# Patient Record
Sex: MALE | Race: WHITE | Employment: UNEMPLOYED | URBAN - METROPOLITAN AREA
[De-identification: names, ages, dates, MRNs, and addresses within clinical notes are randomized per-mention and may not be internally consistent; named-entity substitution may affect disease eponyms.]

---

## 2017-03-17 ENCOUNTER — APPOINTMENT (OUTPATIENT)
Dept: GENERAL RADIOLOGY | Age: 52
End: 2017-03-17
Payer: COMMERCIAL

## 2017-03-17 ENCOUNTER — HOSPITAL ENCOUNTER (EMERGENCY)
Age: 52
Discharge: HOME | End: 2017-03-17
Attending: EMERGENCY MEDICINE
Payer: COMMERCIAL

## 2017-03-17 VITALS
HEART RATE: 95 BPM | BODY MASS INDEX: 21.98 KG/M2 | SYSTOLIC BLOOD PRESSURE: 102 MMHG | HEIGHT: 68 IN | DIASTOLIC BLOOD PRESSURE: 84 MMHG | RESPIRATION RATE: 18 BRPM | OXYGEN SATURATION: 96 % | TEMPERATURE: 98.2 F | WEIGHT: 145 LBS

## 2017-03-17 DIAGNOSIS — S43.402A SPRAIN SHOULDER/ARM, LEFT, INITIAL ENCOUNTER: Primary | ICD-10-CM

## 2017-03-17 PROCEDURE — 73030 X-RAY EXAM OF SHOULDER: CPT

## 2017-03-17 PROCEDURE — 99283 EMERGENCY DEPT VISIT LOW MDM: CPT

## 2017-03-17 PROCEDURE — 6360000002 HC RX W HCPCS: Performed by: EMERGENCY MEDICINE

## 2017-03-17 PROCEDURE — 96372 THER/PROPH/DIAG INJ SC/IM: CPT

## 2017-03-17 RX ORDER — COVID-19 ANTIGEN TEST
1 KIT MISCELLANEOUS 2 TIMES DAILY
COMMUNITY
End: 2017-03-25

## 2017-03-17 RX ORDER — NAPROXEN 500 MG/1
500 TABLET ORAL 2 TIMES DAILY
Qty: 10 TABLET | Refills: 0 | Status: SHIPPED | OUTPATIENT
Start: 2017-03-17 | End: 2017-03-25

## 2017-03-17 RX ORDER — KETOROLAC TROMETHAMINE 30 MG/ML
60 INJECTION, SOLUTION INTRAMUSCULAR; INTRAVENOUS ONCE
Status: COMPLETED | OUTPATIENT
Start: 2017-03-17 | End: 2017-03-17

## 2017-03-17 RX ORDER — IBUPROFEN 600 MG/1
600 TABLET ORAL EVERY 6 HOURS PRN
COMMUNITY
End: 2017-03-25

## 2017-03-17 RX ADMIN — KETOROLAC TROMETHAMINE 60 MG: 30 INJECTION, SOLUTION INTRAMUSCULAR at 23:37

## 2017-03-17 ASSESSMENT — ENCOUNTER SYMPTOMS
EYE REDNESS: 0
SORE THROAT: 0
ABDOMINAL PAIN: 0
PHOTOPHOBIA: 0
RHINORRHEA: 0
COUGH: 0
SHORTNESS OF BREATH: 0
BACK PAIN: 0
COLOR CHANGE: 0
NAUSEA: 0

## 2017-03-17 ASSESSMENT — PAIN DESCRIPTION - LOCATION: LOCATION: SHOULDER

## 2017-03-17 ASSESSMENT — PAIN DESCRIPTION - PAIN TYPE: TYPE: ACUTE PAIN

## 2017-03-17 ASSESSMENT — PAIN DESCRIPTION - ORIENTATION: ORIENTATION: LEFT

## 2017-03-17 ASSESSMENT — PAIN DESCRIPTION - DESCRIPTORS: DESCRIPTORS: BURNING

## 2017-03-17 ASSESSMENT — PAIN SCALES - GENERAL
PAINLEVEL_OUTOF10: 8
PAINLEVEL_OUTOF10: 8

## 2017-03-23 ENCOUNTER — HOSPITAL ENCOUNTER (EMERGENCY)
Age: 52
Discharge: HOME | End: 2017-03-23
Attending: EMERGENCY MEDICINE
Payer: COMMERCIAL

## 2017-03-23 VITALS
HEART RATE: 73 BPM | BODY MASS INDEX: 21.98 KG/M2 | RESPIRATION RATE: 16 BRPM | TEMPERATURE: 97.9 F | WEIGHT: 145 LBS | SYSTOLIC BLOOD PRESSURE: 123 MMHG | DIASTOLIC BLOOD PRESSURE: 76 MMHG | HEIGHT: 68 IN | OXYGEN SATURATION: 99 %

## 2017-03-23 DIAGNOSIS — T25.222A SECOND DEGREE BURN OF FOOT, LEFT, INITIAL ENCOUNTER: Primary | ICD-10-CM

## 2017-03-23 PROCEDURE — 99283 EMERGENCY DEPT VISIT LOW MDM: CPT

## 2017-03-23 PROCEDURE — 2500000003 HC RX 250 WO HCPCS: Performed by: PHYSICIAN ASSISTANT

## 2017-03-23 PROCEDURE — 16020 DRESS/DEBRID P-THICK BURN S: CPT

## 2017-03-23 PROCEDURE — 6370000000 HC RX 637 (ALT 250 FOR IP): Performed by: PHYSICIAN ASSISTANT

## 2017-03-23 RX ORDER — CEPHALEXIN 500 MG/1
500 CAPSULE ORAL 4 TIMES DAILY
Qty: 40 CAPSULE | Refills: 0 | Status: SHIPPED | OUTPATIENT
Start: 2017-03-23 | End: 2017-04-02

## 2017-03-23 RX ORDER — TRAMADOL HYDROCHLORIDE 50 MG/1
50 TABLET ORAL ONCE
Status: COMPLETED | OUTPATIENT
Start: 2017-03-23 | End: 2017-03-23

## 2017-03-23 RX ORDER — CEPHALEXIN 250 MG/1
500 CAPSULE ORAL ONCE
Status: COMPLETED | OUTPATIENT
Start: 2017-03-23 | End: 2017-03-23

## 2017-03-23 RX ADMIN — CEPHALEXIN 500 MG: 250 CAPSULE ORAL at 19:37

## 2017-03-23 RX ADMIN — SILVER SULFADIAZINE: 10 CREAM TOPICAL at 18:58

## 2017-03-23 RX ADMIN — TRAMADOL HYDROCHLORIDE 50 MG: 50 TABLET, FILM COATED ORAL at 18:57

## 2017-03-23 ASSESSMENT — PAIN DESCRIPTION - ORIENTATION: ORIENTATION: LEFT

## 2017-03-23 ASSESSMENT — PAIN DESCRIPTION - PAIN TYPE: TYPE: ACUTE PAIN

## 2017-03-23 ASSESSMENT — PAIN SCALES - GENERAL
PAINLEVEL_OUTOF10: 7
PAINLEVEL_OUTOF10: 7

## 2017-03-23 ASSESSMENT — PAIN DESCRIPTION - LOCATION: LOCATION: FOOT

## 2017-03-25 ENCOUNTER — HOSPITAL ENCOUNTER (EMERGENCY)
Age: 52
Discharge: HOME | End: 2017-03-25
Attending: EMERGENCY MEDICINE
Payer: COMMERCIAL

## 2017-03-25 VITALS
BODY MASS INDEX: 21.98 KG/M2 | HEART RATE: 84 BPM | RESPIRATION RATE: 17 BRPM | WEIGHT: 145 LBS | OXYGEN SATURATION: 100 % | TEMPERATURE: 97.5 F | DIASTOLIC BLOOD PRESSURE: 77 MMHG | SYSTOLIC BLOOD PRESSURE: 126 MMHG | HEIGHT: 68 IN

## 2017-03-25 DIAGNOSIS — T30.0 BURN: Primary | ICD-10-CM

## 2017-03-25 PROCEDURE — 16020 DRESS/DEBRID P-THICK BURN S: CPT

## 2017-03-25 PROCEDURE — 2500000003 HC RX 250 WO HCPCS: Performed by: NURSE PRACTITIONER

## 2017-03-25 PROCEDURE — 99283 EMERGENCY DEPT VISIT LOW MDM: CPT

## 2017-03-25 RX ADMIN — SILVER SULFADIAZINE: 10 CREAM TOPICAL at 16:53

## 2017-03-25 ASSESSMENT — ENCOUNTER SYMPTOMS
SHORTNESS OF BREATH: 0
TROUBLE SWALLOWING: 0
NAUSEA: 0
VOMITING: 0
COUGH: 0

## 2017-03-25 ASSESSMENT — PAIN DESCRIPTION - DESCRIPTORS: DESCRIPTORS: BURNING

## 2017-03-25 ASSESSMENT — PAIN SCALES - GENERAL: PAINLEVEL_OUTOF10: 7

## 2017-03-25 ASSESSMENT — PAIN DESCRIPTION - ORIENTATION: ORIENTATION: LEFT

## 2017-03-25 ASSESSMENT — PAIN DESCRIPTION - PAIN TYPE: TYPE: ACUTE PAIN

## 2017-03-25 ASSESSMENT — PAIN DESCRIPTION - LOCATION: LOCATION: FOOT

## 2017-03-27 ENCOUNTER — TELEPHONE (OUTPATIENT)
Dept: BURN CARE | Age: 52
End: 2017-03-27

## 2017-03-28 ENCOUNTER — OFFICE VISIT (OUTPATIENT)
Dept: BURN CARE | Age: 52
End: 2017-03-28
Payer: COMMERCIAL

## 2017-03-28 VITALS
HEIGHT: 68 IN | TEMPERATURE: 97.6 F | SYSTOLIC BLOOD PRESSURE: 138 MMHG | HEART RATE: 78 BPM | DIASTOLIC BLOOD PRESSURE: 90 MMHG | BODY MASS INDEX: 22.43 KG/M2 | WEIGHT: 148 LBS

## 2017-03-28 DIAGNOSIS — T25.222A LEFT FOOT BURN, SECOND DEGREE, INITIAL ENCOUNTER: Primary | ICD-10-CM

## 2017-03-28 PROBLEM — T25.022A LEFT FOOT BURN: Status: ACTIVE | Noted: 2017-03-28

## 2017-03-28 PROCEDURE — 99202 OFFICE O/P NEW SF 15 MIN: CPT | Performed by: PLASTIC SURGERY

## 2017-03-28 RX ORDER — GINSENG 100 MG
CAPSULE ORAL
Qty: 453.9 G | Refills: 0 | Status: SHIPPED | OUTPATIENT
Start: 2017-03-28 | End: 2017-04-07

## 2017-04-05 ENCOUNTER — TELEPHONE (OUTPATIENT)
Dept: BURN CARE | Age: 52
End: 2017-04-05

## 2017-04-11 ENCOUNTER — OFFICE VISIT (OUTPATIENT)
Dept: BURN CARE | Age: 52
End: 2017-04-11
Payer: COMMERCIAL

## 2017-04-11 VITALS
DIASTOLIC BLOOD PRESSURE: 86 MMHG | TEMPERATURE: 97.4 F | HEART RATE: 66 BPM | HEIGHT: 68 IN | WEIGHT: 152 LBS | SYSTOLIC BLOOD PRESSURE: 125 MMHG | BODY MASS INDEX: 23.04 KG/M2

## 2017-04-11 DIAGNOSIS — T25.222A LEFT FOOT BURN, SECOND DEGREE, INITIAL ENCOUNTER: Primary | ICD-10-CM

## 2017-04-11 PROCEDURE — 99213 OFFICE O/P EST LOW 20 MIN: CPT | Performed by: PLASTIC SURGERY

## 2017-04-11 RX ORDER — GINSENG 100 MG
CAPSULE ORAL 2 TIMES DAILY
Status: ON HOLD | COMMUNITY
End: 2018-02-10 | Stop reason: ALTCHOICE

## 2017-04-23 ENCOUNTER — HOSPITAL ENCOUNTER (EMERGENCY)
Age: 52
Discharge: HOME | End: 2017-04-23
Attending: EMERGENCY MEDICINE
Payer: COMMERCIAL

## 2017-04-23 VITALS
WEIGHT: 154 LBS | DIASTOLIC BLOOD PRESSURE: 95 MMHG | TEMPERATURE: 97.5 F | BODY MASS INDEX: 23.34 KG/M2 | HEIGHT: 68 IN | HEART RATE: 94 BPM | OXYGEN SATURATION: 99 % | RESPIRATION RATE: 16 BRPM | SYSTOLIC BLOOD PRESSURE: 114 MMHG

## 2017-04-23 DIAGNOSIS — R11.0 NAUSEA: ICD-10-CM

## 2017-04-23 DIAGNOSIS — J02.9 PHARYNGITIS, UNSPECIFIED ETIOLOGY: Primary | ICD-10-CM

## 2017-04-23 LAB
DIRECT EXAM: NORMAL
Lab: NORMAL
SPECIMEN DESCRIPTION: NORMAL
SPECIMEN DESCRIPTION: NORMAL
STATUS: NORMAL

## 2017-04-23 PROCEDURE — 99283 EMERGENCY DEPT VISIT LOW MDM: CPT

## 2017-04-23 PROCEDURE — 6370000000 HC RX 637 (ALT 250 FOR IP): Performed by: PHYSICIAN ASSISTANT

## 2017-04-23 PROCEDURE — 87880 STREP A ASSAY W/OPTIC: CPT

## 2017-04-23 PROCEDURE — 6360000002 HC RX W HCPCS: Performed by: PHYSICIAN ASSISTANT

## 2017-04-23 RX ORDER — ONDANSETRON 4 MG/1
4 TABLET, ORALLY DISINTEGRATING ORAL EVERY 8 HOURS PRN
Qty: 8 TABLET | Refills: 0 | Status: ON HOLD | OUTPATIENT
Start: 2017-04-23 | End: 2018-02-10 | Stop reason: ALTCHOICE

## 2017-04-23 RX ORDER — IBUPROFEN 600 MG/1
600 TABLET ORAL ONCE
Status: COMPLETED | OUTPATIENT
Start: 2017-04-23 | End: 2017-04-23

## 2017-04-23 RX ORDER — ONDANSETRON 4 MG/1
4 TABLET, ORALLY DISINTEGRATING ORAL ONCE
Status: COMPLETED | OUTPATIENT
Start: 2017-04-23 | End: 2017-04-23

## 2017-04-23 RX ADMIN — ONDANSETRON 4 MG: 4 TABLET, ORALLY DISINTEGRATING ORAL at 09:13

## 2017-04-23 RX ADMIN — IBUPROFEN 600 MG: 600 TABLET, FILM COATED ORAL at 09:13

## 2017-04-23 ASSESSMENT — PAIN DESCRIPTION - LOCATION: LOCATION: THROAT

## 2017-04-23 ASSESSMENT — PAIN SCALES - GENERAL: PAINLEVEL_OUTOF10: 8

## 2017-04-25 ENCOUNTER — OFFICE VISIT (OUTPATIENT)
Dept: BURN CARE | Age: 52
End: 2017-04-25
Payer: COMMERCIAL

## 2017-04-25 VITALS
BODY MASS INDEX: 22.94 KG/M2 | TEMPERATURE: 97.5 F | DIASTOLIC BLOOD PRESSURE: 74 MMHG | WEIGHT: 151.4 LBS | SYSTOLIC BLOOD PRESSURE: 114 MMHG | HEART RATE: 87 BPM | HEIGHT: 68 IN

## 2017-04-25 DIAGNOSIS — T25.222A LEFT FOOT BURN, SECOND DEGREE, INITIAL ENCOUNTER: Primary | ICD-10-CM

## 2017-04-25 PROCEDURE — 99212 OFFICE O/P EST SF 10 MIN: CPT | Performed by: PLASTIC SURGERY

## 2017-04-26 ENCOUNTER — TELEPHONE (OUTPATIENT)
Dept: BURN CARE | Age: 52
End: 2017-04-26

## 2017-05-16 ENCOUNTER — OFFICE VISIT (OUTPATIENT)
Dept: BURN CARE | Age: 52
End: 2017-05-16
Payer: COMMERCIAL

## 2017-05-16 VITALS
TEMPERATURE: 97.6 F | WEIGHT: 150 LBS | HEART RATE: 64 BPM | SYSTOLIC BLOOD PRESSURE: 106 MMHG | HEIGHT: 68 IN | BODY MASS INDEX: 22.73 KG/M2 | DIASTOLIC BLOOD PRESSURE: 51 MMHG

## 2017-05-16 DIAGNOSIS — T25.222A LEFT FOOT BURN, SECOND DEGREE, INITIAL ENCOUNTER: Primary | ICD-10-CM

## 2017-05-16 PROCEDURE — 99213 OFFICE O/P EST LOW 20 MIN: CPT | Performed by: PLASTIC SURGERY

## 2017-09-17 ENCOUNTER — HOSPITAL ENCOUNTER (EMERGENCY)
Age: 52
Discharge: HOME | End: 2017-09-17
Attending: EMERGENCY MEDICINE
Payer: COMMERCIAL

## 2017-09-17 VITALS
WEIGHT: 160 LBS | RESPIRATION RATE: 16 BRPM | DIASTOLIC BLOOD PRESSURE: 88 MMHG | OXYGEN SATURATION: 100 % | HEIGHT: 68 IN | BODY MASS INDEX: 24.25 KG/M2 | TEMPERATURE: 97.8 F | HEART RATE: 86 BPM | SYSTOLIC BLOOD PRESSURE: 116 MMHG

## 2017-09-17 DIAGNOSIS — L02.91 ABSCESS: Primary | ICD-10-CM

## 2017-09-17 PROCEDURE — 10060 I&D ABSCESS SIMPLE/SINGLE: CPT

## 2017-09-17 PROCEDURE — 99282 EMERGENCY DEPT VISIT SF MDM: CPT

## 2017-09-17 RX ORDER — LIDOCAINE HYDROCHLORIDE 10 MG/ML
20 INJECTION, SOLUTION INFILTRATION; PERINEURAL ONCE
Status: DISCONTINUED | OUTPATIENT
Start: 2017-09-17 | End: 2017-09-17 | Stop reason: HOSPADM

## 2017-09-17 RX ORDER — CEPHALEXIN 500 MG/1
500 CAPSULE ORAL 4 TIMES DAILY
Qty: 40 CAPSULE | Refills: 0 | Status: SHIPPED | OUTPATIENT
Start: 2017-09-17 | End: 2017-09-27

## 2017-09-17 RX ORDER — SULFAMETHOXAZOLE AND TRIMETHOPRIM 800; 160 MG/1; MG/1
1 TABLET ORAL 2 TIMES DAILY
Qty: 20 TABLET | Refills: 0 | Status: SHIPPED | OUTPATIENT
Start: 2017-09-17 | End: 2017-09-27

## 2017-09-26 ENCOUNTER — HOSPITAL ENCOUNTER (EMERGENCY)
Age: 52
Discharge: HOME | End: 2017-09-26
Attending: EMERGENCY MEDICINE
Payer: COMMERCIAL

## 2017-09-26 VITALS
SYSTOLIC BLOOD PRESSURE: 100 MMHG | DIASTOLIC BLOOD PRESSURE: 72 MMHG | RESPIRATION RATE: 15 BRPM | WEIGHT: 160 LBS | HEIGHT: 68 IN | HEART RATE: 78 BPM | OXYGEN SATURATION: 99 % | BODY MASS INDEX: 24.25 KG/M2 | TEMPERATURE: 97.5 F

## 2017-09-26 DIAGNOSIS — L02.91 ABSCESS: Primary | ICD-10-CM

## 2017-09-26 PROCEDURE — 10060 I&D ABSCESS SIMPLE/SINGLE: CPT

## 2017-09-26 PROCEDURE — 99282 EMERGENCY DEPT VISIT SF MDM: CPT

## 2017-09-26 PROCEDURE — 2500000003 HC RX 250 WO HCPCS: Performed by: PHYSICIAN ASSISTANT

## 2017-09-26 RX ORDER — DOXYCYCLINE HYCLATE 100 MG
100 TABLET ORAL 2 TIMES DAILY
Qty: 20 TABLET | Refills: 0 | Status: SHIPPED | OUTPATIENT
Start: 2017-09-26 | End: 2017-10-06

## 2017-09-26 RX ORDER — LIDOCAINE HYDROCHLORIDE 10 MG/ML
20 INJECTION, SOLUTION INFILTRATION; PERINEURAL ONCE
Status: COMPLETED | OUTPATIENT
Start: 2017-09-26 | End: 2017-09-26

## 2017-09-26 RX ADMIN — LIDOCAINE HYDROCHLORIDE 20 ML: 10 INJECTION, SOLUTION INFILTRATION; PERINEURAL at 09:18

## 2017-09-26 ASSESSMENT — PAIN SCALES - GENERAL
PAINLEVEL_OUTOF10: 7
PAINLEVEL_OUTOF10: 7

## 2017-09-26 ASSESSMENT — PAIN DESCRIPTION - PAIN TYPE: TYPE: ACUTE PAIN

## 2017-09-26 ASSESSMENT — PAIN DESCRIPTION - LOCATION: LOCATION: BUTTOCKS

## 2017-09-26 ASSESSMENT — PAIN DESCRIPTION - DESCRIPTORS: DESCRIPTORS: BURNING

## 2018-02-10 ENCOUNTER — HOSPITAL ENCOUNTER (INPATIENT)
Age: 53
LOS: 1 days | Discharge: HOME HEALTH | DRG: 603 | End: 2018-02-13
Attending: EMERGENCY MEDICINE | Admitting: INTERNAL MEDICINE
Payer: COMMERCIAL

## 2018-02-10 DIAGNOSIS — L02.31 GLUTEAL ABSCESS: ICD-10-CM

## 2018-02-10 DIAGNOSIS — L03.317 CELLULITIS OF BUTTOCK: Primary | ICD-10-CM

## 2018-02-10 PROBLEM — L03.90 CELLULITIS: Status: ACTIVE | Noted: 2018-02-10

## 2018-02-10 LAB
ABSOLUTE EOS #: 0.1 K/UL (ref 0–0.4)
ABSOLUTE IMMATURE GRANULOCYTE: ABNORMAL K/UL (ref 0–0.3)
ABSOLUTE LYMPH #: 2.4 K/UL (ref 1–4.8)
ABSOLUTE MONO #: 1.2 K/UL (ref 0.1–1.3)
ANION GAP SERPL CALCULATED.3IONS-SCNC: 13 MMOL/L (ref 9–17)
BASOPHILS # BLD: 0 % (ref 0–2)
BASOPHILS ABSOLUTE: 0 K/UL (ref 0–0.2)
BUN BLDV-MCNC: 26 MG/DL (ref 6–20)
BUN/CREAT BLD: ABNORMAL (ref 9–20)
CALCIUM SERPL-MCNC: 8.6 MG/DL (ref 8.6–10.4)
CHLORIDE BLD-SCNC: 104 MMOL/L (ref 98–107)
CO2: 22 MMOL/L (ref 20–31)
CREAT SERPL-MCNC: 0.8 MG/DL (ref 0.7–1.2)
DIFFERENTIAL TYPE: ABNORMAL
EOSINOPHILS RELATIVE PERCENT: 1 % (ref 0–4)
GFR AFRICAN AMERICAN: >60 ML/MIN
GFR NON-AFRICAN AMERICAN: >60 ML/MIN
GFR SERPL CREATININE-BSD FRML MDRD: ABNORMAL ML/MIN/{1.73_M2}
GFR SERPL CREATININE-BSD FRML MDRD: ABNORMAL ML/MIN/{1.73_M2}
GLUCOSE BLD-MCNC: 112 MG/DL (ref 70–99)
HCT VFR BLD CALC: 43.2 % (ref 41–53)
HEMOGLOBIN: 14.7 G/DL (ref 13.5–17.5)
IMMATURE GRANULOCYTES: ABNORMAL %
LACTIC ACID, WHOLE BLOOD: NORMAL MMOL/L (ref 0.7–2.1)
LACTIC ACID: 0.8 MMOL/L (ref 0.5–2.2)
LYMPHOCYTES # BLD: 20 % (ref 24–44)
MCH RBC QN AUTO: 30.9 PG (ref 26–34)
MCHC RBC AUTO-ENTMCNC: 33.9 G/DL (ref 31–37)
MCV RBC AUTO: 91.1 FL (ref 80–100)
MONOCYTES # BLD: 10 % (ref 1–7)
NRBC AUTOMATED: ABNORMAL PER 100 WBC
PDW BLD-RTO: 14.6 % (ref 11.5–14.9)
PLATELET # BLD: 195 K/UL (ref 150–450)
PLATELET ESTIMATE: ABNORMAL
PMV BLD AUTO: 9.5 FL (ref 6–12)
POTASSIUM SERPL-SCNC: 3.9 MMOL/L (ref 3.7–5.3)
RBC # BLD: 4.74 M/UL (ref 4.5–5.9)
RBC # BLD: ABNORMAL 10*6/UL
SEG NEUTROPHILS: 69 % (ref 36–66)
SEGMENTED NEUTROPHILS ABSOLUTE COUNT: 8.5 K/UL (ref 1.3–9.1)
SODIUM BLD-SCNC: 139 MMOL/L (ref 135–144)
WBC # BLD: 12.2 K/UL (ref 3.5–11)
WBC # BLD: ABNORMAL 10*3/UL

## 2018-02-10 PROCEDURE — 96365 THER/PROPH/DIAG IV INF INIT: CPT

## 2018-02-10 PROCEDURE — 85025 COMPLETE CBC W/AUTO DIFF WBC: CPT

## 2018-02-10 PROCEDURE — 80048 BASIC METABOLIC PNL TOTAL CA: CPT

## 2018-02-10 PROCEDURE — 83605 ASSAY OF LACTIC ACID: CPT

## 2018-02-10 PROCEDURE — 6360000002 HC RX W HCPCS: Performed by: PHYSICIAN ASSISTANT

## 2018-02-10 PROCEDURE — 2580000003 HC RX 258: Performed by: PHYSICIAN ASSISTANT

## 2018-02-10 PROCEDURE — 36415 COLL VENOUS BLD VENIPUNCTURE: CPT

## 2018-02-10 PROCEDURE — 96375 TX/PRO/DX INJ NEW DRUG ADDON: CPT

## 2018-02-10 PROCEDURE — 87040 BLOOD CULTURE FOR BACTERIA: CPT

## 2018-02-10 PROCEDURE — G0378 HOSPITAL OBSERVATION PER HR: HCPCS

## 2018-02-10 PROCEDURE — 99284 EMERGENCY DEPT VISIT MOD MDM: CPT

## 2018-02-10 RX ORDER — SODIUM CHLORIDE 0.9 % (FLUSH) 0.9 %
10 SYRINGE (ML) INJECTION EVERY 12 HOURS SCHEDULED
Status: DISCONTINUED | OUTPATIENT
Start: 2018-02-11 | End: 2018-02-12

## 2018-02-10 RX ORDER — SODIUM CHLORIDE 0.9 % (FLUSH) 0.9 %
10 SYRINGE (ML) INJECTION PRN
Status: DISCONTINUED | OUTPATIENT
Start: 2018-02-10 | End: 2018-02-12

## 2018-02-10 RX ORDER — ACETAMINOPHEN 325 MG/1
650 TABLET ORAL EVERY 4 HOURS PRN
Status: DISCONTINUED | OUTPATIENT
Start: 2018-02-10 | End: 2018-02-12

## 2018-02-10 RX ORDER — MORPHINE SULFATE 2 MG/ML
2 INJECTION, SOLUTION INTRAMUSCULAR; INTRAVENOUS
Status: DISCONTINUED | OUTPATIENT
Start: 2018-02-10 | End: 2018-02-12

## 2018-02-10 RX ORDER — MORPHINE SULFATE 4 MG/ML
4 INJECTION, SOLUTION INTRAMUSCULAR; INTRAVENOUS ONCE
Status: COMPLETED | OUTPATIENT
Start: 2018-02-10 | End: 2018-02-10

## 2018-02-10 RX ADMIN — MORPHINE SULFATE 4 MG: 4 INJECTION, SOLUTION INTRAMUSCULAR; INTRAVENOUS at 21:45

## 2018-02-10 RX ADMIN — VANCOMYCIN HYDROCHLORIDE 1000 MG: 1 INJECTION, POWDER, LYOPHILIZED, FOR SOLUTION INTRAVENOUS at 21:49

## 2018-02-10 ASSESSMENT — PAIN SCALES - GENERAL
PAINLEVEL_OUTOF10: 5
PAINLEVEL_OUTOF10: 8
PAINLEVEL_OUTOF10: 5

## 2018-02-10 ASSESSMENT — PAIN DESCRIPTION - LOCATION: LOCATION: BUTTOCKS

## 2018-02-10 ASSESSMENT — PAIN DESCRIPTION - PAIN TYPE: TYPE: ACUTE PAIN

## 2018-02-11 PROCEDURE — 99254 IP/OBS CNSLTJ NEW/EST MOD 60: CPT | Performed by: INTERNAL MEDICINE

## 2018-02-11 PROCEDURE — 2580000003 HC RX 258: Performed by: INTERNAL MEDICINE

## 2018-02-11 PROCEDURE — 96366 THER/PROPH/DIAG IV INF ADDON: CPT

## 2018-02-11 PROCEDURE — 6360000002 HC RX W HCPCS: Performed by: INTERNAL MEDICINE

## 2018-02-11 PROCEDURE — 6370000000 HC RX 637 (ALT 250 FOR IP): Performed by: INTERNAL MEDICINE

## 2018-02-11 PROCEDURE — 99223 1ST HOSP IP/OBS HIGH 75: CPT | Performed by: INTERNAL MEDICINE

## 2018-02-11 PROCEDURE — 87641 MR-STAPH DNA AMP PROBE: CPT

## 2018-02-11 PROCEDURE — 96376 TX/PRO/DX INJ SAME DRUG ADON: CPT

## 2018-02-11 PROCEDURE — 96372 THER/PROPH/DIAG INJ SC/IM: CPT

## 2018-02-11 PROCEDURE — G0378 HOSPITAL OBSERVATION PER HR: HCPCS

## 2018-02-11 RX ORDER — ASPIRIN 81 MG/1
81 TABLET, CHEWABLE ORAL DAILY
Status: DISCONTINUED | OUTPATIENT
Start: 2018-02-11 | End: 2018-02-13 | Stop reason: HOSPADM

## 2018-02-11 RX ADMIN — VANCOMYCIN HYDROCHLORIDE 1000 MG: 1 INJECTION, POWDER, LYOPHILIZED, FOR SOLUTION INTRAVENOUS at 11:13

## 2018-02-11 RX ADMIN — MORPHINE SULFATE 2 MG: 2 INJECTION, SOLUTION INTRAMUSCULAR; INTRAVENOUS at 21:18

## 2018-02-11 RX ADMIN — MORPHINE SULFATE 2 MG: 2 INJECTION, SOLUTION INTRAMUSCULAR; INTRAVENOUS at 05:55

## 2018-02-11 RX ADMIN — VANCOMYCIN HYDROCHLORIDE 1000 MG: 1 INJECTION, POWDER, LYOPHILIZED, FOR SOLUTION INTRAVENOUS at 20:57

## 2018-02-11 RX ADMIN — MORPHINE SULFATE 2 MG: 2 INJECTION, SOLUTION INTRAMUSCULAR; INTRAVENOUS at 11:22

## 2018-02-11 RX ADMIN — Medication 10 ML: at 11:23

## 2018-02-11 RX ADMIN — Medication 10 ML: at 20:56

## 2018-02-11 RX ADMIN — ENOXAPARIN SODIUM 40 MG: 40 INJECTION SUBCUTANEOUS at 11:13

## 2018-02-11 RX ADMIN — ASPIRIN 81 MG 81 MG: 81 TABLET ORAL at 11:13

## 2018-02-11 ASSESSMENT — PAIN SCALES - GENERAL
PAINLEVEL_OUTOF10: 5
PAINLEVEL_OUTOF10: 3
PAINLEVEL_OUTOF10: 7
PAINLEVEL_OUTOF10: 8
PAINLEVEL_OUTOF10: 6
PAINLEVEL_OUTOF10: 8

## 2018-02-11 ASSESSMENT — ENCOUNTER SYMPTOMS
NAUSEA: 0
VOMITING: 0
SHORTNESS OF BREATH: 0
WHEEZING: 0
COUGH: 0
DIARRHEA: 0

## 2018-02-11 NOTE — PROGRESS NOTES
[address] [address]    HISTORY AND PHYSICAL EXAMINATION            Date:   2/11/2018  Patient name:  Latanya Rizvi  Date of admission:  2/10/2018  7:36 PM  MRN:   [id number]  Account:  [de-identified]  YOB: 1965  PCP:    No primary care provider on file. Room:   [address] [place] [address]  Code Status:    Full Code    Chief Complaint:     Chief Complaint   Patient presents with    Abscess       History Obtained From:     patient    History of Present Illness: The patient is a 46 y.o. Non-/non  male who presents with Abscess   and he is admitted to the hospital for the management of cellulitis of left buttock. He had multiple I&Ds in the past with last cellulitis being two months ago when was given bactrim and keflex. Denies fever or chills. He has history of MRSA. Past Medical History:     Past Medical History:   Diagnosis Date    Chronic back pain     Murmur, cardiac         Past Surgical History:     Past Surgical History:   Procedure Laterality Date    CYST REMOVAL Left     INGUINAL HERNIA REPAIR Right         Medications Prior to Admission:     Prior to Admission medications    Medication Sig Start Date End Date Taking? Authorizing Provider   aspirin EC 81 MG EC tablet Take 1 tablet by mouth daily. 3/27/15  Yes Maty Eaton MD   Gauze Pads & Dressings 4\"X4\" PADS Patient to do dressing changes bid 3/28/17   Kajal Arriaga MD        Allergies:     Review of patient's allergies indicates no known allergies. Social History:     Tobacco:    reports that he has been smoking Cigarettes. He has a 16.50 pack-year smoking history. His smokeless tobacco use includes Chew. Alcohol:      reports that he does not drink alcohol. Drug Use:  reports that he does not use drugs.     Family History:     Family History   Problem Relation Age of Onset    Depression Mother        Review of Systems:     Positive and Negative as described in HPI. Review of Systems   Constitutional: Negative for chills and fever. Respiratory: Negative for cough, shortness of breath and wheezing. Cardiovascular: Negative for chest pain. Gastrointestinal: Negative for diarrhea, nausea and vomiting. Genitourinary: Negative for dysuria. Musculoskeletal: Negative for neck pain. Skin: Positive for rash. Neurological: Negative for weakness. Physical Exam:   /75   Pulse 98   Temp 97.9 °F (36.6 °C) (Oral)   Resp 18   Ht 5' 7\" (1.702 m)   Wt 142 lb 10.2 oz (64.7 kg)   SpO2 100%   BMI 22.34 kg/m²   Temp (24hrs), Av.9 °F (36.6 °C), Min:97.7 °F (36.5 °C), Max:98.3 °F (36.8 °C)    No results for input(s): POCGLU in the last 72 hours. Intake/Output Summary (Last 24 hours) at 18 1303  Last data filed at 18 0552   Gross per 24 hour   Intake              840 ml   Output                0 ml   Net              840 ml       Physical Exam   Constitutional: He is well-developed, well-nourished, and in no distress. Cardiovascular:   Positive for irregular rhythm with regular rate   Pulmonary/Chest: Effort normal and breath sounds normal.   Abdominal: Soft. Bowel sounds are normal.   Skin: He is not diaphoretic. Positive for non fluctuant erythematous lesion over left buttock. Positive for warmth with no discharge   Nursing note and vitals reviewed.       Investigations:      Laboratory Testing:  Recent Results (from the past 24 hour(s))   CBC Auto Differential    Collection Time: 02/10/18  8:45 PM   Result Value Ref Range    WBC 12.2 (H) 3.5 - 11.0 k/uL    RBC 4.74 4.5 - 5.9 m/uL    Hemoglobin 14.7 13.5 - 17.5 g/dL    Hematocrit 43.2 41 - 53 %    MCV 91.1 80 - 100 fL    MCH 30.9 26 - 34 pg    MCHC 33.9 31 - 37 g/dL    RDW 14.6 11.5 - 14.9 %    Platelets 387 518 - 421 k/uL    MPV 9.5 6.0 - 12.0 fL    NRBC Automated NOT REPORTED per 100 WBC    Differential Type NOT REPORTED     Seg Neutrophils 69 (H) 36 - 66 %    Lymphocytes 20 (L) 24 - 44 %    Monocytes 10 (H) 1 - 7 %    Eosinophils % 1 0 - 4 %    Basophils 0 0 - 2 %    Immature Granulocytes NOT REPORTED 0 %    Segs Absolute 8.50 1.3 - 9.1 k/uL    Absolute Lymph # 2.40 1.0 - 4.8 k/uL    Absolute Mono # 1.20 0.1 - 1.3 k/uL    Absolute Eos # 0.10 0.0 - 0.4 k/uL    Basophils # 0.00 0.0 - 0.2 k/uL    Absolute Immature Granulocyte NOT REPORTED 0.00 - 0.30 k/uL    WBC Morphology NOT REPORTED     RBC Morphology NOT REPORTED     Platelet Estimate NOT REPORTED    Basic Metabolic Panel    Collection Time: 02/10/18  8:45 PM   Result Value Ref Range    Glucose 112 (H) 70 - 99 mg/dL    BUN 26 (H) 6 - 20 mg/dL    CREATININE 0.80 0.70 - 1.20 mg/dL    Bun/Cre Ratio NOT REPORTED 9 - 20    Calcium 8.6 8.6 - 10.4 mg/dL    Sodium 139 135 - 144 mmol/L    Potassium 3.9 3.7 - 5.3 mmol/L    Chloride 104 98 - 107 mmol/L    CO2 22 20 - 31 mmol/L    Anion Gap 13 9 - 17 mmol/L    GFR Non-African American >60 >60 mL/min    GFR African American >60 >60 mL/min    GFR Comment          GFR Staging NOT REPORTED    Lactic Acid, Plasma    Collection Time: 02/10/18  8:45 PM   Result Value Ref Range    Lactic Acid 0.8 0.5 - 2.2 mmol/L    Lactic Acid, Whole Blood NOT REPORTED 0.7 - 2.1 mmol/L   Culture Blood #1    Collection Time: 02/10/18  8:45 PM   Result Value Ref Range    Specimen Description       . BLOOD 10mlPURP 10mlRED LA Performed at [facility]: [address]    Specimen Description  [address] [phone]     Special Requests NOT REPORTED     Culture NO GROWTH 1 HOUR     Culture       Performed at [address] [place] [address], [address] [place] [address] [phone]    Status Pending        Imaging/Diagnostics:  No results found.     [id number] Score for Atrial Fibrillation Stroke Risk   Risk   Factors  Component Value   C CHF No 0   H HTN No 0   A2 Age >= 75 No,  (48 y.o.) 0   D DM No 0   S2 Prior Stroke/TIA No 0   V Vascular Disease No 0   A Age 74-69 No,  (48 y.o.) 0   Sc Sex male 0

## 2018-02-11 NOTE — PROGRESS NOTES
Pharmacy Note  Vancomycin Consult    Lexie Irby is a 46 y.o. male started on Vancomycin for boil; consult received from Dr. Yanci Brown to manage therapy. Also receiving the following antibiotics: None. Patient Active Problem List   Diagnosis    Back pain    Tobacco abuse    Atrial fibrillation ([address]    Thrush, oral    Fatigue    Left foot burn       Allergies:  Review of patient's allergies indicates no known allergies. Temp max: 97.7    No results for input(s): BUN in the last 72 hours. No results for input(s): CREATININE in the last 72 hours. No results for input(s): WBC in the last 72 hours. No intake or output data in the 24 hours ending 02/10/18 2034    Culture Date      Source                       Results      Ht Readings from Last 1 Encounters:   02/10/18 5' 7\" (1.702 m)        Wt Readings from Last 1 Encounters:   02/10/18 165 lb (74.8 kg)         Body mass index is 25.84 kg/m². CrCl cannot be calculated (Patient's most recent lab result is older than the maximum 10 days allowed. ). Assessment/Plan:  Will initiate vancomycin 1000 mg IV Once. Re-consult pharmacy if maintenance dosing needed should patient be admitted. Timing of trough level will be determined based on culture results, renal function, and clinical response. Thank you for the consult. Will continue to follow.     Abhinav Cordoba PharmD, BCPS 2/10/2018 8:37 PM

## 2018-02-11 NOTE — ED NOTES
Report called to Kristina Lopez RN on the Med-Surg-Unit. Pt being admitted to room # 2068.      Ariana Guajardo RN  02/10/18 7022

## 2018-02-12 ENCOUNTER — APPOINTMENT (OUTPATIENT)
Dept: ULTRASOUND IMAGING | Age: 53
DRG: 603 | End: 2018-02-12
Payer: COMMERCIAL

## 2018-02-12 ENCOUNTER — ANESTHESIA (OUTPATIENT)
Dept: OPERATING ROOM | Age: 53
DRG: 603 | End: 2018-02-12
Payer: COMMERCIAL

## 2018-02-12 ENCOUNTER — ANESTHESIA EVENT (OUTPATIENT)
Dept: OPERATING ROOM | Age: 53
DRG: 603 | End: 2018-02-12
Payer: COMMERCIAL

## 2018-02-12 VITALS — SYSTOLIC BLOOD PRESSURE: 91 MMHG | DIASTOLIC BLOOD PRESSURE: 66 MMHG | TEMPERATURE: 98.3 F | OXYGEN SATURATION: 98 %

## 2018-02-12 LAB
CREAT SERPL-MCNC: 0.74 MG/DL (ref 0.7–1.2)
EKG ATRIAL RATE: 178 BPM
EKG Q-T INTERVAL: 352 MS
EKG QRS DURATION: 88 MS
EKG QTC CALCULATION (BAZETT): 435 MS
EKG R AXIS: 79 DEGREES
EKG T AXIS: 85 DEGREES
EKG VENTRICULAR RATE: 92 BPM
GFR AFRICAN AMERICAN: >60 ML/MIN
GFR NON-AFRICAN AMERICAN: >60 ML/MIN
GFR SERPL CREATININE-BSD FRML MDRD: NORMAL ML/MIN/{1.73_M2}
GFR SERPL CREATININE-BSD FRML MDRD: NORMAL ML/MIN/{1.73_M2}
MRSA, DNA, NASAL: ABNORMAL
SPECIMEN DESCRIPTION: ABNORMAL
VANCOMYCIN TROUGH DATE LAST DOSE: ABNORMAL
VANCOMYCIN TROUGH DOSE AMOUNT: ABNORMAL
VANCOMYCIN TROUGH TIME LAST DOSE: 2057
VANCOMYCIN TROUGH: 9.1 UG/ML (ref 10–20)

## 2018-02-12 PROCEDURE — 86403 PARTICLE AGGLUT ANTBDY SCRN: CPT

## 2018-02-12 PROCEDURE — 3600000012 HC SURGERY LEVEL 2 ADDTL 15MIN: Performed by: SURGERY

## 2018-02-12 PROCEDURE — 3700000001 HC ADD 15 MINUTES (ANESTHESIA): Performed by: SURGERY

## 2018-02-12 PROCEDURE — 1200000000 HC SEMI PRIVATE

## 2018-02-12 PROCEDURE — 87205 SMEAR GRAM STAIN: CPT

## 2018-02-12 PROCEDURE — 87070 CULTURE OTHR SPECIMN AEROBIC: CPT

## 2018-02-12 PROCEDURE — 99233 SBSQ HOSP IP/OBS HIGH 50: CPT | Performed by: INTERNAL MEDICINE

## 2018-02-12 PROCEDURE — 6370000000 HC RX 637 (ALT 250 FOR IP): Performed by: INTERNAL MEDICINE

## 2018-02-12 PROCEDURE — 82565 ASSAY OF CREATININE: CPT

## 2018-02-12 PROCEDURE — 6360000002 HC RX W HCPCS: Performed by: NURSE ANESTHETIST, CERTIFIED REGISTERED

## 2018-02-12 PROCEDURE — 3700000000 HC ANESTHESIA ATTENDED CARE: Performed by: SURGERY

## 2018-02-12 PROCEDURE — 2580000003 HC RX 258: Performed by: SURGERY

## 2018-02-12 PROCEDURE — 0H98XZX DRAINAGE OF BUTTOCK SKIN, EXTERNAL APPROACH, DIAGNOSTIC: ICD-10-PCS | Performed by: SURGERY

## 2018-02-12 PROCEDURE — 2580000003 HC RX 258: Performed by: ANESTHESIOLOGY

## 2018-02-12 PROCEDURE — 96376 TX/PRO/DX INJ SAME DRUG ADON: CPT

## 2018-02-12 PROCEDURE — 2500000003 HC RX 250 WO HCPCS: Performed by: SURGERY

## 2018-02-12 PROCEDURE — 80202 ASSAY OF VANCOMYCIN: CPT

## 2018-02-12 PROCEDURE — 7100000001 HC PACU RECOVERY - ADDTL 15 MIN: Performed by: SURGERY

## 2018-02-12 PROCEDURE — 6360000002 HC RX W HCPCS: Performed by: INTERNAL MEDICINE

## 2018-02-12 PROCEDURE — 87075 CULTR BACTERIA EXCEPT BLOOD: CPT

## 2018-02-12 PROCEDURE — A6402 STERILE GAUZE <= 16 SQ IN: HCPCS | Performed by: SURGERY

## 2018-02-12 PROCEDURE — 3600000002 HC SURGERY LEVEL 2 BASE: Performed by: SURGERY

## 2018-02-12 PROCEDURE — 6360000002 HC RX W HCPCS: Performed by: SURGERY

## 2018-02-12 PROCEDURE — 99232 SBSQ HOSP IP/OBS MODERATE 35: CPT | Performed by: INTERNAL MEDICINE

## 2018-02-12 PROCEDURE — 87186 SC STD MICRODIL/AGAR DIL: CPT

## 2018-02-12 PROCEDURE — 93005 ELECTROCARDIOGRAM TRACING: CPT

## 2018-02-12 PROCEDURE — 2500000003 HC RX 250 WO HCPCS: Performed by: NURSE ANESTHETIST, CERTIFIED REGISTERED

## 2018-02-12 PROCEDURE — 7100000000 HC PACU RECOVERY - FIRST 15 MIN: Performed by: SURGERY

## 2018-02-12 PROCEDURE — 36415 COLL VENOUS BLD VENIPUNCTURE: CPT

## 2018-02-12 PROCEDURE — G0378 HOSPITAL OBSERVATION PER HR: HCPCS

## 2018-02-12 PROCEDURE — 2580000003 HC RX 258: Performed by: INTERNAL MEDICINE

## 2018-02-12 RX ORDER — OXYCODONE HYDROCHLORIDE AND ACETAMINOPHEN 5; 325 MG/1; MG/1
2 TABLET ORAL EVERY 4 HOURS PRN
Status: DISCONTINUED | OUTPATIENT
Start: 2018-02-12 | End: 2018-02-13 | Stop reason: HOSPADM

## 2018-02-12 RX ORDER — OXYCODONE HYDROCHLORIDE AND ACETAMINOPHEN 5; 325 MG/1; MG/1
1 TABLET ORAL PRN
Status: DISCONTINUED | OUTPATIENT
Start: 2018-02-12 | End: 2018-02-12 | Stop reason: HOSPADM

## 2018-02-12 RX ORDER — HYDROMORPHONE HCL 110MG/55ML
0.5 PATIENT CONTROLLED ANALGESIA SYRINGE INTRAVENOUS EVERY 5 MIN PRN
Status: DISCONTINUED | OUTPATIENT
Start: 2018-02-12 | End: 2018-02-12 | Stop reason: HOSPADM

## 2018-02-12 RX ORDER — BUPIVACAINE HYDROCHLORIDE 2.5 MG/ML
INJECTION, SOLUTION EPIDURAL; INFILTRATION; INTRACAUDAL PRN
Status: DISCONTINUED | OUTPATIENT
Start: 2018-02-12 | End: 2018-02-12 | Stop reason: HOSPADM

## 2018-02-12 RX ORDER — MORPHINE SULFATE 4 MG/ML
4 INJECTION, SOLUTION INTRAMUSCULAR; INTRAVENOUS
Status: DISCONTINUED | OUTPATIENT
Start: 2018-02-12 | End: 2018-02-13 | Stop reason: HOSPADM

## 2018-02-12 RX ORDER — LIDOCAINE HYDROCHLORIDE 10 MG/ML
INJECTION, SOLUTION EPIDURAL; INFILTRATION; INTRACAUDAL; PERINEURAL PRN
Status: DISCONTINUED | OUTPATIENT
Start: 2018-02-12 | End: 2018-02-12 | Stop reason: SDUPTHER

## 2018-02-12 RX ORDER — MORPHINE SULFATE 2 MG/ML
1 INJECTION, SOLUTION INTRAMUSCULAR; INTRAVENOUS EVERY 5 MIN PRN
Status: DISCONTINUED | OUTPATIENT
Start: 2018-02-12 | End: 2018-02-12 | Stop reason: HOSPADM

## 2018-02-12 RX ORDER — SODIUM CHLORIDE 0.9 % (FLUSH) 0.9 %
10 SYRINGE (ML) INJECTION EVERY 12 HOURS SCHEDULED
Status: DISCONTINUED | OUTPATIENT
Start: 2018-02-12 | End: 2018-02-13 | Stop reason: HOSPADM

## 2018-02-12 RX ORDER — MORPHINE SULFATE 2 MG/ML
2 INJECTION, SOLUTION INTRAMUSCULAR; INTRAVENOUS
Status: DISCONTINUED | OUTPATIENT
Start: 2018-02-12 | End: 2018-02-13 | Stop reason: HOSPADM

## 2018-02-12 RX ORDER — OXYCODONE HYDROCHLORIDE AND ACETAMINOPHEN 5; 325 MG/1; MG/1
1 TABLET ORAL EVERY 4 HOURS PRN
Status: DISCONTINUED | OUTPATIENT
Start: 2018-02-12 | End: 2018-02-13 | Stop reason: HOSPADM

## 2018-02-12 RX ORDER — OXYCODONE HYDROCHLORIDE AND ACETAMINOPHEN 5; 325 MG/1; MG/1
2 TABLET ORAL PRN
Status: DISCONTINUED | OUTPATIENT
Start: 2018-02-12 | End: 2018-02-12 | Stop reason: HOSPADM

## 2018-02-12 RX ORDER — SODIUM CHLORIDE 0.9 % (FLUSH) 0.9 %
10 SYRINGE (ML) INJECTION PRN
Status: DISCONTINUED | OUTPATIENT
Start: 2018-02-12 | End: 2018-02-13 | Stop reason: HOSPADM

## 2018-02-12 RX ORDER — PROMETHAZINE HYDROCHLORIDE 25 MG/ML
6.25 INJECTION, SOLUTION INTRAMUSCULAR; INTRAVENOUS
Status: DISCONTINUED | OUTPATIENT
Start: 2018-02-12 | End: 2018-02-12 | Stop reason: HOSPADM

## 2018-02-12 RX ORDER — MIDAZOLAM HYDROCHLORIDE 1 MG/ML
INJECTION INTRAMUSCULAR; INTRAVENOUS PRN
Status: DISCONTINUED | OUTPATIENT
Start: 2018-02-12 | End: 2018-02-12 | Stop reason: SDUPTHER

## 2018-02-12 RX ORDER — SODIUM CHLORIDE 9 MG/ML
INJECTION, SOLUTION INTRAVENOUS CONTINUOUS PRN
Status: DISCONTINUED | OUTPATIENT
Start: 2018-02-12 | End: 2018-02-12 | Stop reason: SDUPTHER

## 2018-02-12 RX ORDER — ACETAMINOPHEN 325 MG/1
650 TABLET ORAL EVERY 4 HOURS PRN
Status: DISCONTINUED | OUTPATIENT
Start: 2018-02-12 | End: 2018-02-13 | Stop reason: HOSPADM

## 2018-02-12 RX ORDER — MEPERIDINE HYDROCHLORIDE 50 MG/ML
12.5 INJECTION INTRAMUSCULAR; INTRAVENOUS; SUBCUTANEOUS EVERY 5 MIN PRN
Status: DISCONTINUED | OUTPATIENT
Start: 2018-02-12 | End: 2018-02-12 | Stop reason: HOSPADM

## 2018-02-12 RX ORDER — DIPHENHYDRAMINE HYDROCHLORIDE 50 MG/ML
12.5 INJECTION INTRAMUSCULAR; INTRAVENOUS
Status: DISCONTINUED | OUTPATIENT
Start: 2018-02-12 | End: 2018-02-12 | Stop reason: HOSPADM

## 2018-02-12 RX ORDER — PROPOFOL 10 MG/ML
INJECTION, EMULSION INTRAVENOUS PRN
Status: DISCONTINUED | OUTPATIENT
Start: 2018-02-12 | End: 2018-02-12 | Stop reason: SDUPTHER

## 2018-02-12 RX ORDER — ONDANSETRON 2 MG/ML
4 INJECTION INTRAMUSCULAR; INTRAVENOUS EVERY 6 HOURS PRN
Status: DISCONTINUED | OUTPATIENT
Start: 2018-02-12 | End: 2018-02-13 | Stop reason: HOSPADM

## 2018-02-12 RX ORDER — FENTANYL CITRATE 50 UG/ML
INJECTION, SOLUTION INTRAMUSCULAR; INTRAVENOUS PRN
Status: DISCONTINUED | OUTPATIENT
Start: 2018-02-12 | End: 2018-02-12 | Stop reason: SDUPTHER

## 2018-02-12 RX ORDER — FENTANYL CITRATE 50 UG/ML
25 INJECTION, SOLUTION INTRAMUSCULAR; INTRAVENOUS EVERY 5 MIN PRN
Status: DISCONTINUED | OUTPATIENT
Start: 2018-02-12 | End: 2018-02-12 | Stop reason: HOSPADM

## 2018-02-12 RX ADMIN — Medication 10 ML: at 20:09

## 2018-02-12 RX ADMIN — FENTANYL CITRATE 50 MCG: 50 INJECTION, SOLUTION INTRAMUSCULAR; INTRAVENOUS at 15:20

## 2018-02-12 RX ADMIN — FENTANYL CITRATE 50 MCG: 50 INJECTION, SOLUTION INTRAMUSCULAR; INTRAVENOUS at 15:15

## 2018-02-12 RX ADMIN — SODIUM CHLORIDE: 9 INJECTION, SOLUTION INTRAVENOUS at 15:09

## 2018-02-12 RX ADMIN — ASPIRIN 81 MG 81 MG: 81 TABLET ORAL at 09:26

## 2018-02-12 RX ADMIN — MORPHINE SULFATE 2 MG: 2 INJECTION, SOLUTION INTRAMUSCULAR; INTRAVENOUS at 09:26

## 2018-02-12 RX ADMIN — PROPOFOL 40 MG: 10 INJECTION, EMULSION INTRAVENOUS at 15:20

## 2018-02-12 RX ADMIN — LIDOCAINE HYDROCHLORIDE 50 MG: 10 INJECTION, SOLUTION EPIDURAL; INFILTRATION; INTRACAUDAL; PERINEURAL at 15:20

## 2018-02-12 RX ADMIN — PROPOFOL 40 MG: 10 INJECTION, EMULSION INTRAVENOUS at 15:15

## 2018-02-12 RX ADMIN — MORPHINE SULFATE 2 MG: 2 INJECTION, SOLUTION INTRAMUSCULAR; INTRAVENOUS at 14:02

## 2018-02-12 RX ADMIN — MIDAZOLAM 2 MG: 1 INJECTION INTRAMUSCULAR; INTRAVENOUS at 15:09

## 2018-02-12 RX ADMIN — ENOXAPARIN SODIUM 40 MG: 40 INJECTION SUBCUTANEOUS at 20:08

## 2018-02-12 RX ADMIN — Medication 10 ML: at 09:35

## 2018-02-12 RX ADMIN — VANCOMYCIN HYDROCHLORIDE 1250 MG: 1 INJECTION, POWDER, LYOPHILIZED, FOR SOLUTION INTRAVENOUS at 14:04

## 2018-02-12 ASSESSMENT — ENCOUNTER SYMPTOMS
VOMITING: 0
NAUSEA: 0
WHEEZING: 0
SPUTUM PRODUCTION: 0
COUGH: 0
SHORTNESS OF BREATH: 0
BLOOD IN STOOL: 0
EYE DISCHARGE: 0
CONSTIPATION: 0
EYE PAIN: 0
DIARRHEA: 0

## 2018-02-12 ASSESSMENT — PULMONARY FUNCTION TESTS
PIF_VALUE: 1
PIF_VALUE: 1
PIF_VALUE: 0
PIF_VALUE: 1
PIF_VALUE: 0
PIF_VALUE: 0
PIF_VALUE: 1
PIF_VALUE: 0
PIF_VALUE: 1
PIF_VALUE: 0
PIF_VALUE: 0
PIF_VALUE: 1
PIF_VALUE: 0
PIF_VALUE: 1
PIF_VALUE: 0
PIF_VALUE: 1
PIF_VALUE: 1

## 2018-02-12 ASSESSMENT — PAIN SCALES - GENERAL
PAINLEVEL_OUTOF10: 6
PAINLEVEL_OUTOF10: 0
PAINLEVEL_OUTOF10: 5

## 2018-02-12 ASSESSMENT — LIFESTYLE VARIABLES: SMOKING_STATUS: 1

## 2018-02-12 ASSESSMENT — PAIN - FUNCTIONAL ASSESSMENT: PAIN_FUNCTIONAL_ASSESSMENT: 0-10

## 2018-02-12 NOTE — H&P
Performed at [facility] [address] [facility], [address] [place] [address] [phone]     Status Pending              Imaging/Diagnostics:  No results found.     [id number] Score for Atrial Fibrillation Stroke Risk    Risk   Factors   Component Value   C CHF No 0   H HTN No 0   A2 Age >= 76 No,  (48 y.o.) 0   D DM No 0   S2 Prior Stroke/TIA No 0   V Vascular Disease No 0   A Age 74-69 No,  (48 y.o.) 0   Sc Sex male 0     [id number]  Score   0   Score last updated 0/55/66 0:61 PM     Click here for a link to the UpToDate guideline \"Atrial Fibrillation: Anticoagulation therapy to prevent embolization     Disclaimer: Risk Score calculation is dependent on accuracy of patient problem list and past encounter diagnosis.       Assessment :       Primary Problem  Cellulitis          Active Hospital Problems     Diagnosis Date Noted    Cellulitis [L03.90] 02/10/2018    Atrial fibrillation ([facility] [address] [I48.91] 03/27/2015         Plan:      Patient status Admit as observation in the  Med/Surge     1. Left buttock cellulitis: on IV vanco, monitor for improvement. Following ID recs for recurrent history of cellulitis. H/o MRSA  2. Chronic a fib without RVR, will get EKG. Patient states he is on ASA due to [facility] of 0. Following cardio recs     Consultations:   PHARMACY TO DOSE VANCOMYCIN  IP CONSULT TO INTERNAL MEDICINE  IP CONSULT TO PHARMACY  IP CONSULT TO PHARMACY  IP CONSULT TO CARDIOLOGY  IP CONSULT TO INFECTIOUS DISEASES     Patient is admitted as inpatient status because of co-morbidities listed above, severity of signs and symptoms as outlined, requirement for current medical therapies and most importantly because of direct risk to patient if care not provided in a hospital setting.  Rodriguez Barger MD  2/11/2018  1:03 PM     Copy sent to Dr. Sofi Thompson primary care provider on file.   Attending Physician Statement  I have reviewed and edited the discharge summary of  Alexandro Soriano AS NEEDED  ,   including pertinent

## 2018-02-12 NOTE — PROGRESS NOTES
Nutrition Assessment    Type and Reason for Visit: Positive Nutrition Screen (Pressure ulcer or non-healing wounds)    Malnutrition Assessment:  · Malnutrition Status: No malnutrition    Nutrition Diagnosis:   · Problem: Increased nutrient needs  · Etiology: Increased demand for energy/nutrients due to (infection/ abscess)    Signs and symptoms: Presence of wounds (left buttock)    Nutrition Assessment:  · Subjective Assessment: Patient reports a good appetite and is eating well at meals. Patient going to OR to have I&D of left buttock abscess. Allow double protein at meals    Nutrition Risk Level   Risk Level: Low    Nutrition Intervention  Food and/or Delivery: Continue current diet  Nutrition Education/Counseling/Coordination of Care:  Continued Inpatient Monitoring    Patient assessed for nutrition risk. Deemed to be at low risk at this time. Will continue to follow patient.       Lydia CAZARES, R.D, L.D,  Clinical Dietitian  Pager # [phone]

## 2018-02-12 NOTE — OP NOTE
OPERATIVE NOTE    DATE OF PROCEDURE: 2/12/2018     SURGEON:Estrella Sinclair DO    PREOPERATIVE DIAGNOSIS: Abscess right buttocks    POSTOPERATIVE DIAGNOSIS: Same    OPERATION: Incision and drainage     ANESTHESIA: MAC    ESTIMATED BLOOD LOSS:  less than 10     COMPLICATIONS: None. SPECIMENS:  Was Obtained: culture right gluteal abscess    HISTORY: The patient is a 46y.o. year old male with history of above preop diagnosis. I explained the risk, benefits, expected outcome, and alternatives to the Patient  And they consent. The area is prepped and draped in typical sterile fashion. The skin and subcutaneous tissue is infiltrated with 1/4% plain marcaine. Incision is made approximately   3 cm and the pus is extruded from the wound. Cultures are taken. The wound is irrigated and suctioned dry. The wound is packed with iodoform gauze and a dressing is applied. The patient tolerates this procedure well.     Electronically signed by Alfa Eden DO on 2/12/2018 at 3:33 PM

## 2018-02-12 NOTE — PLAN OF CARE
Problem: Pain:  Goal: Pain level will decrease  Pain level will decrease   Outcome: Ongoing  No complaints of pain this shift

## 2018-02-13 ENCOUNTER — TELEPHONE (OUTPATIENT)
Dept: INTERNAL MEDICINE CLINIC | Age: 53
End: 2018-02-13

## 2018-02-13 VITALS
SYSTOLIC BLOOD PRESSURE: 100 MMHG | DIASTOLIC BLOOD PRESSURE: 73 MMHG | HEIGHT: 67 IN | TEMPERATURE: 97.5 F | RESPIRATION RATE: 16 BRPM | OXYGEN SATURATION: 99 % | BODY MASS INDEX: 22.91 KG/M2 | WEIGHT: 145.94 LBS | HEART RATE: 80 BPM

## 2018-02-13 PROBLEM — L02.31 GLUTEAL ABSCESS: Status: ACTIVE | Noted: 2018-02-13

## 2018-02-13 LAB
ABSOLUTE EOS #: 0 K/UL (ref 0–0.4)
ABSOLUTE IMMATURE GRANULOCYTE: ABNORMAL K/UL (ref 0–0.3)
ABSOLUTE LYMPH #: 1.1 K/UL (ref 1–4.8)
ABSOLUTE MONO #: 1.2 K/UL (ref 0.1–1.3)
ANION GAP SERPL CALCULATED.3IONS-SCNC: 10 MMOL/L (ref 9–17)
BASOPHILS # BLD: 0 % (ref 0–2)
BASOPHILS ABSOLUTE: 0 K/UL (ref 0–0.2)
BUN BLDV-MCNC: 18 MG/DL (ref 6–20)
BUN/CREAT BLD: ABNORMAL (ref 9–20)
CALCIUM SERPL-MCNC: 8.9 MG/DL (ref 8.6–10.4)
CHLORIDE BLD-SCNC: 103 MMOL/L (ref 98–107)
CO2: 23 MMOL/L (ref 20–31)
CREAT SERPL-MCNC: 0.71 MG/DL (ref 0.7–1.2)
DIFFERENTIAL TYPE: ABNORMAL
EOSINOPHILS RELATIVE PERCENT: 0 % (ref 0–4)
GFR AFRICAN AMERICAN: >60 ML/MIN
GFR NON-AFRICAN AMERICAN: >60 ML/MIN
GFR SERPL CREATININE-BSD FRML MDRD: ABNORMAL ML/MIN/{1.73_M2}
GFR SERPL CREATININE-BSD FRML MDRD: ABNORMAL ML/MIN/{1.73_M2}
GLUCOSE BLD-MCNC: 124 MG/DL (ref 70–99)
HCT VFR BLD CALC: 42 % (ref 41–53)
HEMOGLOBIN: 14.2 G/DL (ref 13.5–17.5)
IMMATURE GRANULOCYTES: ABNORMAL %
LV EF: 55 %
LVEF MODALITY: NORMAL
LYMPHOCYTES # BLD: 8 % (ref 24–44)
MCH RBC QN AUTO: 30.4 PG (ref 26–34)
MCHC RBC AUTO-ENTMCNC: 33.9 G/DL (ref 31–37)
MCV RBC AUTO: 89.6 FL (ref 80–100)
MONOCYTES # BLD: 9 % (ref 1–7)
NRBC AUTOMATED: ABNORMAL PER 100 WBC
PDW BLD-RTO: 14.5 % (ref 11.5–14.9)
PLATELET # BLD: 194 K/UL (ref 150–450)
PLATELET ESTIMATE: ABNORMAL
PMV BLD AUTO: 9.8 FL (ref 6–12)
POTASSIUM SERPL-SCNC: 4.6 MMOL/L (ref 3.7–5.3)
RBC # BLD: 4.69 M/UL (ref 4.5–5.9)
RBC # BLD: ABNORMAL 10*6/UL
SEG NEUTROPHILS: 83 % (ref 36–66)
SEGMENTED NEUTROPHILS ABSOLUTE COUNT: 10.6 K/UL (ref 1.3–9.1)
SODIUM BLD-SCNC: 136 MMOL/L (ref 135–144)
WBC # BLD: 12.9 K/UL (ref 3.5–11)
WBC # BLD: ABNORMAL 10*3/UL

## 2018-02-13 PROCEDURE — 93306 TTE W/DOPPLER COMPLETE: CPT

## 2018-02-13 PROCEDURE — 2580000003 HC RX 258: Performed by: INTERNAL MEDICINE

## 2018-02-13 PROCEDURE — 6360000002 HC RX W HCPCS: Performed by: SURGERY

## 2018-02-13 PROCEDURE — 80048 BASIC METABOLIC PNL TOTAL CA: CPT

## 2018-02-13 PROCEDURE — 6360000002 HC RX W HCPCS: Performed by: INTERNAL MEDICINE

## 2018-02-13 PROCEDURE — 6370000000 HC RX 637 (ALT 250 FOR IP): Performed by: SURGERY

## 2018-02-13 PROCEDURE — 99239 HOSP IP/OBS DSCHRG MGMT >30: CPT | Performed by: INTERNAL MEDICINE

## 2018-02-13 PROCEDURE — 36415 COLL VENOUS BLD VENIPUNCTURE: CPT

## 2018-02-13 PROCEDURE — 85025 COMPLETE CBC W/AUTO DIFF WBC: CPT

## 2018-02-13 PROCEDURE — 2580000003 HC RX 258: Performed by: SURGERY

## 2018-02-13 PROCEDURE — G0378 HOSPITAL OBSERVATION PER HR: HCPCS

## 2018-02-13 PROCEDURE — 99233 SBSQ HOSP IP/OBS HIGH 50: CPT | Performed by: INTERNAL MEDICINE

## 2018-02-13 RX ORDER — OXYCODONE HYDROCHLORIDE AND ACETAMINOPHEN 5; 325 MG/1; MG/1
1 TABLET ORAL EVERY 8 HOURS PRN
Qty: 15 TABLET | Refills: 0 | Status: CANCELLED | OUTPATIENT
Start: 2018-02-13 | End: 2018-02-18

## 2018-02-13 RX ORDER — CEPHALEXIN 500 MG/1
500 CAPSULE ORAL 2 TIMES DAILY
Qty: 20 CAPSULE | Refills: 0 | Status: SHIPPED | OUTPATIENT
Start: 2018-02-13 | End: 2018-02-23

## 2018-02-13 RX ORDER — HYDROCODONE BITARTRATE AND ACETAMINOPHEN 5; 325 MG/1; MG/1
TABLET ORAL
Qty: 30 TABLET | Refills: 0 | Status: SHIPPED | OUTPATIENT
Start: 2018-02-13 | End: 2018-02-20

## 2018-02-13 RX ORDER — DOXYCYCLINE HYCLATE 100 MG
100 TABLET ORAL 2 TIMES DAILY
Qty: 20 TABLET | Refills: 0 | Status: SHIPPED | OUTPATIENT
Start: 2018-02-13 | End: 2018-02-23

## 2018-02-13 RX ORDER — CEPHALEXIN 500 MG/1
500 CAPSULE ORAL 2 TIMES DAILY
Qty: 20 CAPSULE | Refills: 0 | Status: SHIPPED | OUTPATIENT
Start: 2018-02-13 | End: 2018-02-13

## 2018-02-13 RX ADMIN — ENOXAPARIN SODIUM 40 MG: 40 INJECTION SUBCUTANEOUS at 07:50

## 2018-02-13 RX ADMIN — Medication 10 ML: at 07:57

## 2018-02-13 RX ADMIN — VANCOMYCIN HYDROCHLORIDE 1250 MG: 1 INJECTION, POWDER, LYOPHILIZED, FOR SOLUTION INTRAVENOUS at 01:11

## 2018-02-13 RX ADMIN — OXYCODONE HYDROCHLORIDE AND ACETAMINOPHEN 2 TABLET: 5; 325 TABLET ORAL at 07:50

## 2018-02-13 RX ADMIN — OXYCODONE HYDROCHLORIDE AND ACETAMINOPHEN 1 TABLET: 5; 325 TABLET ORAL at 03:18

## 2018-02-13 ASSESSMENT — PAIN SCALES - GENERAL
PAINLEVEL_OUTOF10: 8
PAINLEVEL_OUTOF10: 0
PAINLEVEL_OUTOF10: 2
PAINLEVEL_OUTOF10: 5

## 2018-02-13 ASSESSMENT — PAIN DESCRIPTION - DESCRIPTORS: DESCRIPTORS: ACHING;DISCOMFORT

## 2018-02-13 ASSESSMENT — PAIN DESCRIPTION - LOCATION: LOCATION: BUTTOCKS

## 2018-02-13 ASSESSMENT — PAIN DESCRIPTION - PAIN TYPE: TYPE: ACUTE PAIN

## 2018-02-13 NOTE — PROGRESS NOTES
(1.702 m)   Wt 145 lb 15.1 oz (66.2 kg)   SpO2 99%   BMI 22.86 kg/m²           General Appearance: alert and oriented to person, place and time, well-developed and well-nourished, in no acute distress  Skin: warm and dry,  left buttock incision with surrounding induration and erythema  . Head: normocephalic and atraumatic  Eyes: pupils equal, round  ENT: hearing grossly normal bilaterally  Neck: neck supple and non tender   Pulmonary/Chest: clear to auscultation bilaterally- no wheezes, rales or rhonchi, normal air movement, no respiratory distress  Cardiovascular: normal rate, normal S1 and S2, no murmurs  Abdomen: soft, non-tender, non-distended,  Extremities: no cyanosis, clubbing or edema      Data Review:    Recent Labs      02/10/18   2045  02/13/18   0621   WBC  12.2*  12.9*   HGB  14.7  14.2   HCT  43.2  42.0   MCV  91.1  89.6   PLT  195  194     Recent Labs      02/10/18   2045  02/12/18   0501  02/13/18   0621   NA  139   --   136   K  3.9   --   4.6   CL  104   --   103   CO2  22   --   23   BUN  26*   --   18   CREATININE  0.80  0.74  0.71       Imaging Studies:                           All appropriate imaging studies and reports reviewed: Yes                 Assessment:         Cellulitis to the left buttock with  abscess s/p I&D. Active Problems:    Atrial fibrillation (HCC)          Recommendations:     Po Doxycycline and Keflex for 1 week on discharge   Follow final cultures . F/u in 1 week .           Montana Tolbert MD

## 2018-02-13 NOTE — CARE COORDINATION
CASE MANAGEMENT NOTE:    Admission Date:  2/10/2018 Yeimi Vizcaino is a 46 y.o.  male    Admitted for : Cellulitis [L03.90]    Met with:  Patient    PCP:  None, wants assistance finding one                                Insurance:  Judith Brian, Wife to bring in Card, is currently in [place], [facility] Dept notified,      Current Residence/ Living Arrangements:  independently at home             Current Services PTA:  No    Is patient agreeable to VNS: Yes    Freedom of choice provided: Yes         VNS chosen:  No, Wants to speak w/ Wife, she may be able to do dressing changes, if needed    DME:  none    Home Oxygen: No    Nebulizer: No    Supplier: N/A    Potential Assistance Needed: Yes, Needs PCP, follow for possible VNS, for wound care    SNF needed: No    Pharmacy:  [facility]       Does Patient want to use MEDS to BEDS? No    Family Members/Caregivers that pt would like involved in their care:    Yes    If yes, list name here:  Wife, Leandra Mcculloughdebbi    Transportation Provider:  Patient                      Discharge Plan:  2/11/18, Pt. [facility], Wife is currently in [place], has card, home tonight, Lives W/ wife in an apt w/ no steps. WBC 12.2, on Vanco, HX of MRSA/Cellulitis. Lt buttock abcess, wants to speak w/ wife, about VNS, if dressing changes are needed.  Yovany started, Pt has no PCP, would like assistance getting one. will follow//KB               Readmission Risk              Readmission Risk:        4.5       Age 72 or Greater:  0    Admitted from SNF or Requires Paid or Family Care:  0    Currently has CHF,COPD,ARF,CRI,or is on dialysis:  0    Takes more than 5 Prescription Medications:  0    Takes Digoxin,Insulin,Anticoagulants,Narcotics or ASA/Plavix:  [address] in Past 12 Months:  0    On Disability:  0    Patient Considers own Health:  2.5          Electronically signed by: Phoebe Murrell RN on 2/11/2018 at 9:21 AM
8:26 PM   Calculated Wound Size (cm^2) (l*w) 2.89 cm^2 2/10/2018  8:26 PM   Wound Assessment Yellow;Red 2/10/2018  8:26 PM   Drainage Amount None 2/10/2018  8:26 PM   Odor None 2/10/2018  8:26 PM   Kathie-wound Assessment Clean;Dry; Intact 2/10/2018  8:26 PM   Non-staged Wound Description Not applicable 4/72/0405  0:85 PM   Number of days: 0       Wound 02/10/18 Other (Comment) Hip Lateral circular (Active)   Dressing/Treatment Open to air 2/10/2018  8:26 PM   Wound Length (cm) 1 cm 2/10/2018  8:26 PM   Wound Width (cm) 1 cm 2/10/2018  8:26 PM   Calculated Wound Size (cm^2) (l*w) 1 cm^2 2/10/2018  8:26 PM   Wound Assessment Yellow;Red 2/10/2018  8:26 PM   Drainage Amount None 2/10/2018  8:26 PM   Odor None 2/10/2018  8:26 PM   Kathie-wound Assessment Clean;Dry 2/10/2018  8:26 PM   Non-staged Wound Description Not applicable 6/99/7912  6:27 PM   Number of days: 0       Wound 02/10/18 Other (Comment) Buttocks Other (Comment); Medial (Active)   Dressing/Treatment Open to air 2/10/2018  8:26 PM   Wound Length (cm) 9 cm 2/10/2018  8:26 PM   Wound Width (cm) 9 cm 2/10/2018  8:26 PM   Calculated Wound Size (cm^2) (l*w) 81 cm^2 2/10/2018  8:26 PM   Wound Assessment Red 2/10/2018  8:26 PM   Drainage Amount None 2/10/2018  8:26 PM   Odor None 2/10/2018  8:26 PM   Kathie-wound Assessment Clean;Dry; Intact 2/10/2018  8:26 PM   Non-staged Wound Description Not applicable 3/29/4933  4:89 PM   Number of days: 0        Elimination:  Continence:   · Bowel: Yes  · Bladder: Yes  Urinary Catheter: None   Colostomy/Ileostomy/Ileal Conduit: No       Date of Last BM: 02/12/2018    Intake/Output Summary (Last 24 hours) at 02/11/18 0925  Last data filed at 02/11/18 0552   Gross per 24 hour   Intake              840 ml   Output                0 ml   Net              840 ml     I/O last 3 completed shifts:   In: 840 [P.O.:840]  Out: -     Safety Concerns:     None    Impairments/Disabilities:      None    Nutrition Therapy:  Current Nutrition Therapy:

## 2018-02-13 NOTE — PROGRESS NOTES
Thrush, oral    Fatigue    Left foot burn    Cellulitis    Cellulitis of buttock    Gluteal abscess     POD#1 s/p incision and drainage of gluteal abscess    Plan  Ok to discharge from surgical standpoint with outpatient follow up 1-2 weeks  Will need home care for daily packing changes with 1/2\" iodoform gauze  Pt to shower daily with dressing changes    Electronically signed by Kodak Lawson DO  on 2/13/2018 at 10:10 AM

## 2018-02-13 NOTE — PROGRESS NOTES
[facility])        Progress Note    2018 3:40 PM      Subjective:  Mr. Jacinto Wyatt  has no chest pain, shortness of breath, or palpitations. Very anxious to be discharged      Review of system: Constitutional:    No fever                                   GI:                      No abdominal pain                                   Extremities:        No leg swelling          LABS:     CBC: Recent Labs      02/10/18   2045  02/13/18   0621   WBC  12.2*  12.9*   HGB  14.7  14.2   HCT  43.2  42.0   MCV  91.1  89.6   PLT  195  194     BMP: Recent Labs      02/10/18   2045  02/12/18   0501  18   0621   NA  139   --   136   K  3.9   --   4.6   CL  104   --   103   CO2  22   --   23   BUN  26*   --   18   CREATININE  0.80  0.74  0.71     PT/INR: No results for input(s): PROTIME, INR in the last 72 hours. APTT: No results for input(s): APTT in the last 72 hours. MAG: No results for input(s): MG in the last 72 hours. D Dimer: No results for input(s): DDIMER in the last 72 hours. Troponin: No results for input(s): Lauri Baas in the last 72 hours. PROBNP No results for input(s): PROBNP in the last 72 hours. Pulse Ox: SpO2  Av %  Min: 96 %  Max: 100 %  Supplemental O2:       Current Meds:    influenza virus vaccine  0.5 mL Intramuscular Once    vancomycin  1,250 mg Intravenous Q12H    sodium chloride flush  10 mL Intravenous 2 times per day    enoxaparin  40 mg Subcutaneous Daily    [MAR Hold] aspirin  81 mg Oral Daily    vancomycin (VANCOCIN) intermittent dosing (placeholder)   Other RX Placeholder     Continuous Infusions:         VITAL SIGNS:    /73   Pulse 80   Temp 97.5 °F (36.4 °C) (Oral)   Resp 16   Ht 5' 7\" (1.702 m)   Wt 145 lb 15.1 oz (66.2 kg)   SpO2 99%   BMI 22.86 kg/m²         Admit Weight:  165 lb (74.8 kg)    Last 3 weights:   Wt Readings from Last 3 Encounters:   18 145 lb 15.1 oz (66.2 kg)   17 160 lb (72.6 kg)   17 160 lb (72.6 kg)       BMI: Body mass index is 22.86 kg/m². INPUT/OUTPUT:        Intake/Output Summary (Last 24 hours) at 02/13/18 1540  Last data filed at 02/13/18 1207   Gross per 24 hour   Intake              650 ml   Output              875 ml   Net             -225 ml        TELEMETRY Atrial fibrillation with controlled ventricular response    EXAM:     General appearance: awake and alert moves all ext   Lungs: no rhonchi, no wheezes, no rales  Heart: S1 and S2 Case 3/6 systolic ejection murmur heard to the right of the sternum  Abdomen: positive bowel sounds, no bruits, no masses  Extremities: warm and dry, no cyanosis, no clubbing      Principal Problem:    Cellulitis  Active Problems:    Atrial fibrillation (HCC)    Cellulitis of buttock    Gluteal abscess  Resolved Problems:    * No resolved hospital problems. *      Assessment:  89 years old white male who presents with right gluteal abscess  2. Severe aortic stenosis valve area of 0.7 cm². Denies any chest pain denies any shortness of breath denies any lightheadedness or dizziness or syncopal episodes. 3' chronic persistent atrial fibrillation good rate control without any AV odessa blocking agent  Recommendations:  I discussed with the patient's results of the echocardiogram.  Discussed the importance of follow-up, he was supposed to follow in 2017 but never made an appointment. Recommended to start anticoagulation with Elquis 5 mg twice a day if okay with surgery. To follow with his primary cardiologist Dr. Ace Overton in 2 weeks. And further evaluation and need to proceed with further cardiac studies. The patient remains asymptomatic from cardiac point of view.   We also discussed about the importance of smoke cessation    I made an appointment for the patient to follow-up with Dr. Ace Overton in our office on February 27 at 7:45 AM    Electronically signed by Samantha Diaz MD on 2/13/2018 at 3:40 PM   This note was created with the assistance

## 2018-02-17 LAB
CULTURE: ABNORMAL
CULTURE: NORMAL
CULTURE: NORMAL
DIRECT EXAM: ABNORMAL
DIRECT EXAM: ABNORMAL
Lab: ABNORMAL
Lab: NORMAL
ORGANISM: ABNORMAL
SPECIMEN DESCRIPTION: ABNORMAL
SPECIMEN DESCRIPTION: ABNORMAL
SPECIMEN DESCRIPTION: NORMAL
SPECIMEN DESCRIPTION: NORMAL
STATUS: ABNORMAL
STATUS: NORMAL

## 2018-02-20 ENCOUNTER — TELEPHONE (OUTPATIENT)
Dept: INTERNAL MEDICINE CLINIC | Age: 53
End: 2018-02-20

## 2018-02-20 NOTE — TELEPHONE ENCOUNTER
Patient came in to be seen today for a new patient hospital follow up. He did not have photo id. I explained that we would see him today but he would need to have it updated before he comes in next time. He said \"I will but there won't be a next time. \"    I then asked for his insurance card. He presented me with a BCBS card which I tried to process. RTE said he was ineligible for coverage. He denied having any other coverage. I advised there was a $30 deposit for the visit today and he turned around and rudely stated \"I don't have that either, I'm not coming back. \"  I did not have an opportunity to explain that we could still see him today. Patient and the 3 others that were with him stormed out of the office. I could see him shouting at someone before he got into his vehicle. Please advise if there is anything further that I can do to facilitate this patient.

## 2018-02-22 ENCOUNTER — HOSPITAL ENCOUNTER (EMERGENCY)
Age: 53
Discharge: HOME | End: 2018-02-22
Attending: EMERGENCY MEDICINE
Payer: COMMERCIAL

## 2018-02-22 ENCOUNTER — APPOINTMENT (OUTPATIENT)
Dept: GENERAL RADIOLOGY | Age: 53
End: 2018-02-22
Payer: COMMERCIAL

## 2018-02-22 VITALS
WEIGHT: 147 LBS | TEMPERATURE: 97.5 F | OXYGEN SATURATION: 97 % | BODY MASS INDEX: 23.02 KG/M2 | SYSTOLIC BLOOD PRESSURE: 107 MMHG | HEART RATE: 90 BPM | DIASTOLIC BLOOD PRESSURE: 68 MMHG | RESPIRATION RATE: 18 BRPM

## 2018-02-22 DIAGNOSIS — J06.9 ACUTE UPPER RESPIRATORY INFECTION: Primary | ICD-10-CM

## 2018-02-22 DIAGNOSIS — J44.9 CHRONIC OBSTRUCTIVE PULMONARY DISEASE, UNSPECIFIED COPD TYPE (HCC): ICD-10-CM

## 2018-02-22 LAB
DIRECT EXAM: NORMAL
Lab: NORMAL
Lab: NORMAL
SPECIMEN DESCRIPTION: NORMAL
STATUS: NORMAL
STATUS: NORMAL

## 2018-02-22 PROCEDURE — 87880 STREP A ASSAY W/OPTIC: CPT

## 2018-02-22 PROCEDURE — 71046 X-RAY EXAM CHEST 2 VIEWS: CPT

## 2018-02-22 PROCEDURE — 6360000002 HC RX W HCPCS: Performed by: NURSE PRACTITIONER

## 2018-02-22 PROCEDURE — 87804 INFLUENZA ASSAY W/OPTIC: CPT

## 2018-02-22 PROCEDURE — 99284 EMERGENCY DEPT VISIT MOD MDM: CPT

## 2018-02-22 PROCEDURE — 96372 THER/PROPH/DIAG INJ SC/IM: CPT

## 2018-02-22 RX ORDER — KETOROLAC TROMETHAMINE 30 MG/ML
30 INJECTION, SOLUTION INTRAMUSCULAR; INTRAVENOUS ONCE
Status: COMPLETED | OUTPATIENT
Start: 2018-02-22 | End: 2018-02-22

## 2018-02-22 RX ORDER — ALBUTEROL SULFATE 90 UG/1
2 AEROSOL, METERED RESPIRATORY (INHALATION) EVERY 6 HOURS PRN
Qty: 1 INHALER | Refills: 0 | Status: SHIPPED | OUTPATIENT
Start: 2018-02-22 | End: 2018-11-12 | Stop reason: SDUPTHER

## 2018-02-22 RX ORDER — BENZONATATE 100 MG/1
100 CAPSULE ORAL 3 TIMES DAILY PRN
Qty: 15 CAPSULE | Refills: 0 | Status: SHIPPED | OUTPATIENT
Start: 2018-02-22 | End: 2018-03-14 | Stop reason: ALTCHOICE

## 2018-02-22 RX ADMIN — KETOROLAC TROMETHAMINE 30 MG: 30 INJECTION, SOLUTION INTRAMUSCULAR at 21:54

## 2018-02-22 ASSESSMENT — ENCOUNTER SYMPTOMS
VOMITING: 0
TROUBLE SWALLOWING: 0
SHORTNESS OF BREATH: 0
RHINORRHEA: 1
SORE THROAT: 1
NAUSEA: 0
ABDOMINAL PAIN: 0
COUGH: 1

## 2018-02-22 ASSESSMENT — PAIN SCALES - GENERAL
PAINLEVEL_OUTOF10: 0
PAINLEVEL_OUTOF10: 6

## 2018-02-23 NOTE — ED NOTES
Pt presents in ED c/o cough, generalized body aches, and pharyngitis x2 days. Pt reported he was in hospital x1 week ago for cellulitis. Pt is currently taking doxycyline and keflex. Pt stated \"I just don't feel right. My throat hurts and I have had this cough for two days. I don't feel like doing anything. \" Pt is eupneic, A&OX4, and PWD. Call light in reach.          Leslie Molina RN  02/22/18 9464

## 2018-02-23 NOTE — ED PROVIDER NOTES
[address]  eMERGENCY dEPARTMENT eNCOUnter      Pt Name: Latanya Rizvi  MRN: [id number]  [place] 1965  Date of evaluation: 2/22/2018  Provider: Farrukh Lin 6626       Chief Complaint   Patient presents with    Cough    Pharyngitis    Generalized Body Aches         HISTORY OF PRESENT ILLNESS  (Location/Symptom, Timing/Onset, Context/Setting, Quality, Duration, Modifying Factors, Severity.)   Latanya Rizvi is a 48 y.o. male who presents to the emergency department Complaints of cough, runny nose, congestion, sore throat and generalized body aches. States that symptoms started 2 days ago and are not improving. Patient is currently on antibiotics for cellulitis and abscess of the buttock. States that cellulitis/abscess is getting better. Denies fever or chills. Denies chest pain, shortness of breath, wheezing. Patient smokes tobacco daily. States that cough is intermittent and nonproductive. Nursing Notes were reviewed and I agree. REVIEW OF SYSTEMS    (2-9 systems for level 4, 10 or more for level 5)     Review of Systems   Constitutional: Positive for fever. Negative for chills. HENT: Positive for congestion, rhinorrhea and sore throat. Negative for trouble swallowing. Respiratory: Positive for cough. Negative for shortness of breath. Cardiovascular: Negative for chest pain and palpitations. Gastrointestinal: Negative for abdominal pain, nausea and vomiting. Except as noted above the remainder of the review of systems was reviewed and negative. PAST MEDICAL HISTORY         Diagnosis Date    Chronic back pain     Murmur, cardiac      Reviewed. SURGICAL HISTORY           Procedure Laterality Date    CYST REMOVAL Left     INGUINAL HERNIA REPAIR Right     KS DRAIN SKIN ABSCESS COMPLIC N/A 3/10/4520    GLUTEAL INCISION AND DRAINAGE performed by Gemini Arango DO at [address] [facility].   CURRENT MEDICATIONS       Discharge Medication List

## 2018-02-28 ENCOUNTER — APPOINTMENT (OUTPATIENT)
Dept: GENERAL RADIOLOGY | Age: 53
End: 2018-02-28
Payer: COMMERCIAL

## 2018-02-28 ENCOUNTER — HOSPITAL ENCOUNTER (EMERGENCY)
Age: 53
Discharge: HOME | End: 2018-02-28
Attending: EMERGENCY MEDICINE
Payer: COMMERCIAL

## 2018-02-28 VITALS
HEIGHT: 67 IN | RESPIRATION RATE: 16 BRPM | BODY MASS INDEX: 23.07 KG/M2 | HEART RATE: 84 BPM | WEIGHT: 147 LBS | DIASTOLIC BLOOD PRESSURE: 76 MMHG | TEMPERATURE: 97.5 F | SYSTOLIC BLOOD PRESSURE: 105 MMHG | OXYGEN SATURATION: 100 %

## 2018-02-28 DIAGNOSIS — L02.91 ABSCESS: ICD-10-CM

## 2018-02-28 DIAGNOSIS — J40 BRONCHITIS: Primary | ICD-10-CM

## 2018-02-28 PROCEDURE — 71046 X-RAY EXAM CHEST 2 VIEWS: CPT

## 2018-02-28 PROCEDURE — 99283 EMERGENCY DEPT VISIT LOW MDM: CPT

## 2018-02-28 RX ORDER — GUAIFENESIN AND DEXTROMETHORPHAN HYDROBROMIDE 100; 10 MG/5ML; MG/5ML
5 SOLUTION ORAL EVERY 6 HOURS PRN
Qty: 120 ML | Refills: 0 | Status: SHIPPED | OUTPATIENT
Start: 2018-02-28 | End: 2018-03-14 | Stop reason: ALTCHOICE

## 2018-02-28 RX ORDER — AZITHROMYCIN 250 MG/1
TABLET, FILM COATED ORAL
Qty: 1 PACKET | Refills: 0 | Status: SHIPPED | OUTPATIENT
Start: 2018-02-28 | End: 2018-03-14 | Stop reason: ALTCHOICE

## 2018-02-28 ASSESSMENT — ENCOUNTER SYMPTOMS
ROS SKIN COMMENTS: ABSCESS
NAUSEA: 0
VOMITING: 0
SORE THROAT: 1
TROUBLE SWALLOWING: 0
COUGH: 1
SHORTNESS OF BREATH: 0

## 2018-02-28 ASSESSMENT — PAIN SCALES - GENERAL: PAINLEVEL_OUTOF10: 5

## 2018-03-01 NOTE — ED PROVIDER NOTES
Left     INGUINAL HERNIA REPAIR Right     VT DRAIN SKIN ABSCESS COMPLIC N/A 2/94/4262    GLUTEAL INCISION AND DRAINAGE performed by Jerome Jean Baptiste DO at [address] [address]. CURRENT MEDICATIONS       Discharge Medication List as of 2/28/2018  9:19 PM      CONTINUE these medications which have NOT CHANGED    Details   benzonatate (TESSALON PERLES) 100 MG capsule Take 1 capsule by mouth 3 times daily as needed for Cough, Disp-15 capsule, R-0Print      albuterol sulfate HFA (PROVENTIL HFA) 108 (90 Base) MCG/ACT inhaler Inhale 2 puffs into the lungs every 6 hours as needed for Wheezing, Disp-1 Inhaler, R-0Print      menthol-cetylpyridinium (CEPACOL REGULAR STRENGTH) 3 MG lozenge Take 1 lozenge by mouth as needed for Pain, Disp-15 lozenge, R-0Print      apixaban (ELIQUIS) 5 MG TABS tablet Take 1 tablet by mouth 2 times daily, Disp-60 tablet, R-3NO PRINT      Gauze Pads & Dressings 4\"X4\" PADS Disp-14 each, R-2, PrintPatient to do dressing changes bid      aspirin EC 81 MG EC tablet Take 1 tablet by mouth daily. , Disp-30 tablet, R-3             ALLERGIES     Patient has no known allergies. FAMILY HISTORY           Problem Relation Age of Onset    Depression Mother      Family Status   Relation Status    Mother       Reviewed and not relevant. SOCIAL HISTORY      reports that he has been smoking Cigarettes. He has a 16.50 pack-year smoking history. His smokeless tobacco use includes Chew. He reports that he does not drink alcohol or use drugs. Reviewed. PHYSICAL EXAM    (up to 7 for level 4, 8 or more for level 5)     ED Triage Vitals [02/28/18 1950]   BP Temp Temp Source Pulse Resp SpO2 Height Weight   105/76 97.5 °F (36.4 °C) Oral 84 16 100 % 5' 7\" (1.702 m) 147 lb (66.7 kg)       Physical Exam   Constitutional: He is oriented to person, place, and time. He appears well-developed and well-nourished. HENT:   Head: Normocephalic and atraumatic.    Right Ear: Tympanic membrane, external ear and ear canal tablet (250 mg) once daily on Days 2 through 5., Disp-1 packet, R-0Print             (Please note that portions of this note were completed with a voice recognition program.  Efforts were made to edit the dictations but occasionally words are mis-transcribed.)    Galina Carpenter 41, CNP  02/28/18 5986

## 2018-03-01 NOTE — ED NOTES
Pt given instructions for follow-up and discharge. Pt given education on robitussin, cepacol, and zithromax. Pt verbalizes understanding. Pt is A&O x4, PWD, eupneic, and ambulatory with steady, even gait upon discharge.      Bill Singh RN  02/28/18 5654

## 2018-03-07 ENCOUNTER — OFFICE VISIT (OUTPATIENT)
Dept: INTERNAL MEDICINE CLINIC | Age: 53
End: 2018-03-07
Payer: MEDICAID

## 2018-03-07 ENCOUNTER — TELEPHONE (OUTPATIENT)
Dept: INTERNAL MEDICINE CLINIC | Age: 53
End: 2018-03-07

## 2018-03-07 VITALS
WEIGHT: 161 LBS | HEART RATE: 78 BPM | SYSTOLIC BLOOD PRESSURE: 112 MMHG | OXYGEN SATURATION: 98 % | DIASTOLIC BLOOD PRESSURE: 76 MMHG | BODY MASS INDEX: 25.27 KG/M2 | HEIGHT: 67 IN

## 2018-03-07 DIAGNOSIS — Z72.0 TOBACCO ABUSE: ICD-10-CM

## 2018-03-07 DIAGNOSIS — F17.200 SMOKER: ICD-10-CM

## 2018-03-07 DIAGNOSIS — I48.91 ATRIAL FIBRILLATION, UNSPECIFIED TYPE (HCC): ICD-10-CM

## 2018-03-07 DIAGNOSIS — I35.0 NONRHEUMATIC AORTIC VALVE STENOSIS: Primary | ICD-10-CM

## 2018-03-07 DIAGNOSIS — Q23.1 BICUSPID AORTIC VALVE: ICD-10-CM

## 2018-03-07 DIAGNOSIS — Z00.00 ROUTINE HEALTH MAINTENANCE: ICD-10-CM

## 2018-03-07 DIAGNOSIS — R06.02 SOB (SHORTNESS OF BREATH): ICD-10-CM

## 2018-03-07 PROCEDURE — G8484 FLU IMMUNIZE NO ADMIN: HCPCS | Performed by: INTERNAL MEDICINE

## 2018-03-07 PROCEDURE — 3017F COLORECTAL CA SCREEN DOC REV: CPT | Performed by: INTERNAL MEDICINE

## 2018-03-07 PROCEDURE — 4004F PT TOBACCO SCREEN RCVD TLK: CPT | Performed by: INTERNAL MEDICINE

## 2018-03-07 PROCEDURE — G8427 DOCREV CUR MEDS BY ELIG CLIN: HCPCS | Performed by: INTERNAL MEDICINE

## 2018-03-07 PROCEDURE — 99215 OFFICE O/P EST HI 40 MIN: CPT | Performed by: INTERNAL MEDICINE

## 2018-03-07 PROCEDURE — G8419 CALC BMI OUT NRM PARAM NOF/U: HCPCS | Performed by: INTERNAL MEDICINE

## 2018-03-07 PROCEDURE — 1111F DSCHRG MED/CURRENT MED MERGE: CPT | Performed by: INTERNAL MEDICINE

## 2018-03-07 RX ORDER — BUDESONIDE AND FORMOTEROL FUMARATE DIHYDRATE 160; 4.5 UG/1; UG/1
2 AEROSOL RESPIRATORY (INHALATION) 2 TIMES DAILY
Qty: 1 INHALER | Refills: 3 | Status: SHIPPED | OUTPATIENT
Start: 2018-03-07 | End: 2018-04-10 | Stop reason: ALTCHOICE

## 2018-03-07 RX ORDER — VARENICLINE TARTRATE 25 MG
KIT ORAL
Qty: 1 EACH | Refills: 1 | Status: SHIPPED | OUTPATIENT
Start: 2018-03-07 | End: 2018-05-11

## 2018-03-07 ASSESSMENT — ENCOUNTER SYMPTOMS
CHEST TIGHTNESS: 0
SHORTNESS OF BREATH: 1
BACK PAIN: 0
ABDOMINAL DISTENTION: 0
WHEEZING: 0
DIARRHEA: 0
COUGH: 1
COLOR CHANGE: 0
CONSTIPATION: 0
APNEA: 0
ABDOMINAL PAIN: 0
FACIAL SWELLING: 0

## 2018-03-07 ASSESSMENT — PATIENT HEALTH QUESTIONNAIRE - PHQ9
2. FEELING DOWN, DEPRESSED OR HOPELESS: 0
SUM OF ALL RESPONSES TO PHQ9 QUESTIONS 1 & 2: 0
1. LITTLE INTEREST OR PLEASURE IN DOING THINGS: 0
SUM OF ALL RESPONSES TO PHQ QUESTIONS 1-9: 0

## 2018-03-07 NOTE — TELEPHONE ENCOUNTER
Medication: 1955 24Symbols     Quantity: 1 of each    Directions:     Lot Number: Marla Fremont [id number]  [facility] [id number]    Expiration: SYMBICORT 2/19 SPIRIVA 7/19    Prescriber: DR Nasir Palacios

## 2018-03-13 NOTE — ED PROVIDER NOTES
[address]  eMERGENCY dEPARTMENT eNCOUnter   Independent Attestation     Pt Name: Laura Kapadia  MRN: [id number]  [place] 1965  Date of evaluation: 3/13/18     Laura Kapadia is a 48 y.o. male with CC: Abscess and Influenza      Based on the medical record the care appears appropriate. I was personally available for consultation in the Emergency Department.     Ad Rodriguez MD  Attending Emergency Physician                    Awais Singh MD  03/13/18 7811

## 2018-03-14 ENCOUNTER — OFFICE VISIT (OUTPATIENT)
Dept: INFECTIOUS DISEASES | Age: 53
End: 2018-03-14
Payer: COMMERCIAL

## 2018-03-14 VITALS
WEIGHT: 163.8 LBS | DIASTOLIC BLOOD PRESSURE: 92 MMHG | RESPIRATION RATE: 16 BRPM | SYSTOLIC BLOOD PRESSURE: 131 MMHG | TEMPERATURE: 97.2 F | BODY MASS INDEX: 25.71 KG/M2 | HEART RATE: 81 BPM | HEIGHT: 67 IN

## 2018-03-14 DIAGNOSIS — L02.31 GLUTEAL ABSCESS: Primary | ICD-10-CM

## 2018-03-14 PROCEDURE — G8484 FLU IMMUNIZE NO ADMIN: HCPCS | Performed by: INTERNAL MEDICINE

## 2018-03-14 PROCEDURE — 3017F COLORECTAL CA SCREEN DOC REV: CPT | Performed by: INTERNAL MEDICINE

## 2018-03-14 PROCEDURE — 1111F DSCHRG MED/CURRENT MED MERGE: CPT | Performed by: INTERNAL MEDICINE

## 2018-03-14 PROCEDURE — 99215 OFFICE O/P EST HI 40 MIN: CPT | Performed by: INTERNAL MEDICINE

## 2018-03-14 PROCEDURE — G8419 CALC BMI OUT NRM PARAM NOF/U: HCPCS | Performed by: INTERNAL MEDICINE

## 2018-03-14 PROCEDURE — 4004F PT TOBACCO SCREEN RCVD TLK: CPT | Performed by: INTERNAL MEDICINE

## 2018-03-14 PROCEDURE — G8427 DOCREV CUR MEDS BY ELIG CLIN: HCPCS | Performed by: INTERNAL MEDICINE

## 2018-03-14 NOTE — PROGRESS NOTES
02/12/2018  3:00  [address]   METHICILLIN RESISTANT STAPHYLOCOCCUS AUREUS LIGHT GROWTH     Culture  (Abnormal) 02/12/2018  3:00  [address]   NO ANAEROBIC ORGANISMS ISOLATED AT 5 DAYS     Culture 02/12/2018  3:00  [address]   Performed at [facility] [address] [phone]    Status 02/12/2018  3:00  [address]   FINAL 02/17/2018    Organism 02/12/2018  3:00  [address]   MRSA    Testing Performed By     Lab - Abbreviation Name Director Address Valid Date Range   [address] [address] 08/30/17 1201-Present   [address] [place] [address] Gustavo Parisi MD [address]   [facility] [address] 11/17/17 2021-Present   Culture & Susceptibility     METHICILLIN RESISTANT STAPHYLOCOCCUS AUREUS     Antibiotic Interpretation ELIANA Status   Induced Clind Resist Sensitive NEGATIVE Final   Synercid  NOT REPORTED Final   cefoxitin screen  NOT REPORTED Final   ciprofloxacin  NOT REPORTED Final   clindamycin Sensitive <=0.25 SUSCEPTIBLE Final   erythromycin Resistant >=8 RESISTANT Final   gentamicin Sensitive <=0.5 SUSCEPTIBLE Final   levofloxacin Intermediate 4 INTERMEDIATE Final   linezolid  NOT REPORTED Final   moxifloxacin  NOT REPORTED Final   nitrofurantoin  NOT REPORTED Final   oxacillin Resistant >=4 RESISTANT Final   penicillin Resistant >=0.5 RESISTANT Final   rifampin  NOT REPORTED Final   tetracycline Sensitive <=1 SUSCEPTIBLE Final   tigecycline  NOT REPORTED Final   trimethoprim-sulfamethoxazole Sensitive <=10 SUSCEPTIBLE Final   vancomycin Sensitive <=0.5 SUSCEPTIBLE Final         Lab and Collection     Anaerobic and Aerobic Culture on 2/12/2018   Result History     Anaerobic and Aerobic Culture on 2/17/2018                      Assessment:     Cellulitis to the left buttock

## 2018-03-26 ENCOUNTER — TELEPHONE (OUTPATIENT)
Dept: INTERNAL MEDICINE CLINIC | Age: 53
End: 2018-03-26

## 2018-03-28 ENCOUNTER — HOSPITAL ENCOUNTER (OUTPATIENT)
Age: 53
Setting detail: SPECIMEN
Discharge: HOME | End: 2018-03-28
Payer: MEDICAID

## 2018-03-28 DIAGNOSIS — Z00.00 ROUTINE HEALTH MAINTENANCE: ICD-10-CM

## 2018-03-28 LAB
CHOLESTEROL/HDL RATIO: 2.5
CHOLESTEROL: 119 MG/DL
DLCO %PRED: NORMAL
DLCO PRE: NORMAL
ESTIMATED AVERAGE GLUCOSE: 105 MG/DL
FEF 25-75%-POST: NORMAL
FEF 25-75%-PRE: NORMAL
FEV1-POST: NORMAL
FEV1-PRE: NORMAL
FEV1/FVC-POST: NORMAL
FEV1/FVC-PRE: NORMAL
FVC-POST: NORMAL
FVC-PRE: NORMAL
HBA1C MFR BLD: 5.3 % (ref 4–6)
HDLC SERPL-MCNC: 48 MG/DL
HIV AG/AB: NONREACTIVE
LDL CHOLESTEROL: 60 MG/DL (ref 0–130)
MEP: NORMAL
MIP: NORMAL
MVV %PRED-PRE: NORMAL
MVV-PRE: NORMAL
TLC %PRED: NORMAL
TLC PRE: NORMAL
TRIGL SERPL-MCNC: 55 MG/DL
VLDLC SERPL CALC-MCNC: NORMAL MG/DL (ref 1–30)

## 2018-04-04 ENCOUNTER — TELEPHONE (OUTPATIENT)
Dept: INTERNAL MEDICINE CLINIC | Age: 53
End: 2018-04-04

## 2018-04-06 DIAGNOSIS — R06.02 SOB (SHORTNESS OF BREATH): ICD-10-CM

## 2018-04-10 ENCOUNTER — OFFICE VISIT (OUTPATIENT)
Dept: INTERNAL MEDICINE CLINIC | Age: 53
End: 2018-04-10
Payer: MEDICAID

## 2018-04-10 VITALS
WEIGHT: 162 LBS | HEIGHT: 67 IN | BODY MASS INDEX: 25.43 KG/M2 | HEART RATE: 88 BPM | SYSTOLIC BLOOD PRESSURE: 117 MMHG | DIASTOLIC BLOOD PRESSURE: 83 MMHG

## 2018-04-10 DIAGNOSIS — I35.0 NONRHEUMATIC AORTIC VALVE STENOSIS: ICD-10-CM

## 2018-04-10 DIAGNOSIS — Z72.0 TOBACCO ABUSE: Primary | ICD-10-CM

## 2018-04-10 DIAGNOSIS — I48.91 ATRIAL FIBRILLATION, UNSPECIFIED TYPE (HCC): ICD-10-CM

## 2018-04-10 DIAGNOSIS — L02.32 BOIL OF BUTTOCK: ICD-10-CM

## 2018-04-10 DIAGNOSIS — I35.0 SEVERE AORTIC STENOSIS: ICD-10-CM

## 2018-04-10 PROCEDURE — G8427 DOCREV CUR MEDS BY ELIG CLIN: HCPCS | Performed by: INTERNAL MEDICINE

## 2018-04-10 PROCEDURE — G8419 CALC BMI OUT NRM PARAM NOF/U: HCPCS | Performed by: INTERNAL MEDICINE

## 2018-04-10 PROCEDURE — 3017F COLORECTAL CA SCREEN DOC REV: CPT | Performed by: INTERNAL MEDICINE

## 2018-04-10 PROCEDURE — 99214 OFFICE O/P EST MOD 30 MIN: CPT | Performed by: INTERNAL MEDICINE

## 2018-04-10 PROCEDURE — 4004F PT TOBACCO SCREEN RCVD TLK: CPT | Performed by: INTERNAL MEDICINE

## 2018-04-10 RX ORDER — MUPIROCIN CALCIUM 20 MG/G
CREAM TOPICAL
Qty: 1 TUBE | Refills: 1 | Status: SHIPPED | OUTPATIENT
Start: 2018-04-10 | End: 2018-05-10

## 2018-04-10 ASSESSMENT — ENCOUNTER SYMPTOMS
APNEA: 0
FACIAL SWELLING: 0
DIARRHEA: 0
CONSTIPATION: 0
ABDOMINAL DISTENTION: 0
SHORTNESS OF BREATH: 1
WHEEZING: 0
BACK PAIN: 0
CHEST TIGHTNESS: 0
COUGH: 0
ABDOMINAL PAIN: 0
COLOR CHANGE: 0

## 2018-04-30 ENCOUNTER — TELEPHONE (OUTPATIENT)
Dept: INTERNAL MEDICINE CLINIC | Age: 53
End: 2018-04-30

## 2018-05-06 ENCOUNTER — HOSPITAL ENCOUNTER (EMERGENCY)
Age: 53
Discharge: HOME | End: 2018-05-06
Attending: EMERGENCY MEDICINE
Payer: MEDICAID

## 2018-05-06 VITALS
OXYGEN SATURATION: 98 % | WEIGHT: 160 LBS | DIASTOLIC BLOOD PRESSURE: 81 MMHG | SYSTOLIC BLOOD PRESSURE: 125 MMHG | TEMPERATURE: 97.6 F | BODY MASS INDEX: 25.11 KG/M2 | HEART RATE: 100 BPM | HEIGHT: 67 IN | RESPIRATION RATE: 14 BRPM

## 2018-05-06 DIAGNOSIS — H72.91 RUPTURED OR PERFORATED EARDRUM, RIGHT: Primary | ICD-10-CM

## 2018-05-06 PROCEDURE — 99282 EMERGENCY DEPT VISIT SF MDM: CPT

## 2018-05-06 RX ORDER — MECLIZINE HYDROCHLORIDE 25 MG/1
25 TABLET ORAL 3 TIMES DAILY PRN
Qty: 10 TABLET | Refills: 0 | Status: SHIPPED | OUTPATIENT
Start: 2018-05-06 | End: 2018-05-16

## 2018-05-06 RX ORDER — HYDROCODONE BITARTRATE AND ACETAMINOPHEN 5; 325 MG/1; MG/1
1 TABLET ORAL EVERY 6 HOURS PRN
Qty: 10 TABLET | Refills: 0 | Status: SHIPPED | OUTPATIENT
Start: 2018-05-06 | End: 2018-05-11

## 2018-05-06 ASSESSMENT — PAIN SCALES - GENERAL: PAINLEVEL_OUTOF10: 8

## 2018-05-06 ASSESSMENT — PAIN DESCRIPTION - LOCATION: LOCATION: EAR

## 2018-05-06 ASSESSMENT — PAIN DESCRIPTION - ORIENTATION: ORIENTATION: RIGHT

## 2018-05-06 ASSESSMENT — PAIN DESCRIPTION - PAIN TYPE: TYPE: ACUTE PAIN

## 2018-05-11 ENCOUNTER — OFFICE VISIT (OUTPATIENT)
Dept: INTERNAL MEDICINE CLINIC | Age: 53
End: 2018-05-11
Payer: MEDICAID

## 2018-05-11 VITALS
DIASTOLIC BLOOD PRESSURE: 71 MMHG | BODY MASS INDEX: 24.48 KG/M2 | SYSTOLIC BLOOD PRESSURE: 108 MMHG | WEIGHT: 156 LBS | HEIGHT: 67 IN

## 2018-05-11 DIAGNOSIS — I35.0 NONRHEUMATIC AORTIC VALVE STENOSIS: ICD-10-CM

## 2018-05-11 DIAGNOSIS — I48.91 ATRIAL FIBRILLATION, UNSPECIFIED TYPE (HCC): Primary | ICD-10-CM

## 2018-05-11 DIAGNOSIS — H72.91 PERFORATION OF RIGHT TYMPANIC MEMBRANE: ICD-10-CM

## 2018-05-11 DIAGNOSIS — L02.32 BOIL OF BUTTOCK: ICD-10-CM

## 2018-05-11 PROCEDURE — 4004F PT TOBACCO SCREEN RCVD TLK: CPT | Performed by: INTERNAL MEDICINE

## 2018-05-11 PROCEDURE — G8427 DOCREV CUR MEDS BY ELIG CLIN: HCPCS | Performed by: INTERNAL MEDICINE

## 2018-05-11 PROCEDURE — G8420 CALC BMI NORM PARAMETERS: HCPCS | Performed by: INTERNAL MEDICINE

## 2018-05-11 PROCEDURE — 3017F COLORECTAL CA SCREEN DOC REV: CPT | Performed by: INTERNAL MEDICINE

## 2018-05-11 PROCEDURE — 99214 OFFICE O/P EST MOD 30 MIN: CPT | Performed by: INTERNAL MEDICINE

## 2018-05-11 RX ORDER — DOXYCYCLINE HYCLATE 100 MG
100 TABLET ORAL 2 TIMES DAILY
Qty: 20 TABLET | Refills: 0 | Status: SHIPPED | OUTPATIENT
Start: 2018-05-11 | End: 2018-05-21

## 2018-05-15 DIAGNOSIS — H72.91 PERFORATED TYMPANIC MEMBRANE, RIGHT: Primary | ICD-10-CM

## 2018-05-17 ASSESSMENT — ENCOUNTER SYMPTOMS
COLOR CHANGE: 0
APNEA: 0
FACIAL SWELLING: 0
WHEEZING: 0
DIARRHEA: 0
ABDOMINAL PAIN: 0
CHEST TIGHTNESS: 0
ABDOMINAL DISTENTION: 0
SHORTNESS OF BREATH: 0
BACK PAIN: 0
COUGH: 0
CONSTIPATION: 0

## 2018-06-11 ENCOUNTER — TELEPHONE (OUTPATIENT)
Dept: INTERNAL MEDICINE CLINIC | Age: 53
End: 2018-06-11

## 2018-07-12 ENCOUNTER — OFFICE VISIT (OUTPATIENT)
Dept: INTERNAL MEDICINE CLINIC | Age: 53
End: 2018-07-12
Payer: COMMERCIAL

## 2018-07-12 VITALS
WEIGHT: 156 LBS | SYSTOLIC BLOOD PRESSURE: 90 MMHG | DIASTOLIC BLOOD PRESSURE: 70 MMHG | HEART RATE: 74 BPM | BODY MASS INDEX: 23.64 KG/M2 | HEIGHT: 68 IN

## 2018-07-12 DIAGNOSIS — H73.91 TM (TYMPANIC MEMBRANE DISORDER), RIGHT: ICD-10-CM

## 2018-07-12 DIAGNOSIS — Z12.11 SCREENING FOR COLON CANCER: Primary | ICD-10-CM

## 2018-07-12 DIAGNOSIS — I48.91 ATRIAL FIBRILLATION, UNSPECIFIED TYPE (HCC): ICD-10-CM

## 2018-07-12 DIAGNOSIS — F17.200 SMOKER: ICD-10-CM

## 2018-07-12 DIAGNOSIS — I35.0 SEVERE AORTIC STENOSIS: ICD-10-CM

## 2018-07-12 PROCEDURE — G8427 DOCREV CUR MEDS BY ELIG CLIN: HCPCS | Performed by: INTERNAL MEDICINE

## 2018-07-12 PROCEDURE — 99214 OFFICE O/P EST MOD 30 MIN: CPT | Performed by: INTERNAL MEDICINE

## 2018-07-12 PROCEDURE — 3017F COLORECTAL CA SCREEN DOC REV: CPT | Performed by: INTERNAL MEDICINE

## 2018-07-12 PROCEDURE — 4004F PT TOBACCO SCREEN RCVD TLK: CPT | Performed by: INTERNAL MEDICINE

## 2018-07-12 PROCEDURE — G8420 CALC BMI NORM PARAMETERS: HCPCS | Performed by: INTERNAL MEDICINE

## 2018-07-12 ASSESSMENT — ENCOUNTER SYMPTOMS
APNEA: 0
COUGH: 0
ABDOMINAL PAIN: 0
DIARRHEA: 0
SHORTNESS OF BREATH: 1
CHEST TIGHTNESS: 0
WHEEZING: 0
COLOR CHANGE: 0
CONSTIPATION: 0
BACK PAIN: 0
FACIAL SWELLING: 0
ABDOMINAL DISTENTION: 0

## 2018-07-12 ASSESSMENT — PATIENT HEALTH QUESTIONNAIRE - PHQ9
2. FEELING DOWN, DEPRESSED OR HOPELESS: 0
1. LITTLE INTEREST OR PLEASURE IN DOING THINGS: 0
SUM OF ALL RESPONSES TO PHQ QUESTIONS 1-9: 0
SUM OF ALL RESPONSES TO PHQ9 QUESTIONS 1 & 2: 0

## 2018-07-12 NOTE — PROGRESS NOTES
Subjective:      Patient ID: Heladio Salazar is a 48 y.o. male. Visit Information    Have you changed or started any medications since your last visit including any over-the-counter medicines, vitamins, or herbal medicines? no   Are you having any side effects from any of your medications? -  no  Have you stopped taking any of your medications? Is so, why? -  no    Have you seen any other physician or provider since your last visit? No  Have you had any other diagnostic tests since your last visit? No  Have you been seen in the emergency room and/or had an admission to a hospital since we last saw you? No  Have you had your routine dental cleaning in the past 6 months? no    Have you activated your Hiri account? If not, what are your barriers? No: declined     Patient Care Team:  Xochilt Martinez MD as PCP - General (Internal Medicine)    Medical History Review  Past Medical, Family, and Social History reviewed and does contribute to the patient presenting condition  Chief Complaint   Patient presents with    3 Month Follow-Up     FU  on right ear still feels like an echo in it can hear  missed ENT appointment    Back Pain     doing well       Health Maintenance   Topic Date Due    Hepatitis C screen  1965    Pneumococcal med risk (1 of 1 - PPSV23) 02/13/1984    Shingles Vaccine (1 of 2 - 2 Dose Series) 02/13/2015    Colon cancer screen colonoscopy  02/13/2015    Flu vaccine (1) 09/01/2018    Lipid screen  03/28/2023    DTaP/Tdap/Td vaccine (2 - Td) 09/18/2025    HIV screen  Completed       HPI- Patient is here for Follow up examination. He has AF, Bicuspid Aortic valve severe AS . Following with Cardiologist , Has been Referred to CTS . Patient also has Traumatic Perforation of right TM, sen by ENT , Going to have Audiometry study soon     Review of Systems   Constitutional: Negative for activity change, appetite change, chills and diaphoresis.    HENT: Positive for ear pain (Right Ear , No Drainage ). Negative for congestion, dental problem, ear discharge, facial swelling and hearing loss. Respiratory: Positive for shortness of breath (with excertion ). Negative for apnea, cough, chest tightness and wheezing. Cardiovascular: Negative for chest pain and leg swelling. Gastrointestinal: Negative for abdominal distention, abdominal pain, constipation and diarrhea. Genitourinary: Negative for difficulty urinating, dysuria, enuresis, flank pain and frequency. Musculoskeletal: Negative for arthralgias, back pain, gait problem and joint swelling. Skin: Negative for color change, pallor and rash. Neurological: Negative for dizziness, seizures, facial asymmetry, light-headedness, numbness and headaches. Psychiatric/Behavioral: Negative for agitation, behavioral problems, confusion, decreased concentration and dysphoric mood. Objective:   Physical Exam   Constitutional: He is oriented to person, place, and time. He appears well-developed and well-nourished. HENT:   Head: Normocephalic and atraumatic. Mouth/Throat: No oropharyngeal exudate. Perforated Ear Drum Present    Eyes: Conjunctivae are normal. Pupils are equal, round, and reactive to light. Right eye exhibits no discharge. Left eye exhibits no discharge. No scleral icterus. Neck: Normal range of motion. Neck supple. No JVD present. No tracheal deviation present. No thyromegaly present. Cardiovascular: Normal rate. Exam reveals no gallop. Murmur (Aortic Valave systolic Murmur Present ) heard. Pulmonary/Chest: Effort normal and breath sounds normal. No stridor. No respiratory distress. He has no wheezes. He has no rales. He exhibits no tenderness. Abdominal: Soft. Bowel sounds are normal. He exhibits no distension. There is no tenderness. There is no rebound and no guarding. Musculoskeletal: Normal range of motion. He exhibits no edema. Neurological: He is alert and oriented to person, place, and time.    Skin: He is not diaphoretic. Assessment / Plan:   1. Screening for colon cancer  - POCT FECAL IMMUNOCHEMICAL TEST (FIT); Future    2. Atrial fibrillation, unspecified type (HCC, Rate Controlled , on AC     3. Tm (tympanic membrane disorder), right  Seen by ENT Physician , Missed his hearing Test     4. Severe aortic stenosis- Symptomatic, Bicuspids Aortic Valve   Evaluated by Cardiologist , Referred to CTS and Structural Heart Disease Expert by his Cardiologist     5. Smoker, Advise to quit smoking   Quit Cocaine already       · Return in about 3 months (around 10/12/2018). · Reviewed prior labs and health maintenance. · Discussed use, benefit, and side effects of prescribed medications. Barriers to medication compliance addressed. All patient questions answered. Pt voiced understanding. MD [facility]UNIOR [facility]  7/12/2018, 12:46 PM    Please note that this chart was generated using voice recognition Dragon dictation software. Although every effort was made to ensure the accuracy of this automated transcription, some errors in transcription may have occurred.

## 2018-08-22 ENCOUNTER — TELEPHONE (OUTPATIENT)
Dept: OTHER | Facility: CLINIC | Age: 53
End: 2018-08-22

## 2018-08-25 ENCOUNTER — APPOINTMENT (OUTPATIENT)
Dept: GENERAL RADIOLOGY | Age: 53
DRG: 178 | End: 2018-08-25
Payer: COMMERCIAL

## 2018-08-25 ENCOUNTER — HOSPITAL ENCOUNTER (OUTPATIENT)
Age: 53
Setting detail: OBSERVATION
Discharge: HOME | DRG: 178 | End: 2018-08-26
Attending: EMERGENCY MEDICINE | Admitting: INTERNAL MEDICINE
Payer: COMMERCIAL

## 2018-08-25 DIAGNOSIS — R07.9 CHEST PAIN, UNSPECIFIED TYPE: ICD-10-CM

## 2018-08-25 DIAGNOSIS — I50.9 ACUTE ON CHRONIC CONGESTIVE HEART FAILURE, UNSPECIFIED HEART FAILURE TYPE (HCC): Primary | ICD-10-CM

## 2018-08-25 PROBLEM — I50.43 CHF (CONGESTIVE HEART FAILURE), NYHA CLASS I, ACUTE ON CHRONIC, COMBINED (HCC): Status: ACTIVE | Noted: 2018-08-25

## 2018-08-25 PROBLEM — R06.02 SHORTNESS OF BREATH: Status: ACTIVE | Noted: 2018-08-25

## 2018-08-25 LAB
ABSOLUTE EOS #: 0.1 K/UL (ref 0–0.4)
ABSOLUTE IMMATURE GRANULOCYTE: ABNORMAL K/UL (ref 0–0.3)
ABSOLUTE LYMPH #: 1.6 K/UL (ref 1–4.8)
ABSOLUTE MONO #: 1.2 K/UL (ref 0.1–1.3)
ALBUMIN SERPL-MCNC: 3.4 G/DL (ref 3.5–5.2)
ALBUMIN/GLOBULIN RATIO: ABNORMAL (ref 1–2.5)
ALP BLD-CCNC: 80 U/L (ref 40–129)
ALT SERPL-CCNC: 18 U/L (ref 5–41)
ANION GAP SERPL CALCULATED.3IONS-SCNC: 11 MMOL/L (ref 9–17)
AST SERPL-CCNC: 20 U/L
BASOPHILS # BLD: 1 % (ref 0–2)
BASOPHILS ABSOLUTE: 0.1 K/UL (ref 0–0.2)
BILIRUB SERPL-MCNC: 0.36 MG/DL (ref 0.3–1.2)
BNP INTERPRETATION: ABNORMAL
BUN BLDV-MCNC: 22 MG/DL (ref 6–20)
BUN/CREAT BLD: ABNORMAL (ref 9–20)
CALCIUM SERPL-MCNC: 8.9 MG/DL (ref 8.6–10.4)
CHLORIDE BLD-SCNC: 103 MMOL/L (ref 98–107)
CO2: 22 MMOL/L (ref 20–31)
CREAT SERPL-MCNC: 0.77 MG/DL (ref 0.7–1.2)
DIFFERENTIAL TYPE: ABNORMAL
EKG ATRIAL RATE: 197 BPM
EKG Q-T INTERVAL: 326 MS
EKG QRS DURATION: 84 MS
EKG QTC CALCULATION (BAZETT): 458 MS
EKG R AXIS: 65 DEGREES
EKG T AXIS: 99 DEGREES
EKG VENTRICULAR RATE: 119 BPM
EOSINOPHILS RELATIVE PERCENT: 1 % (ref 0–4)
GFR AFRICAN AMERICAN: >60 ML/MIN
GFR NON-AFRICAN AMERICAN: >60 ML/MIN
GFR SERPL CREATININE-BSD FRML MDRD: ABNORMAL ML/MIN/{1.73_M2}
GFR SERPL CREATININE-BSD FRML MDRD: ABNORMAL ML/MIN/{1.73_M2}
GLUCOSE BLD-MCNC: 118 MG/DL (ref 70–99)
HCT VFR BLD CALC: 38.9 % (ref 41–53)
HEMOGLOBIN: 13.1 G/DL (ref 13.5–17.5)
IMMATURE GRANULOCYTES: ABNORMAL %
LYMPHOCYTES # BLD: 13 % (ref 24–44)
MCH RBC QN AUTO: 30.3 PG (ref 26–34)
MCHC RBC AUTO-ENTMCNC: 33.8 G/DL (ref 31–37)
MCV RBC AUTO: 89.8 FL (ref 80–100)
MONOCYTES # BLD: 10 % (ref 1–7)
NRBC AUTOMATED: ABNORMAL PER 100 WBC
PDW BLD-RTO: 14.1 % (ref 11.5–14.9)
PLATELET # BLD: 224 K/UL (ref 150–450)
PLATELET ESTIMATE: ABNORMAL
PMV BLD AUTO: 9.7 FL (ref 6–12)
POTASSIUM SERPL-SCNC: 4.2 MMOL/L (ref 3.7–5.3)
PRO-BNP: 7448 PG/ML
RBC # BLD: 4.33 M/UL (ref 4.5–5.9)
RBC # BLD: ABNORMAL 10*6/UL
SEG NEUTROPHILS: 75 % (ref 36–66)
SEGMENTED NEUTROPHILS ABSOLUTE COUNT: 9.1 K/UL (ref 1.3–9.1)
SODIUM BLD-SCNC: 136 MMOL/L (ref 135–144)
TOTAL PROTEIN: 6.1 G/DL (ref 6.4–8.3)
TROPONIN INTERP: NORMAL
TROPONIN T: <0.03 NG/ML
WBC # BLD: 12 K/UL (ref 3.5–11)
WBC # BLD: ABNORMAL 10*3/UL

## 2018-08-25 PROCEDURE — G0378 HOSPITAL OBSERVATION PER HR: HCPCS

## 2018-08-25 PROCEDURE — 99285 EMERGENCY DEPT VISIT HI MDM: CPT

## 2018-08-25 PROCEDURE — 85025 COMPLETE CBC W/AUTO DIFF WBC: CPT

## 2018-08-25 PROCEDURE — 96374 THER/PROPH/DIAG INJ IV PUSH: CPT

## 2018-08-25 PROCEDURE — 84484 ASSAY OF TROPONIN QUANT: CPT

## 2018-08-25 PROCEDURE — 83880 ASSAY OF NATRIURETIC PEPTIDE: CPT

## 2018-08-25 PROCEDURE — 2580000003 HC RX 258: Performed by: STUDENT IN AN ORGANIZED HEALTH CARE EDUCATION/TRAINING PROGRAM

## 2018-08-25 PROCEDURE — 94664 DEMO&/EVAL PT USE INHALER: CPT

## 2018-08-25 PROCEDURE — 6370000000 HC RX 637 (ALT 250 FOR IP): Performed by: STUDENT IN AN ORGANIZED HEALTH CARE EDUCATION/TRAINING PROGRAM

## 2018-08-25 PROCEDURE — 36415 COLL VENOUS BLD VENIPUNCTURE: CPT

## 2018-08-25 PROCEDURE — 6360000002 HC RX W HCPCS: Performed by: STUDENT IN AN ORGANIZED HEALTH CARE EDUCATION/TRAINING PROGRAM

## 2018-08-25 PROCEDURE — 96375 TX/PRO/DX INJ NEW DRUG ADDON: CPT

## 2018-08-25 PROCEDURE — 99999 PR OFFICE/OUTPT VISIT,PROCEDURE ONLY: CPT | Performed by: INTERNAL MEDICINE

## 2018-08-25 PROCEDURE — 93005 ELECTROCARDIOGRAM TRACING: CPT

## 2018-08-25 PROCEDURE — 71046 X-RAY EXAM CHEST 2 VIEWS: CPT

## 2018-08-25 PROCEDURE — 80053 COMPREHEN METABOLIC PANEL: CPT

## 2018-08-25 RX ORDER — ALBUTEROL SULFATE 90 UG/1
2 AEROSOL, METERED RESPIRATORY (INHALATION) EVERY 6 HOURS PRN
Status: DISCONTINUED | OUTPATIENT
Start: 2018-08-25 | End: 2018-08-26 | Stop reason: HOSPADM

## 2018-08-25 RX ORDER — LORAZEPAM 2 MG/ML
1 INJECTION INTRAMUSCULAR ONCE
Status: COMPLETED | OUTPATIENT
Start: 2018-08-25 | End: 2018-08-25

## 2018-08-25 RX ORDER — ASPIRIN 81 MG/1
324 TABLET, CHEWABLE ORAL ONCE
Status: COMPLETED | OUTPATIENT
Start: 2018-08-25 | End: 2018-08-25

## 2018-08-25 RX ORDER — POTASSIUM CHLORIDE 20 MEQ/1
40 TABLET, EXTENDED RELEASE ORAL PRN
Status: DISCONTINUED | OUTPATIENT
Start: 2018-08-25 | End: 2018-08-26 | Stop reason: HOSPADM

## 2018-08-25 RX ORDER — FUROSEMIDE 10 MG/ML
20 INJECTION INTRAMUSCULAR; INTRAVENOUS ONCE
Status: COMPLETED | OUTPATIENT
Start: 2018-08-25 | End: 2018-08-25

## 2018-08-25 RX ORDER — POTASSIUM CHLORIDE 20MEQ/15ML
40 LIQUID (ML) ORAL PRN
Status: DISCONTINUED | OUTPATIENT
Start: 2018-08-25 | End: 2018-08-26 | Stop reason: HOSPADM

## 2018-08-25 RX ORDER — POTASSIUM CHLORIDE 7.45 MG/ML
10 INJECTION INTRAVENOUS PRN
Status: DISCONTINUED | OUTPATIENT
Start: 2018-08-25 | End: 2018-08-26 | Stop reason: HOSPADM

## 2018-08-25 RX ORDER — FUROSEMIDE 10 MG/ML
20 INJECTION INTRAMUSCULAR; INTRAVENOUS DAILY
Status: DISCONTINUED | OUTPATIENT
Start: 2018-08-26 | End: 2018-08-26 | Stop reason: HOSPADM

## 2018-08-25 RX ORDER — SODIUM CHLORIDE 0.9 % (FLUSH) 0.9 %
10 SYRINGE (ML) INJECTION EVERY 12 HOURS SCHEDULED
Status: DISCONTINUED | OUTPATIENT
Start: 2018-08-25 | End: 2018-08-26 | Stop reason: HOSPADM

## 2018-08-25 RX ORDER — FAMOTIDINE 20 MG/1
20 TABLET, FILM COATED ORAL 2 TIMES DAILY
Status: DISCONTINUED | OUTPATIENT
Start: 2018-08-25 | End: 2018-08-26 | Stop reason: HOSPADM

## 2018-08-25 RX ORDER — ONDANSETRON 2 MG/ML
4 INJECTION INTRAMUSCULAR; INTRAVENOUS EVERY 6 HOURS PRN
Status: DISCONTINUED | OUTPATIENT
Start: 2018-08-25 | End: 2018-08-26 | Stop reason: HOSPADM

## 2018-08-25 RX ORDER — SODIUM CHLORIDE 0.9 % (FLUSH) 0.9 %
10 SYRINGE (ML) INJECTION PRN
Status: DISCONTINUED | OUTPATIENT
Start: 2018-08-25 | End: 2018-08-26 | Stop reason: HOSPADM

## 2018-08-25 RX ADMIN — APIXABAN 5 MG: 5 TABLET, FILM COATED ORAL at 21:06

## 2018-08-25 RX ADMIN — TIOTROPIUM BROMIDE 18 MCG: 18 CAPSULE ORAL; RESPIRATORY (INHALATION) at 21:12

## 2018-08-25 RX ADMIN — FUROSEMIDE 20 MG: 10 INJECTION, SOLUTION INTRAVENOUS at 18:06

## 2018-08-25 RX ADMIN — Medication 10 ML: at 21:13

## 2018-08-25 RX ADMIN — LORAZEPAM 1 MG: 2 INJECTION INTRAMUSCULAR; INTRAVENOUS at 15:52

## 2018-08-25 RX ADMIN — FAMOTIDINE 20 MG: 20 TABLET ORAL at 21:06

## 2018-08-25 RX ADMIN — ASPIRIN 81 MG 324 MG: 81 TABLET ORAL at 15:52

## 2018-08-25 ASSESSMENT — ENCOUNTER SYMPTOMS
SHORTNESS OF BREATH: 0
PHOTOPHOBIA: 0
SORE THROAT: 0
DOUBLE VISION: 0
COUGH: 1
SPUTUM PRODUCTION: 0
SHORTNESS OF BREATH: 1
WHEEZING: 0
BLURRED VISION: 0
ABDOMINAL PAIN: 0
VOMITING: 0
VOMITING: 1
NAUSEA: 0
CONSTIPATION: 1
BACK PAIN: 0
DIARRHEA: 0

## 2018-08-25 ASSESSMENT — PAIN DESCRIPTION - PAIN TYPE: TYPE: ACUTE PAIN

## 2018-08-25 ASSESSMENT — PAIN SCALES - GENERAL
PAINLEVEL_OUTOF10: 7
PAINLEVEL_OUTOF10: 2

## 2018-08-25 ASSESSMENT — PAIN DESCRIPTION - LOCATION: LOCATION: CHEST

## 2018-08-25 ASSESSMENT — PAIN DESCRIPTION - ORIENTATION: ORIENTATION: LEFT

## 2018-08-25 NOTE — ED PROVIDER NOTES
[address]  Emergency Department Encounter  Emergency Medicine Resident     Pt Name: Hiram Silver  MRN: [id number]  [place] 1965  Date of evaluation: 8/25/18  PCP:  Judy Gant MD    [address] [place] [address]       Chief Complaint   Patient presents with    Chest Pain    Shortness of Breath       HISTORY OF PRESENT ILLNESS  (Location/Symptom, Timing/Onset, Context/Setting, Quality, Duration, Modifying Factors, Severity.)      Hiram Silver is a 48 y.o. male with a history of atrial fibrillation and aortic stenosis presents with chest pain, shortness of breath, lightheadedness, and headaches for the past week. Patient reports intermittent left-sided sharp chest pain with radiation to his left neck, exacerbated by exertion, deep breathing, coughing, and certain positions. His pain is associated with shortness of breath and lightheadedness. He also reports cough productive of white sputum as well as arthralgias. No fevers/chills, vision changes, numbness/tingling, or any focal weakness. No abdominal pain, nausea, vomiting, or any other symptoms. No lower extremity swelling or pain. History of cocaine use, last used 2 days ago. History of A. fib on Eliquis, history of aortic stenosis currently being evaluated for possible valve replacement. Cardiologist is  Dr. August Browning with [address] [place] [address]. No history of CAD. Last stress test 2016. No history of DVT/PE. No recent surgery. No recent immobilization. PAST MEDICAL / SURGICAL / SOCIAL / FAMILY HISTORY      has a past medical history of Atrial fibrillation ([address] Chronic back pain; and Murmur, cardiac.     has a past surgical history that includes Inguinal hernia repair (Right); cyst removal (Left); and pr drain skin abscess complic (N/A, 5/64/5309). Social History     Social History    Marital status:      Spouse name: N/A    Number of children: N/A    Years of education: N/A     Occupational History    Not on file.      Social History Main Topics    Smoking status: Light Tobacco Smoker     Packs/day: 0.25     Years: 33.00     Types: Cigarettes    Smokeless tobacco: Current User     Types: Chew    Alcohol use No    Drug use: Yes     Types: Cocaine      Comment: rarely    Sexual activity: Yes     Partners: Female     Other Topics Concern    Not on file     Social History Narrative    No narrative on file       Family History   Problem Relation Age of Onset    Depression Mother        Allergies:  Patient has no known allergies. Home Medications:  Prior to Admission medications    Medication Sig Start Date End Date Taking? Authorizing Provider   diltiazem (CARDIZEM CD) 240 MG extended release capsule Take 1 capsule by mouth daily 8/27/18  Yes Jacqui Mejia MD   furosemide (LASIX) 20 MG tablet Take 1 tablet by mouth every other day 8/26/18  Yes Jacqui Mejia MD   tiotropium (SPIRIVA RESPIMAT) 1.25 MCG/ACT AERS inhaler Inhale 2 puffs into the lungs daily 3/7/18  Yes Mera Alba MD   albuterol sulfate HFA (PROVENTIL HFA) 108 (90 Base) MCG/ACT inhaler Inhale 2 puffs into the lungs every 6 hours as needed for Wheezing 2/22/18  Yes Ann Mondragon APRN - CNP   apixaban (ELIQUIS) 5 MG TABS tablet Take 1 tablet by mouth 2 times daily 2/13/18  Yes Serafin Fung MD       REVIEW OF SYSTEMS    (2-9 systems for level 4, 10 or more for level 5)      Review of Systems   Constitutional: Positive for fatigue. Negative for chills, diaphoresis and fever. HENT: Positive for congestion. Negative for sore throat. Eyes: Negative for photophobia and visual disturbance. Respiratory: Positive for cough and shortness of breath. Cardiovascular: Positive for chest pain. Negative for palpitations and leg swelling. Gastrointestinal: Negative for abdominal pain, nausea and vomiting. Genitourinary: Negative for dysuria and hematuria. Musculoskeletal: Negative for back pain, neck pain and neck stiffness.    Skin: Negative for rash and wound.   Neurological: Positive for light-headedness and headaches. Negative for syncope, weakness and numbness. PHYSICAL EXAM   (up to 7 for level 4, 8 or more for level 5)      INITIAL VITALS:   BP 95/73   Pulse 105   Temp 98.3 °F (36.8 °C) (Oral)   Resp 16   Ht 5' 9\" (1.753 m)   Wt 147 lb (66.7 kg)   SpO2 97%   BMI 21.71 kg/m²     Physical Exam   Gen. Appearance: patient appears well, nontoxic-appearing, nondistressed. HEENT: head atraumatic, normocephalic. Pupils equal and reactive to light. Oropharynx clear and moist.  Neck: Supple, normal range of motion. No lymphadenopathy. Pulmonary: Lungs clear to auscultation bilaterally. Equal air entry right left. Cardiovascular:  Heart sounds irregularly irregular. Systolic murmur. Peripheral pulses strong, equal, irregularly irregular. Abdomen: Soft, nontender, nondistended. Bowel sounds normal. No palpable masses. Neurology: GCS 15. Oriented to person place and time. Normal tone and power in all 4 extremities. No sensory deficits.     Skin: Warm, dry, well-perfused      DIFFERENTIAL  DIAGNOSIS     PLAN (LABS / IMAGING / EKG):  Orders Placed This Encounter   Procedures    Cult,Blood    CULTURE BLOOD #1    XR CHEST STANDARD (2 VW)    CBC Auto Differential    Comprehensive Metabolic Panel    Brain Natriuretic Peptide    Troponin    Troponin    Basic Metabolic Panel w/ Reflex to MG    CBC auto differential    Troponin    DIET CARDIAC;    Telemetry monitoring    Vital signs per unit routine    Tobacco cessation education    Intake and output    Place intermittent pneumatic compression device    Telemetry monitoring    Full Code    Consult to Cardiology    OT eval and treat    PT evaluation and treat    Initiate Oxygen Therapy Protocol    Respiratory Care Evaluation and Treat    EKG 12 Lead    PATIENT STATUS (FROM ED OR OR/PROCEDURAL) Observation    Discharge patient       MEDICATIONS ORDERED:  Orders Placed This

## 2018-08-25 NOTE — H&P
substance abuse (smokes crack). Physical Exam:   /66   Pulse 125   Temp 98.4 °F (36.9 °C) (Oral)   Resp 18   Ht 5' 9\" (1.753 m)   Wt 147 lb (66.7 kg)   SpO2 96%   BMI 21.71 kg/m²   Temp (24hrs), Av.4 °F (36.9 °C), Min:98.4 °F (36.9 °C), Max:98.4 °F (36.9 °C)    No results for input(s): POCGLU in the last 72 hours. No intake or output data in the 24 hours ending 18 1820    Physical Exam   Constitutional: He is well-developed, well-nourished, and in no distress. HENT:   Head: Normocephalic and atraumatic. Neck: Normal range of motion. Cardiovascular: Normal rate and regular rhythm. Murmur (Systolic ejection murmur, grade 4) heard. Pulmonary/Chest: Effort normal and breath sounds normal. He exhibits no tenderness. Abdominal: Soft. Bowel sounds are normal. He exhibits mass (Bilateral inquinal hernia present, right > left). Musculoskeletal: Normal range of motion. He exhibits no edema. Neurological: He is alert. Skin: Skin is warm and dry. Nursing note and vitals reviewed.       Investigations:      Laboratory Testing:  Recent Results (from the past 24 hour(s))   EKG 12 Lead    Collection Time: 18  3:17 PM   Result Value Ref Range    Ventricular Rate 119 BPM    Atrial Rate 197 BPM    QRS Duration 84 ms    Q-T Interval 326 ms    QTc Calculation (Bazett) 458 ms    R Axis 65 degrees    T Axis 99 degrees   CBC Auto Differential    Collection Time: 18  3:52 PM   Result Value Ref Range    WBC 12.0 (H) 3.5 - 11.0 k/uL    RBC 4.33 (L) 4.5 - 5.9 m/uL    Hemoglobin 13.1 (L) 13.5 - 17.5 g/dL    Hematocrit 38.9 (L) 41 - 53 %    MCV 89.8 80 - 100 fL    MCH 30.3 26 - 34 pg    MCHC 33.8 31 - 37 g/dL    RDW 14.1 11.5 - 14.9 %    Platelets 254 651 - 945 k/uL    MPV 9.7 6.0 - 12.0 fL    NRBC Automated NOT REPORTED per 100 WBC    Differential Type NOT REPORTED     Seg Neutrophils 75 (H) 36 - 66 %    Lymphocytes 13 (L) 24 - 44 %    Monocytes 10 (H) 1 - 7 %    Eosinophils % 1 0 - 4 size. There is pulmonary venous congestion new since prior study with some associated patchy perihilar airspace disease. No pleural effusion or pneumothorax. No free subdiaphragmatic air. The osseous structures are grossly unremarkable. Interval development of pulmonary edema manifest predominantly by pulmonary venous congestion and perihilar opacities. .       Assessment :      Primary Problem  Atrial fibrillation [facility])    Active Hospital Problems    Diagnosis Date Noted    Shortness of breath [R06.02] 08/25/2018    Nonrheumatic aortic valve stenosis [I35.0] 03/07/2018    Atrial fibrillation ([address] [I48.91] 03/27/2015       Plan:     Patient status Admit as inpatient in the  Med/Surge    1. Atrial Fibrillation 2/2 Aortic Stenosis (bivalve)  -Telemetry  -F/u troponin   -Lasix 20mg IV daily   -Eliquis 5mg tablet BID  -f/u cardio consult     2. Asthma?   -Resp Eval and Treat  -Albuterol inhaler 2 puff  -Spriva 1.25mcg/act inhaler 2 puff     3. Substance Abuse  -Crack: smoke/snort  -Last use Wednesday, 08/22     4. Maintenance  -Potassium replacement   -Zofran 4mg q6h PRN  -Pepcid 20mg tablet BID     Consultations:   None    Patient is admitted as inpatient status because of co-morbidities listed above, severity of signs and symptoms as outlined, requirement for current medical therapies and most importantly because of direct risk to patient if care not provided in a hospital setting. Carolina Adame MD  8/25/2018  6:20 PM    Copy sent to Dr. Laly Martin MD      IM Attending    Pt seen and examined today   I have discussed the care of pt , including pertinent history and exam findings,  with the resident. I have reviewed the key elements of all parts of the encounter with the resident. I agree with the assessment, plan and orders as documented by the resident.   A fib   Cocaine use   Social issues  Need AV sx   D/w pt     Electronically signed by Nova Carlton MD

## 2018-08-25 NOTE — ED PROVIDER NOTES
[address]  eMERGENCY dEPARTMENT eNCOUnter   Attending Attestation     Pt Name: Veronica Archuleta  MRN: [id number]  [place] 1965  Date of evaluation: 8/25/18       Veronica Archuleta is a 48 y.o. male who presents with Chest Pain and Shortness of Breath      MDM:     51-year-old male past medical history significant for aortic stenosis and cocaine use presents to ER with chest pain and shortness of breath. Will obtain cardiac workup, provide Ativan for chest pain consider rate control with a calcium channel blocker if needed. Atrial fibrillation with RVR @ 119, QRS 84, QTc 458. Abnormal EKG    Vitals:   Vitals:    08/25/18 2336 08/26/18 0013 08/26/18 0653 08/26/18 1255   BP: 89/75 100/62 115/73 114/72   Pulse: 92  102 105   Resp: 12  17 16   Temp: 98.2 °F (36.8 °C)  98.1 °F (36.7 °C) 98.3 °F (36.8 °C)   TempSrc: Oral  Oral Oral   SpO2: 96%  95% 97%   Weight:       Height:             I personally evaluated and examined the patient in conjunction with the resident and agree with the assessment, treatment plan, and disposition of the patient as recorded by the resident. I performed a history and physical examination of the patient and discussed management with the resident. I reviewed the residents note and agree with the documented findings and plan of care. Any areas of disagreement are noted on the chart. I was personally present for the key portions of any procedures. I have documented in the chart those procedures where I was not present during the key portions. I have personally reviewed all images and agree with the resident's interpretation. I have reviewed the emergency nurses triage note. I agree with the chief complaint, past medical history, past surgical history, allergies, medications, social and family history as documented unless otherwise noted.     Cat Pinto MD  Attending Emergency Physician          Cat Pinto MD  08/25/18 7882       Cat Pinto MD  08/27/18 6649

## 2018-08-26 VITALS
DIASTOLIC BLOOD PRESSURE: 72 MMHG | OXYGEN SATURATION: 97 % | BODY MASS INDEX: 21.77 KG/M2 | WEIGHT: 147 LBS | SYSTOLIC BLOOD PRESSURE: 114 MMHG | HEART RATE: 105 BPM | RESPIRATION RATE: 16 BRPM | TEMPERATURE: 98.3 F | HEIGHT: 69 IN

## 2018-08-26 LAB
ABSOLUTE EOS #: 0.2 K/UL (ref 0–0.4)
ABSOLUTE IMMATURE GRANULOCYTE: ABNORMAL K/UL (ref 0–0.3)
ABSOLUTE LYMPH #: 1.7 K/UL (ref 1–4.8)
ABSOLUTE MONO #: 1.2 K/UL (ref 0.1–1.3)
ANION GAP SERPL CALCULATED.3IONS-SCNC: 10 MMOL/L (ref 9–17)
BASOPHILS # BLD: 1 % (ref 0–2)
BASOPHILS ABSOLUTE: 0.1 K/UL (ref 0–0.2)
BUN BLDV-MCNC: 20 MG/DL (ref 6–20)
BUN/CREAT BLD: ABNORMAL (ref 9–20)
CALCIUM SERPL-MCNC: 8.5 MG/DL (ref 8.6–10.4)
CHLORIDE BLD-SCNC: 107 MMOL/L (ref 98–107)
CO2: 21 MMOL/L (ref 20–31)
CREAT SERPL-MCNC: 0.75 MG/DL (ref 0.7–1.2)
DIFFERENTIAL TYPE: ABNORMAL
EOSINOPHILS RELATIVE PERCENT: 2 % (ref 0–4)
GFR AFRICAN AMERICAN: >60 ML/MIN
GFR NON-AFRICAN AMERICAN: >60 ML/MIN
GFR SERPL CREATININE-BSD FRML MDRD: ABNORMAL ML/MIN/{1.73_M2}
GFR SERPL CREATININE-BSD FRML MDRD: ABNORMAL ML/MIN/{1.73_M2}
GLUCOSE BLD-MCNC: 126 MG/DL (ref 70–99)
HCT VFR BLD CALC: 42.9 % (ref 41–53)
HEMOGLOBIN: 14.5 G/DL (ref 13.5–17.5)
IMMATURE GRANULOCYTES: ABNORMAL %
LYMPHOCYTES # BLD: 16 % (ref 24–44)
MCH RBC QN AUTO: 30.3 PG (ref 26–34)
MCHC RBC AUTO-ENTMCNC: 33.8 G/DL (ref 31–37)
MCV RBC AUTO: 89.8 FL (ref 80–100)
MONOCYTES # BLD: 11 % (ref 1–7)
NRBC AUTOMATED: ABNORMAL PER 100 WBC
PDW BLD-RTO: 14.4 % (ref 11.5–14.9)
PLATELET # BLD: 218 K/UL (ref 150–450)
PLATELET ESTIMATE: ABNORMAL
PMV BLD AUTO: 9.6 FL (ref 6–12)
POTASSIUM SERPL-SCNC: 4.1 MMOL/L (ref 3.7–5.3)
RBC # BLD: 4.78 M/UL (ref 4.5–5.9)
RBC # BLD: ABNORMAL 10*6/UL
SEG NEUTROPHILS: 70 % (ref 36–66)
SEGMENTED NEUTROPHILS ABSOLUTE COUNT: 7.3 K/UL (ref 1.3–9.1)
SODIUM BLD-SCNC: 138 MMOL/L (ref 135–144)
WBC # BLD: 10.4 K/UL (ref 3.5–11)
WBC # BLD: ABNORMAL 10*3/UL

## 2018-08-26 PROCEDURE — G0378 HOSPITAL OBSERVATION PER HR: HCPCS

## 2018-08-26 PROCEDURE — G8978 MOBILITY CURRENT STATUS: HCPCS

## 2018-08-26 PROCEDURE — G8980 MOBILITY D/C STATUS: HCPCS

## 2018-08-26 PROCEDURE — G8979 MOBILITY GOAL STATUS: HCPCS

## 2018-08-26 PROCEDURE — 85025 COMPLETE CBC W/AUTO DIFF WBC: CPT

## 2018-08-26 PROCEDURE — 97161 PT EVAL LOW COMPLEX 20 MIN: CPT

## 2018-08-26 PROCEDURE — 2580000003 HC RX 258: Performed by: STUDENT IN AN ORGANIZED HEALTH CARE EDUCATION/TRAINING PROGRAM

## 2018-08-26 PROCEDURE — 87040 BLOOD CULTURE FOR BACTERIA: CPT

## 2018-08-26 PROCEDURE — 6360000002 HC RX W HCPCS: Performed by: STUDENT IN AN ORGANIZED HEALTH CARE EDUCATION/TRAINING PROGRAM

## 2018-08-26 PROCEDURE — 36415 COLL VENOUS BLD VENIPUNCTURE: CPT

## 2018-08-26 PROCEDURE — 96376 TX/PRO/DX INJ SAME DRUG ADON: CPT

## 2018-08-26 PROCEDURE — 99217 PR OBSERVATION CARE DISCHARGE MANAGEMENT: CPT | Performed by: INTERNAL MEDICINE

## 2018-08-26 PROCEDURE — 6370000000 HC RX 637 (ALT 250 FOR IP): Performed by: STUDENT IN AN ORGANIZED HEALTH CARE EDUCATION/TRAINING PROGRAM

## 2018-08-26 PROCEDURE — 6370000000 HC RX 637 (ALT 250 FOR IP): Performed by: INTERNAL MEDICINE

## 2018-08-26 PROCEDURE — 80048 BASIC METABOLIC PNL TOTAL CA: CPT

## 2018-08-26 RX ORDER — DILTIAZEM HYDROCHLORIDE 240 MG/1
240 CAPSULE, COATED, EXTENDED RELEASE ORAL DAILY
Status: DISCONTINUED | OUTPATIENT
Start: 2018-08-26 | End: 2018-08-26 | Stop reason: HOSPADM

## 2018-08-26 RX ORDER — FUROSEMIDE 20 MG/1
20 TABLET ORAL DAILY
Qty: 60 TABLET | Refills: 3 | Status: SHIPPED | OUTPATIENT
Start: 2018-08-26 | End: 2018-08-26

## 2018-08-26 RX ORDER — FUROSEMIDE 20 MG/1
20 TABLET ORAL EVERY OTHER DAY
Qty: 60 TABLET | Refills: 3 | Status: SHIPPED | OUTPATIENT
Start: 2018-08-26 | End: 2019-01-02

## 2018-08-26 RX ORDER — DILTIAZEM HYDROCHLORIDE 240 MG/1
240 CAPSULE, COATED, EXTENDED RELEASE ORAL DAILY
Qty: 30 CAPSULE | Refills: 3 | Status: ON HOLD | OUTPATIENT
Start: 2018-08-27 | End: 2018-08-31

## 2018-08-26 RX ADMIN — APIXABAN 5 MG: 5 TABLET, FILM COATED ORAL at 08:33

## 2018-08-26 RX ADMIN — Medication 10 ML: at 08:34

## 2018-08-26 RX ADMIN — FUROSEMIDE 20 MG: 10 INJECTION, SOLUTION INTRAVENOUS at 08:33

## 2018-08-26 RX ADMIN — DILTIAZEM HYDROCHLORIDE 240 MG: 240 CAPSULE, COATED, EXTENDED RELEASE ORAL at 11:10

## 2018-08-26 RX ADMIN — FAMOTIDINE 20 MG: 20 TABLET ORAL at 08:33

## 2018-08-26 RX ADMIN — TIOTROPIUM BROMIDE 18 MCG: 18 CAPSULE ORAL; RESPIRATORY (INHALATION) at 11:11

## 2018-08-26 ASSESSMENT — PAIN DESCRIPTION - ONSET: ONSET: ON-GOING

## 2018-08-26 ASSESSMENT — PAIN SCALES - GENERAL
PAINLEVEL_OUTOF10: 0
PAINLEVEL_OUTOF10: 4

## 2018-08-26 ASSESSMENT — PAIN DESCRIPTION - PROGRESSION: CLINICAL_PROGRESSION: GRADUALLY IMPROVING

## 2018-08-26 ASSESSMENT — PAIN DESCRIPTION - FREQUENCY: FREQUENCY: INTERMITTENT

## 2018-08-26 ASSESSMENT — PAIN DESCRIPTION - DESCRIPTORS: DESCRIPTORS: ACHING

## 2018-08-26 ASSESSMENT — PAIN DESCRIPTION - LOCATION: LOCATION: CHEST;RIB CAGE

## 2018-08-26 ASSESSMENT — PAIN DESCRIPTION - PAIN TYPE: TYPE: ACUTE PAIN

## 2018-08-26 ASSESSMENT — PAIN DESCRIPTION - ORIENTATION: ORIENTATION: LEFT

## 2018-08-26 NOTE — PROGRESS NOTES
Repositioned; Ambulation/Increased activity  Response to Pain Intervention: Patient Satisfied  Multiple Pain Sites: No  Vital Signs  Patient Currently in Pain: Yes       Orientation  Orientation  Overall Orientation Status: Within Normal Limits    Social/Functional History  Social/Functional History  Lives With: Significant other  Type of Home: Apartment  Home Layout: One level  Home Access: Stairs to enter without rails  Entrance Stairs - Number of Steps: 4\" threshhold step to enter  Bathroom Shower/Tub: Tub/Shower unit, Curtain  Bathroom Toilet: Standard  Bathroom Equipment: Tub transfer bench  Home Equipment:  (no DME)  ADL Assistance: Independent  Homemaking Assistance: Independent (states he shares duties w/ SO)  Homemaking Responsibilities: Yes (states he shares duties w/ SO)  Ambulation Assistance: Independent (no device)  Transfer Assistance: Independent  Active : Yes  Mode of Transportation: Car  Occupation: Self employed  Objective     Observation/Palpation  Observation: pt walking back from bathroom when therapist entered room    AROM RLE (degrees)  RLE AROM: WFL  AROM LLE (degrees)  LLE AROM : WFL  AROM RUE (degrees)  RUE General AROM: see OT for UE assessment  AROM LUE (degrees)  LUE General AROM: see OT for UE assessment  Strength RLE  Strength RLE: WFL  Comment: 5/5  Strength LLE  Strength LLE: WFL  Comment: 5/5  Strength RUE  Comment: see OT for UE assessment  Strength LUE  Comment: see OT for UE assessment     Sensation  Overall Sensation Status: WFL (denies)  Bed mobility  Rolling to Left: Independent  Supine to Sit: Independent  Sit to Supine: Independent  Scooting: Independent  Comment: dangled at the EOB independently for evaluation  Transfers  Sit to Stand: Independent  Stand to sit:  Independent  Stand Pivot Transfers: Independent (no device)  Ambulation  Ambulation?: Yes  Ambulation 1  Surface: level tile  Device: No Device  Assistance: Independent  Quality of Gait: no balance issues  Distance: 200' x 2  Stairs/Curb  Stairs?: No (refused demonstration- states step is only 4\" and does not anticipate a problem)     Balance  Sitting - Static: Good  Sitting - Dynamic: Good  Standing - Static: Good (no device)  Standing - Dynamic: Good (no device)        Assessment   Assessment: pt is independent in bed mobility, transfers and gait. Strength is 5/5. No need for skilled PT. Treatment Diagnosis: CHF, chest pain  Specific instructions for Next Treatment: 8-26-18 D/C PT- pt is independent in bed mobility, transfers and gait. Prognosis: Excellent  Decision Making: Low Complexity  History: admitted w/ C/O chest pain x 1 day and SOB x 1 week  Exam: ROM, MMT, balance and mobility assessments  Clinical Presentation: D/C PT- pt is independent  Patient Education: POC  Barriers to Learning: none  No Skilled PT: Independent with functional mobility   REQUIRES PT FOLLOW UP: No  Activity Tolerance  Activity Tolerance: Patient Tolerated treatment well         Plan   Plan  Times per week: D/C PT  Times per day:  (D/C PT)  Specific instructions for Next Treatment: 8-26-18 D/C PT- pt is independent in bed mobility, transfers and gait.    Safety Devices  Type of devices: Call light within reach, Left in bed    G-Code  PT G-Codes  Functional Assessment Tool Used: [place] w/o steps  Score: 20/20  Functional Limitation: Mobility: Walking and moving around  Mobility: Walking and Moving Around Current Status (): 0 percent impaired, limited or restricted  Mobility: Walking and Moving Around Goal Status (): 0 percent impaired, limited or restricted  Mobility: Walking and Moving Around Discharge Status (): 0 percent impaired, limited or restricted  OutComes Score                                           AM-PAC Score     AM-PAC Inpatient Mobility without Stair Climbing Raw Score : 20  AM-PAC Inpatient without Stair Climbing T-Scale Score : 60.57  Mobility Inpatient CMS 0-100% Score: 0  Mobility

## 2018-08-26 NOTE — DISCHARGE SUMMARY
Internal Medicine   Discharge Summary         Patient Identification:  Camacho Chanel is a 48 y.o. male. :  1965  MRN: [id number]     Acct: [de-identified]   Admit Date:  2018  Discharge date and time: No discharge date for patient encounter. Attending Provider: Muriel Chun MD                                       Reason for Admission: CHF   Severe AS   Cocaine  use     Discharge Diagnoses:   Patient Active Problem List   Diagnosis    Back pain    Tobacco abuse    Atrial fibrillation ([facility]    Thrush, oral    Fatigue    Left foot burn    Cellulitis    Cellulitis of buttock    Gluteal abscess    Nonrheumatic aortic valve stenosis    Shortness of breath    CHF (congestive heart failure), NYHA class I, acute on chronic, combined ([facility] [address] [facility]          Consults:  Cardiology     Procedures:    Hospital Course:     Vitals:    18 0653   BP: 115/73   Pulse: 102   Resp: 17   Temp: 98.1 °F (36.7 °C)   SpO2: 95%         Disposition:   Home    Discharged Condition:  Stable     Discharge Medications:       Thomas Jamison   [place] Medication Instructions UPU:[id number]    Printed on:18 1130   Medication Information                      albuterol sulfate HFA (PROVENTIL HFA) 108 (90 Base) MCG/ACT inhaler  Inhale 2 puffs into the lungs every 6 hours as needed for Wheezing             apixaban (ELIQUIS) 5 MG TABS tablet  Take 1 tablet by mouth 2 times daily             diltiazem (CARDIZEM CD) 240 MG extended release capsule  Take 1 capsule by mouth daily             furosemide (LASIX) 20 MG tablet  Take 1 tablet by mouth every other day             tiotropium (SPIRIVA RESPIMAT) 1.25 MCG/ACT AERS inhaler  Inhale 2 puffs into the lungs daily                 Discharge Instructions: Follow up with Yokasta Mccarthy MD in 2 weeks.       Hospital acquired Infections: None    Time spent for dc more than 30 min     Lola MIRANDA attending

## 2018-08-26 NOTE — CONSULTS
Inhalation Q6H PRN Micky Petit MD        sodium chloride flush 0.9 % injection 10 mL  10 mL Intravenous 2 times per day Micky Petit MD   10 mL at 08/26/18 0834    sodium chloride flush 0.9 % injection 10 mL  10 mL Intravenous PRN Micky Petit MD        magnesium hydroxide (MILK OF MAGNESIA) 400 MG/5ML suspension 30 mL  30 mL Oral Daily PRN Micky Petit MD        ondansetron (ZOFRAN) injection 4 mg  4 mg Intravenous Q6H PRN Micky Petit MD        potassium chloride (KLOR-CON M) extended release tablet 40 mEq  40 mEq Oral PRN Micky Petit MD        Or    potassium chloride 20 MEQ/15ML (10%) oral solution 40 mEq  40 mEq Oral PRN Micky Petit MD        Or    potassium chloride 10 mEq/100 mL IVPB (Peripheral Line)  10 mEq Intravenous PRN Micky Petit MD        famotidine (PEPCID) tablet 20 mg  20 mg Oral BID Micky Petit MD   20 mg at 08/26/18 2101    furosemide (LASIX) injection 20 mg  20 mg Intravenous Daily Micky Petit MD   20 mg at 08/26/18 1360       Social History:       TOBACCO:   reports that he has been smoking Cigarettes. He has a 8.25 pack-year smoking history. His smokeless tobacco use includes Chew. ETOH:   reports that he does not drink alcohol. DRUGS:  reports that he uses drugs, including Cocaine. OCCUPATION:          Family Histroy:     Family History   Problem Relation Age of Onset    Depression Mother            Review of Systems:   · Constitutional: there has been no unanticipated weight loss. There's been no change in energy level, sleep pattern, or activity level. Does note some night sweats  · Eyes: No visual changes or diplopia. No scleral icterus. · ENT: No Headaches, hearing loss or vertigo. No mouth sores or sore throat. · Cardiovascular: No chest pain, dyspnea on exertion, palpitations or loss of consciousness. No cough, hemoptysis, pleuritic pain, or phlebitis. · Respiratory: No cough or wheezing, no sputum production. No hematemesis. · Gastrointestinal: No abdominal pain, appetite loss, blood in stools. No change in bowel or bladder habits. · Genitourinary: No dysuria, trouble voiding, or hematuria. · Musculoskeletal:  No gait disturbance, weakness or joint complaints. · Integumentary: No rash or pruritis. · Neurological: No headache, diplopia, change in muscle strength, numbness or tingling. No change in gait, balance, coordination, mood, affect, memory, mentation, behavior. · Psychiatric: No anxiety, or depression. · Endocrine: No temperature intolerance. No excessive thirst, fluid intake, or urination. No tremor. · Hematologic/Lymphatic: No abnormal bruising or bleeding, blood clots or swollen lymph nodes. · Allergic/Immunologic: No nasal congestion or hives. Physical Exam    Vital Signs: /73   Pulse 102   Temp 98.1 °F (36.7 °C) (Oral)   Resp 17   Ht 5' 9\" (1.753 m)   Wt 147 lb (66.7 kg)   SpO2 95%   BMI 21.71 kg/m²        Admission Weight: 147 lb (66.7 kg)     General appearance: Awake, Alert Cooperative    Head: Normocephalic, without obvious abnormality, atraumatic    Eyes: Conjunctivae/corneas clear. PERRL, EOM's intact. Fundi benign    Neck: no adenopathy, no carotid bruit, no JVD, supple, symmetrical, trachea midline and thyroid: not enlarged, symmetric, no tenderness/mass/nodules    Lungs: scarce wheeze at apicies. Clear to percussion. Heart: irregular rate and rhythm, S1, S2 absent normal,harsh AS murmur, click, rub or gallop    Abdomen: Soft, non-tender.  Bowel sounds normal. No masses,  no organomegaly    Extremities: extremities normal, atraumatic, no cyanosis or edema    Skin: Skin color, texture, turgor normal. No rashes or lesions    Neurologic: Grossly normal            Labs:      CBC:   Recent Labs      08/25/18   1552  08/26/18   0554   WBC  12.0*  10.4   HGB  13.1*  14.5   HCT  38.9*  42.9   MCV  89.8  89.8   PLT  224  218     BMP:   Recent Labs      08/25/18   1552  08/26/18   0554   NA  136

## 2018-08-26 NOTE — PROGRESS NOTES
[address]   OCCUPATIONAL THERAPY  TREATMENT NOTE   INPATIENT   Date: 18  Patient Name: Delana Sever       Room: 9188/7202-31  MRN: [id number]   Account #: [de-identified]    : 1965  (48 y.o.)  Gender: male      REASON FOR MISSED TREATMENT:    Reports he is performing his own care tasks. Nursing notes increased heart rate with activity, but patient reports no symptoms. .  No Skilled OT Needs identified currently - patient at baseline. -   OT being discontinued at this time.  Patient functioning at Premorbid Level  No further needs        Yamilet Cordova OT

## 2018-08-26 NOTE — PROGRESS NOTES
Pt evaluated per RT ,pt would like to remain on home regime for Albuterol inhaler as well as Spiriva 02 sat on room air 97% ,Breath sounds clear

## 2018-08-26 NOTE — CARE COORDINATION
ADMISSION NOTE       Patient admitted to room  2050. Time of admit:  1925    Admit from:  ER    Reason for admission:  Chest pain, atrial fib. Where patient has been residing for the last 24 hrs:  Private residence    Has the patient been admitted to any facility in the last 4 weeks, which one:  no    Family at bedside:  yes    Patient is currently in pain yes      Patient has been oriented to room, educated on how to use call light, and to call for assistance prior to getting up. Bed in lowest and locked position. 2 siderails up for safety. Call light within reach. [facility]  DVT Prophylaxis and Vaccine Status  Work List  Mandatory for all patients      Patient must be on both Chemical prophylaxis and Mechanical prophylaxis.  If chemical/mechanical prophylaxis is not ordered, the physician must document a reason for not using prophylaxis     Chemical Prophylaxis  Is patient on chemical prophylaxis: Yes, on eliquis  If no chemical prophylaxis Is a order in for No Chemical VTE prophylaxisNo  If no was the physician notified not applicable      Mechanical Prophylaxis  Is patient on mechanical prophylaxis, intermittent pneumatic compression device: Yes  If no was the physician notified not applicable        Pneumonia Vaccine  Vaccine indicated:  Not indicated  If indicated was the vaccine given: not applicable    Influenza Vaccine (applicable from October through March):  Vaccine indicated: Not indicated  If indicated was the vaccine given: not applicable    Patient Education  Education completed on DVT prophylaxis: yes

## 2018-08-26 NOTE — SIGNIFICANT EVENT
irregular rate and rhythm, no S3, +systolic murmur  PERIPHERAL PULSES: bilateral radial pulses full and equal  EXTREMITIES and BACK: no cyanosis present, no clubbing present, no edema present  PSYCHIATRIC: appropriate mood, memory and judgement  NEUROLOGICAL: no gross motor or sensory deficits noted  _______________________  VITAL SIGNS:  /68 (BP Site: Left Arm, BP Postition: Sitting, BP CUFF SIZE: M (9-13 inches))   Pulse 64   Ht 177.8 cm (5' 10\")   Wt 71.2 kg (157 lb)   BMI 22.53 kg/m²   _______________________  No orders of the defined types were placed in this encounter.          Medications Discontinued During This Encounter   Medication Reason    DOXYCYCLINE CALCIUM ORAL Discontinued by another clinician      _______________________  IMPRESSIONS/PLAN       Encounter Diagnoses   Name Primary?  Severe aortic stenosis Yes    Permanent atrial fibrillation (HCC)      SOB (shortness of breath) on exertion      Smoker      Chews tobacco        Given improved infection and not using any drugs, will refer to [facility]. for further evaluation of Critical Aortic stenosis and timing of eventual AVR with a Team approach by Interventional Cardiologist and Cardiothoracic surgeon. .       Will continue on ALL current cardiac medications along with continued aggressive cardiac risk factor modification including cardiac diet, regular exercise, loss of weight.       Advised to stop cig smoking and chewing tobacco.     Congratulated on quitting Cocaine and no more infection.     Follow up in 6 months or sooner as needed.     Thank you.  _______________________  Kasia Coffman      Orders Placed This Encounter   Procedures    Ambulatory Referral to Structural Heart      _______________________  FOLLOW UP  Return in about 6 months (around 2018) for Recheck.     PCP: Jaylon Bradford MD  Referring Physician: JAMES Hung  [address] [facility], [address] [facility], [address] [place] [address]     Martín Mosqueda MA  06/20/18 9274     Shania Monge MD, [facility] [place], [place]            Electronically signed by Shania Monge MD at 06/20/18 1017      2 D ECHO 2/13/2018:  Summary  Left ventricle is normal in size. Normal left ventricular ejection fraction (>55%). Mild left ventricular hypertrophy. Bi-atrial enlargement. Prominent Eustachian valve. Heavily calcified AV with possible functional bicuspid AV. Mean gradient 47 mm Hg  Severe aortic stenosis. Trivial aortic insufficiency. Normal aortic root dimension. Thickened mitral valve leaflets. Mild mitral regurgitation. Mild tricuspid regurgitation. Normal right ventricular systolic pressure.   No significant pericardial effusion is seen.     EKG Feb 2018 - A fib, Abnormal EKG, No change since 2012 - by report.     Nuclear stress test Dec 2016:  No ischemia or infarct, LVEF 52%, Low risk study.

## 2018-08-26 NOTE — PROGRESS NOTES
Breath Sounds: clear  RR[de-identified] 18  Pulse Sat: 97 room air  Home Meds: alb inhaler ,spiriva    · Bronchodilator assessment at level:   · []    Home Level  BRONCHODILATOR ASSESSMENT SCORE  Score 0 1 2 3 4 5   Breath Sounds   []  Patient Baseline [x]  No Wheeze good aeration []  Faint, scattered wheezing, good aeration []  Expiratory Wheezing and or moderately diminished []  Insp/Exp wheeze and/or very diminished []  Insp/Exp and/ or marked distress   Respiratory Rate   []  Patient Baseline [x]  Less than 20 []  Less than 20 []  20-25 []  Greater than 25 []  Greater than 25   Peak flow % of Pred or PB []  NA   []  Greater than 90%  []  81-90% []  71-80% []  Less than or equal to 70%  or unable to perform []  Unable due to Respiratory Distress   Dyspnea re []  Patient Baseline []  No SOB []  No SOB [x]  SOB on exertion []  SOB min activity []  At rest/acute   e FEV% Predicted       []  NA []  Above 69%  []  Unable []  Above 60-69%  []  Unable []  Above 50-59%  []  Unable []  Above 35-49%  []  Unable []  Less than 35%  []  Unable

## 2018-08-27 ENCOUNTER — APPOINTMENT (OUTPATIENT)
Dept: GENERAL RADIOLOGY | Age: 53
DRG: 178 | End: 2018-08-27
Payer: COMMERCIAL

## 2018-08-27 ENCOUNTER — APPOINTMENT (OUTPATIENT)
Dept: CT IMAGING | Age: 53
DRG: 178 | End: 2018-08-27
Payer: COMMERCIAL

## 2018-08-27 ENCOUNTER — HOSPITAL ENCOUNTER (INPATIENT)
Age: 53
LOS: 4 days | Discharge: ANOTHER HOSPITAL | DRG: 178 | End: 2018-08-31
Attending: EMERGENCY MEDICINE | Admitting: INTERNAL MEDICINE
Payer: COMMERCIAL

## 2018-08-27 DIAGNOSIS — R07.9 RIGHT-SIDED CHEST PAIN: ICD-10-CM

## 2018-08-27 DIAGNOSIS — R91.8 MASS OF UPPER LOBE OF LEFT LUNG: ICD-10-CM

## 2018-08-27 DIAGNOSIS — J98.4 CAVITARY LESION OF LUNG: ICD-10-CM

## 2018-08-27 DIAGNOSIS — J18.9 PNEUMONIA OF BOTH UPPER LOBES DUE TO INFECTIOUS ORGANISM: Primary | ICD-10-CM

## 2018-08-27 DIAGNOSIS — F14.10 COCAINE ABUSE (HCC): ICD-10-CM

## 2018-08-27 LAB
ABSOLUTE EOS #: 0.2 K/UL (ref 0–0.4)
ABSOLUTE IMMATURE GRANULOCYTE: ABNORMAL K/UL (ref 0–0.3)
ABSOLUTE LYMPH #: 2.1 K/UL (ref 1–4.8)
ABSOLUTE MONO #: 1.4 K/UL (ref 0.1–1.3)
ALBUMIN SERPL-MCNC: 3.5 G/DL (ref 3.5–5.2)
ALBUMIN/GLOBULIN RATIO: ABNORMAL (ref 1–2.5)
ALP BLD-CCNC: 91 U/L (ref 40–129)
ALT SERPL-CCNC: 18 U/L (ref 5–41)
AMPHETAMINE SCREEN URINE: NEGATIVE
ANION GAP SERPL CALCULATED.3IONS-SCNC: 12 MMOL/L (ref 9–17)
AST SERPL-CCNC: 15 U/L
BARBITURATE SCREEN URINE: NEGATIVE
BASOPHILS # BLD: 1 % (ref 0–2)
BASOPHILS ABSOLUTE: 0.1 K/UL (ref 0–0.2)
BENZODIAZEPINE SCREEN, URINE: NEGATIVE
BILIRUB SERPL-MCNC: 0.33 MG/DL (ref 0.3–1.2)
BUN BLDV-MCNC: 19 MG/DL (ref 6–20)
BUN/CREAT BLD: ABNORMAL (ref 9–20)
BUPRENORPHINE URINE: ABNORMAL
CALCIUM SERPL-MCNC: 9.5 MG/DL (ref 8.6–10.4)
CANNABINOID SCREEN URINE: NEGATIVE
CHLORIDE BLD-SCNC: 101 MMOL/L (ref 98–107)
CO2: 27 MMOL/L (ref 20–31)
COCAINE METABOLITE, URINE: POSITIVE
CREAT SERPL-MCNC: 0.83 MG/DL (ref 0.7–1.2)
DIFFERENTIAL TYPE: ABNORMAL
EKG ATRIAL RATE: 102 BPM
EKG ATRIAL RATE: 94 BPM
EKG Q-T INTERVAL: 358 MS
EKG Q-T INTERVAL: 358 MS
EKG QRS DURATION: 80 MS
EKG QRS DURATION: 86 MS
EKG QTC CALCULATION (BAZETT): 426 MS
EKG QTC CALCULATION (BAZETT): 445 MS
EKG R AXIS: 146 DEGREES
EKG R AXIS: 69 DEGREES
EKG T AXIS: 109 DEGREES
EKG T AXIS: 151 DEGREES
EKG VENTRICULAR RATE: 85 BPM
EKG VENTRICULAR RATE: 93 BPM
EOSINOPHILS RELATIVE PERCENT: 2 % (ref 0–4)
GFR AFRICAN AMERICAN: >60 ML/MIN
GFR NON-AFRICAN AMERICAN: >60 ML/MIN
GFR SERPL CREATININE-BSD FRML MDRD: ABNORMAL ML/MIN/{1.73_M2}
GFR SERPL CREATININE-BSD FRML MDRD: ABNORMAL ML/MIN/{1.73_M2}
GLUCOSE BLD-MCNC: 118 MG/DL (ref 70–99)
HCT VFR BLD CALC: 43.3 % (ref 41–53)
HEMOGLOBIN: 14.7 G/DL (ref 13.5–17.5)
IMMATURE GRANULOCYTES: ABNORMAL %
LYMPHOCYTES # BLD: 18 % (ref 24–44)
MCH RBC QN AUTO: 30.1 PG (ref 26–34)
MCHC RBC AUTO-ENTMCNC: 33.9 G/DL (ref 31–37)
MCV RBC AUTO: 88.6 FL (ref 80–100)
MDMA URINE: ABNORMAL
METHADONE SCREEN, URINE: NEGATIVE
METHAMPHETAMINE, URINE: ABNORMAL
MONOCYTES # BLD: 12 % (ref 1–7)
NRBC AUTOMATED: ABNORMAL PER 100 WBC
OPIATES, URINE: NEGATIVE
OXYCODONE SCREEN URINE: NEGATIVE
PDW BLD-RTO: 14.1 % (ref 11.5–14.9)
PHENCYCLIDINE, URINE: NEGATIVE
PLATELET # BLD: 244 K/UL (ref 150–450)
PLATELET ESTIMATE: ABNORMAL
PMV BLD AUTO: 9.5 FL (ref 6–12)
POTASSIUM SERPL-SCNC: 4.5 MMOL/L (ref 3.7–5.3)
PROPOXYPHENE, URINE: ABNORMAL
RBC # BLD: 4.88 M/UL (ref 4.5–5.9)
RBC # BLD: ABNORMAL 10*6/UL
SEG NEUTROPHILS: 67 % (ref 36–66)
SEGMENTED NEUTROPHILS ABSOLUTE COUNT: 8.2 K/UL (ref 1.3–9.1)
SODIUM BLD-SCNC: 140 MMOL/L (ref 135–144)
TEST INFORMATION: ABNORMAL
TOTAL PROTEIN: 6.5 G/DL (ref 6.4–8.3)
TRICYCLIC ANTIDEPRESSANTS, UR: ABNORMAL
TROPONIN INTERP: NORMAL
TROPONIN INTERP: NORMAL
TROPONIN T: <0.03 NG/ML
TROPONIN T: <0.03 NG/ML
WBC # BLD: 12 K/UL (ref 3.5–11)
WBC # BLD: ABNORMAL 10*3/UL

## 2018-08-27 PROCEDURE — 99285 EMERGENCY DEPT VISIT HI MDM: CPT

## 2018-08-27 PROCEDURE — 6360000002 HC RX W HCPCS: Performed by: INTERNAL MEDICINE

## 2018-08-27 PROCEDURE — 2060000000 HC ICU INTERMEDIATE R&B

## 2018-08-27 PROCEDURE — 6360000004 HC RX CONTRAST MEDICATION: Performed by: EMERGENCY MEDICINE

## 2018-08-27 PROCEDURE — 84484 ASSAY OF TROPONIN QUANT: CPT

## 2018-08-27 PROCEDURE — 6360000002 HC RX W HCPCS: Performed by: EMERGENCY MEDICINE

## 2018-08-27 PROCEDURE — 2580000003 HC RX 258: Performed by: EMERGENCY MEDICINE

## 2018-08-27 PROCEDURE — 80053 COMPREHEN METABOLIC PANEL: CPT

## 2018-08-27 PROCEDURE — 85025 COMPLETE CBC W/AUTO DIFF WBC: CPT

## 2018-08-27 PROCEDURE — 99223 1ST HOSP IP/OBS HIGH 75: CPT | Performed by: INTERNAL MEDICINE

## 2018-08-27 PROCEDURE — 71260 CT THORAX DX C+: CPT

## 2018-08-27 PROCEDURE — 6370000000 HC RX 637 (ALT 250 FOR IP): Performed by: INTERNAL MEDICINE

## 2018-08-27 PROCEDURE — 94761 N-INVAS EAR/PLS OXIMETRY MLT: CPT

## 2018-08-27 PROCEDURE — 93005 ELECTROCARDIOGRAM TRACING: CPT

## 2018-08-27 PROCEDURE — 71046 X-RAY EXAM CHEST 2 VIEWS: CPT

## 2018-08-27 PROCEDURE — 80307 DRUG TEST PRSMV CHEM ANLYZR: CPT

## 2018-08-27 PROCEDURE — 6370000000 HC RX 637 (ALT 250 FOR IP): Performed by: EMERGENCY MEDICINE

## 2018-08-27 PROCEDURE — 94640 AIRWAY INHALATION TREATMENT: CPT

## 2018-08-27 PROCEDURE — 94664 DEMO&/EVAL PT USE INHALER: CPT

## 2018-08-27 PROCEDURE — 36415 COLL VENOUS BLD VENIPUNCTURE: CPT

## 2018-08-27 PROCEDURE — 2580000003 HC RX 258: Performed by: INTERNAL MEDICINE

## 2018-08-27 RX ORDER — SODIUM CHLORIDE 0.9 % (FLUSH) 0.9 %
10 SYRINGE (ML) INJECTION PRN
Status: DISCONTINUED | OUTPATIENT
Start: 2018-08-27 | End: 2018-08-31 | Stop reason: HOSPADM

## 2018-08-27 RX ORDER — ALBUTEROL SULFATE 2.5 MG/3ML
2.5 SOLUTION RESPIRATORY (INHALATION) 4 TIMES DAILY
Status: DISCONTINUED | OUTPATIENT
Start: 2018-08-27 | End: 2018-08-30

## 2018-08-27 RX ORDER — AZITHROMYCIN 250 MG/1
250 TABLET, FILM COATED ORAL DAILY
Status: DISCONTINUED | OUTPATIENT
Start: 2018-08-28 | End: 2018-08-27

## 2018-08-27 RX ORDER — SODIUM CHLORIDE 0.9 % (FLUSH) 0.9 %
10 SYRINGE (ML) INJECTION EVERY 12 HOURS SCHEDULED
Status: DISCONTINUED | OUTPATIENT
Start: 2018-08-27 | End: 2018-08-31 | Stop reason: HOSPADM

## 2018-08-27 RX ORDER — CIPROFLOXACIN 2 MG/ML
400 INJECTION, SOLUTION INTRAVENOUS EVERY 12 HOURS
Status: DISCONTINUED | OUTPATIENT
Start: 2018-08-27 | End: 2018-08-31 | Stop reason: HOSPADM

## 2018-08-27 RX ORDER — SODIUM CHLORIDE 9 MG/ML
INJECTION, SOLUTION INTRAVENOUS CONTINUOUS
Status: DISCONTINUED | OUTPATIENT
Start: 2018-08-27 | End: 2018-08-27

## 2018-08-27 RX ORDER — 0.9 % SODIUM CHLORIDE 0.9 %
80 INTRAVENOUS SOLUTION INTRAVENOUS ONCE
Status: COMPLETED | OUTPATIENT
Start: 2018-08-27 | End: 2018-08-27

## 2018-08-27 RX ORDER — DILTIAZEM HYDROCHLORIDE 240 MG/1
240 CAPSULE, COATED, EXTENDED RELEASE ORAL DAILY
Status: DISCONTINUED | OUTPATIENT
Start: 2018-08-27 | End: 2018-08-30

## 2018-08-27 RX ORDER — ALBUTEROL SULFATE 90 UG/1
2 AEROSOL, METERED RESPIRATORY (INHALATION) EVERY 6 HOURS PRN
Status: DISCONTINUED | OUTPATIENT
Start: 2018-08-27 | End: 2018-08-28

## 2018-08-27 RX ORDER — FUROSEMIDE 20 MG/1
20 TABLET ORAL EVERY OTHER DAY
Status: DISCONTINUED | OUTPATIENT
Start: 2018-08-27 | End: 2018-08-27

## 2018-08-27 RX ORDER — ACETAMINOPHEN 325 MG/1
650 TABLET ORAL EVERY 4 HOURS PRN
Status: DISCONTINUED | OUTPATIENT
Start: 2018-08-27 | End: 2018-08-31 | Stop reason: HOSPADM

## 2018-08-27 RX ORDER — ASPIRIN 81 MG/1
324 TABLET, CHEWABLE ORAL ONCE
Status: COMPLETED | OUTPATIENT
Start: 2018-08-27 | End: 2018-08-27

## 2018-08-27 RX ORDER — BENZONATATE 100 MG/1
200 CAPSULE ORAL 3 TIMES DAILY
Status: DISCONTINUED | OUTPATIENT
Start: 2018-08-27 | End: 2018-08-31 | Stop reason: HOSPADM

## 2018-08-27 RX ORDER — AZITHROMYCIN 250 MG/1
500 TABLET, FILM COATED ORAL ONCE
Status: COMPLETED | OUTPATIENT
Start: 2018-08-27 | End: 2018-08-27

## 2018-08-27 RX ORDER — FUROSEMIDE 20 MG/1
20 TABLET ORAL EVERY OTHER DAY
Status: DISCONTINUED | OUTPATIENT
Start: 2018-08-28 | End: 2018-08-31 | Stop reason: HOSPADM

## 2018-08-27 RX ADMIN — PIPERACILLIN SODIUM AND TAZOBACTAM SODIUM 3.38 G: 3; .375 INJECTION, POWDER, LYOPHILIZED, FOR SOLUTION INTRAVENOUS at 19:39

## 2018-08-27 RX ADMIN — PIPERACILLIN SODIUM AND TAZOBACTAM SODIUM 3.38 G: 3; .375 INJECTION, POWDER, LYOPHILIZED, FOR SOLUTION INTRAVENOUS at 14:10

## 2018-08-27 RX ADMIN — ACETAMINOPHEN 650 MG: 325 TABLET, FILM COATED ORAL at 08:44

## 2018-08-27 RX ADMIN — TIOTROPIUM BROMIDE 18 MCG: 18 CAPSULE ORAL; RESPIRATORY (INHALATION) at 12:27

## 2018-08-27 RX ADMIN — DILTIAZEM HYDROCHLORIDE 240 MG: 240 CAPSULE, COATED, EXTENDED RELEASE ORAL at 12:27

## 2018-08-27 RX ADMIN — IOPAMIDOL 75 ML: 755 INJECTION, SOLUTION INTRAVENOUS at 02:17

## 2018-08-27 RX ADMIN — BENZONATATE 200 MG: 100 CAPSULE ORAL at 17:57

## 2018-08-27 RX ADMIN — CIPROFLOXACIN 400 MG: 2 INJECTION, SOLUTION INTRAVENOUS at 17:58

## 2018-08-27 RX ADMIN — SODIUM CHLORIDE 80 ML: 9 INJECTION, SOLUTION INTRAVENOUS at 02:17

## 2018-08-27 RX ADMIN — CEFTRIAXONE SODIUM 1 G: 1 INJECTION, POWDER, FOR SOLUTION INTRAMUSCULAR; INTRAVENOUS at 03:20

## 2018-08-27 RX ADMIN — SODIUM CHLORIDE: 9 INJECTION, SOLUTION INTRAVENOUS at 05:39

## 2018-08-27 RX ADMIN — BENZONATATE 200 MG: 100 CAPSULE ORAL at 19:39

## 2018-08-27 RX ADMIN — ACETAMINOPHEN 650 MG: 325 TABLET, FILM COATED ORAL at 17:57

## 2018-08-27 RX ADMIN — Medication 10 ML: at 05:39

## 2018-08-27 RX ADMIN — ALBUTEROL SULFATE 2.5 MG: 2.5 SOLUTION RESPIRATORY (INHALATION) at 15:34

## 2018-08-27 RX ADMIN — ENOXAPARIN SODIUM 40 MG: 40 INJECTION SUBCUTANEOUS at 12:26

## 2018-08-27 RX ADMIN — ASPIRIN 81 MG 324 MG: 81 TABLET ORAL at 01:37

## 2018-08-27 RX ADMIN — ALBUTEROL SULFATE 2.5 MG: 2.5 SOLUTION RESPIRATORY (INHALATION) at 19:27

## 2018-08-27 RX ADMIN — AZITHROMYCIN MONOHYDRATE 500 MG: 250 TABLET ORAL at 03:20

## 2018-08-27 RX ADMIN — Medication 10 ML: at 02:17

## 2018-08-27 ASSESSMENT — ENCOUNTER SYMPTOMS
ABDOMINAL PAIN: 0
EYE PAIN: 0
VOMITING: 0
BACK PAIN: 0
NAUSEA: 0
COUGH: 0
SHORTNESS OF BREATH: 0
DIARRHEA: 0
SORE THROAT: 0

## 2018-08-27 ASSESSMENT — PAIN SCALES - GENERAL
PAINLEVEL_OUTOF10: 7
PAINLEVEL_OUTOF10: 3
PAINLEVEL_OUTOF10: 4
PAINLEVEL_OUTOF10: 8

## 2018-08-27 ASSESSMENT — PAIN DESCRIPTION - LOCATION
LOCATION: CHEST

## 2018-08-27 ASSESSMENT — PAIN DESCRIPTION - FREQUENCY: FREQUENCY: CONTINUOUS

## 2018-08-27 ASSESSMENT — PAIN DESCRIPTION - PAIN TYPE
TYPE: ACUTE PAIN

## 2018-08-27 ASSESSMENT — PAIN DESCRIPTION - ORIENTATION: ORIENTATION: RIGHT

## 2018-08-27 ASSESSMENT — PAIN DESCRIPTION - DESCRIPTORS
DESCRIPTORS: SHARP
DESCRIPTORS: SHARP;SHOOTING;BURNING;CONSTANT

## 2018-08-27 ASSESSMENT — PAIN DESCRIPTION - ONSET: ONSET: ON-GOING

## 2018-08-27 ASSESSMENT — PAIN DESCRIPTION - PROGRESSION: CLINICAL_PROGRESSION: NOT CHANGED

## 2018-08-27 NOTE — ED PROVIDER NOTES
[facility]  eMERGENCY dEPARTMENT eNCOUnter      Pt Name: Noe Roach  MRN: [id number]  [place] 1965  Date of evaluation: 8/27/18      CHIEF COMPLAINT:  Chief Complaint   Patient presents with    Chest Pain       HISTORY OF PRESENT ILLNESS    Noe Roach is a 48 y.o. male who presents with Chest pain that is right-sided, starting early this evening in the context of recent chest pain evaluation of the left side, quality is sharp, constant duration and no modifying factors, moderate severity. He feels as though switches back and forth file left. Patient does have risk factors in the form of smoking. Patient also missed having increased cough recently. Patient denies fevers chillsNausea vomiting diarrhea shortness of breath. Patient does have a history of atrial fibrillation, on ELIQUIS. REVIEW OF SYSTEMS       Review of Systems   Constitutional: Negative for chills and fever. HENT: Negative for ear pain and sore throat. Eyes: Negative for pain and visual disturbance. Respiratory: Negative for cough and shortness of breath. Cardiovascular: Positive for chest pain. Gastrointestinal: Negative for abdominal pain, diarrhea, nausea and vomiting. Endocrine: Negative for polydipsia and polyuria. Genitourinary: Negative for discharge, dysuria and hematuria. Musculoskeletal: Negative for arthralgias and back pain. Skin: Negative for rash. Allergic/Immunologic: Negative for food allergies. Neurological: Negative for weakness, numbness and headaches. Hematological: Does not bruise/bleed easily. Psychiatric/Behavioral: Negative for self-injury and suicidal ideas. The patient is not nervous/anxious. PAST MEDICAL HISTORY   PMH:  has a past medical history of Atrial fibrillation ([address] Chronic back pain; and Murmur, cardiac.   Surgical History:  has a past surgical history that includes Inguinal hernia repair (Right); cyst removal (Left); and pr drain skin abscess javed (N/A, 2/09/4770). Social History:  reports that he has been smoking Cigarettes. He has a 8.25 pack-year smoking history. He has quit using smokeless tobacco. His smokeless tobacco use included Chew. He reports that he uses drugs, including Cocaine. He reports that he does not drink alcohol. Family History: Noncontributory at this time  Psychiatric History: Noncontributory at this time  Allergies:has No Known Allergies. PHYSICAL EXAM     INITIAL VITALS: BP 92/68   Pulse 80   Temp 97.9 °F (36.6 °C) (Oral)   Resp 16   Ht 5' 9\" (1.753 m)   Wt 146 lb (66.2 kg)   SpO2 97%   BMI 21.56 kg/m²     Physical Exam   Constitutional: He is oriented to person, place, and time. He appears well-developed and well-nourished. HENT:   Head: Normocephalic and atraumatic. Right Ear: External ear normal.   Left Ear: External ear normal.   Nose: Nose normal.   Mouth/Throat: Oropharynx is clear and moist.   Eyes: Pupils are equal, round, and reactive to light. Conjunctivae and EOM are normal. Right eye exhibits no discharge. Left eye exhibits no discharge. Neck: Normal range of motion. Neck supple. No tracheal deviation present. Cardiovascular: Normal rate, normal heart sounds and intact distal pulses. An irregularly irregular rhythm present. Exam reveals no gallop and no friction rub. No murmur heard. Pulmonary/Chest: Effort normal and breath sounds normal. No respiratory distress. He has no wheezes. He has no rales. He exhibits no tenderness. Abdominal: Soft. Bowel sounds are normal. He exhibits no distension and no mass. There is no tenderness. There is no rebound and no guarding. Musculoskeletal: Normal range of motion. He exhibits no edema or tenderness. Neurological: He is alert and oriented to person, place, and time. He has normal reflexes. No cranial nerve deficit. Skin: No rash noted. He is not diaphoretic. Psychiatric: He has a normal mood and affect.  His behavior is normal. Thought content normal.   Nursing note and vitals reviewed. DIAGNOSTIC RESULTS     EKG: All EKG's are interpreted by the Emergency Department Physician who either signs or Co-signs this chart in the absence of a cardiologist.    EKG Interpretation    Interpreted by emergency department physician    Rhythm: atrial fibrillation - controlled  Rate: normal, 85  Axis: normal  Ectopy: none  Conduction: normal  ST Segments: nonspecific changes  T Waves: non specific changes  Q Waves: nonspecific    EKG  Impression: non-specific EKG and atrial fibrillation (chronic)        RADIOLOGY:   All plain film, CT, MRI, and formal ultrasound images (except ED bedside ultrasound) are read by the radiologist, see reports below, unless otherwise noted in the medical decision-making or here. CT Chest Pulmonary Embolism W Contrast   Preliminary Result   No evidence of pulmonary embolism. 3.5 x 2.5 cm left upper lobe cavitary lesion located medially. This finding   may represent malignancy versus a lung abscess. Atypical infection should be   considered      Bilateral upper lobe patchy infiltrates which may represent atypical   infection. There is associated COPD. Spiculated nodule within the left upper lobe measuring 1.5 x 1.0 cm. This   finding may represent malignancy versus infection.   Short-term follow-up   examination is a for recommended         XR CHEST STANDARD (2 VW)   Preliminary Result   Stable bilateral upper lobe infiltrates suspicious for multifocal pneumonia                  LABS:  Labs Reviewed   CBC WITH AUTO DIFFERENTIAL - Abnormal; Notable for the following:        Result Value    WBC 12.0 (*)     Seg Neutrophils 67 (*)     Lymphocytes 18 (*)     Monocytes 12 (*)     Absolute Mono # 1.40 (*)     All other components within normal limits   COMPREHENSIVE METABOLIC PANEL - Abnormal; Notable for the following:     Glucose 118 (*)     All other components within normal limits   URINE DRUG SCREEN - Abnormal; Physician            Shashi Forde MD  08/27/18 7886

## 2018-08-27 NOTE — FLOWSHEET NOTE
08/27/18 0555   Provider Notification   Reason for Communication New orders   Provider Name Dr. Syd Casiano   Provider Notification Physician   Method of Communication Secure Message   Response Other (Comment)   Notification Time 8909   Contacted Kirsty Gaston regarding the need to reconcile patients home medications, MD replied to perfect serve with \"Will do\" waiting to receive orders.

## 2018-08-27 NOTE — H&P
[facility]   [facility]    HISTORY AND PHYSICAL EXAMINATION            Date:   8/27/2018  Patient name:  Mary Leger  Date of admission:  8/27/2018  1:25 AM  MRN:   [id number]  Account:  [de-identified]  YOB: 1965  PCP:    Geneva Strange MD  Room:   2120/2120-01  Code Status:    Full Code    Chief Complaint:     Chief Complaint   Patient presents with    Chest Pain       History Obtained From:     patient    History of Present Illness: The patient is a 48 y.o. Non-/non  male who has a significant PMHx of atrial fibrillation (on Eliquis)  and bicuspid aortic valve with severe stenosis. He was recently admitted to the hospital on 8/25/18 for CHF exacerbation, which he presented with chest pain, SOB, and lightheadedness. The patient admitted that he used cocaine recently before he came into the hospital.  He was found to have a mild leukocytosis and EKG revealed atrial fibrillation with RVR. CXR revealed pulmonary edema, and his BNP was 7400. Cardiology started him on Cardizem for rate control and said with active cocaine use the patient is a surgical risk for valve replacement. The patient was discharged home on 8/26/18. The patient returned to the hospital early the next morning after discharge because the chest pain he was having moved from the left side of his chest to the right side of his chest. He states that it hurts to breath in, and he has developed a \"hard\" cough. He admits to chronically coughing up phlegm since he is a smoker. The patient denies yellow/green color to the phlegm and states that it is white. The patient has not taken his temperature at home, so he has not known if he has been febrile. He did admit to times when he becomes sweaty and feels the pain in the right side of his chest, and then he vomits up white mucus. He said this has happened once a day for the last three days. 4. 88 4.5 - 5.9 m/uL    Hemoglobin 14.7 13.5 - 17.5 g/dL    Hematocrit 43.3 41 - 53 %    MCV 88.6 80 - 100 fL    MCH 30.1 26 - 34 pg    MCHC 33.9 31 - 37 g/dL    RDW 14.1 11.5 - 14.9 %    Platelets 992 714 - 703 k/uL    MPV 9.5 6.0 - 12.0 fL    NRBC Automated NOT REPORTED per 100 WBC    Differential Type NOT REPORTED     Seg Neutrophils 67 (H) 36 - 66 %    Lymphocytes 18 (L) 24 - 44 %    Monocytes 12 (H) 1 - 7 %    Eosinophils % 2 0 - 4 %    Basophils 1 0 - 2 %    Immature Granulocytes NOT REPORTED 0 %    Segs Absolute 8.20 1.3 - 9.1 k/uL    Absolute Lymph # 2.10 1.0 - 4.8 k/uL    Absolute Mono # 1.40 (H) 0.1 - 1.3 k/uL    Absolute Eos # 0.20 0.0 - 0.4 k/uL    Basophils # 0.10 0.0 - 0.2 k/uL    Absolute Immature Granulocyte NOT REPORTED 0.00 - 0.30 k/uL    WBC Morphology NOT REPORTED     RBC Morphology NOT REPORTED     Platelet Estimate NOT REPORTED    Comprehensive Metabolic Panel    Collection Time: 08/27/18  1:30 AM   Result Value Ref Range    Glucose 118 (H) 70 - 99 mg/dL    BUN 19 6 - 20 mg/dL    CREATININE 0.83 0.70 - 1.20 mg/dL    Bun/Cre Ratio NOT REPORTED 9 - 20    Calcium 9.5 8.6 - 10.4 mg/dL    Sodium 140 135 - 144 mmol/L    Potassium 4.5 3.7 - 5.3 mmol/L    Chloride 101 98 - 107 mmol/L    CO2 27 20 - 31 mmol/L    Anion Gap 12 9 - 17 mmol/L    Alkaline Phosphatase 91 40 - 129 U/L    ALT 18 5 - 41 U/L    AST 15 <40 U/L    Total Bilirubin 0.33 0.3 - 1.2 mg/dL    Total Protein 6.5 6.4 - 8.3 g/dL    Alb 3.5 3.5 - 5.2 g/dL    Albumin/Globulin Ratio NOT REPORTED 1.0 - 2.5    GFR Non-African American >60 >60 mL/min    GFR African American >60 >60 mL/min    GFR Comment          GFR Staging NOT REPORTED    Troponin    Collection Time: 08/27/18  1:30 AM   Result Value Ref Range    Troponin T <0.03 <0.03 ng/mL    Troponin Interp         EKG 12 Lead    Collection Time: 08/27/18  1:43 AM   Result Value Ref Range    Ventricular Rate 85 BPM    Atrial Rate 102 BPM    QRS Duration 86 ms    Q-T Interval 358 ms    QTc Order alpha-1 antitrypsin level    2. Chronic atrial fibrillation  - Continue Eliquis 5 mg BID  - Continue Cardizem 240 mg QD    3. Severe aortic stenosis of bicuspid valve  - Continue Lasix 20 mg every other day  - Dr. Prosper Caldera, his cardiologist, referred the patient to the structural heart clinic in June to evaluate for valve replacement surgery  -Order ECHO    4. Pneumonia  - Continue Zosyn 3.375 g Q8H   - Start IV Cipro   - Swab nose for MRSA  - Order infectious serologies for mycoplasma and legionella  - Order procalcitonin    5. Left upper lobe cavitary lesion  - Repeat CT Chest in 4 weeks    6. Spiculated left upper lobe nodule  - Repeat CT Chest in 4 weeks    Barbara WardCentral [facility]  8/27/2018  1:53 PM     Patient seen and examined independently by me. Above discussed and I agree with medical student note except where indicated in the EMR revision history. Also see my additional comments and changes indicated by discrete font, text color, italics, and/or initials. Labs, cultures, and radiographs where available were reviewed.      I have dictated admit additional note  Electronically signed by Reynaldo Edwards MD on 8/27/2018 at 3:16 PM

## 2018-08-28 LAB
ALPHA-1 ANTITRYPSIN: 199 MG/DL (ref 90–200)
ANION GAP SERPL CALCULATED.3IONS-SCNC: 13 MMOL/L (ref 9–17)
BUN BLDV-MCNC: 14 MG/DL (ref 6–20)
BUN/CREAT BLD: ABNORMAL (ref 9–20)
CALCIUM SERPL-MCNC: 8.7 MG/DL (ref 8.6–10.4)
CHLORIDE BLD-SCNC: 101 MMOL/L (ref 98–107)
CO2: 24 MMOL/L (ref 20–31)
CREAT SERPL-MCNC: 0.84 MG/DL (ref 0.7–1.2)
DIRECT EXAM: NORMAL
GFR AFRICAN AMERICAN: >60 ML/MIN
GFR NON-AFRICAN AMERICAN: >60 ML/MIN
GFR SERPL CREATININE-BSD FRML MDRD: ABNORMAL ML/MIN/{1.73_M2}
GFR SERPL CREATININE-BSD FRML MDRD: ABNORMAL ML/MIN/{1.73_M2}
GLUCOSE BLD-MCNC: 145 MG/DL (ref 70–99)
HCT VFR BLD CALC: 41.4 % (ref 41–53)
HEMOGLOBIN: 13.7 G/DL (ref 13.5–17.5)
LV EF: 20 %
LVEF MODALITY: NORMAL
Lab: NORMAL
MAGNESIUM: 2.1 MG/DL (ref 1.6–2.6)
MCH RBC QN AUTO: 29.8 PG (ref 26–34)
MCHC RBC AUTO-ENTMCNC: 33 G/DL (ref 31–37)
MCV RBC AUTO: 90.2 FL (ref 80–100)
NRBC AUTOMATED: NORMAL PER 100 WBC
PDW BLD-RTO: 14.3 % (ref 11.5–14.9)
PHOSPHORUS: 4.3 MG/DL (ref 2.5–4.5)
PLATELET # BLD: 230 K/UL (ref 150–450)
PMV BLD AUTO: 9.5 FL (ref 6–12)
POTASSIUM SERPL-SCNC: 4.3 MMOL/L (ref 3.7–5.3)
RBC # BLD: 4.59 M/UL (ref 4.5–5.9)
SODIUM BLD-SCNC: 138 MMOL/L (ref 135–144)
SPECIMEN DESCRIPTION: NORMAL
STATUS: NORMAL
TROPONIN INTERP: NORMAL
TROPONIN T: <0.03 NG/ML
WBC # BLD: 9.3 K/UL (ref 3.5–11)

## 2018-08-28 PROCEDURE — 84484 ASSAY OF TROPONIN QUANT: CPT

## 2018-08-28 PROCEDURE — 94640 AIRWAY INHALATION TREATMENT: CPT

## 2018-08-28 PROCEDURE — 6370000000 HC RX 637 (ALT 250 FOR IP): Performed by: INTERNAL MEDICINE

## 2018-08-28 PROCEDURE — 6360000002 HC RX W HCPCS: Performed by: INTERNAL MEDICINE

## 2018-08-28 PROCEDURE — 2060000000 HC ICU INTERMEDIATE R&B

## 2018-08-28 PROCEDURE — 83735 ASSAY OF MAGNESIUM: CPT

## 2018-08-28 PROCEDURE — 99232 SBSQ HOSP IP/OBS MODERATE 35: CPT | Performed by: INTERNAL MEDICINE

## 2018-08-28 PROCEDURE — 80048 BASIC METABOLIC PNL TOTAL CA: CPT

## 2018-08-28 PROCEDURE — 2580000003 HC RX 258: Performed by: INTERNAL MEDICINE

## 2018-08-28 PROCEDURE — 84100 ASSAY OF PHOSPHORUS: CPT

## 2018-08-28 PROCEDURE — 87205 SMEAR GRAM STAIN: CPT

## 2018-08-28 PROCEDURE — 85027 COMPLETE CBC AUTOMATED: CPT

## 2018-08-28 PROCEDURE — 87070 CULTURE OTHR SPECIMN AEROBIC: CPT

## 2018-08-28 PROCEDURE — 87449 NOS EACH ORGANISM AG IA: CPT

## 2018-08-28 PROCEDURE — 94761 N-INVAS EAR/PLS OXIMETRY MLT: CPT

## 2018-08-28 PROCEDURE — 82103 ALPHA-1-ANTITRYPSIN TOTAL: CPT

## 2018-08-28 PROCEDURE — 87641 MR-STAPH DNA AMP PROBE: CPT

## 2018-08-28 PROCEDURE — 86738 MYCOPLASMA ANTIBODY: CPT

## 2018-08-28 PROCEDURE — 36415 COLL VENOUS BLD VENIPUNCTURE: CPT

## 2018-08-28 RX ADMIN — CIPROFLOXACIN 400 MG: 2 INJECTION, SOLUTION INTRAVENOUS at 03:42

## 2018-08-28 RX ADMIN — ALBUTEROL SULFATE 2.5 MG: 2.5 SOLUTION RESPIRATORY (INHALATION) at 11:26

## 2018-08-28 RX ADMIN — ACETAMINOPHEN 650 MG: 325 TABLET, FILM COATED ORAL at 14:35

## 2018-08-28 RX ADMIN — PIPERACILLIN SODIUM AND TAZOBACTAM SODIUM 3.38 G: 3; .375 INJECTION, POWDER, LYOPHILIZED, FOR SOLUTION INTRAVENOUS at 05:33

## 2018-08-28 RX ADMIN — DILTIAZEM HYDROCHLORIDE 240 MG: 240 CAPSULE, COATED, EXTENDED RELEASE ORAL at 08:03

## 2018-08-28 RX ADMIN — PIPERACILLIN SODIUM AND TAZOBACTAM SODIUM 3.38 G: 3; .375 INJECTION, POWDER, LYOPHILIZED, FOR SOLUTION INTRAVENOUS at 12:29

## 2018-08-28 RX ADMIN — BENZONATATE 200 MG: 100 CAPSULE ORAL at 20:07

## 2018-08-28 RX ADMIN — TIOTROPIUM BROMIDE 18 MCG: 18 CAPSULE ORAL; RESPIRATORY (INHALATION) at 08:04

## 2018-08-28 RX ADMIN — ALBUTEROL SULFATE 2.5 MG: 2.5 SOLUTION RESPIRATORY (INHALATION) at 05:36

## 2018-08-28 RX ADMIN — CIPROFLOXACIN 400 MG: 2 INJECTION, SOLUTION INTRAVENOUS at 17:33

## 2018-08-28 RX ADMIN — PIPERACILLIN SODIUM AND TAZOBACTAM SODIUM 3.38 G: 3; .375 INJECTION, POWDER, LYOPHILIZED, FOR SOLUTION INTRAVENOUS at 20:07

## 2018-08-28 RX ADMIN — ENOXAPARIN SODIUM 40 MG: 40 INJECTION SUBCUTANEOUS at 08:03

## 2018-08-28 RX ADMIN — APIXABAN 5 MG: 5 TABLET, FILM COATED ORAL at 20:07

## 2018-08-28 RX ADMIN — FUROSEMIDE 20 MG: 20 TABLET ORAL at 08:03

## 2018-08-28 RX ADMIN — APIXABAN 5 MG: 5 TABLET, FILM COATED ORAL at 12:33

## 2018-08-28 RX ADMIN — BENZONATATE 200 MG: 100 CAPSULE ORAL at 08:03

## 2018-08-28 RX ADMIN — ALBUTEROL SULFATE 2.5 MG: 2.5 SOLUTION RESPIRATORY (INHALATION) at 21:10

## 2018-08-28 RX ADMIN — BENZONATATE 200 MG: 100 CAPSULE ORAL at 14:35

## 2018-08-28 ASSESSMENT — PAIN DESCRIPTION - LOCATION: LOCATION: HEAD

## 2018-08-28 ASSESSMENT — PAIN SCALES - GENERAL
PAINLEVEL_OUTOF10: 1
PAINLEVEL_OUTOF10: 1

## 2018-08-28 ASSESSMENT — PAIN DESCRIPTION - PAIN TYPE: TYPE: ACUTE PAIN

## 2018-08-28 NOTE — PROGRESS NOTES
irregular rhythm. Murmur appreciated. Abdomen - Soft, nontender, and nondistended  Extremities - No clubbing, cyanosis, edema    MEDS      sodium chloride flush  10 mL Intravenous 2 times per day    enoxaparin  40 mg Subcutaneous Daily    diltiazem  240 mg Oral Daily    tiotropium  18 mcg Inhalation Daily    piperacillin-tazobactam  3.375 g Intravenous Q8H    furosemide  20 mg Oral Every Other Day    albuterol  2.5 mg Nebulization 4x daily    benzonatate  200 mg Oral TID    ciprofloxacin  400 mg Intravenous Q12H       sodium chloride flush, sodium chloride flush, acetaminophen, albuterol sulfate HFA    LABS   CBC   Recent Labs      08/28/18   0416   WBC  9.3   HGB  13.7   HCT  41.4   MCV  90.2   PLT  230     BMP: Lab Results   Component Value Date     08/28/2018    K 4.3 08/28/2018     08/28/2018    CO2 24 08/28/2018    BUN 14 08/28/2018    LABALBU 3.5 08/27/2018    CREATININE 0.84 08/28/2018    CALCIUM 8.7 08/28/2018    GFRAA >60 08/28/2018    LABGLOM >60 08/28/2018     ABGs:No results found for: PHART, PO2ART, TSS5GVI No results found for: IFIO2, MODE, SETTIDVOL, SETPEEP  Ionized Calcium:  No results found for: IONCA  Magnesium:    Lab Results   Component Value Date    MG 2.1 08/28/2018     Phosphorus:    Lab Results   Component Value Date    PHOS 4.3 08/28/2018        LIVER PROFILE   Recent Labs      08/27/18   0130   AST  15   ALT  18   BILITOT  0.33   ALKPHOS  91     INR No results for input(s): INR in the last 72 hours. PTT   Lab Results   Component Value Date    APTT 45.0 (H) 06/18/2012         RADIOLOGY     (See actual reports for details)    ASSESSMENT/PLAN   Principal Problem:    Pneumonia of both upper lobes due to infectious organism [facility])  Active Problems:    Tobacco abuse    Atrial fibrillation ([facility] [address] [facility]  Resolved Problems:    * No resolved hospital problems.  *    COPD/Empyhsema  Chronic atrial fibrillation  Severe aortic stenosis of bicuspid aortic valve  Bilateral upper lobe

## 2018-08-28 NOTE — CONSULTS
[address] [facility]                   [address] [facility], [address]                                   CONSULTATION    PATIENT NAME: Nasir Newell                    :        1965  MED REC NO:   [id number]                              ROOM:       2120  ACCOUNT NO:   [de-identified]                           ADMIT DATE: 2018  PROVIDER:     Kirsten Mcclelland    CONSULT DATE:  2018    CHIEF COMPLAINT:  Chest pain and dyspnea. HISTORY OF PRESENT ILLNESS:  The patient is a 51-year-old gentleman who has  a history of atrial fibrillation and bicuspid aortic valve who was admitted  and discharged here on 2018 for dyspnea and shortness of breath. It  was felt that he had CHF. His BMP was 7400 and his chest x-rays revealed  \"pulmonary edema pattern. \"  At any rate, the patient was discharged home. He denies using any cocaine while he was at home, but on his last  admission, on the , he was positive for cocaine. Subsequently, the  patient returned this morning because he was having chest pain on the right  side of his chest.  The patient also had a cough, which was nonproductive  in nature. Subsequently, the patient was admitted. A CT of the chest  done, which showed increasing infiltrates bilaterally particularly in the  right upper lobe but also showed a left upper lobe 1.5-cm nodule. There  was also a questionable cavitary lesion in the anterior mediastinal area. The patient denies any hemoptysis. Denies any fevers or chills. He states  that he does have a cough but it is usually a dry cough. He was started on Zosyn. He has also been put on Zithromax. ALLERGIES:  No known drug allergies. MEDICATIONS:  List was reviewed. PAST MEDICAL HISTORY:  Atrial fibrillation with bicuspid valve as noted  above.   He had an echo back in 2018, which showed the bicuspid valve and  an EF of 55% and it also showed normal right ventricular nebulizer. Most  importantly, however, he needs to stop smoking and using cocaine and he  needs reform his lifestyle. He will need a followup CT of the chest in  approximately 4 weeks. If these infiltrates and especially if the lung  nodule has not changed or resolved, he will need an outpatient PET scan as  well as PFTs. This has been communicated with him in detail. We will also  check an alpha-1 antitrypsin level.         Conor Peñaloza    D: 08/27/2018 15:20:10       T: 08/27/2018 15:22:40     [email or web address]  Job#: [id number]     Doc#: [id number]    CC:

## 2018-08-28 NOTE — H&P
[address] [facility]     HISTORY AND PHYSICAL EXAMINATION            Date:   2018  Patient name:  Magnolia Souza  Date of admission:  2018  1:25 AM  MRN:   [id number]  Account:  [de-identified]  YOB: 1965  PCP:    Leeta Fothergill, MD  Room:   [address] [place] [address]  Code Status:    Full Code    Chief Complaint:     Chief Complaint   Patient presents with    Chest Pain       History Obtained From:     patient    History of Present Illness: The patient is a 48 y.o. Non-/non  male who presents with Chest Pain   and he is admitted to the hospital for the management of  Sob         HPI   1) Location/Symptom sob   2) Timing/Onset: 1 day   3) Context/Settin) Quality: no pain   5) Duration: cont   6) Modifying Factors: A fib substance abuse   7) Severity: moderate     Pt with hx of A fib AS   With substance abuse   With sob  Has chf on CXR admit and dc yesterday   Today ct chest show cavitary lung       Past Medical History:     Past Medical History:   Diagnosis Date    Atrial fibrillation (HCC)     Chronic back pain     Murmur, cardiac         Past Surgical History:     Past Surgical History:   Procedure Laterality Date    CYST REMOVAL Left     INGUINAL HERNIA REPAIR Right     NE DRAIN SKIN ABSCESS COMPLIC N/A     GLUTEAL INCISION AND DRAINAGE performed by Abdiaziz Easley DO at [address]        Medications Prior to Admission:     Prior to Admission medications    Medication Sig Start Date End Date Taking?  Authorizing Provider   diltiazem (CARDIZEM CD) 240 MG extended release capsule Take 1 capsule by mouth daily 18  Yes Warden Stephen MD   furosemide (LASIX) 20 MG tablet Take 1 tablet by mouth every other day 18  Yes Warden Stephen MD   tiotropium (SPIRIVA RESPIMAT) 1.25 MCG/ACT AERS inhaler Inhale 2 puffs into the lungs daily 3/7/18  Yes Cj Penny MD   albuterol sulfate HFA (PROVENTIL HFA) 108 (90 Base) MCG/ACT inhaler Inhale 2 puffs into the lungs every 6 hours as needed for Wheezing 2/22/18  Yes BENEDICT Lang - CNP   apixaban (ELIQUIS) 5 MG TABS tablet Take 1 tablet by mouth 2 times daily 2/13/18  Yes Raj Alcocer MD        Allergies:     Patient has no known allergies. Social History:     Tobacco:    reports that he has been smoking Cigarettes. He has a 8.25 pack-year smoking history. He has quit using smokeless tobacco. His smokeless tobacco use included Chew. Alcohol:      reports that he does not drink alcohol. Drug Use:  reports that he uses drugs, including Cocaine. Family History:     Family History   Problem Relation Age of Onset    Depression Mother        Review of Systems:     Positive and Negative as described in HPI. CONSTITUTIONAL:  negative for fevers, chills, sweats, fatigue, weight loss  HEENT:  negative for vision, hearing changes, runny nose, throat pain  RESPIRATORY:  + sob   CARDIOVASCULAR:  negative for chest pain, palpitations.   GASTROINTESTINAL:  negative for nausea, vomiting, diarrhea, constipation, change in bowel habits, abdominal pain   GENITOURINARY:  negative for difficulty of urination, burning with urination, frequency   INTEGUMENT:  negative for rash, skin lesions, easy bruising   HEMATOLOGIC/LYMPHATIC:  negative for swelling/edema   ALLERGIC/IMMUNOLOGIC:  negative for urticaria , itching  ENDOCRINE:  negative increase in drinking, increase in urination, hot or cold intolerance  MUSCULOSKELETAL:  negative joint pains, muscle aches, swelling of joints  NEUROLOGICAL:  negative for headaches, dizziness, lightheadedness, numbness, pain, tingling extremities  BEHAVIOR/PSYCH:  negative for depression, anxiety    Physical Exam:   BP 90/62   Pulse 83   Temp 97.9 °F (36.6 °C)   Resp 17   Ht 5' 9\" (1.753 m)   Wt 146 lb (66.2 kg)   SpO2 99%   BMI 21.56 degrees   Urine Drug Screen    Collection Time: 08/27/18  1:53 AM   Result Value Ref Range    Amphetamine Screen, Ur NEGATIVE NEG    Barbiturate Screen, Ur NEGATIVE NEG    Benzodiazepine Screen, Urine NEGATIVE NEG    Cocaine Metabolite, Urine POSITIVE (A) NEG    Methadone Screen, Urine NEGATIVE NEG    Opiates, Urine NEGATIVE NEG    Phencyclidine, Urine NEGATIVE NEG    Propoxyphene, Urine NOT REPORTED NEG    Cannabinoid Scrn, Ur NEGATIVE NEG    Oxycodone Screen, Ur NEGATIVE NEG    Methamphetamine, Urine NOT REPORTED NEG    Tricyclic Antidepressants, Urine NOT REPORTED NEG    MDMA, Urine NOT REPORTED NEG    Buprenorphine Urine NOT REPORTED NEG    Test Information       Assay provides medical screening only. The absence of expected drug(s) and/or   Troponin    Collection Time: 08/27/18  3:56 AM   Result Value Ref Range    Troponin T <0.03 <0.03 ng/mL    Troponin Interp             Imaging/Diagnostics:    CT cavitary lesion     Assessment :      Primary Problem  Pneumonia of both upper lobes due to infectious organism [facility])    Active Hospital Problems    Diagnosis Date Noted    Pneumonia of both upper lobes due to infectious organism ([facility] [J18.1] 08/27/2018    Tobacco abuse [Z72.0] 03/27/2015    [facility] [I48.91] 03/27/2015       Plan:     Patient status Admit as inpt     1. Pneumonia with lung abscess   2. IV antb  3. pulm consult   4. Substance abuse     Consultations:   IP CONSULT TO PRIMARY CARE PROVIDER  IP CONSULT TO PULMONOLOGY     Patient is admitted as inpatient status because of co-morbidities listed above, severity of signs and symptoms as outlined, requirement for current medical therapies and most importantly because of direct risk to patient if care not provided in a hospital setting.     Jayjay Mireles MD  8/27/2018  11:47 PM    Copy sent to Dr. Demetrio James MD

## 2018-08-28 NOTE — PLAN OF CARE
Problem: Falls - Risk of:  Goal: Will remain free from falls  Will remain free from falls   Outcome: Ongoing  Pt alert and oriented. Pt was instructed on how to use the call light. Pt used call light appropriately. Non-slip socks are in place. Bed is in lowest and locked position. No falls noted this shift. Problem: Pain:  Goal: Pain level will decrease  Pain level will decrease   Outcome: Ongoing  Pt did not complain of pain during this shift.

## 2018-08-28 NOTE — CARE COORDINATION
ONGOING DISCHARGE PLAN:    Spoke with patient regarding discharge plan and patient confirms that plan is still to DC home w/ spouse w/ no needs. Pt. Remains on IV Zosyn/Cipro, per Nursing had run of V Tach, Already on Eliquis, Cardio following. Will continue to follow for additional discharge needs.     Electronically signed by Nury Naqvi RN on 8/28/2018 at 11:04 AM

## 2018-08-29 LAB
ANION GAP SERPL CALCULATED.3IONS-SCNC: 11 MMOL/L (ref 9–17)
BUN BLDV-MCNC: 13 MG/DL (ref 6–20)
BUN/CREAT BLD: ABNORMAL (ref 9–20)
CALCIUM SERPL-MCNC: 9 MG/DL (ref 8.6–10.4)
CHLORIDE BLD-SCNC: 100 MMOL/L (ref 98–107)
CO2: 27 MMOL/L (ref 20–31)
CREAT SERPL-MCNC: 1.04 MG/DL (ref 0.7–1.2)
GFR AFRICAN AMERICAN: >60 ML/MIN
GFR NON-AFRICAN AMERICAN: >60 ML/MIN
GFR SERPL CREATININE-BSD FRML MDRD: ABNORMAL ML/MIN/{1.73_M2}
GFR SERPL CREATININE-BSD FRML MDRD: ABNORMAL ML/MIN/{1.73_M2}
GLUCOSE BLD-MCNC: 115 MG/DL (ref 70–99)
HCT VFR BLD CALC: 41.6 % (ref 41–53)
HEMOGLOBIN: 13.9 G/DL (ref 13.5–17.5)
MCH RBC QN AUTO: 30.1 PG (ref 26–34)
MCHC RBC AUTO-ENTMCNC: 33.3 G/DL (ref 31–37)
MCV RBC AUTO: 90.4 FL (ref 80–100)
MRSA, DNA, NASAL: NORMAL
MYCOPLASMA PNEUMONIAE IGM: 0.13
NRBC AUTOMATED: NORMAL PER 100 WBC
PDW BLD-RTO: 14.3 % (ref 11.5–14.9)
PLATELET # BLD: 259 K/UL (ref 150–450)
PMV BLD AUTO: 9 FL (ref 6–12)
POTASSIUM SERPL-SCNC: 5 MMOL/L (ref 3.7–5.3)
RBC # BLD: 4.61 M/UL (ref 4.5–5.9)
SODIUM BLD-SCNC: 138 MMOL/L (ref 135–144)
SPECIMEN DESCRIPTION: NORMAL
WBC # BLD: 10.7 K/UL (ref 3.5–11)

## 2018-08-29 PROCEDURE — 85027 COMPLETE CBC AUTOMATED: CPT

## 2018-08-29 PROCEDURE — 6370000000 HC RX 637 (ALT 250 FOR IP): Performed by: INTERNAL MEDICINE

## 2018-08-29 PROCEDURE — 6360000002 HC RX W HCPCS: Performed by: INTERNAL MEDICINE

## 2018-08-29 PROCEDURE — 93306 TTE W/DOPPLER COMPLETE: CPT

## 2018-08-29 PROCEDURE — 80048 BASIC METABOLIC PNL TOTAL CA: CPT

## 2018-08-29 PROCEDURE — 94640 AIRWAY INHALATION TREATMENT: CPT

## 2018-08-29 PROCEDURE — 94761 N-INVAS EAR/PLS OXIMETRY MLT: CPT

## 2018-08-29 PROCEDURE — 99232 SBSQ HOSP IP/OBS MODERATE 35: CPT | Performed by: INTERNAL MEDICINE

## 2018-08-29 PROCEDURE — 36415 COLL VENOUS BLD VENIPUNCTURE: CPT

## 2018-08-29 PROCEDURE — 2580000003 HC RX 258: Performed by: INTERNAL MEDICINE

## 2018-08-29 PROCEDURE — 2060000000 HC ICU INTERMEDIATE R&B

## 2018-08-29 RX ORDER — NICOTINE 21 MG/24HR
1 PATCH, TRANSDERMAL 24 HOURS TRANSDERMAL DAILY
Status: DISCONTINUED | OUTPATIENT
Start: 2018-08-29 | End: 2018-08-31 | Stop reason: HOSPADM

## 2018-08-29 RX ORDER — DIGOXIN 0.25 MG/ML
500 INJECTION INTRAMUSCULAR; INTRAVENOUS ONCE
Status: COMPLETED | OUTPATIENT
Start: 2018-08-29 | End: 2018-08-29

## 2018-08-29 RX ADMIN — PIPERACILLIN SODIUM AND TAZOBACTAM SODIUM 3.38 G: 3; .375 INJECTION, POWDER, LYOPHILIZED, FOR SOLUTION INTRAVENOUS at 21:36

## 2018-08-29 RX ADMIN — APIXABAN 5 MG: 5 TABLET, FILM COATED ORAL at 09:58

## 2018-08-29 RX ADMIN — ALBUTEROL SULFATE 2.5 MG: 2.5 SOLUTION RESPIRATORY (INHALATION) at 11:14

## 2018-08-29 RX ADMIN — APIXABAN 5 MG: 5 TABLET, FILM COATED ORAL at 21:35

## 2018-08-29 RX ADMIN — BENZONATATE 200 MG: 100 CAPSULE ORAL at 09:58

## 2018-08-29 RX ADMIN — ALBUTEROL SULFATE 2.5 MG: 2.5 SOLUTION RESPIRATORY (INHALATION) at 21:04

## 2018-08-29 RX ADMIN — TIOTROPIUM BROMIDE 18 MCG: 18 CAPSULE ORAL; RESPIRATORY (INHALATION) at 09:58

## 2018-08-29 RX ADMIN — ALBUTEROL SULFATE 2.5 MG: 2.5 SOLUTION RESPIRATORY (INHALATION) at 14:33

## 2018-08-29 RX ADMIN — DIGOXIN 500 MCG: 0.25 INJECTION INTRAMUSCULAR; INTRAVENOUS at 17:50

## 2018-08-29 RX ADMIN — ALBUTEROL SULFATE 2.5 MG: 2.5 SOLUTION RESPIRATORY (INHALATION) at 07:30

## 2018-08-29 RX ADMIN — PIPERACILLIN SODIUM AND TAZOBACTAM SODIUM 3.38 G: 3; .375 INJECTION, POWDER, LYOPHILIZED, FOR SOLUTION INTRAVENOUS at 14:45

## 2018-08-29 RX ADMIN — Medication 10 ML: at 21:37

## 2018-08-29 RX ADMIN — Medication 10 ML: at 18:00

## 2018-08-29 RX ADMIN — BENZONATATE 200 MG: 100 CAPSULE ORAL at 14:45

## 2018-08-29 RX ADMIN — ACETAMINOPHEN 650 MG: 325 TABLET, FILM COATED ORAL at 18:17

## 2018-08-29 RX ADMIN — PIPERACILLIN SODIUM AND TAZOBACTAM SODIUM 3.38 G: 3; .375 INJECTION, POWDER, LYOPHILIZED, FOR SOLUTION INTRAVENOUS at 05:46

## 2018-08-29 RX ADMIN — CIPROFLOXACIN 400 MG: 2 INJECTION, SOLUTION INTRAVENOUS at 04:28

## 2018-08-29 RX ADMIN — BENZONATATE 200 MG: 100 CAPSULE ORAL at 21:35

## 2018-08-29 ASSESSMENT — PAIN SCALES - GENERAL
PAINLEVEL_OUTOF10: 7
PAINLEVEL_OUTOF10: 2

## 2018-08-29 NOTE — PLAN OF CARE
Problem: Pain:  Goal: Pain level will decrease  Pain level will decrease   Outcome: Ongoing  Pt did not complain of pain during this shift.

## 2018-08-29 NOTE — PROGRESS NOTES
Patient reports history of oral thrush and recurrence with throat pain.  Oral cavity assessed with no significant findings and Dr. Maksim Santos notified

## 2018-08-29 NOTE — PROGRESS NOTES
Patient BP last reading 92/58. Will hold diltiazem until cardiology assesses patient. Patient continues to be asymptomatic.

## 2018-08-30 LAB
ANION GAP SERPL CALCULATED.3IONS-SCNC: 10 MMOL/L (ref 9–17)
BUN BLDV-MCNC: 16 MG/DL (ref 6–20)
BUN/CREAT BLD: ABNORMAL (ref 9–20)
CALCIUM SERPL-MCNC: 9.4 MG/DL (ref 8.6–10.4)
CHLORIDE BLD-SCNC: 100 MMOL/L (ref 98–107)
CO2: 27 MMOL/L (ref 20–31)
CREAT SERPL-MCNC: 1.01 MG/DL (ref 0.7–1.2)
CULTURE: NORMAL
DIRECT EXAM: NORMAL
GFR AFRICAN AMERICAN: >60 ML/MIN
GFR NON-AFRICAN AMERICAN: >60 ML/MIN
GFR SERPL CREATININE-BSD FRML MDRD: ABNORMAL ML/MIN/{1.73_M2}
GFR SERPL CREATININE-BSD FRML MDRD: ABNORMAL ML/MIN/{1.73_M2}
GLUCOSE BLD-MCNC: 122 MG/DL (ref 70–99)
HCT VFR BLD CALC: 48 % (ref 41–53)
HEMOGLOBIN: 15.9 G/DL (ref 13.5–17.5)
Lab: NORMAL
MCH RBC QN AUTO: 29.8 PG (ref 26–34)
MCHC RBC AUTO-ENTMCNC: 33.1 G/DL (ref 31–37)
MCV RBC AUTO: 90.1 FL (ref 80–100)
NRBC AUTOMATED: ABNORMAL PER 100 WBC
PDW BLD-RTO: 14.4 % (ref 11.5–14.9)
PLATELET # BLD: 289 K/UL (ref 150–450)
PMV BLD AUTO: 8.7 FL (ref 6–12)
POTASSIUM SERPL-SCNC: 5 MMOL/L (ref 3.7–5.3)
RBC # BLD: 5.33 M/UL (ref 4.5–5.9)
SODIUM BLD-SCNC: 137 MMOL/L (ref 135–144)
SPECIMEN DESCRIPTION: NORMAL
STATUS: NORMAL
WBC # BLD: 12.8 K/UL (ref 3.5–11)

## 2018-08-30 PROCEDURE — 6360000002 HC RX W HCPCS: Performed by: INTERNAL MEDICINE

## 2018-08-30 PROCEDURE — 6370000000 HC RX 637 (ALT 250 FOR IP): Performed by: NURSE PRACTITIONER

## 2018-08-30 PROCEDURE — 6370000000 HC RX 637 (ALT 250 FOR IP): Performed by: INTERNAL MEDICINE

## 2018-08-30 PROCEDURE — 2060000000 HC ICU INTERMEDIATE R&B

## 2018-08-30 PROCEDURE — 2580000003 HC RX 258: Performed by: INTERNAL MEDICINE

## 2018-08-30 PROCEDURE — 94640 AIRWAY INHALATION TREATMENT: CPT

## 2018-08-30 PROCEDURE — 80048 BASIC METABOLIC PNL TOTAL CA: CPT

## 2018-08-30 PROCEDURE — 36415 COLL VENOUS BLD VENIPUNCTURE: CPT

## 2018-08-30 PROCEDURE — 85027 COMPLETE CBC AUTOMATED: CPT

## 2018-08-30 PROCEDURE — 94761 N-INVAS EAR/PLS OXIMETRY MLT: CPT

## 2018-08-30 PROCEDURE — 99232 SBSQ HOSP IP/OBS MODERATE 35: CPT | Performed by: INTERNAL MEDICINE

## 2018-08-30 RX ORDER — LEVALBUTEROL INHALATION SOLUTION 0.63 MG/3ML
0.63 SOLUTION RESPIRATORY (INHALATION) EVERY 6 HOURS
Status: DISCONTINUED | OUTPATIENT
Start: 2018-08-30 | End: 2018-08-31

## 2018-08-30 RX ORDER — DIGOXIN 250 MCG
250 TABLET ORAL DAILY
Status: DISCONTINUED | OUTPATIENT
Start: 2018-08-30 | End: 2018-08-31 | Stop reason: HOSPADM

## 2018-08-30 RX ORDER — DILTIAZEM HYDROCHLORIDE 120 MG/1
120 CAPSULE, COATED, EXTENDED RELEASE ORAL DAILY
Status: DISCONTINUED | OUTPATIENT
Start: 2018-08-31 | End: 2018-08-30

## 2018-08-30 RX ADMIN — APIXABAN 5 MG: 5 TABLET, FILM COATED ORAL at 07:59

## 2018-08-30 RX ADMIN — APIXABAN 5 MG: 5 TABLET, FILM COATED ORAL at 20:26

## 2018-08-30 RX ADMIN — DILTIAZEM HYDROCHLORIDE 30 MG: 30 TABLET, FILM COATED ORAL at 19:05

## 2018-08-30 RX ADMIN — BENZONATATE 200 MG: 100 CAPSULE ORAL at 13:45

## 2018-08-30 RX ADMIN — LEVALBUTEROL HYDROCHLORIDE 0.63 MG: 0.63 SOLUTION RESPIRATORY (INHALATION) at 11:12

## 2018-08-30 RX ADMIN — PIPERACILLIN SODIUM AND TAZOBACTAM SODIUM 3.38 G: 3; .375 INJECTION, POWDER, LYOPHILIZED, FOR SOLUTION INTRAVENOUS at 05:36

## 2018-08-30 RX ADMIN — FUROSEMIDE 20 MG: 20 TABLET ORAL at 07:59

## 2018-08-30 RX ADMIN — BENZONATATE 200 MG: 100 CAPSULE ORAL at 20:26

## 2018-08-30 RX ADMIN — PIPERACILLIN SODIUM AND TAZOBACTAM SODIUM 3.38 G: 3; .375 INJECTION, POWDER, LYOPHILIZED, FOR SOLUTION INTRAVENOUS at 20:26

## 2018-08-30 RX ADMIN — CIPROFLOXACIN 400 MG: 2 INJECTION, SOLUTION INTRAVENOUS at 17:10

## 2018-08-30 RX ADMIN — DIGOXIN 250 MCG: 250 TABLET ORAL at 12:52

## 2018-08-30 RX ADMIN — LEVALBUTEROL HYDROCHLORIDE 0.63 MG: 0.63 SOLUTION RESPIRATORY (INHALATION) at 22:04

## 2018-08-30 RX ADMIN — BENZONATATE 200 MG: 100 CAPSULE ORAL at 07:59

## 2018-08-30 RX ADMIN — ACETAMINOPHEN 650 MG: 325 TABLET, FILM COATED ORAL at 05:41

## 2018-08-30 RX ADMIN — ALBUTEROL SULFATE 2.5 MG: 2.5 SOLUTION RESPIRATORY (INHALATION) at 07:08

## 2018-08-30 RX ADMIN — DILTIAZEM HYDROCHLORIDE 30 MG: 30 TABLET, FILM COATED ORAL at 12:52

## 2018-08-30 RX ADMIN — CIPROFLOXACIN 400 MG: 2 INJECTION, SOLUTION INTRAVENOUS at 02:40

## 2018-08-30 RX ADMIN — PIPERACILLIN SODIUM AND TAZOBACTAM SODIUM 3.38 G: 3; .375 INJECTION, POWDER, LYOPHILIZED, FOR SOLUTION INTRAVENOUS at 12:07

## 2018-08-30 RX ADMIN — Medication 10 ML: at 07:59

## 2018-08-30 RX ADMIN — ACETAMINOPHEN 650 MG: 325 TABLET, FILM COATED ORAL at 13:46

## 2018-08-30 RX ADMIN — TIOTROPIUM BROMIDE 18 MCG: 18 CAPSULE ORAL; RESPIRATORY (INHALATION) at 09:56

## 2018-08-30 RX ADMIN — LEVALBUTEROL HYDROCHLORIDE 0.63 MG: 0.63 SOLUTION RESPIRATORY (INHALATION) at 15:08

## 2018-08-30 ASSESSMENT — PAIN SCALES - GENERAL
PAINLEVEL_OUTOF10: 7
PAINLEVEL_OUTOF10: 6
PAINLEVEL_OUTOF10: 7
PAINLEVEL_OUTOF10: 4
PAINLEVEL_OUTOF10: 0
PAINLEVEL_OUTOF10: 2

## 2018-08-30 ASSESSMENT — PAIN DESCRIPTION - LOCATION: LOCATION: BACK

## 2018-08-30 ASSESSMENT — PAIN DESCRIPTION - PAIN TYPE: TYPE: ACUTE PAIN

## 2018-08-30 NOTE — FLOWSHEET NOTE
08/29/18 1040   Provider Notification   Reason for Communication Review case   Provider Name Francisca Glynn   Provider Notification Nurse Practitioner   Method of Communication Secure Message   Response See orders   Practitioner notified that patient is requesting a nicotine patch, new order received.

## 2018-08-30 NOTE — PROGRESS NOTES
[address] [facility]    Patient name:  Tamar Poster  Date of admission:  8/27/2018  1:25 AM  MRN:   [id number]  YOB: 1965    CC- pneumonia     HPI-    Pt with right sided chest pain   Has productive cough yellow     Episode of NSVT today cardio consulted       REVIEW OF SYSTEMS:    · General----negative for fatigue, weight loss  · GI negative for nausea and vomiting, no dysphagia       EXAM-  BP 97/66   Pulse 102   Temp 98.3 °F (36.8 °C) (Oral)   Resp 16   Ht 5' 9\" (1.753 m)   Wt 146 lb (66.2 kg)   SpO2 97%   BMI 21.56 kg/m²      · General appearance: NAD conversant  · Lungs: normal effort, clear to auscultation bilaterally,no wheeze.   · Heart: regular rate and rhythm, S1, S2 normal, no murmur  · Abdomen: soft, non-tender; no masses, no organomegaly  · Extremities: no cyanosis, no edema no clubbing no synovitis      BMP:    Recent Labs      08/27/18   0130  08/28/18   0416    NA  140  138    K  4.5  4.3    CL  101  101    CO2  27  24    BUN  19  14    CREATININE  0.83  0.84    GLUCOSE  118*  145*          ASSESSMENT:    Patient Active Problem List   Diagnosis    Back pain    Tobacco abuse    Atrial fibrillation (HCC)    Thrush, oral    Fatigue    Left foot burn    Cellulitis    Cellulitis of buttock    Gluteal abscess    Nonrheumatic aortic valve stenosis    Shortness of breath    CHF (congestive heart failure), NYHA class I, acute on chronic, combined (HCC)    Pneumonia of both upper lobes due to infectious organism [facility])       PLAN:  Pneumonia concern for abscess   COPD emphysema   A fib chronic   Severe AS eliquis     Cont zosyn cipro wbc is 9.3     CT chest   No evidence of pulmonary embolism.       3.5 x 2.5 cm left upper lobe cavitary lesion located medially.  This finding   may represent malignancy versus a lung abscess.  Atypical infection should be   considered.       Bilateral upper lobe patchy infiltrates which may represent atypical   infection.  There is associated COPD.       Spiculated nodule within the left upper lobe measuring 1.5 x 1.0 cm.  This   finding may represent malignancy versus infection.  Short-term follow-up   examination is therefore recommended. MD YUNIOR Licea[address], [address] [place] [address].    Phone [phone]   Fax: [phone]  Answering Service: [phone]

## 2018-08-30 NOTE — CARE COORDINATION
ONGOING DISCHARGE PLAN:    Spoke with patient regarding discharge plan and patient confirms that plan is still to discharge to home  Per Pulm  Patient seen and examined independently by me. Above discussed and I agree with resident note except where indicated in the EMR revision history. Also see my additional comments and changes indicated by discrete font, text color, italics, and/or initials. Labs, cultures, and radiographs where available were reviewed.         Continues to clinically improved, with less cough  Continue empiric Zosyn and Cipro since clinically improving, despite negative cultures and serology  Agree with cardiology that he needs at least a day more of inpatient hospitalization  He will need oral antibiotics for approximately 10 more days after discharge  Follow-up CAT scan  Polysubstance abuse counseling done    Will continue to follow for additional discharge needs.     Electronically signed by Von Angelo RN on 8/30/2018 at 3:19 PM

## 2018-08-30 NOTE — PROGRESS NOTES
Pulmonary Progress Note  [facility]      Patient - Eliseo Zhang,  Age - 48 y.o.    - 1965      Room Number - 2120/2120-01   N -  [id number]   [facility] # - [de-identified]  Date of Admission -  2018  1:25 AM      Consulting Thor Leiva MD  Primary Care Physician - Marie Beckett MD     SUBJECTIVE     Patient was seen and examined at bedside this AM. No acute events overnight. Patient denied any CP, SOB, fever, chills, nausea, vomiting or diarrhea. He was able to lie flat in bed. The patient denied any orthopnea either. States that his condition is improved. Echo results were discussed with the nursing staff as well as the patient. Cardiology is on board and will be informed. Patient denied any new complaints or concerns. OBJECTIVE   VITALS    height is 5' 9\" (1.753 m) and weight is 146 lb (66.2 kg). His oral temperature is 97.6 °F (36.4 °C). His blood pressure is 97/70 and his pulse is 114. His respiration is 20 and oxygen saturation is 97%. Body mass index is 21.56 kg/m². Temperature Range: Temp: 97.6 °F (36.4 °C) Temp  Av °F (36.7 °C)  Min: 97.6 °F (36.4 °C)  Max: 98.4 °F (36.9 °C)  BP Range:  Systolic (68LWD), VQW:59 , Min:81 , LXM:350     Diastolic (05KNO), MPY:19, Min:57, Max:83    Pulse Range: Pulse  Av.7  Min: 61  Max: 150  Respiration Range: Resp  Av.6  Min: 15  Max: 20  Current Pulse Ox[de-identified]  SpO2: 97 %  24HR Pulse Ox Range:  SpO2  Av.1 %  Min: 93 %  Max: 100 %  Oxygen Amount and Delivery:       Wt Readings from Last 3 Encounters:   18 146 lb (66.2 kg)   18 147 lb (66.7 kg)   18 156 lb (70.8 kg)       I/O (24 Hours)    Intake/Output Summary (Last 24 hours) at 18 1305  Last data filed at 18 1003   Gross per 24 hour   Intake              710 ml   Output                0 ml   Net              710 ml       EXAM     General Appearance  Awake, alert, oriented, in no acute distress, lying flat comfortably  HEENT - normocephalic, atraumatic. Neck - Supple,  trachea midline   Lungs - Diminished lungs sounds. No rhonchi, wheezes, or rales. Cardiovascular - Regular rate with irregular rhythm. Murmur appreciated. Abdomen - Soft, nontender, and nondistended  Extremities - No clubbing, cyanosis, edema    MEDS      levalbuterol  0.63 mg Nebulization Q6H    diltiazem  30 mg Oral 4 times per day    digoxin  250 mcg Oral Daily    nicotine  1 patch Transdermal Daily    apixaban  5 mg Oral BID    sodium chloride flush  10 mL Intravenous 2 times per day    tiotropium  18 mcg Inhalation Daily    piperacillin-tazobactam  3.375 g Intravenous Q8H    furosemide  20 mg Oral Every Other Day    benzonatate  200 mg Oral TID    ciprofloxacin  400 mg Intravenous Q12H       sodium chloride flush, sodium chloride flush, acetaminophen    LABS   CBC   Recent Labs      08/30/18   0516   WBC  12.8*   HGB  15.9   HCT  48.0   MCV  90.1   PLT  289     BMP:   Lab Results   Component Value Date     08/30/2018    K 5.0 08/30/2018     08/30/2018    CO2 27 08/30/2018    BUN 16 08/30/2018    LABALBU 3.5 08/27/2018    CREATININE 1.01 08/30/2018    CALCIUM 9.4 08/30/2018    GFRAA >60 08/30/2018    LABGLOM >60 08/30/2018     ABGs:No results found for: PHART, PO2ART, RTF3AAZ No results found for: IFIO2, MODE, SETTIDVOL, SETPEEP  Ionized Calcium:  No results found for: IONCA  Magnesium:    Lab Results   Component Value Date    MG 2.1 08/28/2018     Phosphorus:    Lab Results   Component Value Date    PHOS 4.3 08/28/2018        LIVER PROFILE   No results for input(s): AST, ALT, LIPASE, BILIDIR, BILITOT, ALKPHOS in the last 72 hours. Invalid input(s): AMYLASE,  ALB  INR No results for input(s): INR in the last 72 hours.   PTT   Lab Results   Component Value Date    APTT 45.0 (H) 06/18/2012         RADIOLOGY     (See actual reports for details)    ASSESSMENT/PLAN

## 2018-08-30 NOTE — PROGRESS NOTES
Progress Note    2018 1:12 PM      Subjective:  Mr. Mery Rooney  has no chest pain,   Continued to have cough sputum and some shortness of breath. He is somewhat tachycardic with atrial fibrillation intermittently. Blood pressure on the low side and we had to give him digoxin rather. LABS:     CBC:   Recent Labs      186  18   0516   WBC  9.3  10.7  12.8*   HGB  13.7  13.9  15.9   HCT  41.4  41.6  48.0   MCV  90.2  90.4  90.1   PLT  230  259  289     BMP: Recent Labs      18   0516   NA  138  138  137   K  4.3  5.0  5.0   CL  101  100  100   CO2  24  27  27   PHOS  4.3   --    --    BUN  14  13  16   CREATININE  0.84  1.04  1.01     PT/INR: No results for input(s): PROTIME, INR in the last 72 hours. APTT: No results for input(s): APTT in the last 72 hours. MAG:   Recent Labs      18   MG  2.1     D Dimer: No results for input(s): DDIMER in the last 72 hours. Troponin I No results for input(s): TROPONINI in the last 72 hours. BNP No results for input(s): BNP in the last 72 hours. Pulse Ox: SpO2  Av.1 %  Min: 93 %  Max: 100 %  Supplemental O2:       Current Meds:    levalbuterol  0.63 mg Nebulization Q6H    diltiazem  30 mg Oral 4 times per day    digoxin  250 mcg Oral Daily    nicotine  1 patch Transdermal Daily    apixaban  5 mg Oral BID    sodium chloride flush  10 mL Intravenous 2 times per day    tiotropium  18 mcg Inhalation Daily    piperacillin-tazobactam  3.375 g Intravenous Q8H    furosemide  20 mg Oral Every Other Day    benzonatate  200 mg Oral TID    ciprofloxacin  400 mg Intravenous Q12H     Continuous Infusions:        VITAL SIGNS:    BP 97/70   Pulse 114   Temp 97.6 °F (36.4 °C) (Oral)   Resp 20   Ht 5' 9\" (1.753 m)   Wt 146 lb (66.2 kg)   SpO2 97%   BMI 21.56 kg/m²         Admit Weight:  146 lb (66.2 kg)    Last 3 weights:   Wt Readings from Last 3 Encounters:   18 146 lb (66.2 kg)   08/25/18 147 lb (66.7 kg)   07/12/18 156 lb (70.8 kg)       BMI: Body mass index is 21.56 kg/m². INPUT/OUTPUT:        Intake/Output Summary (Last 24 hours) at 08/30/18 1312  Last data filed at 08/30/18 1003   Gross per 24 hour   Intake              710 ml   Output                0 ml   Net              710 ml         EXAM:     General appearance: awake and alert moves all ext   Lungs:   A rhonchi with diminished breath sounds in the bases  Heart:  Irregular and systolic murmur across aortic valve area  Abdomen: positive bowel sounds, no bruits, no masses  Extremities: warm and dry, no cyanosis, no clubbing      Principal Problem:    Pneumonia of both upper lobes due to infectious organism [facility])  Active Problems:    Tobacco abuse    Atrial fibrillation ([facility] [address]  Resolved Problems:    * No resolved hospital problems. *      PLAN  1. Chronic atrial fibrillation. Needs better rate control. Started oral Cardizem Would not beta block with active cocaine use. Continue Eliquis. Continue with oral digoxin he was bolused with 0.5 mg yesterday. 2. Aortic stenosis. Severe, bicuspid AV. Unfortunately with active cocaine use surgical risk for valve replacement may be prohibitive. 3. Cocaine use. Would  Encourage treatment. 4. Stop smoking discussed. 5. Pneumonia per primary team.    Eventually evaluation by structural Heart [facility] for aortic valve replacement.   Not ready for discharge in my opinion we need to watch him at least another day      Electronically signed by Delma Resendiz MD on 8/30/2018 at 1:12 PM

## 2018-08-30 NOTE — FLOWSHEET NOTE
08/29/18 2240   Provider Notification   Reason for Communication Review case   Provider Name Tiana Landry   Provider Notification Nurse Practitioner   Method of Communication Secure Message   Response See orders   Practitioner notified that patient is requesting a nicotine patch, new orders received.

## 2018-08-30 NOTE — FLOWSHEET NOTE
08/30/18 5502   Provider Notification   Reason for Communication Review case   Provider Name Dr. Allison Lopez   Provider Notification Physician   Method of Communication Page   Response Waiting for response   Notification Time [id number]     Dr. Allison Lopez returned page, updated on patient's blood pressures of 40'B systolic and fluctuating heart rate , asked for parameters for cardizem. Dr. Allison Lopez stated to hold the cardizem at this time and he will evaluate patient later today and decide. No new orders at this time. Will continue to monitor closely.     Electronically signed by Alban Montoya RN on 8/30/2018 at 9:18 AM

## 2018-08-31 VITALS
BODY MASS INDEX: 21.62 KG/M2 | TEMPERATURE: 97.5 F | RESPIRATION RATE: 19 BRPM | WEIGHT: 146 LBS | SYSTOLIC BLOOD PRESSURE: 91 MMHG | DIASTOLIC BLOOD PRESSURE: 54 MMHG | HEART RATE: 99 BPM | HEIGHT: 69 IN | OXYGEN SATURATION: 100 %

## 2018-08-31 LAB
ANION GAP SERPL CALCULATED.3IONS-SCNC: 11 MMOL/L (ref 9–17)
BUN BLDV-MCNC: 14 MG/DL (ref 6–20)
BUN/CREAT BLD: ABNORMAL (ref 9–20)
CALCIUM SERPL-MCNC: 8.9 MG/DL (ref 8.6–10.4)
CHLORIDE BLD-SCNC: 102 MMOL/L (ref 98–107)
CO2: 28 MMOL/L (ref 20–31)
CREAT SERPL-MCNC: 0.88 MG/DL (ref 0.7–1.2)
GFR AFRICAN AMERICAN: >60 ML/MIN
GFR NON-AFRICAN AMERICAN: >60 ML/MIN
GFR SERPL CREATININE-BSD FRML MDRD: ABNORMAL ML/MIN/{1.73_M2}
GFR SERPL CREATININE-BSD FRML MDRD: ABNORMAL ML/MIN/{1.73_M2}
GLUCOSE BLD-MCNC: 104 MG/DL (ref 70–99)
HCT VFR BLD CALC: 46.3 % (ref 41–53)
HEMOGLOBIN: 15.1 G/DL (ref 13.5–17.5)
MCH RBC QN AUTO: 29.1 PG (ref 26–34)
MCHC RBC AUTO-ENTMCNC: 32.6 G/DL (ref 31–37)
MCV RBC AUTO: 89.3 FL (ref 80–100)
NRBC AUTOMATED: ABNORMAL PER 100 WBC
PDW BLD-RTO: 14.2 % (ref 11.5–14.9)
PLATELET # BLD: 283 K/UL (ref 150–450)
PMV BLD AUTO: 8.7 FL (ref 6–12)
POTASSIUM SERPL-SCNC: 4.6 MMOL/L (ref 3.7–5.3)
RBC # BLD: 5.19 M/UL (ref 4.5–5.9)
SODIUM BLD-SCNC: 141 MMOL/L (ref 135–144)
WBC # BLD: 11.4 K/UL (ref 3.5–11)

## 2018-08-31 PROCEDURE — 6370000000 HC RX 637 (ALT 250 FOR IP): Performed by: INTERNAL MEDICINE

## 2018-08-31 PROCEDURE — 99239 HOSP IP/OBS DSCHRG MGMT >30: CPT | Performed by: INTERNAL MEDICINE

## 2018-08-31 PROCEDURE — 2580000003 HC RX 258: Performed by: INTERNAL MEDICINE

## 2018-08-31 PROCEDURE — 6360000002 HC RX W HCPCS: Performed by: INTERNAL MEDICINE

## 2018-08-31 PROCEDURE — 94640 AIRWAY INHALATION TREATMENT: CPT

## 2018-08-31 PROCEDURE — 85027 COMPLETE CBC AUTOMATED: CPT

## 2018-08-31 PROCEDURE — 80048 BASIC METABOLIC PNL TOTAL CA: CPT

## 2018-08-31 PROCEDURE — 94761 N-INVAS EAR/PLS OXIMETRY MLT: CPT

## 2018-08-31 PROCEDURE — 36415 COLL VENOUS BLD VENIPUNCTURE: CPT

## 2018-08-31 RX ORDER — DIGOXIN 250 MCG
250 TABLET ORAL DAILY
Qty: 30 TABLET | Refills: 3 | Status: SHIPPED | OUTPATIENT
Start: 2018-09-01 | End: 2019-01-02

## 2018-08-31 RX ORDER — DILTIAZEM HYDROCHLORIDE 120 MG/1
120 CAPSULE, COATED, EXTENDED RELEASE ORAL DAILY
Qty: 30 CAPSULE | Refills: 0 | Status: SHIPPED | OUTPATIENT
Start: 2018-08-31 | End: 2019-01-02

## 2018-08-31 RX ORDER — ALBUTEROL SULFATE 2.5 MG/3ML
2.5 SOLUTION RESPIRATORY (INHALATION) 4 TIMES DAILY
Status: DISCONTINUED | OUTPATIENT
Start: 2018-08-31 | End: 2018-08-31 | Stop reason: HOSPADM

## 2018-08-31 RX ADMIN — LEVALBUTEROL HYDROCHLORIDE 0.63 MG: 0.63 SOLUTION RESPIRATORY (INHALATION) at 03:23

## 2018-08-31 RX ADMIN — DIGOXIN 250 MCG: 250 TABLET ORAL at 08:56

## 2018-08-31 RX ADMIN — BENZONATATE 200 MG: 100 CAPSULE ORAL at 08:55

## 2018-08-31 RX ADMIN — CIPROFLOXACIN 400 MG: 2 INJECTION, SOLUTION INTRAVENOUS at 03:08

## 2018-08-31 RX ADMIN — APIXABAN 5 MG: 5 TABLET, FILM COATED ORAL at 08:55

## 2018-08-31 RX ADMIN — ACETAMINOPHEN 650 MG: 325 TABLET, FILM COATED ORAL at 05:47

## 2018-08-31 RX ADMIN — PIPERACILLIN SODIUM AND TAZOBACTAM SODIUM 3.38 G: 3; .375 INJECTION, POWDER, LYOPHILIZED, FOR SOLUTION INTRAVENOUS at 03:07

## 2018-08-31 RX ADMIN — LEVALBUTEROL HYDROCHLORIDE 0.63 MG: 0.63 SOLUTION RESPIRATORY (INHALATION) at 07:16

## 2018-08-31 RX ADMIN — DILTIAZEM HYDROCHLORIDE 30 MG: 30 TABLET, FILM COATED ORAL at 05:46

## 2018-08-31 RX ADMIN — PIPERACILLIN SODIUM AND TAZOBACTAM SODIUM 3.38 G: 3; .375 INJECTION, POWDER, LYOPHILIZED, FOR SOLUTION INTRAVENOUS at 13:19

## 2018-08-31 ASSESSMENT — PAIN DESCRIPTION - ONSET: ONSET: GRADUAL

## 2018-08-31 ASSESSMENT — PAIN SCALES - GENERAL
PAINLEVEL_OUTOF10: 3
PAINLEVEL_OUTOF10: 0

## 2018-08-31 ASSESSMENT — PAIN DESCRIPTION - LOCATION: LOCATION: BACK

## 2018-08-31 ASSESSMENT — PAIN DESCRIPTION - PAIN TYPE: TYPE: ACUTE PAIN

## 2018-08-31 ASSESSMENT — PAIN DESCRIPTION - DESCRIPTORS: DESCRIPTORS: ACHING

## 2018-08-31 ASSESSMENT — PAIN DESCRIPTION - PROGRESSION: CLINICAL_PROGRESSION: NOT CHANGED

## 2018-08-31 ASSESSMENT — PAIN DESCRIPTION - FREQUENCY: FREQUENCY: INTERMITTENT

## 2018-08-31 NOTE — DISCHARGE SUMMARY
every 6 hours as needed for Wheezing             apixaban (ELIQUIS) 5 MG TABS tablet  Take 1 tablet by mouth 2 times daily             digoxin (LANOXIN) 250 MCG tablet  Take 1 tablet by mouth daily             diltiazem (CARDIZEM CD) 120 MG extended release capsule  Take 1 capsule by mouth daily             furosemide (LASIX) 20 MG tablet  Take 1 tablet by mouth every other day             tiotropium (SPIRIVA RESPIMAT) 1.25 MCG/ACT AERS inhaler  Inhale 2 puffs into the lungs daily                 Discharge Instructions: Follow up with Georgi Vaughan MD in 2 weeks.       Hospital acquired Infections: None    Time spent for dc more than 30 min    Lola MIRANDA attending

## 2018-08-31 NOTE — PROGRESS NOTES
RN spoke with Lee Ann Wise at [facility] and gave report. All questions answered at this time. RN gave her phone number if any additional questions.

## 2018-08-31 NOTE — PROGRESS NOTES
[facility])  Resolved Problems:    * No resolved hospital problems. *    Continue on Zosyn and Cipro, clinically improving  Pneumonia workup with negative cultures and serologies  Will need oral Abx for approx. 10 days upon discharge  Follow-up Cat scan outpatient   Cardiology has patient on Digoxin 250 mcg QD and Cardizem 30 mg Q6H      Electronically signed by Justyna Fraser MS4 on 8/31/2018 at 9:15 AM    Patient seen and examined independently by me. Above discussed and I agree with medical student note except where indicated in the EMR revision history. Also see my additional comments and changes indicated by discrete font, text color, italics, and/or initials. Labs, cultures, and radiographs where available were reviewed. Just informed that he is going to be transferred to [address] IV antibiotics for another 24 hours and then switched to oral Augmentin 875 twice a day for 1 week  He will need a nebulizer machine as well as medication  He will need a follow-up CT of the chest in 4 weeks to make sure infiltrate has resolved  The left lingular nodule need to be watched closely, depending on follow-up CT scan and PFTs,, he may need a PET scan as well  We will need to be involved in his care over at [facility].   I contacted my partner Dr. Jessi Cardenas    Electronically signed by Brandon Klein MD on 8/31/2018 at 1:15 PM

## 2018-09-01 LAB
CULTURE: NORMAL
CULTURE: NORMAL
Lab: NORMAL
Lab: NORMAL
SPECIMEN DESCRIPTION: NORMAL
SPECIMEN DESCRIPTION: NORMAL
STATUS: NORMAL
STATUS: NORMAL

## 2018-09-06 ENCOUNTER — OFFICE VISIT (OUTPATIENT)
Dept: INTERNAL MEDICINE CLINIC | Age: 53
End: 2018-09-06
Payer: MEDICAID

## 2018-09-06 VITALS
RESPIRATION RATE: 14 BRPM | HEART RATE: 85 BPM | DIASTOLIC BLOOD PRESSURE: 62 MMHG | SYSTOLIC BLOOD PRESSURE: 103 MMHG | BODY MASS INDEX: 22 KG/M2 | WEIGHT: 149 LBS

## 2018-09-06 DIAGNOSIS — Z72.0 TOBACCO ABUSE: Primary | ICD-10-CM

## 2018-09-06 DIAGNOSIS — J18.9 PNEUMONIA OF BOTH UPPER LOBES DUE TO INFECTIOUS ORGANISM: ICD-10-CM

## 2018-09-06 DIAGNOSIS — I48.91 ATRIAL FIBRILLATION, UNSPECIFIED TYPE (HCC): ICD-10-CM

## 2018-09-06 DIAGNOSIS — F14.10 COCAINE ABUSE (HCC): ICD-10-CM

## 2018-09-06 DIAGNOSIS — J44.9 CHRONIC OBSTRUCTIVE PULMONARY DISEASE, UNSPECIFIED COPD TYPE (HCC): ICD-10-CM

## 2018-09-06 DIAGNOSIS — R91.1 PULMONARY NODULE: ICD-10-CM

## 2018-09-06 DIAGNOSIS — I35.0 SEVERE AORTIC STENOSIS: ICD-10-CM

## 2018-09-06 DIAGNOSIS — J85.1 ABSCESS OF UPPER LOBE OF LEFT LUNG WITH PNEUMONIA (HCC): ICD-10-CM

## 2018-09-06 PROCEDURE — 1111F DSCHRG MED/CURRENT MED MERGE: CPT | Performed by: INTERNAL MEDICINE

## 2018-09-06 PROCEDURE — 99215 OFFICE O/P EST HI 40 MIN: CPT | Performed by: INTERNAL MEDICINE

## 2018-09-06 RX ORDER — NICOTINE 21 MG/24HR
1 PATCH, TRANSDERMAL 24 HOURS TRANSDERMAL EVERY 24 HOURS
Qty: 30 PATCH | Refills: 3 | Status: SHIPPED | OUTPATIENT
Start: 2018-09-06 | End: 2019-01-02 | Stop reason: SDUPTHER

## 2018-09-06 RX ORDER — BENZONATATE 100 MG/1
100 CAPSULE ORAL 3 TIMES DAILY PRN
Qty: 21 CAPSULE | Refills: 0 | Status: SHIPPED | OUTPATIENT
Start: 2018-09-06 | End: 2018-09-13

## 2018-09-06 RX ORDER — MIDODRINE HYDROCHLORIDE 10 MG/1
5 TABLET ORAL 2 TIMES DAILY
Status: ON HOLD | COMMUNITY
Start: 2018-09-05 | End: 2019-01-07

## 2018-09-06 ASSESSMENT — ENCOUNTER SYMPTOMS
DIARRHEA: 0
SHORTNESS OF BREATH: 1
COUGH: 1
BACK PAIN: 0
COLOR CHANGE: 0
CONSTIPATION: 0
CHEST TIGHTNESS: 0
APNEA: 0
ABDOMINAL PAIN: 0
FACIAL SWELLING: 0
WHEEZING: 0
ABDOMINAL DISTENTION: 0

## 2018-09-07 PROCEDURE — 90732 PPSV23 VACC 2 YRS+ SUBQ/IM: CPT | Performed by: INTERNAL MEDICINE

## 2018-09-07 PROCEDURE — 90471 IMMUNIZATION ADMIN: CPT | Performed by: INTERNAL MEDICINE

## 2018-10-18 ENCOUNTER — TELEPHONE (OUTPATIENT)
Dept: INTERNAL MEDICINE CLINIC | Age: 53
End: 2018-10-18

## 2018-10-18 DIAGNOSIS — R91.8 LUNG MASS: Primary | ICD-10-CM

## 2018-10-22 ENCOUNTER — TELEPHONE (OUTPATIENT)
Dept: INTERNAL MEDICINE CLINIC | Age: 53
End: 2018-10-22

## 2018-10-24 NOTE — TELEPHONE ENCOUNTER
Medication: eliquis  Last visit: 09/06/18  Next visit: 11/12/2018  Last refill: 02/13/18  Pharmacy: [facility]

## 2018-10-29 ENCOUNTER — TELEPHONE (OUTPATIENT)
Dept: INTERNAL MEDICINE CLINIC | Age: 53
End: 2018-10-29

## 2018-10-29 DIAGNOSIS — F40.240 CLAUSTROPHOBIA: Primary | ICD-10-CM

## 2018-10-29 RX ORDER — LORAZEPAM 0.5 MG/1
0.5 TABLET ORAL 2 TIMES DAILY PRN
Qty: 2 TABLET | Refills: 0 | Status: SHIPPED | OUTPATIENT
Start: 2018-10-29 | End: 2018-10-31

## 2018-10-29 NOTE — TELEPHONE ENCOUNTER
Patient is scheduled for a PET Scan on 11/096/18. He is very claustrophobic and he freaked out the last time he had to have one of these test.  The hospital told him to contact his PCP to see if you would be willing to prescribe something he could take prior to testing. Please advise.     No Known Allergies    Last Appt:   09/06/18    [facility] in [place]

## 2018-11-12 ENCOUNTER — OFFICE VISIT (OUTPATIENT)
Dept: INTERNAL MEDICINE CLINIC | Age: 53
End: 2018-11-12
Payer: COMMERCIAL

## 2018-11-12 VITALS
BODY MASS INDEX: 22.07 KG/M2 | HEIGHT: 69 IN | DIASTOLIC BLOOD PRESSURE: 72 MMHG | WEIGHT: 149 LBS | SYSTOLIC BLOOD PRESSURE: 112 MMHG

## 2018-11-12 DIAGNOSIS — I35.0 NONRHEUMATIC AORTIC VALVE STENOSIS: ICD-10-CM

## 2018-11-12 DIAGNOSIS — I48.91 ATRIAL FIBRILLATION, UNSPECIFIED TYPE (HCC): Primary | ICD-10-CM

## 2018-11-12 DIAGNOSIS — R06.02 SOB (SHORTNESS OF BREATH): ICD-10-CM

## 2018-11-12 DIAGNOSIS — R91.8 LUNG MASS: ICD-10-CM

## 2018-11-12 DIAGNOSIS — I50.43 CHF (CONGESTIVE HEART FAILURE), NYHA CLASS I, ACUTE ON CHRONIC, COMBINED (HCC): ICD-10-CM

## 2018-11-12 PROCEDURE — G8427 DOCREV CUR MEDS BY ELIG CLIN: HCPCS | Performed by: INTERNAL MEDICINE

## 2018-11-12 PROCEDURE — 4004F PT TOBACCO SCREEN RCVD TLK: CPT | Performed by: INTERNAL MEDICINE

## 2018-11-12 PROCEDURE — 99214 OFFICE O/P EST MOD 30 MIN: CPT | Performed by: INTERNAL MEDICINE

## 2018-11-12 PROCEDURE — G8420 CALC BMI NORM PARAMETERS: HCPCS | Performed by: INTERNAL MEDICINE

## 2018-11-12 PROCEDURE — G8484 FLU IMMUNIZE NO ADMIN: HCPCS | Performed by: INTERNAL MEDICINE

## 2018-11-12 PROCEDURE — 3017F COLORECTAL CA SCREEN DOC REV: CPT | Performed by: INTERNAL MEDICINE

## 2018-11-12 RX ORDER — LORAZEPAM 0.5 MG/1
0.5 TABLET ORAL 2 TIMES DAILY
Qty: 2 TABLET | Refills: 0 | Status: SHIPPED | OUTPATIENT
Start: 2018-11-12 | End: 2018-11-13

## 2018-11-12 RX ORDER — LORAZEPAM 0.5 MG/1
TABLET ORAL
Refills: 0 | COMMUNITY
Start: 2018-11-08 | End: 2018-11-12 | Stop reason: SDUPTHER

## 2018-11-12 RX ORDER — ALBUTEROL SULFATE 90 UG/1
2 AEROSOL, METERED RESPIRATORY (INHALATION) EVERY 6 HOURS PRN
Qty: 1 INHALER | Refills: 0 | Status: SHIPPED | OUTPATIENT
Start: 2018-11-12 | End: 2019-01-02 | Stop reason: SDUPTHER

## 2018-11-12 ASSESSMENT — PATIENT HEALTH QUESTIONNAIRE - PHQ9
2. FEELING DOWN, DEPRESSED OR HOPELESS: 0
SUM OF ALL RESPONSES TO PHQ9 QUESTIONS 1 & 2: 0
SUM OF ALL RESPONSES TO PHQ QUESTIONS 1-9: 0
1. LITTLE INTEREST OR PLEASURE IN DOING THINGS: 0
SUM OF ALL RESPONSES TO PHQ QUESTIONS 1-9: 0

## 2018-11-12 NOTE — PROGRESS NOTES
Subjective:      Patient ID: Roman Antonio is a 48 y.o. male. Chronic Disease Visit Information    BP Readings from Last 3 Encounters:   09/06/18 103/62   08/31/18 (!) 91/54   08/26/18 114/72          Hemoglobin A1C (%)   Date Value   03/28/2018 5.3     LDL Cholesterol (mg/dL)   Date Value   03/28/2018 60     HDL (mg/dL)   Date Value   03/28/2018 48     BUN (mg/dL)   Date Value   08/31/2018 14     CREATININE (mg/dL)   Date Value   08/31/2018 0.88     Glucose (mg/dL)   Date Value   08/31/2018 104 (H)            Have you changed or started any medications since your last visit including any over-the-counter medicines, vitamins, or herbal medicines? no   Are you having any side effects from any of your medications? -  no  Have you stopped taking any of your medications? Is so, why? -  no    Have you seen any other physician or provider since your last visit? No  Have you had any other diagnostic tests since your last visit? No  Have you been seen in the emergency room and/or had an admission to a hospital since we last saw you? No  Have you had your annual diabetic retinal (eye) exam? No  Have you had your routine dental cleaning in the past 6 months? no    Have you activated your Admit[employer] account? If not, what are your barriers? Yes     Patient Care Team:  Dustin Diaz MD as PCP - General (Internal Medicine)     Chief Complaint   Patient presents with    Atrial Fibrillation    Congestive Heart Failure    Medication Problem     pt states he was given 2 Lorazepam to take prior to testing.  States he left them in his lunchbox at work and the \"turned to [employer]"               Medical History Review  Past Medical, Family, and Social History reviewed and does contribute to the patient presenting condition    Health Maintenance   Topic Date Due    Hepatitis C screen  1965    Shingles Vaccine (1 of 2 - 2 Dose Series) 02/13/2015    Colon cancer screen colonoscopy  02/13/2015    Flu vaccine (1) 09/01/2018    Potassium monitoring  08/31/2019    Creatinine monitoring  08/31/2019    Lipid screen  03/28/2023    DTaP/Tdap/Td vaccine (2 - Td) 09/18/2025    Pneumococcal med risk  Completed    HIV screen  Completed       HPI- patient is here for evaluation of multiple medical problems. He has congestive heart failure, Atrial  Fibrillation on AC . He is following with structural heart disease specialist for Aortic valve  replacement surgery. He has lung mass, going to have PET scan soon. He was prescribed 2 pills of Ativan  last time,   Is complaining of fatigue, tiredness. He mentioned that he feel fatigued after walking short distances. Did not appreciate any chest pain, has chronic shortness of breath. Review of Systems   Constitutional: Positive for fatigue. Negative for activity change, appetite change, chills and diaphoresis. HENT: Negative for congestion, dental problem, ear discharge, facial swelling and hearing loss. Respiratory: Positive for cough and shortness of breath. Negative for apnea, chest tightness and wheezing. Cardiovascular: Negative for chest pain and leg swelling. Gastrointestinal: Negative for abdominal distention, abdominal pain, constipation and diarrhea. Genitourinary: Negative for difficulty urinating, dysuria, enuresis, flank pain and frequency. Musculoskeletal: Negative for arthralgias, back pain, gait problem and joint swelling. Skin: Negative for color change, pallor and rash. Neurological: Negative for dizziness, seizures, facial asymmetry, light-headedness, numbness and headaches. Psychiatric/Behavioral: Negative for agitation, behavioral problems, confusion, decreased concentration and dysphoric mood. Objective:   Physical Exam   Constitutional: He is oriented to person, place, and time. He appears well-developed and well-nourished. HENT:   Head: Normocephalic and atraumatic. Mouth/Throat: No oropharyngeal exudate.    Eyes: Pupils are equal, round, and

## 2018-11-16 ENCOUNTER — TELEPHONE (OUTPATIENT)
Dept: INTERNAL MEDICINE CLINIC | Age: 53
End: 2018-11-16

## 2018-11-25 ASSESSMENT — ENCOUNTER SYMPTOMS
COLOR CHANGE: 0
DIARRHEA: 0
FACIAL SWELLING: 0
WHEEZING: 0
COUGH: 1
ABDOMINAL DISTENTION: 0
CONSTIPATION: 0
APNEA: 0
ABDOMINAL PAIN: 0
SHORTNESS OF BREATH: 1
BACK PAIN: 0
CHEST TIGHTNESS: 0

## 2018-12-07 ENCOUNTER — TELEPHONE (OUTPATIENT)
Dept: INTERNAL MEDICINE CLINIC | Age: 53
End: 2018-12-07

## 2018-12-20 ENCOUNTER — TELEPHONE (OUTPATIENT)
Dept: INTERNAL MEDICINE CLINIC | Age: 53
End: 2018-12-20

## 2019-01-02 ENCOUNTER — OFFICE VISIT (OUTPATIENT)
Dept: INTERNAL MEDICINE CLINIC | Age: 54
End: 2019-01-02
Payer: MEDICAID

## 2019-01-02 VITALS
WEIGHT: 152 LBS | HEART RATE: 98 BPM | HEIGHT: 69 IN | DIASTOLIC BLOOD PRESSURE: 74 MMHG | BODY MASS INDEX: 22.51 KG/M2 | SYSTOLIC BLOOD PRESSURE: 114 MMHG

## 2019-01-02 DIAGNOSIS — I95.9 HYPOTENSION, UNSPECIFIED HYPOTENSION TYPE: ICD-10-CM

## 2019-01-02 DIAGNOSIS — I48.91 ATRIAL FIBRILLATION, UNSPECIFIED TYPE (HCC): ICD-10-CM

## 2019-01-02 DIAGNOSIS — R93.89 ABNORMAL CHEST CT: ICD-10-CM

## 2019-01-02 DIAGNOSIS — J44.9 CHRONIC OBSTRUCTIVE PULMONARY DISEASE, UNSPECIFIED COPD TYPE (HCC): ICD-10-CM

## 2019-01-02 DIAGNOSIS — R06.02 SOB (SHORTNESS OF BREATH): ICD-10-CM

## 2019-01-02 DIAGNOSIS — F14.91 HISTORY OF COCAINE USE: ICD-10-CM

## 2019-01-02 DIAGNOSIS — Z72.0 TOBACCO ABUSE: ICD-10-CM

## 2019-01-02 DIAGNOSIS — I35.0 AORTIC VALVE STENOSIS, ETIOLOGY OF CARDIAC VALVE DISEASE UNSPECIFIED: Primary | ICD-10-CM

## 2019-01-02 PROCEDURE — G8484 FLU IMMUNIZE NO ADMIN: HCPCS | Performed by: NURSE PRACTITIONER

## 2019-01-02 PROCEDURE — 1111F DSCHRG MED/CURRENT MED MERGE: CPT | Performed by: NURSE PRACTITIONER

## 2019-01-02 PROCEDURE — 3023F SPIROM DOC REV: CPT | Performed by: NURSE PRACTITIONER

## 2019-01-02 PROCEDURE — 3017F COLORECTAL CA SCREEN DOC REV: CPT | Performed by: NURSE PRACTITIONER

## 2019-01-02 PROCEDURE — G8420 CALC BMI NORM PARAMETERS: HCPCS | Performed by: NURSE PRACTITIONER

## 2019-01-02 PROCEDURE — G8427 DOCREV CUR MEDS BY ELIG CLIN: HCPCS | Performed by: NURSE PRACTITIONER

## 2019-01-02 PROCEDURE — 99214 OFFICE O/P EST MOD 30 MIN: CPT | Performed by: NURSE PRACTITIONER

## 2019-01-02 PROCEDURE — 1036F TOBACCO NON-USER: CPT | Performed by: NURSE PRACTITIONER

## 2019-01-02 PROCEDURE — G8926 SPIRO NO PERF OR DOC: HCPCS | Performed by: NURSE PRACTITIONER

## 2019-01-02 RX ORDER — ASPIRIN 81 MG/1
81 TABLET, CHEWABLE ORAL
Status: ON HOLD | COMMUNITY
Start: 2018-12-21 | End: 2019-01-07

## 2019-01-02 RX ORDER — ALBUTEROL SULFATE 90 UG/1
2 AEROSOL, METERED RESPIRATORY (INHALATION) EVERY 6 HOURS PRN
Qty: 3 INHALER | Refills: 1 | Status: ON HOLD | OUTPATIENT
Start: 2019-01-02 | End: 2019-01-07

## 2019-01-02 RX ORDER — MEDICAL SUPPLY, MISCELLANEOUS
1 EACH MISCELLANEOUS 2 TIMES DAILY
Qty: 1 EACH | Refills: 0 | Status: ON HOLD | OUTPATIENT
Start: 2019-01-02 | End: 2019-01-07

## 2019-01-02 RX ORDER — NICOTINE 21 MG/24HR
1 PATCH, TRANSDERMAL 24 HOURS TRANSDERMAL EVERY 24 HOURS
Qty: 30 PATCH | Refills: 1 | Status: ON HOLD | OUTPATIENT
Start: 2019-01-02 | End: 2019-01-07

## 2019-01-02 ASSESSMENT — ENCOUNTER SYMPTOMS
COUGH: 1
EYE REDNESS: 0
WHEEZING: 0
SHORTNESS OF BREATH: 1
ABDOMINAL PAIN: 0
CONSTIPATION: 0

## 2019-01-06 ENCOUNTER — HOSPITAL ENCOUNTER (INPATIENT)
Age: 54
LOS: 1 days | Discharge: HOME | DRG: 816 | End: 2019-01-07
Attending: EMERGENCY MEDICINE | Admitting: INTERNAL MEDICINE
Payer: MEDICAID

## 2019-01-06 ENCOUNTER — APPOINTMENT (OUTPATIENT)
Dept: GENERAL RADIOLOGY | Age: 54
DRG: 816 | End: 2019-01-06
Payer: MEDICAID

## 2019-01-06 DIAGNOSIS — J44.9 CHRONIC OBSTRUCTIVE PULMONARY DISEASE, UNSPECIFIED COPD TYPE (HCC): ICD-10-CM

## 2019-01-06 DIAGNOSIS — I95.9 HYPOTENSION, UNSPECIFIED HYPOTENSION TYPE: ICD-10-CM

## 2019-01-06 DIAGNOSIS — I48.91 ATRIAL FIBRILLATION WITH RAPID VENTRICULAR RESPONSE (HCC): Primary | ICD-10-CM

## 2019-01-06 DIAGNOSIS — Z72.0 TOBACCO ABUSE: ICD-10-CM

## 2019-01-06 DIAGNOSIS — I35.0 AORTIC VALVE STENOSIS, ETIOLOGY OF CARDIAC VALVE DISEASE UNSPECIFIED: ICD-10-CM

## 2019-01-06 DIAGNOSIS — F14.10 NONDEPENDENT COCAINE ABUSE (HCC): ICD-10-CM

## 2019-01-06 DIAGNOSIS — R06.02 SOB (SHORTNESS OF BREATH): ICD-10-CM

## 2019-01-06 PROBLEM — F14.90 CRACK COCAINE USE: Status: ACTIVE | Noted: 2019-01-06

## 2019-01-06 LAB
ABSOLUTE EOS #: 0 K/UL (ref 0–0.4)
ABSOLUTE IMMATURE GRANULOCYTE: ABNORMAL K/UL (ref 0–0.3)
ABSOLUTE LYMPH #: 2.1 K/UL (ref 1–4.8)
ABSOLUTE MONO #: 1.3 K/UL (ref 0.1–1.3)
ALBUMIN SERPL-MCNC: 4.4 G/DL (ref 3.5–5.2)
ALBUMIN/GLOBULIN RATIO: ABNORMAL (ref 1–2.5)
ALP BLD-CCNC: 89 U/L (ref 40–129)
ALT SERPL-CCNC: 19 U/L (ref 5–41)
AMPHETAMINE SCREEN URINE: POSITIVE
ANION GAP SERPL CALCULATED.3IONS-SCNC: 18 MMOL/L (ref 9–17)
AST SERPL-CCNC: 25 U/L
BARBITURATE SCREEN URINE: NEGATIVE
BASOPHILS # BLD: 0 % (ref 0–2)
BASOPHILS ABSOLUTE: 0 K/UL (ref 0–0.2)
BENZODIAZEPINE SCREEN, URINE: NEGATIVE
BILIRUB SERPL-MCNC: 0.88 MG/DL (ref 0.3–1.2)
BNP INTERPRETATION: ABNORMAL
BUN BLDV-MCNC: 25 MG/DL (ref 6–20)
BUN/CREAT BLD: ABNORMAL (ref 9–20)
BUPRENORPHINE URINE: ABNORMAL
CALCIUM SERPL-MCNC: 10.3 MG/DL (ref 8.6–10.4)
CANNABINOID SCREEN URINE: NEGATIVE
CHLORIDE BLD-SCNC: 98 MMOL/L (ref 98–107)
CO2: 23 MMOL/L (ref 20–31)
COCAINE METABOLITE, URINE: POSITIVE
CREAT SERPL-MCNC: 1.45 MG/DL (ref 0.7–1.2)
DIFFERENTIAL TYPE: ABNORMAL
EKG ATRIAL RATE: 197 BPM
EKG Q-T INTERVAL: 310 MS
EKG QRS DURATION: 82 MS
EKG QTC CALCULATION (BAZETT): 481 MS
EKG R AXIS: 77 DEGREES
EKG T AXIS: 55 DEGREES
EKG VENTRICULAR RATE: 145 BPM
EOSINOPHILS RELATIVE PERCENT: 0 % (ref 0–4)
GFR AFRICAN AMERICAN: >60 ML/MIN
GFR NON-AFRICAN AMERICAN: 51 ML/MIN
GFR SERPL CREATININE-BSD FRML MDRD: ABNORMAL ML/MIN/{1.73_M2}
GFR SERPL CREATININE-BSD FRML MDRD: ABNORMAL ML/MIN/{1.73_M2}
GLUCOSE BLD-MCNC: 86 MG/DL (ref 70–99)
HCT VFR BLD CALC: 46 % (ref 41–53)
HEMOGLOBIN: 15.7 G/DL (ref 13.5–17.5)
IMMATURE GRANULOCYTES: ABNORMAL %
INR BLD: 1.1
LYMPHOCYTES # BLD: 14 % (ref 24–44)
MCH RBC QN AUTO: 30.1 PG (ref 26–34)
MCHC RBC AUTO-ENTMCNC: 34 G/DL (ref 31–37)
MCV RBC AUTO: 88.5 FL (ref 80–100)
MDMA URINE: ABNORMAL
METHADONE SCREEN, URINE: NEGATIVE
METHAMPHETAMINE, URINE: ABNORMAL
MONOCYTES # BLD: 9 % (ref 1–7)
NRBC AUTOMATED: ABNORMAL PER 100 WBC
OPIATES, URINE: NEGATIVE
OXYCODONE SCREEN URINE: NEGATIVE
PARTIAL THROMBOPLASTIN TIME: 26.5 SEC (ref 23–31)
PDW BLD-RTO: 14.9 % (ref 11.5–14.9)
PHENCYCLIDINE, URINE: NEGATIVE
PLATELET # BLD: 203 K/UL (ref 150–450)
PLATELET ESTIMATE: ABNORMAL
PMV BLD AUTO: 9.5 FL (ref 6–12)
POTASSIUM SERPL-SCNC: 4.3 MMOL/L (ref 3.7–5.3)
PRO-BNP: 1552 PG/ML
PROPOXYPHENE, URINE: ABNORMAL
PROTHROMBIN TIME: 11.9 SEC (ref 9.7–12)
RBC # BLD: 5.2 M/UL (ref 4.5–5.9)
RBC # BLD: ABNORMAL 10*6/UL
SEG NEUTROPHILS: 77 % (ref 36–66)
SEGMENTED NEUTROPHILS ABSOLUTE COUNT: 11.7 K/UL (ref 1.3–9.1)
SODIUM BLD-SCNC: 139 MMOL/L (ref 135–144)
TEST INFORMATION: ABNORMAL
TOTAL PROTEIN: 7.3 G/DL (ref 6.4–8.3)
TRICYCLIC ANTIDEPRESSANTS, UR: ABNORMAL
TROPONIN INTERP: NORMAL
TROPONIN T: NORMAL NG/ML
TROPONIN, HIGH SENSITIVITY: 10 NG/L (ref 0–22)
TROPONIN, HIGH SENSITIVITY: 14 NG/L (ref 0–22)
WBC # BLD: 15.3 K/UL (ref 3.5–11)
WBC # BLD: ABNORMAL 10*3/UL

## 2019-01-06 PROCEDURE — 6370000000 HC RX 637 (ALT 250 FOR IP): Performed by: STUDENT IN AN ORGANIZED HEALTH CARE EDUCATION/TRAINING PROGRAM

## 2019-01-06 PROCEDURE — 85730 THROMBOPLASTIN TIME PARTIAL: CPT

## 2019-01-06 PROCEDURE — 6360000002 HC RX W HCPCS: Performed by: EMERGENCY MEDICINE

## 2019-01-06 PROCEDURE — 80307 DRUG TEST PRSMV CHEM ANLYZR: CPT

## 2019-01-06 PROCEDURE — 85610 PROTHROMBIN TIME: CPT

## 2019-01-06 PROCEDURE — 2060000000 HC ICU INTERMEDIATE R&B

## 2019-01-06 PROCEDURE — 96365 THER/PROPH/DIAG IV INF INIT: CPT

## 2019-01-06 PROCEDURE — 36415 COLL VENOUS BLD VENIPUNCTURE: CPT

## 2019-01-06 PROCEDURE — 2500000003 HC RX 250 WO HCPCS: Performed by: EMERGENCY MEDICINE

## 2019-01-06 PROCEDURE — 2580000003 HC RX 258: Performed by: EMERGENCY MEDICINE

## 2019-01-06 PROCEDURE — 84484 ASSAY OF TROPONIN QUANT: CPT

## 2019-01-06 PROCEDURE — 93005 ELECTROCARDIOGRAM TRACING: CPT

## 2019-01-06 PROCEDURE — 99223 1ST HOSP IP/OBS HIGH 75: CPT | Performed by: INTERNAL MEDICINE

## 2019-01-06 PROCEDURE — 2580000003 HC RX 258: Performed by: STUDENT IN AN ORGANIZED HEALTH CARE EDUCATION/TRAINING PROGRAM

## 2019-01-06 PROCEDURE — 85025 COMPLETE CBC W/AUTO DIFF WBC: CPT

## 2019-01-06 PROCEDURE — 83880 ASSAY OF NATRIURETIC PEPTIDE: CPT

## 2019-01-06 PROCEDURE — 96375 TX/PRO/DX INJ NEW DRUG ADDON: CPT

## 2019-01-06 PROCEDURE — 80053 COMPREHEN METABOLIC PANEL: CPT

## 2019-01-06 PROCEDURE — 99285 EMERGENCY DEPT VISIT HI MDM: CPT

## 2019-01-06 PROCEDURE — 71046 X-RAY EXAM CHEST 2 VIEWS: CPT

## 2019-01-06 RX ORDER — ONDANSETRON 2 MG/ML
4 INJECTION INTRAMUSCULAR; INTRAVENOUS EVERY 6 HOURS PRN
Status: DISCONTINUED | OUTPATIENT
Start: 2019-01-06 | End: 2019-01-07 | Stop reason: HOSPADM

## 2019-01-06 RX ORDER — SODIUM CHLORIDE 0.9 % (FLUSH) 0.9 %
10 SYRINGE (ML) INJECTION EVERY 12 HOURS SCHEDULED
Status: DISCONTINUED | OUTPATIENT
Start: 2019-01-06 | End: 2019-01-07 | Stop reason: HOSPADM

## 2019-01-06 RX ORDER — DIGOXIN 0.25 MG/ML
250 INJECTION INTRAMUSCULAR; INTRAVENOUS ONCE
Status: COMPLETED | OUTPATIENT
Start: 2019-01-06 | End: 2019-01-06

## 2019-01-06 RX ORDER — NICOTINE 21 MG/24HR
1 PATCH, TRANSDERMAL 24 HOURS TRANSDERMAL DAILY PRN
Status: DISCONTINUED | OUTPATIENT
Start: 2019-01-06 | End: 2019-01-07 | Stop reason: HOSPADM

## 2019-01-06 RX ORDER — SODIUM CHLORIDE 0.9 % (FLUSH) 0.9 %
10 SYRINGE (ML) INJECTION PRN
Status: DISCONTINUED | OUTPATIENT
Start: 2019-01-06 | End: 2019-01-07 | Stop reason: HOSPADM

## 2019-01-06 RX ORDER — SODIUM CHLORIDE 9 MG/ML
INJECTION, SOLUTION INTRAVENOUS CONTINUOUS
Status: DISCONTINUED | OUTPATIENT
Start: 2019-01-06 | End: 2019-01-07 | Stop reason: HOSPADM

## 2019-01-06 RX ORDER — ACETAMINOPHEN 325 MG/1
650 TABLET ORAL EVERY 4 HOURS PRN
Status: DISCONTINUED | OUTPATIENT
Start: 2019-01-06 | End: 2019-01-07 | Stop reason: HOSPADM

## 2019-01-06 RX ORDER — ALBUTEROL SULFATE 90 UG/1
2 AEROSOL, METERED RESPIRATORY (INHALATION) EVERY 6 HOURS PRN
Status: DISCONTINUED | OUTPATIENT
Start: 2019-01-06 | End: 2019-01-07 | Stop reason: HOSPADM

## 2019-01-06 RX ORDER — ASPIRIN 81 MG/1
81 TABLET, CHEWABLE ORAL DAILY
Status: DISCONTINUED | OUTPATIENT
Start: 2019-01-06 | End: 2019-01-07 | Stop reason: HOSPADM

## 2019-01-06 RX ORDER — DILTIAZEM HYDROCHLORIDE 5 MG/ML
10 INJECTION INTRAVENOUS ONCE
Status: COMPLETED | OUTPATIENT
Start: 2019-01-06 | End: 2019-01-06

## 2019-01-06 RX ORDER — 0.9 % SODIUM CHLORIDE 0.9 %
1000 INTRAVENOUS SOLUTION INTRAVENOUS ONCE
Status: COMPLETED | OUTPATIENT
Start: 2019-01-06 | End: 2019-01-06

## 2019-01-06 RX ADMIN — APIXABAN 5 MG: 5 TABLET, FILM COATED ORAL at 21:29

## 2019-01-06 RX ADMIN — SODIUM CHLORIDE: 9 INJECTION, SOLUTION INTRAVENOUS at 18:17

## 2019-01-06 RX ADMIN — DIGOXIN 250 MCG: 0.25 INJECTION INTRAMUSCULAR; INTRAVENOUS at 15:17

## 2019-01-06 RX ADMIN — SODIUM CHLORIDE 1000 ML: 9 INJECTION, SOLUTION INTRAVENOUS at 14:15

## 2019-01-06 RX ADMIN — DILTIAZEM HYDROCHLORIDE 5 MG/HR: 5 INJECTION INTRAVENOUS at 16:09

## 2019-01-06 RX ADMIN — DIGOXIN 250 MCG: 0.25 INJECTION INTRAMUSCULAR; INTRAVENOUS at 14:16

## 2019-01-06 RX ADMIN — DILTIAZEM HYDROCHLORIDE 10 MG: 5 INJECTION INTRAVENOUS at 15:59

## 2019-01-06 ASSESSMENT — ENCOUNTER SYMPTOMS
COUGH: 0
SHORTNESS OF BREATH: 0
RHINORRHEA: 0
DIARRHEA: 0
ABDOMINAL PAIN: 0
NAUSEA: 0
VOMITING: 0
CHEST TIGHTNESS: 0
CONSTIPATION: 0

## 2019-01-06 ASSESSMENT — PAIN DESCRIPTION - PAIN TYPE: TYPE: ACUTE PAIN

## 2019-01-06 ASSESSMENT — PAIN DESCRIPTION - LOCATION: LOCATION: CHEST

## 2019-01-06 ASSESSMENT — PAIN SCALES - GENERAL: PAINLEVEL_OUTOF10: 4

## 2019-01-07 VITALS
HEIGHT: 69 IN | HEART RATE: 80 BPM | DIASTOLIC BLOOD PRESSURE: 67 MMHG | RESPIRATION RATE: 16 BRPM | WEIGHT: 144.84 LBS | BODY MASS INDEX: 21.45 KG/M2 | OXYGEN SATURATION: 99 % | TEMPERATURE: 97.3 F | SYSTOLIC BLOOD PRESSURE: 106 MMHG

## 2019-01-07 LAB
ANION GAP SERPL CALCULATED.3IONS-SCNC: 9 MMOL/L (ref 9–17)
BUN BLDV-MCNC: 23 MG/DL (ref 6–20)
BUN/CREAT BLD: ABNORMAL (ref 9–20)
CALCIUM SERPL-MCNC: 8.7 MG/DL (ref 8.6–10.4)
CHLORIDE BLD-SCNC: 107 MMOL/L (ref 98–107)
CO2: 24 MMOL/L (ref 20–31)
CREAT SERPL-MCNC: 0.95 MG/DL (ref 0.7–1.2)
GFR AFRICAN AMERICAN: >60 ML/MIN
GFR NON-AFRICAN AMERICAN: >60 ML/MIN
GFR SERPL CREATININE-BSD FRML MDRD: ABNORMAL ML/MIN/{1.73_M2}
GFR SERPL CREATININE-BSD FRML MDRD: ABNORMAL ML/MIN/{1.73_M2}
GLUCOSE BLD-MCNC: 113 MG/DL (ref 70–99)
HCT VFR BLD CALC: 41.9 % (ref 41–53)
HEMOGLOBIN: 14.2 G/DL (ref 13.5–17.5)
LV EF: 58 %
LVEF MODALITY: NORMAL
MCH RBC QN AUTO: 30 PG (ref 26–34)
MCHC RBC AUTO-ENTMCNC: 33.9 G/DL (ref 31–37)
MCV RBC AUTO: 88.7 FL (ref 80–100)
NRBC AUTOMATED: ABNORMAL PER 100 WBC
PDW BLD-RTO: 14.8 % (ref 11.5–14.9)
PLATELET # BLD: 140 K/UL (ref 150–450)
PMV BLD AUTO: 9.6 FL (ref 6–12)
POTASSIUM SERPL-SCNC: 4.1 MMOL/L (ref 3.7–5.3)
RBC # BLD: 4.72 M/UL (ref 4.5–5.9)
SODIUM BLD-SCNC: 140 MMOL/L (ref 135–144)
TSH SERPL DL<=0.05 MIU/L-ACNC: 2.33 MIU/L (ref 0.3–5)
WBC # BLD: 6.6 K/UL (ref 3.5–11)

## 2019-01-07 PROCEDURE — 97161 PT EVAL LOW COMPLEX 20 MIN: CPT

## 2019-01-07 PROCEDURE — 6360000002 HC RX W HCPCS

## 2019-01-07 PROCEDURE — 90686 IIV4 VACC NO PRSV 0.5 ML IM: CPT

## 2019-01-07 PROCEDURE — G8979 MOBILITY GOAL STATUS: HCPCS

## 2019-01-07 PROCEDURE — 85027 COMPLETE CBC AUTOMATED: CPT

## 2019-01-07 PROCEDURE — G0008 ADMIN INFLUENZA VIRUS VAC: HCPCS

## 2019-01-07 PROCEDURE — 2580000003 HC RX 258: Performed by: STUDENT IN AN ORGANIZED HEALTH CARE EDUCATION/TRAINING PROGRAM

## 2019-01-07 PROCEDURE — 99233 SBSQ HOSP IP/OBS HIGH 50: CPT | Performed by: INTERNAL MEDICINE

## 2019-01-07 PROCEDURE — 93306 TTE W/DOPPLER COMPLETE: CPT

## 2019-01-07 PROCEDURE — 6370000000 HC RX 637 (ALT 250 FOR IP): Performed by: STUDENT IN AN ORGANIZED HEALTH CARE EDUCATION/TRAINING PROGRAM

## 2019-01-07 PROCEDURE — 84443 ASSAY THYROID STIM HORMONE: CPT

## 2019-01-07 PROCEDURE — 80048 BASIC METABOLIC PNL TOTAL CA: CPT

## 2019-01-07 PROCEDURE — G8978 MOBILITY CURRENT STATUS: HCPCS

## 2019-01-07 PROCEDURE — G8980 MOBILITY D/C STATUS: HCPCS

## 2019-01-07 PROCEDURE — 97165 OT EVAL LOW COMPLEX 30 MIN: CPT

## 2019-01-07 PROCEDURE — 36415 COLL VENOUS BLD VENIPUNCTURE: CPT

## 2019-01-07 RX ORDER — NICOTINE 21 MG/24HR
1 PATCH, TRANSDERMAL 24 HOURS TRANSDERMAL EVERY 24 HOURS
Qty: 30 PATCH | Refills: 1 | Status: SHIPPED | OUTPATIENT
Start: 2019-01-07 | End: 2019-08-29 | Stop reason: DRUGHIGH

## 2019-01-07 RX ORDER — MIDODRINE HYDROCHLORIDE 10 MG/1
5 TABLET ORAL 2 TIMES DAILY
Qty: 30 TABLET | Refills: 0 | Status: SHIPPED | OUTPATIENT
Start: 2019-01-07 | End: 2019-04-11 | Stop reason: SDUPTHER

## 2019-01-07 RX ORDER — ALBUTEROL SULFATE 90 UG/1
2 AEROSOL, METERED RESPIRATORY (INHALATION) EVERY 6 HOURS PRN
Qty: 3 INHALER | Refills: 1 | Status: ON HOLD | OUTPATIENT
Start: 2019-01-07 | End: 2019-07-31 | Stop reason: HOSPADM

## 2019-01-07 RX ORDER — MEDICAL SUPPLY, MISCELLANEOUS
1 EACH MISCELLANEOUS 2 TIMES DAILY
Qty: 1 EACH | Refills: 0 | Status: ON HOLD | OUTPATIENT
Start: 2019-01-07 | End: 2019-07-31 | Stop reason: HOSPADM

## 2019-01-07 RX ORDER — ASPIRIN 81 MG/1
81 TABLET, CHEWABLE ORAL DAILY
Qty: 30 TABLET | Refills: 3 | Status: SHIPPED | OUTPATIENT
Start: 2019-01-07

## 2019-01-07 RX ADMIN — INFLUENZA A VIRUS A/MICHIGAN/45/2015 X-275 (H1N1) ANTIGEN (FORMALDEHYDE INACTIVATED), INFLUENZA A VIRUS A/SINGAPORE/INFIMH-16-0019/2016 IVR-186 (H3N2) ANTIGEN (FORMALDEHYDE INACTIVATED), INFLUENZA B VIRUS B/PHUKET/3073/2013 ANTIGEN (FORMALDEHYDE INACTIVATED), AND INFLUENZA B VIRUS B/MARYLAND/15/2016 BX-69A ANTIGEN (FORMALDEHYDE INACTIVATED) 0.5 ML: 15; 15; 15; 15 INJECTION, SUSPENSION INTRAMUSCULAR at 16:37

## 2019-01-07 RX ADMIN — ASPIRIN 81 MG 81 MG: 81 TABLET ORAL at 10:25

## 2019-01-07 RX ADMIN — METOPROLOL TARTRATE 12.5 MG: 25 TABLET ORAL at 17:04

## 2019-01-07 RX ADMIN — APIXABAN 5 MG: 5 TABLET, FILM COATED ORAL at 17:05

## 2019-01-07 RX ADMIN — Medication 10 ML: at 10:26

## 2019-01-07 RX ADMIN — SODIUM CHLORIDE: 9 INJECTION, SOLUTION INTRAVENOUS at 01:54

## 2019-01-07 RX ADMIN — METOPROLOL TARTRATE 12.5 MG: 25 TABLET ORAL at 10:25

## 2019-01-07 RX ADMIN — APIXABAN 5 MG: 5 TABLET, FILM COATED ORAL at 10:25

## 2019-01-07 ASSESSMENT — ENCOUNTER SYMPTOMS
VOMITING: 0
NAUSEA: 0
COUGH: 0
SHORTNESS OF BREATH: 0
DIARRHEA: 0
CONSTIPATION: 0
CHEST TIGHTNESS: 0
ABDOMINAL PAIN: 0

## 2019-01-07 ASSESSMENT — PAIN DESCRIPTION - FREQUENCY
FREQUENCY: CONTINUOUS
FREQUENCY: CONTINUOUS

## 2019-01-07 ASSESSMENT — PAIN DESCRIPTION - ORIENTATION
ORIENTATION: ANTERIOR
ORIENTATION: ANTERIOR

## 2019-01-07 ASSESSMENT — PAIN SCALES - GENERAL
PAINLEVEL_OUTOF10: 2
PAINLEVEL_OUTOF10: 2

## 2019-01-07 ASSESSMENT — PAIN DESCRIPTION - PAIN TYPE
TYPE: ACUTE PAIN
TYPE: ACUTE PAIN

## 2019-01-07 ASSESSMENT — PAIN DESCRIPTION - ONSET: ONSET: ON-GOING

## 2019-01-07 ASSESSMENT — PAIN DESCRIPTION - LOCATION
LOCATION: CHEST
LOCATION: CHEST

## 2019-01-07 ASSESSMENT — PAIN DESCRIPTION - DESCRIPTORS
DESCRIPTORS: PRESSURE
DESCRIPTORS: PRESSURE

## 2019-01-07 ASSESSMENT — PAIN DESCRIPTION - PROGRESSION
CLINICAL_PROGRESSION: GRADUALLY IMPROVING
CLINICAL_PROGRESSION: GRADUALLY IMPROVING

## 2019-01-08 ENCOUNTER — TELEPHONE (OUTPATIENT)
Dept: INTERNAL MEDICINE CLINIC | Age: 54
End: 2019-01-08

## 2019-01-09 ENCOUNTER — TELEPHONE (OUTPATIENT)
Dept: INTERNAL MEDICINE CLINIC | Age: 54
End: 2019-01-09

## 2019-01-18 ENCOUNTER — TELEPHONE (OUTPATIENT)
Dept: INTERNAL MEDICINE CLINIC | Age: 54
End: 2019-01-18

## 2019-02-04 ENCOUNTER — TELEPHONE (OUTPATIENT)
Dept: INTERNAL MEDICINE CLINIC | Age: 54
End: 2019-02-04

## 2019-03-12 ENCOUNTER — HOSPITAL ENCOUNTER (EMERGENCY)
Age: 54
Discharge: LEFT BEFORE DISCHARGE | End: 2019-03-12
Attending: EMERGENCY MEDICINE
Payer: COMMERCIAL

## 2019-03-12 VITALS
RESPIRATION RATE: 18 BRPM | HEART RATE: 104 BPM | BODY MASS INDEX: 21.62 KG/M2 | HEIGHT: 69 IN | WEIGHT: 146 LBS | OXYGEN SATURATION: 98 % | TEMPERATURE: 97.5 F | DIASTOLIC BLOOD PRESSURE: 78 MMHG | SYSTOLIC BLOOD PRESSURE: 137 MMHG

## 2019-04-09 RX ORDER — AMLODIPINE BESYLATE 5 MG/1
TABLET ORAL
Qty: 30 TABLET | Refills: 11 | Status: SHIPPED | OUTPATIENT
Start: 2019-04-09 | End: 2019-07-31 | Stop reason: HOSPADM

## 2019-07-30 ENCOUNTER — APPOINTMENT (OUTPATIENT)
Dept: GENERAL RADIOLOGY | Age: 54
End: 2019-07-30
Payer: MEDICAID

## 2019-07-30 ENCOUNTER — APPOINTMENT (OUTPATIENT)
Dept: NUCLEAR MEDICINE | Age: 54
End: 2019-07-30
Payer: MEDICAID

## 2019-07-30 ENCOUNTER — HOSPITAL ENCOUNTER (OUTPATIENT)
Age: 54
Setting detail: OBSERVATION
Discharge: HOME | End: 2019-07-31
Attending: EMERGENCY MEDICINE | Admitting: INTERNAL MEDICINE
Payer: MEDICAID

## 2019-07-30 DIAGNOSIS — R07.9 CHEST PAIN, UNSPECIFIED TYPE: Primary | ICD-10-CM

## 2019-07-30 PROBLEM — F14.10 COCAINE ABUSE (HCC): Status: ACTIVE | Noted: 2019-07-30

## 2019-07-30 PROBLEM — J44.9 COPD (CHRONIC OBSTRUCTIVE PULMONARY DISEASE) (HCC): Status: ACTIVE | Noted: 2019-07-30

## 2019-07-30 LAB
ABSOLUTE EOS #: 0.1 K/UL (ref 0–0.4)
ABSOLUTE IMMATURE GRANULOCYTE: ABNORMAL K/UL (ref 0–0.3)
ABSOLUTE LYMPH #: 1.6 K/UL (ref 1–4.8)
ABSOLUTE MONO #: 0.7 K/UL (ref 0.1–1.3)
ANION GAP SERPL CALCULATED.3IONS-SCNC: 12 MMOL/L (ref 9–17)
BASOPHILS # BLD: 1 % (ref 0–2)
BASOPHILS ABSOLUTE: 0.1 K/UL (ref 0–0.2)
BNP INTERPRETATION: ABNORMAL
BUN BLDV-MCNC: 13 MG/DL (ref 6–20)
BUN/CREAT BLD: ABNORMAL (ref 9–20)
CALCIUM SERPL-MCNC: 9 MG/DL (ref 8.6–10.4)
CHLORIDE BLD-SCNC: 108 MMOL/L (ref 98–107)
CO2: 21 MMOL/L (ref 20–31)
CREAT SERPL-MCNC: 0.91 MG/DL (ref 0.7–1.2)
DIFFERENTIAL TYPE: ABNORMAL
EOSINOPHILS RELATIVE PERCENT: 1 % (ref 0–4)
GFR AFRICAN AMERICAN: >60 ML/MIN
GFR NON-AFRICAN AMERICAN: >60 ML/MIN
GFR SERPL CREATININE-BSD FRML MDRD: ABNORMAL ML/MIN/{1.73_M2}
GFR SERPL CREATININE-BSD FRML MDRD: ABNORMAL ML/MIN/{1.73_M2}
GLUCOSE BLD-MCNC: 97 MG/DL (ref 70–99)
HCT VFR BLD CALC: 45.9 % (ref 41–53)
HEMOGLOBIN: 15.2 G/DL (ref 13.5–17.5)
IMMATURE GRANULOCYTES: ABNORMAL %
INR BLD: 1.1
LYMPHOCYTES # BLD: 17 % (ref 24–44)
MCH RBC QN AUTO: 31.1 PG (ref 26–34)
MCHC RBC AUTO-ENTMCNC: 33.2 G/DL (ref 31–37)
MCV RBC AUTO: 93.9 FL (ref 80–100)
MONOCYTES # BLD: 8 % (ref 1–7)
MYOGLOBIN: 39 NG/ML (ref 28–72)
MYOGLOBIN: 51 NG/ML (ref 28–72)
NRBC AUTOMATED: ABNORMAL PER 100 WBC
PARTIAL THROMBOPLASTIN TIME: 26.2 SEC (ref 24–36)
PDW BLD-RTO: 14.4 % (ref 11.5–14.9)
PLATELET # BLD: 160 K/UL (ref 150–450)
PLATELET ESTIMATE: ABNORMAL
PMV BLD AUTO: 9.4 FL (ref 6–12)
POTASSIUM SERPL-SCNC: 4.4 MMOL/L (ref 3.7–5.3)
PRO-BNP: 1302 PG/ML
PROTHROMBIN TIME: 13.8 SEC (ref 11.8–14.6)
RBC # BLD: 4.89 M/UL (ref 4.5–5.9)
RBC # BLD: ABNORMAL 10*6/UL
SEG NEUTROPHILS: 73 % (ref 36–66)
SEGMENTED NEUTROPHILS ABSOLUTE COUNT: 6.9 K/UL (ref 1.3–9.1)
SODIUM BLD-SCNC: 141 MMOL/L (ref 135–144)
THYROXINE, FREE: 1.31 NG/DL (ref 0.93–1.7)
TROPONIN INTERP: NORMAL
TROPONIN T: NORMAL NG/ML
TROPONIN, HIGH SENSITIVITY: 7 NG/L (ref 0–22)
TSH SERPL DL<=0.05 MIU/L-ACNC: 2.29 MIU/L (ref 0.3–5)
WBC # BLD: 9.2 K/UL (ref 3.5–11)
WBC # BLD: ABNORMAL 10*3/UL

## 2019-07-30 PROCEDURE — 99285 EMERGENCY DEPT VISIT HI MDM: CPT

## 2019-07-30 PROCEDURE — 83874 ASSAY OF MYOGLOBIN: CPT

## 2019-07-30 PROCEDURE — G0378 HOSPITAL OBSERVATION PER HR: HCPCS

## 2019-07-30 PROCEDURE — 84439 ASSAY OF FREE THYROXINE: CPT

## 2019-07-30 PROCEDURE — 83880 ASSAY OF NATRIURETIC PEPTIDE: CPT

## 2019-07-30 PROCEDURE — 71045 X-RAY EXAM CHEST 1 VIEW: CPT

## 2019-07-30 PROCEDURE — 85025 COMPLETE CBC W/AUTO DIFF WBC: CPT

## 2019-07-30 PROCEDURE — 80048 BASIC METABOLIC PNL TOTAL CA: CPT

## 2019-07-30 PROCEDURE — 6370000000 HC RX 637 (ALT 250 FOR IP): Performed by: STUDENT IN AN ORGANIZED HEALTH CARE EDUCATION/TRAINING PROGRAM

## 2019-07-30 PROCEDURE — 2580000003 HC RX 258: Performed by: STUDENT IN AN ORGANIZED HEALTH CARE EDUCATION/TRAINING PROGRAM

## 2019-07-30 PROCEDURE — 3430000000 HC RX DIAGNOSTIC RADIOPHARMACEUTICAL: Performed by: STUDENT IN AN ORGANIZED HEALTH CARE EDUCATION/TRAINING PROGRAM

## 2019-07-30 PROCEDURE — 85610 PROTHROMBIN TIME: CPT

## 2019-07-30 PROCEDURE — 78452 HT MUSCLE IMAGE SPECT MULT: CPT

## 2019-07-30 PROCEDURE — 84443 ASSAY THYROID STIM HORMONE: CPT

## 2019-07-30 PROCEDURE — 93005 ELECTROCARDIOGRAM TRACING: CPT

## 2019-07-30 PROCEDURE — 84484 ASSAY OF TROPONIN QUANT: CPT

## 2019-07-30 PROCEDURE — 99220 PR INITIAL OBSERVATION CARE/DAY 70 MINUTES: CPT | Performed by: INTERNAL MEDICINE

## 2019-07-30 PROCEDURE — 6370000000 HC RX 637 (ALT 250 FOR IP): Performed by: EMERGENCY MEDICINE

## 2019-07-30 PROCEDURE — A9500 TC99M SESTAMIBI: HCPCS | Performed by: STUDENT IN AN ORGANIZED HEALTH CARE EDUCATION/TRAINING PROGRAM

## 2019-07-30 PROCEDURE — 36415 COLL VENOUS BLD VENIPUNCTURE: CPT

## 2019-07-30 PROCEDURE — 85730 THROMBOPLASTIN TIME PARTIAL: CPT

## 2019-07-30 RX ORDER — NITROGLYCERIN 0.4 MG/1
0.4 TABLET SUBLINGUAL EVERY 5 MIN PRN
Status: DISCONTINUED | OUTPATIENT
Start: 2019-07-30 | End: 2019-07-31 | Stop reason: HOSPADM

## 2019-07-30 RX ORDER — SODIUM CHLORIDE 0.9 % (FLUSH) 0.9 %
10 SYRINGE (ML) INJECTION EVERY 12 HOURS SCHEDULED
Status: DISCONTINUED | OUTPATIENT
Start: 2019-07-30 | End: 2019-07-31 | Stop reason: HOSPADM

## 2019-07-30 RX ORDER — MAGNESIUM SULFATE 1 G/100ML
1 INJECTION INTRAVENOUS PRN
Status: DISCONTINUED | OUTPATIENT
Start: 2019-07-30 | End: 2019-07-31 | Stop reason: HOSPADM

## 2019-07-30 RX ORDER — NICOTINE 21 MG/24HR
1 PATCH, TRANSDERMAL 24 HOURS TRANSDERMAL EVERY 24 HOURS
Status: DISCONTINUED | OUTPATIENT
Start: 2019-07-30 | End: 2019-07-31 | Stop reason: HOSPADM

## 2019-07-30 RX ORDER — ASPIRIN 81 MG/1
81 TABLET, CHEWABLE ORAL DAILY
Status: DISCONTINUED | OUTPATIENT
Start: 2019-07-30 | End: 2019-07-31 | Stop reason: HOSPADM

## 2019-07-30 RX ORDER — ACETAMINOPHEN 325 MG/1
650 TABLET ORAL EVERY 4 HOURS PRN
Status: DISCONTINUED | OUTPATIENT
Start: 2019-07-30 | End: 2019-07-31 | Stop reason: HOSPADM

## 2019-07-30 RX ORDER — SODIUM CHLORIDE 0.9 % (FLUSH) 0.9 %
10 SYRINGE (ML) INJECTION PRN
Status: DISCONTINUED | OUTPATIENT
Start: 2019-07-30 | End: 2019-07-31 | Stop reason: HOSPADM

## 2019-07-30 RX ORDER — ASPIRIN 81 MG/1
324 TABLET, CHEWABLE ORAL ONCE
Status: COMPLETED | OUTPATIENT
Start: 2019-07-30 | End: 2019-07-30

## 2019-07-30 RX ORDER — AMLODIPINE BESYLATE 5 MG/1
5 TABLET ORAL DAILY
Status: DISCONTINUED | OUTPATIENT
Start: 2019-07-30 | End: 2019-07-31 | Stop reason: HOSPADM

## 2019-07-30 RX ORDER — POTASSIUM CHLORIDE 20 MEQ/1
40 TABLET, EXTENDED RELEASE ORAL PRN
Status: DISCONTINUED | OUTPATIENT
Start: 2019-07-30 | End: 2019-07-31 | Stop reason: HOSPADM

## 2019-07-30 RX ORDER — POTASSIUM CHLORIDE 7.45 MG/ML
10 INJECTION INTRAVENOUS PRN
Status: DISCONTINUED | OUTPATIENT
Start: 2019-07-30 | End: 2019-07-31 | Stop reason: HOSPADM

## 2019-07-30 RX ORDER — ALBUTEROL SULFATE 90 UG/1
2 AEROSOL, METERED RESPIRATORY (INHALATION) EVERY 6 HOURS PRN
Status: DISCONTINUED | OUTPATIENT
Start: 2019-07-30 | End: 2019-07-31 | Stop reason: HOSPADM

## 2019-07-30 RX ORDER — ONDANSETRON 2 MG/ML
4 INJECTION INTRAMUSCULAR; INTRAVENOUS EVERY 6 HOURS PRN
Status: DISCONTINUED | OUTPATIENT
Start: 2019-07-30 | End: 2019-07-31 | Stop reason: HOSPADM

## 2019-07-30 RX ADMIN — ASPIRIN 81 MG 324 MG: 81 TABLET ORAL at 09:16

## 2019-07-30 RX ADMIN — NITROGLYCERIN 0.4 MG: 0.4 TABLET SUBLINGUAL at 09:17

## 2019-07-30 RX ADMIN — APIXABAN 5 MG: 5 TABLET, FILM COATED ORAL at 21:07

## 2019-07-30 RX ADMIN — Medication 10 ML: at 12:42

## 2019-07-30 RX ADMIN — AMLODIPINE BESYLATE 5 MG: 5 TABLET ORAL at 12:57

## 2019-07-30 RX ADMIN — TETRAKIS(2-METHOXYISOBUTYLISOCYANIDE)COPPER(I) TETRAFLUOROBORATE 31.2 MILLICURIE: 1 INJECTION, POWDER, LYOPHILIZED, FOR SOLUTION INTRAVENOUS at 12:41

## 2019-07-30 RX ADMIN — APIXABAN 5 MG: 5 TABLET, FILM COATED ORAL at 12:57

## 2019-07-30 RX ADMIN — Medication 10 ML: at 21:09

## 2019-07-30 RX ADMIN — NITROGLYCERIN 0.4 MG: 0.4 TABLET SUBLINGUAL at 09:23

## 2019-07-30 ASSESSMENT — PAIN DESCRIPTION - PAIN TYPE
TYPE: CHRONIC PAIN
TYPE: CHRONIC PAIN

## 2019-07-30 ASSESSMENT — ENCOUNTER SYMPTOMS
CONSTIPATION: 0
CHEST TIGHTNESS: 0
COLOR CHANGE: 0
RHINORRHEA: 0
SHORTNESS OF BREATH: 0
EYE DISCHARGE: 0
TROUBLE SWALLOWING: 0
BLOOD IN STOOL: 0
BACK PAIN: 0
WHEEZING: 0
DIARRHEA: 0
ABDOMINAL PAIN: 0
SHORTNESS OF BREATH: 1
EYE PAIN: 0
FACIAL SWELLING: 0
EYE REDNESS: 0
SORE THROAT: 0
NAUSEA: 0
COUGH: 0
ABDOMINAL DISTENTION: 0
VOMITING: 0
SINUS PRESSURE: 0

## 2019-07-30 ASSESSMENT — PAIN SCALES - GENERAL
PAINLEVEL_OUTOF10: 6
PAINLEVEL_OUTOF10: 4
PAINLEVEL_OUTOF10: 6

## 2019-07-30 NOTE — ED NOTES
Pt reports no change in chest pain after 2 SL Nitro tabs. Pt states he does not want 3rd dose of Nitro.       Amrita Matthew RN  07/30/19 9009

## 2019-07-30 NOTE — H&P
pain. Review of systems is positive for chest pain sharp in nature and fatigue. Patient's last echocardiogram was 1/6/2019 and showed left ventricle normal in size mild concentric left ventricular hypertrophy, ejection fraction 57.5%, right atrium moderately dilated, bioprosthetic replacement valve in aortic position. No evidence of aortic insufficiency or stenosis. Mild tricuspid regurgitation. On arrival to emergency department, patient is hemodynamically stable and saturating well on room air. Patient is in atrial fibrillation with a heart rate between 100-110. Troponins negative. Chest x-ray unremarkable. Decision was made to admit patient as observation status to the progressive unit for further medical management of chest pain. Current plan will be to consult cardiology for exercise stress test tomorrow a.m. and discharge if negative.     Past Medical History:     Past Medical History:   Diagnosis Date    Abnormal echocardiogram 02/2018    Arrhythmia     AS (aortic stenosis)     Atrial fibrillation (HCC)     Benign prostatic hyperplasia     Biatrial enlargement     Central perforation of tympanic membrane, right     Chest pain     Chews tobacco     CHF (congestive heart failure), NYHA class I, acute on chronic, combined (HCC)     Chronic back pain     Conductive hearing loss of right ear     COPD (chronic obstructive pulmonary disease) (HCC)     Diverticulitis     Fatigue     History of cocaine abuse 01/2018    Household contact with history of methicillin resistant Staphylococcus aureus (MRSA) infection     Lung nodule     Mass of upper lobe of left lung     Mild concentric left ventricular hypertrophy     Murmur, cardiac     Near syncope     Nonrheumatic aortic (valve) stenosis     Palpitations     Panlobular emphysema (HCC)     Pneumonia of both upper lobes due to infectious organism ([address]     Right-sided tinnitus     Severe aortic stenosis     Shortness of breath tenderness. There is no rebound and no guarding. Musculoskeletal: Normal range of motion. He exhibits no edema or tenderness. Neurological: He is alert and oriented to person, place, and time. Skin: Skin is warm and dry. He is not diaphoretic. No erythema. Psychiatric: He has a normal mood and affect.  His behavior is normal. Judgment and thought content normal.       Investigations:     Laboratory Testing:  Recent Results (from the past 24 hour(s))   Basic Metabolic Panel    Collection Time: 07/30/19  8:27 AM   Result Value Ref Range    Glucose 97 70 - 99 mg/dL    BUN 13 6 - 20 mg/dL    CREATININE 0.91 0.70 - 1.20 mg/dL    Bun/Cre Ratio NOT REPORTED 9 - 20    Calcium 9.0 8.6 - 10.4 mg/dL    Sodium 141 135 - 144 mmol/L    Potassium 4.4 3.7 - 5.3 mmol/L    Chloride 108 (H) 98 - 107 mmol/L    CO2 21 20 - 31 mmol/L    Anion Gap 12 9 - 17 mmol/L    GFR Non-African American >60 >60 mL/min    GFR African American >60 >60 mL/min    GFR Comment          GFR Staging NOT REPORTED    Brain Natriuretic Peptide    Collection Time: 07/30/19  8:27 AM   Result Value Ref Range    Pro-BNP 1,302 (H) <300 pg/mL    BNP Interpretation Pro-BNP Reference Range:    CBC Auto Differential    Collection Time: 07/30/19  8:27 AM   Result Value Ref Range    WBC 9.2 3.5 - 11.0 k/uL    RBC 4.89 4.5 - 5.9 m/uL    Hemoglobin 15.2 13.5 - 17.5 g/dL    Hematocrit 45.9 41 - 53 %    MCV 93.9 80 - 100 fL    MCH 31.1 26 - 34 pg    MCHC 33.2 31 - 37 g/dL    RDW 14.4 11.5 - 14.9 %    Platelets 575 203 - 091 k/uL    MPV 9.4 6.0 - 12.0 fL    NRBC Automated NOT REPORTED per 100 WBC    Differential Type NOT REPORTED     Seg Neutrophils 73 (H) 36 - 66 %    Lymphocytes 17 (L) 24 - 44 %    Monocytes 8 (H) 1 - 7 %    Eosinophils % 1 0 - 4 %    Basophils 1 0 - 2 %    Immature Granulocytes NOT REPORTED 0 %    Segs Absolute 6.90 1.3 - 9.1 k/uL    Absolute Lymph # 1.60 1.0 - 4.8 k/uL    Absolute Mono # 0.70 0.1 - 1.3 k/uL    Absolute Eos # 0.10 0.0 - 0.4 k/uL Basophils # 0.10 0.0 - 0.2 k/uL    Absolute Immature Granulocyte NOT REPORTED 0.00 - 0.30 k/uL    WBC Morphology NOT REPORTED     RBC Morphology NOT REPORTED     Platelet Estimate NOT REPORTED    TROP/MYOGLOBIN    Collection Time: 07/30/19  8:27 AM   Result Value Ref Range    Troponin, High Sensitivity 7 0 - 22 ng/L    Troponin T NOT REPORTED <0.03 ng/mL    Troponin Interp NOT REPORTED     Myoglobin 51 28 - 72 ng/mL   TSH without Reflex    Collection Time: 07/30/19  8:27 AM   Result Value Ref Range    TSH 2.29 0.30 - 5.00 mIU/L   T4, Free    Collection Time: 07/30/19  8:27 AM   Result Value Ref Range    Thyroxine, Free 1.31 0.93 - 1.70 ng/dL   APTT    Collection Time: 07/30/19  8:27 AM   Result Value Ref Range    PTT 26.2 24.0 - 36.0 sec   Protime-INR    Collection Time: 07/30/19  8:27 AM   Result Value Ref Range    Protime 13.8 11.8 - 14.6 sec    INR 1.1        Imaging/Diagnostics:  Xr Chest Portable    Result Date: 7/30/2019  EXAMINATION: ONE XRAY VIEW OF THE CHEST 7/30/2019 8:14 am COMPARISON: 01/06/2019 HISTORY: ORDERING SYSTEM PROVIDED HISTORY: Chest Pain Reason for Exam: left chest pain with pain to left neck and down left arm. Acuity: Acute Type of Exam: Initial FINDINGS: The lungs are without acute focal process. There no effusion or pneumothorax. Normal heart size. Aortic valve stent is redemonstrated. The osseous structures are grossly intact without acute process. Stable chest without acute process.        Assessment :      Primary Problem  Chest pain    Active Hospital Problems    Diagnosis Date Noted    Chest pain [R07.9] 07/30/2019    [facility] [J44.9] 07/30/2019    Fatigue [R53.83] 06/04/2015    Atrial fibrillation ([facility] [I48.91] 03/27/2015       Plan:     Patient status Admit as observation in the  Progressive Unit/Step down    Chest pain  Trend troponins  Consult cardiology, [facility]  Cardiac diet, NPO after midnight  Exercise cardiac stress

## 2019-07-30 NOTE — ED PROVIDER NOTES
Reason for Exam: left chest pain with pain to left neck and down left arm. Acuity: Acute Type of Exam: Initial FINDINGS: The lungs are without acute focal process. There no effusion or pneumothorax. Normal heart size. Aortic valve stent is redemonstrated. The osseous structures are grossly intact without acute process. Stable chest without acute process. LABS: All lab results were reviewed bymyself, and all abnormals are listed below. Labs Reviewed   BASIC METABOLIC PANEL - Abnormal; Notable for the following components:       Result Value    Chloride 108 (*)     All other components within normal limits   BRAIN NATRIURETIC PEPTIDE - Abnormal; Notable for the following components:    Pro-BNP 1,302 (*)     All other components within normal limits   CBC WITH AUTO DIFFERENTIAL - Abnormal; Notable for the following components:    Seg Neutrophils 73 (*)     Lymphocytes 17 (*)     Monocytes 8 (*)     All other components within normal limits   TROP/MYOGLOBIN   TSH WITHOUT REFLEX   T4, FREE   APTT   PROTIME-INR   TROP/MYOGLOBIN         EMERGENCY DEPARTMENT COURSE:   Vitals:    Vitals:    07/30/19 0900 07/30/19 0915 07/30/19 0922 07/30/19 0928   BP: 111/65 106/71 107/68 106/72   Pulse: 91 93 103 103   Resp: 14 17 15 17   Temp:       SpO2: 98% 99% 95% 95%   Weight:       Height:           The patient was given the following medications while in the emergency department:     Orders Placed This Encounter   Medications    aspirin chewable tablet 324 mg    nitroGLYCERIN (NITROSTAT) SL tablet 0.4 mg       -------------------------  9:48 AM  Patient was reevaluated and pain is starting to improve. Patient does not require any further intervention at this time but due to his pain being exercise-induced I do feel the patient needs to be admitted for cardiac rule out. Updated the PCP and spoke with the residents.     CRITICAL CARE:   None    CONSULTS:  IP CONSULT TO PRIMARY CARE

## 2019-07-30 NOTE — CARE COORDINATION
CASE MANAGEMENT NOTE:    Admission Date:  7/30/2019 Cathy Vargas is a 47 y.o.  male    Admitted for : Chest pain [R07.9]    Met with:  Patient    PCP:  Dr. Pankaj Dillon:  [facility]      Current Residence/ Living Arrangements:  independently at home, W X Wife             Current Services PTA:  No    Is patient agreeable to VNS: No    Freedom of choice provided: No    List of [address] provided: No    VNS chosen:  No, States Independent    DME:  none    Home Oxygen: No    Nebulizer: No    CPAP/BIPAP: No    Supplier: N/A    Potential Assistance Needed: No    SNF needed: No    Pharmacy:  [facility] in [facility]       Is the Patient an My Pick Box with Readmission Risk Score greater than 14%? Yes  If yes, pt needs a follow up appointment made within 7 days. Does Patient want to use MEDS to BEDS? No    Family Members/Caregivers that pt would like involved in their care:    Yes    If yes, list name here:  X wife, Lenard Boeck [phone]    Transportation Provider:  Patient             Is patient in Isolation/One on One/Altered Mental Status? Yes  If yes, skip next question. If no, would they like an I-Pad to  use? NA  If yes, call [phone]. Discharge Plan:  7/30/19 [facility] Pt. Lives in an apt w/ no steps w/ X Wife. No DME. Denies VNS, independent. PT/OT. Eliquis PTA. HX of A Fib. HX of Cocaine. Stress test. Cardio following. [place] 15%, will need apt, Denies needs, will follow//KB              Electronically signed by: Chris Frazier RN on 7/30/2019 at 4:12 PM

## 2019-07-30 NOTE — PROGRESS NOTES
Aniya Velasquez cardiology has been notified of patient consult, page has also been sent to Dr. Sepideh Molina notifying him of consult as well.

## 2019-07-30 NOTE — PROGRESS NOTES
Breath Sounds: clear  RR[de-identified] 17  Pulse Sat: 98 on R/A  Home Meds: 0    · Bronchodilator assessment at level: Keep pt on MDI  · []    Home Level  BRONCHODILATOR ASSESSMENT SCORE  Score 0 1 2 3 4 5   Breath Sounds   []  Patient Baseline [x]  No Wheeze good aeration []  Faint, scattered wheezing, good aeration []  Expiratory Wheezing and or moderately diminished []  Insp/Exp wheeze and/or very diminished []  Insp/Exp and/ or marked distress   Respiratory Rate   [x]  Patient Baseline []  Less than 20 []  Less than 20 []  20-25 []  Greater than 25 []  Greater than 25   Peak flow % of Pred or PB [x]  NA   []  Greater than 90%  []  81-90% []  71-80% []  Less than or equal to 70%  or unable to perform []  Unable due to Respiratory Distress   Dyspnea re []  Patient Baseline [x]  No SOB []  No SOB []  SOB on exertion []  SOB min activity []  At rest/acute   e FEV% Predicted       [x]  NA []  Above 69%  []  Unable []  Above 60-69%  []  Unable []  Above 50-59%  []  Unable []  Above 35-49%  []  Unable []  Less than 35%  []  Unable

## 2019-07-31 VITALS
RESPIRATION RATE: 20 BRPM | HEART RATE: 80 BPM | DIASTOLIC BLOOD PRESSURE: 63 MMHG | SYSTOLIC BLOOD PRESSURE: 123 MMHG | WEIGHT: 138.23 LBS | OXYGEN SATURATION: 100 % | BODY MASS INDEX: 20.95 KG/M2 | HEIGHT: 68 IN | TEMPERATURE: 97.6 F

## 2019-07-31 LAB
ABSOLUTE EOS #: 0.2 K/UL (ref 0–0.4)
ABSOLUTE IMMATURE GRANULOCYTE: ABNORMAL K/UL (ref 0–0.3)
ABSOLUTE LYMPH #: 2.1 K/UL (ref 1–4.8)
ABSOLUTE MONO #: 0.6 K/UL (ref 0.1–1.3)
ANION GAP SERPL CALCULATED.3IONS-SCNC: 12 MMOL/L (ref 9–17)
BASOPHILS # BLD: 1 % (ref 0–2)
BASOPHILS ABSOLUTE: 0 K/UL (ref 0–0.2)
BUN BLDV-MCNC: 15 MG/DL (ref 6–20)
BUN/CREAT BLD: ABNORMAL (ref 9–20)
CALCIUM SERPL-MCNC: 8.8 MG/DL (ref 8.6–10.4)
CHLORIDE BLD-SCNC: 105 MMOL/L (ref 98–107)
CO2: 22 MMOL/L (ref 20–31)
CREAT SERPL-MCNC: 1.02 MG/DL (ref 0.7–1.2)
DIFFERENTIAL TYPE: ABNORMAL
EOSINOPHILS RELATIVE PERCENT: 3 % (ref 0–4)
GFR AFRICAN AMERICAN: >60 ML/MIN
GFR NON-AFRICAN AMERICAN: >60 ML/MIN
GFR SERPL CREATININE-BSD FRML MDRD: ABNORMAL ML/MIN/{1.73_M2}
GFR SERPL CREATININE-BSD FRML MDRD: ABNORMAL ML/MIN/{1.73_M2}
GLUCOSE BLD-MCNC: 114 MG/DL (ref 70–99)
HCT VFR BLD CALC: 45.8 % (ref 41–53)
HEMOGLOBIN: 15.4 G/DL (ref 13.5–17.5)
IMMATURE GRANULOCYTES: ABNORMAL %
LV EF: 53 %
LVEF MODALITY: NORMAL
LYMPHOCYTES # BLD: 34 % (ref 24–44)
MCH RBC QN AUTO: 31.3 PG (ref 26–34)
MCHC RBC AUTO-ENTMCNC: 33.6 G/DL (ref 31–37)
MCV RBC AUTO: 93.3 FL (ref 80–100)
MONOCYTES # BLD: 9 % (ref 1–7)
NRBC AUTOMATED: ABNORMAL PER 100 WBC
PDW BLD-RTO: 14.3 % (ref 11.5–14.9)
PLATELET # BLD: 166 K/UL (ref 150–450)
PLATELET ESTIMATE: ABNORMAL
PMV BLD AUTO: 9.9 FL (ref 6–12)
POTASSIUM SERPL-SCNC: 4.3 MMOL/L (ref 3.7–5.3)
RBC # BLD: 4.91 M/UL (ref 4.5–5.9)
RBC # BLD: ABNORMAL 10*6/UL
SEG NEUTROPHILS: 53 % (ref 36–66)
SEGMENTED NEUTROPHILS ABSOLUTE COUNT: 3.4 K/UL (ref 1.3–9.1)
SODIUM BLD-SCNC: 139 MMOL/L (ref 135–144)
WBC # BLD: 6.3 K/UL (ref 3.5–11)
WBC # BLD: ABNORMAL 10*3/UL

## 2019-07-31 PROCEDURE — 99217 PR OBSERVATION CARE DISCHARGE MANAGEMENT: CPT | Performed by: INTERNAL MEDICINE

## 2019-07-31 PROCEDURE — 6370000000 HC RX 637 (ALT 250 FOR IP): Performed by: STUDENT IN AN ORGANIZED HEALTH CARE EDUCATION/TRAINING PROGRAM

## 2019-07-31 PROCEDURE — 2580000003 HC RX 258: Performed by: STUDENT IN AN ORGANIZED HEALTH CARE EDUCATION/TRAINING PROGRAM

## 2019-07-31 PROCEDURE — 36415 COLL VENOUS BLD VENIPUNCTURE: CPT

## 2019-07-31 PROCEDURE — A9500 TC99M SESTAMIBI: HCPCS | Performed by: INTERNAL MEDICINE

## 2019-07-31 PROCEDURE — 93017 CV STRESS TEST TRACING ONLY: CPT

## 2019-07-31 PROCEDURE — 6360000002 HC RX W HCPCS: Performed by: INTERNAL MEDICINE

## 2019-07-31 PROCEDURE — 85025 COMPLETE CBC W/AUTO DIFF WBC: CPT

## 2019-07-31 PROCEDURE — 3430000000 HC RX DIAGNOSTIC RADIOPHARMACEUTICAL: Performed by: INTERNAL MEDICINE

## 2019-07-31 PROCEDURE — 80048 BASIC METABOLIC PNL TOTAL CA: CPT

## 2019-07-31 PROCEDURE — G0378 HOSPITAL OBSERVATION PER HR: HCPCS

## 2019-07-31 RX ORDER — SODIUM CHLORIDE 9 MG/ML
500 INJECTION, SOLUTION INTRAVENOUS CONTINUOUS PRN
Status: DISCONTINUED | OUTPATIENT
Start: 2019-07-31 | End: 2019-07-31 | Stop reason: HOSPADM

## 2019-07-31 RX ORDER — ATROPINE SULFATE 0.1 MG/ML
0.5 INJECTION INTRAVENOUS EVERY 5 MIN PRN
Status: DISCONTINUED | OUTPATIENT
Start: 2019-07-31 | End: 2019-07-31 | Stop reason: HOSPADM

## 2019-07-31 RX ORDER — NITROGLYCERIN 0.4 MG/1
0.4 TABLET SUBLINGUAL EVERY 5 MIN PRN
Status: DISCONTINUED | OUTPATIENT
Start: 2019-07-31 | End: 2019-07-31 | Stop reason: HOSPADM

## 2019-07-31 RX ORDER — METOPROLOL TARTRATE 5 MG/5ML
5 INJECTION INTRAVENOUS EVERY 5 MIN PRN
Status: DISCONTINUED | OUTPATIENT
Start: 2019-07-31 | End: 2019-07-31 | Stop reason: HOSPADM

## 2019-07-31 RX ORDER — ALBUTEROL SULFATE 90 UG/1
2 AEROSOL, METERED RESPIRATORY (INHALATION) PRN
Status: DISCONTINUED | OUTPATIENT
Start: 2019-07-31 | End: 2019-07-31

## 2019-07-31 RX ORDER — SODIUM CHLORIDE 0.9 % (FLUSH) 0.9 %
10 SYRINGE (ML) INJECTION PRN
Status: DISCONTINUED | OUTPATIENT
Start: 2019-07-31 | End: 2019-07-31 | Stop reason: HOSPADM

## 2019-07-31 RX ORDER — AMINOPHYLLINE DIHYDRATE 25 MG/ML
50 INJECTION, SOLUTION INTRAVENOUS PRN
Status: DISCONTINUED | OUTPATIENT
Start: 2019-07-31 | End: 2019-07-31 | Stop reason: HOSPADM

## 2019-07-31 RX ORDER — ALBUTEROL SULFATE 90 UG/1
2 AEROSOL, METERED RESPIRATORY (INHALATION) PRN
Status: DISCONTINUED | OUTPATIENT
Start: 2019-07-31 | End: 2019-07-31 | Stop reason: HOSPADM

## 2019-07-31 RX ADMIN — Medication 10 ML: at 10:28

## 2019-07-31 RX ADMIN — AMLODIPINE BESYLATE 5 MG: 5 TABLET ORAL at 12:10

## 2019-07-31 RX ADMIN — TETRAKIS(2-METHOXYISOBUTYLISOCYANIDE)COPPER(I) TETRAFLUOROBORATE 37.6 MILLICURIE: 1 INJECTION, POWDER, LYOPHILIZED, FOR SOLUTION INTRAVENOUS at 10:29

## 2019-07-31 RX ADMIN — ASPIRIN 81 MG 81 MG: 81 TABLET ORAL at 12:09

## 2019-07-31 RX ADMIN — APIXABAN 5 MG: 5 TABLET, FILM COATED ORAL at 12:10

## 2019-07-31 RX ADMIN — Medication 10 ML: at 09:03

## 2019-07-31 RX ADMIN — REGADENOSON 0.4 MG: 0.08 INJECTION, SOLUTION INTRAVENOUS at 10:28

## 2019-07-31 ASSESSMENT — ENCOUNTER SYMPTOMS
SINUS PAIN: 0
SHORTNESS OF BREATH: 0
ABDOMINAL DISTENTION: 0
COUGH: 0
PHOTOPHOBIA: 0
VOMITING: 0
APNEA: 0
EYE PAIN: 0
NAUSEA: 0
ABDOMINAL PAIN: 0

## 2019-07-31 NOTE — PROGRESS NOTES
LEXISCAN COMPLETED, PATIENT STATES HE FEELS FUNNY IN HIS STOMACH, PATIENT DENIES ALL PAIN AND DENIES SHORTNESS OF BREATH. CAFFEINE GIVEN AFTER ONE MINUTE. , /64, PATIENT REMAINS AFIB ON MONITOR.

## 2019-08-01 ENCOUNTER — TELEPHONE (OUTPATIENT)
Dept: INTERNAL MEDICINE CLINIC | Age: 54
End: 2019-08-01

## 2019-08-02 ENCOUNTER — TELEPHONE (OUTPATIENT)
Dept: INTERNAL MEDICINE CLINIC | Age: 54
End: 2019-08-02

## 2019-08-02 NOTE — PROCEDURES
[address] [facility]                    [address] [facility] [address]                              CARDIAC STRESS TEST    PATIENT NAME: Aiden Chandler                    :        1965  MED REC NO:   [id number]                              ROOM:       2106  ACCOUNT NO:   [de-identified]                           ADMIT DATE: 2019  PROVIDER:     Checo Stallings    DATE OF STUDY:  2019    ORDERING PROVIDER:  Rafael Pendleton MD    INTERPRETING PHYSICIAN:  MACARIO Bailey MD    LEXISCAN STRESS TEST    INDICATION:  CHEST PAIN    INTERPRETATION:  Resting heart rate: 77 bpm.  Resting blood pressure:  119/62 mmHg. Resting EKG:  Atrial fibrillation. Heart rate:  83 bpm.  Stress heart response:  Normal response. Blood pressure response:  Appropriate. Stress EKGS:  Heart rate increased to 98 bpm.  No chest pain during stress or recovery. No ischemic Ekg changes. IMPRESISON:  NEGATIVE LEXISCAN STRESS TEST. THE NUCLEAR SCAN TO FOLLOW.                  Viktoria Palacios    D: 2019 10:11:38       T: 2019 10:12:36     AS/CLOTILDE  Job#: [id number]     Doc#: Unknown    CC:    (Retain this field even if not dictated or not decipherable)

## 2019-08-04 LAB
EKG ATRIAL RATE: 96 BPM
EKG Q-T INTERVAL: 352 MS
EKG QRS DURATION: 86 MS
EKG QTC CALCULATION (BAZETT): 476 MS
EKG R AXIS: 68 DEGREES
EKG T AXIS: 92 DEGREES
EKG VENTRICULAR RATE: 110 BPM

## 2019-08-07 ENCOUNTER — OFFICE VISIT (OUTPATIENT)
Dept: PULMONOLOGY | Age: 54
End: 2019-08-07
Payer: MEDICAID

## 2019-08-07 VITALS
RESPIRATION RATE: 12 BRPM | BODY MASS INDEX: 22.73 KG/M2 | WEIGHT: 150 LBS | DIASTOLIC BLOOD PRESSURE: 66 MMHG | HEART RATE: 64 BPM | HEIGHT: 68 IN | SYSTOLIC BLOOD PRESSURE: 102 MMHG | OXYGEN SATURATION: 99 %

## 2019-08-07 VITALS — OXYGEN SATURATION: 100 % | BODY MASS INDEX: 22.22 KG/M2 | HEIGHT: 69 IN | WEIGHT: 150 LBS | HEART RATE: 70 BPM

## 2019-08-07 DIAGNOSIS — J44.9 CHRONIC OBSTRUCTIVE PULMONARY DISEASE, UNSPECIFIED COPD TYPE (HCC): Primary | ICD-10-CM

## 2019-08-07 DIAGNOSIS — Z95.3 S/P TAVR (TRANSCATHETER AORTIC VALVE REPLACEMENT), BIOPROSTHETIC: ICD-10-CM

## 2019-08-07 DIAGNOSIS — Z87.891 SMOKING HISTORY: ICD-10-CM

## 2019-08-07 DIAGNOSIS — J44.9 CHRONIC OBSTRUCTIVE PULMONARY DISEASE, UNSPECIFIED COPD TYPE (HCC): ICD-10-CM

## 2019-08-07 DIAGNOSIS — Z72.0 CHEWS TOBACCO: ICD-10-CM

## 2019-08-07 DIAGNOSIS — R91.1 LUNG NODULE SEEN ON IMAGING STUDY: Primary | ICD-10-CM

## 2019-08-07 PROBLEM — R93.1 ABNORMAL ECHOCARDIOGRAM: Status: ACTIVE | Noted: 2018-02-27

## 2019-08-07 PROBLEM — F14.11 HISTORY OF COCAINE ABUSE (HCC): Status: ACTIVE | Noted: 2018-02-27

## 2019-08-07 PROBLEM — H90.11 CONDUCTIVE HEARING LOSS OF RIGHT EAR: Status: ACTIVE | Noted: 2018-05-16

## 2019-08-07 PROBLEM — I51.7 BIATRIAL ENLARGEMENT: Status: ACTIVE | Noted: 2018-02-27

## 2019-08-07 PROBLEM — I35.0 AORTIC VALVE STENOSIS: Status: ACTIVE | Noted: 2017-01-05

## 2019-08-07 PROBLEM — H72.01 CENTRAL PERFORATION OF TYMPANIC MEMBRANE, RIGHT: Status: ACTIVE | Noted: 2018-05-16

## 2019-08-07 PROBLEM — R55 NEAR SYNCOPE: Status: ACTIVE | Noted: 2018-12-12

## 2019-08-07 PROBLEM — R06.02 SHORTNESS OF BREATH: Status: ACTIVE | Noted: 2018-06-20

## 2019-08-07 PROBLEM — R91.8 MASS OF UPPER LOBE OF LEFT LUNG: Status: ACTIVE | Noted: 2018-10-15

## 2019-08-07 PROBLEM — L03.317 CELLULITIS OF BUTTOCK: Status: ACTIVE | Noted: 2018-12-26

## 2019-08-07 PROBLEM — I42.8 NICM (NONISCHEMIC CARDIOMYOPATHY) (HCC): Status: ACTIVE | Noted: 2018-10-02

## 2019-08-07 PROBLEM — H93.11 RIGHT-SIDED TINNITUS: Status: ACTIVE | Noted: 2018-05-16

## 2019-08-07 PROBLEM — A49.02 MRSA (METHICILLIN RESISTANT STAPHYLOCOCCUS AUREUS) INFECTION: Status: ACTIVE | Noted: 2018-02-27

## 2019-08-07 PROBLEM — J43.1 PANLOBULAR EMPHYSEMA (HCC): Status: ACTIVE | Noted: 2018-10-15

## 2019-08-07 PROBLEM — I51.7 MILD CONCENTRIC LEFT VENTRICULAR HYPERTROPHY (LVH): Status: ACTIVE | Noted: 2018-02-27

## 2019-08-07 PROBLEM — I48.91 A-FIB (HCC): Status: ACTIVE | Noted: 2018-09-01

## 2019-08-07 PROCEDURE — G8427 DOCREV CUR MEDS BY ELIG CLIN: HCPCS | Performed by: INTERNAL MEDICINE

## 2019-08-07 PROCEDURE — 99244 OFF/OP CNSLTJ NEW/EST MOD 40: CPT | Performed by: INTERNAL MEDICINE

## 2019-08-07 PROCEDURE — G8420 CALC BMI NORM PARAMETERS: HCPCS | Performed by: INTERNAL MEDICINE

## 2019-08-07 PROCEDURE — 3023F SPIROM DOC REV: CPT | Performed by: INTERNAL MEDICINE

## 2019-08-07 PROCEDURE — G8926 SPIRO NO PERF OR DOC: HCPCS | Performed by: INTERNAL MEDICINE

## 2019-08-15 ENCOUNTER — HOSPITAL ENCOUNTER (EMERGENCY)
Age: 54
Discharge: HOME | End: 2019-08-15
Attending: EMERGENCY MEDICINE
Payer: MEDICAID

## 2019-08-15 ENCOUNTER — APPOINTMENT (OUTPATIENT)
Dept: GENERAL RADIOLOGY | Age: 54
End: 2019-08-15
Payer: MEDICAID

## 2019-08-15 VITALS
HEART RATE: 92 BPM | OXYGEN SATURATION: 98 % | WEIGHT: 147 LBS | BODY MASS INDEX: 22.28 KG/M2 | TEMPERATURE: 97.6 F | RESPIRATION RATE: 18 BRPM | SYSTOLIC BLOOD PRESSURE: 116 MMHG | HEIGHT: 68 IN | DIASTOLIC BLOOD PRESSURE: 78 MMHG

## 2019-08-15 DIAGNOSIS — S82.892A CLOSED FRACTURE OF LEFT ANKLE, INITIAL ENCOUNTER: Primary | ICD-10-CM

## 2019-08-15 PROCEDURE — 99283 EMERGENCY DEPT VISIT LOW MDM: CPT

## 2019-08-15 PROCEDURE — 29515 APPLICATION SHORT LEG SPLINT: CPT

## 2019-08-15 PROCEDURE — 73590 X-RAY EXAM OF LOWER LEG: CPT

## 2019-08-15 PROCEDURE — 73610 X-RAY EXAM OF ANKLE: CPT

## 2019-08-15 PROCEDURE — 6370000000 HC RX 637 (ALT 250 FOR IP): Performed by: PHYSICIAN ASSISTANT

## 2019-08-15 RX ORDER — IBUPROFEN 600 MG/1
600 TABLET ORAL EVERY 6 HOURS PRN
Qty: 30 TABLET | Refills: 0 | Status: SHIPPED | OUTPATIENT
Start: 2019-08-15 | End: 2019-08-29

## 2019-08-15 RX ORDER — ACETAMINOPHEN AND CODEINE PHOSPHATE 300; 30 MG/1; MG/1
1 TABLET ORAL EVERY 8 HOURS PRN
Qty: 9 TABLET | Refills: 0 | Status: SHIPPED | OUTPATIENT
Start: 2019-08-15 | End: 2019-08-18

## 2019-08-15 RX ORDER — IBUPROFEN 600 MG/1
600 TABLET ORAL ONCE
Status: COMPLETED | OUTPATIENT
Start: 2019-08-15 | End: 2019-08-15

## 2019-08-15 RX ADMIN — IBUPROFEN 600 MG: 600 TABLET, FILM COATED ORAL at 17:07

## 2019-08-15 ASSESSMENT — PAIN SCALES - GENERAL
PAINLEVEL_OUTOF10: 8
PAINLEVEL_OUTOF10: 8

## 2019-08-15 NOTE — ED PROVIDER NOTES
Cardiovascular: Normal rate, regular rhythm and normal heart sounds. Pulmonary/Chest: Effort normal and breath sounds normal.   Musculoskeletal:        Left ankle: He exhibits normal range of motion, no swelling, no ecchymosis, no deformity and normal pulse. Tenderness. Medial malleolus tenderness found. Achilles tendon normal.        Feet:    Neurological: He is alert and oriented to person, place, and time. Nursing note and vitals reviewed. MEDICAL DECISION MAKING:     Ice and elevate  Call Dr Han Nolan in am for follow up. Keep splint clean and dry. Use crutches for ambulation. DIAGNOSTIC RESULTS     EKG: All EKG's are interpreted by the Emergency Department Physician who either signs or Co-signs this chart in the absence of acardiologist.        RADIOLOGY:Allplain film, CT, MRI, and formal ultrasound images (except ED bedside ultrasound) are read by the radiologist and the images and interpretations are directly viewed by the emergency physician. LABS:All lab results were reviewed by myself, and all abnormals are listed below. Labs Reviewed - No data to display      EMERGENCY DEPARTMENT COURSE:   Vitals:    Vitals:    08/15/19 1706   BP: 116/78   Pulse: 92   Resp: 18   Temp: 97.6 °F (36.4 °C)   TempSrc: Oral   SpO2: 98%   Weight: 147 lb (66.7 kg)   Height: 5' 8\" (1.727 m)       The patient was given the following medications while in the emergency department:  Orders Placed This Encounter   Medications    ibuprofen (ADVIL;MOTRIN) tablet 600 mg    acetaminophen-codeine (TYLENOL/CODEINE #3) 300-30 MG per tablet     Sig: Take 1 tablet by mouth every 8 hours as needed for Pain for up to 3 days.      Dispense:  9 tablet     Refill:  0    ibuprofen (IBU) 600 MG tablet     Sig: Take 1 tablet by mouth every 6 hours as needed for Pain     Dispense:  30 tablet     Refill:  0       -------------------------      CRITICAL CARE:    CONSULTS:  None    PROCEDURES:  Procedures    FINAL IMPRESSION

## 2019-08-16 ENCOUNTER — TELEPHONE (OUTPATIENT)
Dept: ADMISSION | Age: 54
End: 2019-08-16

## 2019-08-20 ENCOUNTER — OFFICE VISIT (OUTPATIENT)
Dept: ORTHOPEDIC SURGERY | Age: 54
End: 2019-08-20
Payer: MEDICAID

## 2019-08-20 VITALS — WEIGHT: 147.05 LBS | BODY MASS INDEX: 22.29 KG/M2 | HEIGHT: 68 IN

## 2019-08-20 DIAGNOSIS — F17.200 TOBACCO DEPENDENCY: ICD-10-CM

## 2019-08-20 DIAGNOSIS — S82.832A OTHER CLOSED FRACTURE OF DISTAL END OF LEFT FIBULA, INITIAL ENCOUNTER: Primary | ICD-10-CM

## 2019-08-20 PROCEDURE — G8427 DOCREV CUR MEDS BY ELIG CLIN: HCPCS | Performed by: ORTHOPAEDIC SURGERY

## 2019-08-20 PROCEDURE — 4004F PT TOBACCO SCREEN RCVD TLK: CPT | Performed by: ORTHOPAEDIC SURGERY

## 2019-08-20 PROCEDURE — 99203 OFFICE O/P NEW LOW 30 MIN: CPT | Performed by: ORTHOPAEDIC SURGERY

## 2019-08-20 PROCEDURE — 3017F COLORECTAL CA SCREEN DOC REV: CPT | Performed by: ORTHOPAEDIC SURGERY

## 2019-08-20 PROCEDURE — G8420 CALC BMI NORM PARAMETERS: HCPCS | Performed by: ORTHOPAEDIC SURGERY

## 2019-08-20 ASSESSMENT — ENCOUNTER SYMPTOMS
ABDOMINAL PAIN: 0
SHORTNESS OF BREATH: 0
EYE PAIN: 0

## 2019-08-20 NOTE — PROGRESS NOTES
disease) ([facility] [address]     Diverticulitis     Fatigue     History of cocaine abuse 01/2018    Household contact with history of methicillin resistant Staphylococcus aureus (MRSA) infection     Lung nodule     Mass of upper lobe of left lung     Mild concentric left ventricular hypertrophy     Murmur, cardiac     Near syncope     Nonrheumatic aortic (valve) stenosis     Palpitations     Panlobular emphysema (HCC)     Pneumonia of both upper lobes due to infectious organism ([facility] [address]     Right-sided tinnitus     Severe aortic stenosis     Shortness of breath     Tobacco abuse        Past Surgical History:    Past Surgical History:   Procedure Laterality Date    AORTIC VALVE REPLACEMENT  12/2018    CYST REMOVAL Left     INGUINAL HERNIA REPAIR Right     RI DRAIN SKIN ABSCESS COMPLIC N/A 6/40/7883    GLUTEAL INCISION AND DRAINAGE performed by Deon Gómez DO at [facility] OR       Current Medications:   Current Outpatient Medications   Medication Sig Dispense Refill    ibuprofen (IBU) 600 MG tablet Take 1 tablet by mouth every 6 hours as needed for Pain 30 tablet 0    diltiazem (CARDIZEM) 30 MG tablet Take 2 tablets by mouth 2 times daily 120 tablet 3    apixaban (ELIQUIS) 5 MG TABS tablet Take 1 tablet by mouth 2 times daily 60 tablet 3    aspirin 81 MG chewable tablet Take 1 tablet by mouth daily 30 tablet 3    nicotine (NICODERM CQ) 21 MG/24HR Place 1 patch onto the skin every 24 hours 30 patch 1     No current facility-administered medications for this visit. Allergies:    Patient has no known allergies.     Family History:  Family History   Problem Relation Age of Onset   [facility] Depression Mother    [facility] COPD Father     COPD Paternal Grandfather        Social History:   Social History     Socioeconomic History    Marital status:      Spouse name: Not on file    Number of children: Not on file    Years of education: Not on file    Highest education level: Not on file   Occupational History    Not on file   Social Needs    Financial resource strain: Not on file    Food insecurity:     Worry: Not on file     Inability: Not on file    Transportation needs:     Medical: Not on file     Non-medical: Not on file   Tobacco Use    Smoking status: Current Every Day Smoker     Packs/day: 0.25     Years: 40.00     Pack years: 10.00     Types: Cigarettes     Last attempt to quit: 2018     Years since quittin.6    Smokeless tobacco: Current User     Types: Chew   Substance and Sexual Activity    Alcohol use: No    Drug use: Yes     Types: Cocaine     Comment: last cocaine use in Dec 2018    Sexual activity: Yes     Partners: Female   Lifestyle    Physical activity:     Days per week: Not on file     Minutes per session: Not on file    Stress: Not on file   Relationships    Social connections:     Talks on phone: Not on file     Gets together: Not on file     Attends Shinto service: Not on file     Active member of club or organization: Not on file     Attends meetings of clubs or organizations: Not on file     Relationship status: Not on file    Intimate partner violence:     Fear of current or ex partner: Not on file     Emotionally abused: Not on file     Physically abused: Not on file     Forced sexual activity: Not on file   Other Topics Concern    Not on file   Social History Narrative    Not on file     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 Skin:  Intact over foot/ankle, without rash/lesions/AV malformations. Motion: Able to wiggle toes   -Range of motion not tested due to pain  -No tenderness at knee or proximal leg   -Tenderness to palpation: Distal fibula      RADIOLOGY:   8/20/2019 FINDINGS:  Three weightbearing views (AP, Mortise, and Lateral) of the left ankle and three weightbearing views (AP, Oblique, Lateral) of the left foot were obtained in the office today and reviewed, revealing nondisplaced distal fibula spiral fracture with anterior cortical comminution. IMPRESSION: Nondisplaced distal fibula spiral fracture as above    Electronically signed by Uri Singh MD        ASSESSMENT AND PLAN:  Edouard Perez was seen today for Ankle Pain (Left Ankle Pain)  The primary encounter diagnosis was Other closed fracture of distal end of left fibula, initial encounter. A diagnosis of Tobacco dependency was also pertinent to this visit. Body mass index is 22.36 kg/m². He has has a left nondisplaced distal fibula spiral fracture, sustained 8/15/2019. Notably, he has a complex past medical history including a history of a gluteal abscess and MRSA infection, aortic valve stenosis with CHF and A. fib status post transcatheter aortic valve replacement, COPD and emphysema, uses cocaine, and reports he smokes 1/2 pack of cigarettes per day. I had a long discussion today with the patient about the likely diagnosis and its natural history, physical exam and imaging findings, as well as treatment options in detail. We discussed rest/activity modification, swelling control, NSAIDs/Acetaminophen/topical anesthetics, orthotics/shoewear modification, bracing/immobilization, injections, and physical therapy. The patient wishes to proceed with the recommendations as above. Orders/referrals were placed as below at today's visit.  The patient was provided the following, for stability and pain control, to use when ambulatory:  CAM boot and Ace wrap. The patient was cautioned to avoid pain provoking activity. He will avoid NSAIDs and use acetaminophen for pain control. We also had a discussion about the risk of blood clot and thromboembolic events, including the fact that they can be fatal. The patient understands that there is an increased risk with surgery/immobilization. The patient denies the following risk factors:      []  Atrial Fibrillation      []  Tobacco use   [x]  Obesity     [x]  Hormones (OCPs/IUD/HRT)   [x]  H/o blood clot   [x]  Family h/o blood clot        We discussed prophylactic measures to reduce the risk of DVT/PE's, such as early ambulation/mobilization, as well as pharmacologic prophylaxis, including their risks/benefits (bruising/bleeding). The patient understands nothing will completely eliminate the risk of DVT/PE's, and that any prophylactic medication does not substitute for early mobilization. I gave the patient a choice of both weaker and stronger options, but given his above risk profile, I have recommended the following strategy to decrease the risk of blood clots, and the patient agrees and wishes to proceed: He will continue to take his Eliquis. I am concerned about his history of tobacco use. I have encouraged tobacco use cessation, and believe it is in the patient's best interest. He expressed verbal understanding. All questions were answered and the patient agrees with the above plan. The patient will return to clinic in 1 month with repeat left ankle x-rays. Return in about 4 weeks (around 9/17/2019). No orders of the defined types were placed in this encounter.     Orders Placed This Encounter   Procedures    XR FOOT LEFT (MIN 3 VIEWS)     WEIGHTBEARING 3 views: AP, Oblique, Lateral     Order Specific Question:   Reason for exam:     Answer:   eval alignment    XR ANKLE LEFT (MIN 3 VIEWS)     WEIGHT BEARING 3 VIEWS:  AP, MORTISE, LATERAL  Please include entire foot     Order Specific

## 2019-08-20 NOTE — LETTER
Dr. Gino Mas  [address] [place] [address].  [phone]        8/20/2019     Patient: Dorothye Curling  YOB: 1965    Dear Anabella Umanzor MD,    I had the pleasure of seeing one of your patients, Dorothye Curling recently in the office. Below are the relevant portions of my assessment and plan of care. ASSESSMENT AND PLAN:  He has has a left nondisplaced distal fibula spiral fracture, sustained 8/15/2019. Notably, he has a complex past medical history including a history of a gluteal abscess and MRSA infection, aortic valve stenosis with CHF and A. fib status post transcatheter aortic valve replacement, COPD and emphysema, uses cocaine, and reports he smokes 1/2 pack of cigarettes per day. I had a long discussion today with the patient about the likely diagnosis and its natural history, physical exam and imaging findings, as well as treatment options in detail. We discussed rest/activity modification, swelling control, NSAIDs/Acetaminophen/topical anesthetics, orthotics/shoewear modification, bracing/immobilization, injections, and physical therapy. The patient wishes to proceed with the recommendations as above. Orders/referrals were placed as below at today's visit. The patient was provided the following, for stability and pain control, to use when ambulatory:  CAM boot and Ace wrap. The patient was cautioned to avoid pain provoking activity. He will avoid NSAIDs and use acetaminophen for pain control. We also had a discussion about the risk of blood clot and thromboembolic events, including the fact that they can be fatal. The patient understands that there is an increased risk with surgery/immobilization.   The patient denies the following risk factors:      []  Atrial Fibrillation      []  Tobacco use   [x]  Obesity     [x]  Hormones (OCPs/IUD/HRT)   [x]  H/o blood clot   [x]  Family h/o blood clot We discussed prophylactic measures to reduce the risk of DVT/PE's, such as early ambulation/mobilization, as well as pharmacologic prophylaxis, including their risks/benefits (bruising/bleeding). The patient understands nothing will completely eliminate the risk of DVT/PE's, and that any prophylactic medication does not substitute for early mobilization. I gave the patient a choice of both weaker and stronger options, but given his above risk profile, I have recommended the following strategy to decrease the risk of blood clots, and the patient agrees and wishes to proceed: He will continue to take his Eliquis. I am concerned about his history of tobacco use. I have encouraged tobacco use cessation, and believe it is in the patient's best interest. He expressed verbal understanding. All questions were answered and the patient agrees with the above plan. The patient will return to clinic in 1 month with repeat left ankle x-rays. I look forward to serving you and your patients again in the future. Please don't hesitate to contact me at my mobile number [phone].         Tawny Gray MD  Orthopedic Surgery, Foot and Ankle

## 2019-08-21 ENCOUNTER — TELEPHONE (OUTPATIENT)
Dept: ORTHOPEDIC SURGERY | Age: 54
End: 2019-08-21

## 2019-08-22 ENCOUNTER — CARE COORDINATION (OUTPATIENT)
Dept: CARE COORDINATION | Age: 54
End: 2019-08-22

## 2019-08-29 ENCOUNTER — CARE COORDINATION (OUTPATIENT)
Dept: CARE COORDINATION | Age: 54
End: 2019-08-29

## 2019-08-29 ENCOUNTER — OFFICE VISIT (OUTPATIENT)
Dept: INTERNAL MEDICINE CLINIC | Age: 54
End: 2019-08-29
Payer: MEDICAID

## 2019-08-29 VITALS
DIASTOLIC BLOOD PRESSURE: 82 MMHG | OXYGEN SATURATION: 98 % | HEART RATE: 99 BPM | HEIGHT: 68 IN | BODY MASS INDEX: 22.58 KG/M2 | SYSTOLIC BLOOD PRESSURE: 130 MMHG | WEIGHT: 149 LBS

## 2019-08-29 DIAGNOSIS — I42.8 NICM (NONISCHEMIC CARDIOMYOPATHY) (HCC): ICD-10-CM

## 2019-08-29 DIAGNOSIS — M25.572 ACUTE LEFT ANKLE PAIN: ICD-10-CM

## 2019-08-29 DIAGNOSIS — F17.200 CURRENT EVERY DAY SMOKER: ICD-10-CM

## 2019-08-29 DIAGNOSIS — S82.892D CLOSED FRACTURE OF LEFT ANKLE WITH ROUTINE HEALING, SUBSEQUENT ENCOUNTER: Primary | ICD-10-CM

## 2019-08-29 PROCEDURE — G8420 CALC BMI NORM PARAMETERS: HCPCS | Performed by: NURSE PRACTITIONER

## 2019-08-29 PROCEDURE — G8427 DOCREV CUR MEDS BY ELIG CLIN: HCPCS | Performed by: NURSE PRACTITIONER

## 2019-08-29 PROCEDURE — 99214 OFFICE O/P EST MOD 30 MIN: CPT | Performed by: NURSE PRACTITIONER

## 2019-08-29 PROCEDURE — 3017F COLORECTAL CA SCREEN DOC REV: CPT | Performed by: NURSE PRACTITIONER

## 2019-08-29 PROCEDURE — 4004F PT TOBACCO SCREEN RCVD TLK: CPT | Performed by: NURSE PRACTITIONER

## 2019-08-29 RX ORDER — ACETAMINOPHEN 500 MG
500 TABLET ORAL EVERY 6 HOURS PRN
Qty: 200 TABLET | Refills: 1 | Status: SHIPPED | OUTPATIENT
Start: 2019-08-29 | End: 2020-07-30

## 2019-08-29 RX ORDER — TRAMADOL HYDROCHLORIDE 50 MG/1
50 TABLET ORAL EVERY 6 HOURS PRN
Qty: 20 TABLET | Refills: 0 | Status: SHIPPED | OUTPATIENT
Start: 2019-08-29 | End: 2019-09-03

## 2019-08-29 RX ORDER — NICOTINE 21 MG/24HR
1 PATCH, TRANSDERMAL 24 HOURS TRANSDERMAL EVERY 24 HOURS
Qty: 30 PATCH | Refills: 1 | Status: SHIPPED | OUTPATIENT
Start: 2019-08-29 | End: 2020-12-15 | Stop reason: ALTCHOICE

## 2019-08-29 SDOH — ECONOMIC STABILITY: TRANSPORTATION INSECURITY
IN THE PAST 12 MONTHS, HAS THE LACK OF TRANSPORTATION KEPT YOU FROM MEDICAL APPOINTMENTS OR FROM GETTING MEDICATIONS?: NO

## 2019-08-29 SDOH — ECONOMIC STABILITY: TRANSPORTATION INSECURITY
IN THE PAST 12 MONTHS, HAS LACK OF TRANSPORTATION KEPT YOU FROM MEETINGS, WORK, OR FROM GETTING THINGS NEEDED FOR DAILY LIVING?: NO

## 2019-08-29 ASSESSMENT — PATIENT HEALTH QUESTIONNAIRE - PHQ9
SUM OF ALL RESPONSES TO PHQ9 QUESTIONS 1 & 2: 0
1. LITTLE INTEREST OR PLEASURE IN DOING THINGS: 0
2. FEELING DOWN, DEPRESSED OR HOPELESS: 0
SUM OF ALL RESPONSES TO PHQ QUESTIONS 1-9: 0
SUM OF ALL RESPONSES TO PHQ QUESTIONS 1-9: 0

## 2019-08-29 NOTE — PROGRESS NOTES
Cardiovascular: Negative for chest pain, palpitations and leg swelling. Gastrointestinal: Negative for abdominal pain and nausea. Musculoskeletal: Positive for arthralgias, gait problem and joint swelling. Skin: Positive for color change. Neurological: Negative for dizziness, weakness and numbness. Psychiatric/Behavioral: Negative for behavioral problems. The patient is not nervous/anxious. PHYSICAL EXAM:      Vitals:    08/29/19 1256   BP: 130/82   Pulse: 99   SpO2: 98%   Weight: 149 lb (67.6 kg)   Height: 5' 7.99\" (1.727 m)     BP Readings from Last 3 Encounters:   08/29/19 130/82   08/15/19 116/78   08/07/19 102/66        Physical Exam   Constitutional: He is oriented to person, place, and time. No distress. thin   Eyes: Pupils are equal, round, and reactive to light. Conjunctivae are normal.   Cardiovascular: Normal rate and regular rhythm. Murmur heard. Pulmonary/Chest: Effort normal and breath sounds normal. He has no wheezes. Abdominal: Soft. Bowel sounds are normal. There is no tenderness. Musculoskeletal:        Right ankle: Normal.        Left ankle: He exhibits decreased range of motion and swelling. Tenderness. Left foot: There is tenderness. There is no deformity. Feet:    Neurological: He is alert and oriented to person, place, and time. He has normal strength. No sensory deficit. Skin: Skin is warm and dry. Ecchymosis (leela groin, no erythema or warmth) noted. No erythema. Psychiatric: He has a normal mood and affect. His behavior is normal.   Vitals reviewed.        LABORATORY FINDINGS:    CBC:   Lab Results   Component Value Date    WBC 6.3 07/31/2019    HGB 15.4 07/31/2019     07/31/2019     BMP:   Lab Results   Component Value Date     07/31/2019    K 4.3 07/31/2019     07/31/2019    CO2 22 07/31/2019    BUN 15 07/31/2019    CREATININE 1.02 07/31/2019    GLUCOSE 114 07/31/2019     HEMOGLOBIN A1C:   Lab Results   Component Value Date LABA1C 5.3 03/28/2018     FASTING LIPID PANEL:   Lab Results   Component Value Date    CHOL 119 03/28/2018    HDL 48 03/28/2018    LDLCHOLESTEROL 60 03/28/2018    TRIG 55 03/28/2018       ASSESSMENT AND PLAN:      Visit Diagnoses and Associated Orders     Closed fracture of left ankle with routine healing, subsequent encounter    -  Primary    Patient requesting second opinion from ortho. Macy Nunez MD, Orthopedic Surgery, [place] [[id number] [employer]]           Acute left ankle pain        Severe, edema improved. Continue ice prn, Tylenol with Tramadol only for severe pain. traMADol (ULTRAM) 50 MG tablet [08737]      acetaminophen (TYLENOL) 500 MG tablet [102]           NICM (nonischemic cardiomyopathy) ([facility] [address]        Stable, encouraged to follow up with cardiology at least annually         Current every day smoker        Patient has cut back, decrease nicoderm patch and add gum    nicotine (NICODERM CQ) 14 MG/24HR [[id number]]      nicotine polacrilex (NICORETTE) 2 MG gum [[id number]]                  FOLLOW UP AND INSTRUCTIONS:     Return in about 2 months (around 10/29/2019) for routine follow up, or sooner if needed. · Alexandro received counseling on the following healthy behaviors: nutrition, exercise, medication adherence and tobacco cessation    · Discussed use, benefit, and side effects of prescribed medications. Barriers to medication compliance addressed. All patient questions answered. Pt voiced understanding. · Patient given educational materials - see patient instructions    BENEDICT Cintron - CNP    8/30/2019, 12:35 PM    Please note that this chart was generated using voice recognition Dragon dictation software. Although every effort was made to ensure the accuracy of this automatedtranscription, some errors in transcription may have occurred.

## 2019-08-30 ASSESSMENT — ENCOUNTER SYMPTOMS
EYE REDNESS: 0
WHEEZING: 0
COUGH: 0
SHORTNESS OF BREATH: 0
NAUSEA: 0
COLOR CHANGE: 1
ABDOMINAL PAIN: 0

## 2019-09-06 ENCOUNTER — CARE COORDINATION (OUTPATIENT)
Dept: CARE COORDINATION | Age: 54
End: 2019-09-06

## 2019-09-10 ENCOUNTER — CARE COORDINATION (OUTPATIENT)
Dept: CARE COORDINATION | Age: 54
End: 2019-09-10

## 2019-09-12 DIAGNOSIS — S82.832A OTHER CLOSED FRACTURE OF DISTAL END OF LEFT FIBULA, INITIAL ENCOUNTER: Primary | ICD-10-CM

## 2019-09-20 ENCOUNTER — CARE COORDINATION (OUTPATIENT)
Dept: CARE COORDINATION | Age: 54
End: 2019-09-20

## 2019-09-24 ENCOUNTER — OFFICE VISIT (OUTPATIENT)
Dept: ORTHOPEDIC SURGERY | Age: 54
End: 2019-09-24
Payer: MEDICAID

## 2019-09-24 VITALS — WEIGHT: 149.03 LBS | BODY MASS INDEX: 22.59 KG/M2 | HEIGHT: 68 IN

## 2019-09-24 DIAGNOSIS — F17.200 TOBACCO DEPENDENCY: ICD-10-CM

## 2019-09-24 DIAGNOSIS — S82.832D OTHER CLOSED FRACTURE OF DISTAL END OF LEFT FIBULA WITH ROUTINE HEALING, SUBSEQUENT ENCOUNTER: Primary | ICD-10-CM

## 2019-09-24 DIAGNOSIS — S82.832A OTHER CLOSED FRACTURE OF DISTAL END OF LEFT FIBULA, INITIAL ENCOUNTER: Primary | ICD-10-CM

## 2019-09-24 PROCEDURE — 4004F PT TOBACCO SCREEN RCVD TLK: CPT | Performed by: ORTHOPAEDIC SURGERY

## 2019-09-24 PROCEDURE — G8420 CALC BMI NORM PARAMETERS: HCPCS | Performed by: ORTHOPAEDIC SURGERY

## 2019-09-24 PROCEDURE — G8427 DOCREV CUR MEDS BY ELIG CLIN: HCPCS | Performed by: ORTHOPAEDIC SURGERY

## 2019-09-24 PROCEDURE — 3017F COLORECTAL CA SCREEN DOC REV: CPT | Performed by: ORTHOPAEDIC SURGERY

## 2019-09-24 PROCEDURE — 99213 OFFICE O/P EST LOW 20 MIN: CPT | Performed by: ORTHOPAEDIC SURGERY

## 2019-09-24 ASSESSMENT — ENCOUNTER SYMPTOMS
SHORTNESS OF BREATH: 0
ABDOMINAL PAIN: 0
EYE PAIN: 0

## 2019-10-01 ENCOUNTER — TELEPHONE (OUTPATIENT)
Dept: INTERNAL MEDICINE CLINIC | Age: 54
End: 2019-10-01

## 2019-10-02 NOTE — PROGRESS NOTES
[address] [facility].    PROGRESS NOTE             7/31/2019    8:25 AM    Name:   Wm Pedraza  MRN:     [id number]     [place]:      [de-identified]   Room:   [address] [place] [address]  IP Day:  0  Admit Date:  7/30/2019  7:57 AM    PCP:  Alessia Quijano MD  Code Status:  Full Code    Subjective:     C/C:   Chief Complaint   Patient presents with    Chest Pain     Interval History Status: improved. No acute issues overnight. Patient continues to have 4/10 crampy chest pain worse on the left. Pain is made worse with inspiration. Pressure over chest and leaning forward do not make it worse. Pending stress test today and consult with Dr. Mele Willis. Brief History:   See HPI    Review of Systems:     Review of Systems   Constitutional: Negative for activity change, appetite change, chills, fatigue and unexpected weight change. HENT: Negative for congestion, ear pain and sinus pain. Eyes: Negative for photophobia, pain and visual disturbance. Respiratory: Negative for apnea, cough and shortness of breath. Cardiovascular: Positive for chest pain. Negative for palpitations and leg swelling. Gastrointestinal: Negative for abdominal distention, abdominal pain, nausea and vomiting. Endocrine: Negative for cold intolerance and polydipsia. Genitourinary: Negative for difficulty urinating and flank pain. Musculoskeletal: Negative for arthralgias and myalgias. Skin: Negative for rash. Allergic/Immunologic: Negative for environmental allergies and food allergies. Neurological: Negative for dizziness, weakness and headaches. Hematological: Negative for adenopathy. Psychiatric/Behavioral: Negative for agitation and behavioral problems. Medications:      Allergies:  No Known Allergies    Current Meds:   Scheduled Meds:    sodium chloride flush  10 mL Intravenous 2 times per day    amLODIPine  5 mg Oral Daily    apixaban  5 mg Oral BID    aspirin  81 mg Oral Daily    nicotine  1 patch Transdermal Q24H     Continuous Infusions:    sodium chloride       PRN Meds: sodium chloride flush, sodium chloride, albuterol sulfate HFA, atropine, nitroGLYCERIN, metoprolol, nitroGLYCERIN, sodium chloride flush, magnesium hydroxide, ondansetron, potassium chloride **OR** potassium alternative oral replacement **OR** potassium chloride, magnesium sulfate, acetaminophen, albuterol sulfate HFA, sodium chloride flush    Data:     Past Medical History:   has a past medical history of Abnormal echocardiogram, Arrhythmia, AS (aortic stenosis), Atrial fibrillation ([facility] Benign prostatic hyperplasia, Biatrial enlargement, Central perforation of tympanic membrane, right, Chest pain, Chews tobacco, CHF (congestive heart failure), NYHA class I, acute on chronic, combined ([facility] Chronic back pain, Conductive hearing loss of right ear, COPD [facility]ch[facility] disease) [facility] Diverticulitis, Fatigue, History of cocaine abuse, Household contact with history of methicillin resistant Staphylococcus aureus (MRSA) infection, Lung nodule, Mass of upper lobe of left lung, Mild concentric left ventricular hypertrophy, Murmur, cardiac, Near syncope, Nonrheumatic aortic (valve) stenosis, Palpitations, Panlobular emphysema (HCC), Pneumonia of both upper lobes due to infectious organism [facility]), Right-sided tinnitus, Severe aortic stenosis, Shortness of breath, and Tobacco abuse. Social History:   reports that he quit smoking about 7 months ago. His smoking use included cigarettes. He has a 8.25 pack-year smoking history. He has quit using smokeless tobacco.  His smokeless tobacco use included chew. He reports that he has current or past drug history. Drug: Cocaine. He reports that he does not drink alcohol.      Family History:   Family History   Problem Relation Age of Onset   Lexa Leah Depression Mother     COPD Father     COPD Paternal Grandfather        Vitals:  /69 Pulse 81   Temp 97.5 °F (36.4 °C) (Oral)   Resp 20   Ht 5' 8\" (1.727 m)   Wt 138 lb 3.7 oz (62.7 kg)   SpO2 97%   BMI 21.02 kg/m²   Temp (24hrs), Av.8 °F (36.6 °C), Min:97.3 °F (36.3 °C), Max:98.6 °F (37 °C)    No results for input(s): POCGLU in the last 72 hours. I/O(24Hr): Intake/Output Summary (Last 24 hours) at 2019 0825  Last data filed at 2019 1840  Gross per 24 hour   Intake 480 ml   Output --   Net 480 ml       Labs:    CBC with Differential:    Lab Results   Component Value Date    WBC 6.3 2019    RBC 4.91 2019    HGB 15.4 2019    HCT 45.8 2019     2019    MCV 93.3 2019    MCH 31.3 2019    MCHC 33.6 2019    RDW 14.3 2019    LYMPHOPCT 34 2019    MONOPCT 9 2019    BASOPCT 1 2019    MONOSABS 0.60 2019    LYMPHSABS 2.10 2019    EOSABS 0.20 2019    BASOSABS 0.00 2019    DIFFTYPE NOT REPORTED 2019     CMP:    Lab Results   Component Value Date     2019    K 4.3 2019     2019    CO2 22 2019    BUN 15 2019    CREATININE 1.02 2019    GFRAA >60 2019    LABGLOM >60 2019    GLUCOSE 114 2019    PROT 7.3 2019    LABALBU 4.4 2019    CALCIUM 8.8 2019    BILITOT 0.88 2019    ALKPHOS 89 2019    AST 25 2019    ALT 19 2019     Last 3 Troponin:  No results found for: TROPONINI    Lab Results   Component Value Date/Time    SPECIAL NOT REPORTED 2018 04:12 PM     Lab Results   Component Value Date/Time    CULTURE NORMAL RESPIRATORY TYLER LIGHT GROWTH 2018 04:12 PM         Radiology:    Xr Chest Portable    Result Date: 2019  EXAMINATION: ONE XRAY VIEW OF THE CHEST 2019 8:14 am COMPARISON: 2019 HISTORY: ORDERING SYSTEM PROVIDED HISTORY: Chest Pain Reason for Exam: left chest pain with pain to left neck and down left arm.  Acuity: Acute Type of Exam: Initial FINDINGS: The lungs are without acute focal process. There no effusion or pneumothorax. Normal heart size. Aortic valve stent is redemonstrated. The osseous structures are grossly intact without acute process. Stable chest without acute process. Physical Examination:        Physical Exam   Constitutional: He is oriented to person, place, and time. He appears well-developed and well-nourished. No distress. HENT:   Head: Normocephalic and atraumatic. Eyes: Pupils are equal, round, and reactive to light. EOM are normal.   Neck: Normal range of motion. Neck supple. No JVD present. Cardiovascular: Normal rate, regular rhythm, normal heart sounds and intact distal pulses. Inspiratory pain in center of chest and left side of sternum. Pulmonary/Chest: Effort normal and breath sounds normal. No respiratory distress. He exhibits no tenderness. Abdominal: Soft. Bowel sounds are normal. He exhibits no distension. There is no tenderness. Musculoskeletal: Normal range of motion. He exhibits no edema or deformity. Lymphadenopathy:     He has no cervical adenopathy. Neurological: He is alert and oriented to person, place, and time. Skin: Capillary refill takes less than 2 seconds. No rash noted. Psychiatric: He has a normal mood and affect. His behavior is normal.   Vitals reviewed. Assessment:        Primary Problem  Chest pain    Active Hospital Problems    Diagnosis Date Noted    Chest pain [R07.9] 07/30/2019    COPD (chronic obstructive pulmonary disease) ([facility] [J44.9] 07/30/2019    Cocaine abuse ([facility] [F14.10] 07/30/2019    Fatigue [R53.83] 06/04/2015    [facility] ([facility] [I48.91] 03/27/2015       Plan:        Patient presents with crampy chest pain for the past 3 days that is reproducible with inspiration. Pending Stress test to rule out demand ischemia vs Costochondritis.        Chest pain  Trend troponins  Consult cardiology, [facility]  Cardiac diet, NPO after DISPLAY PLAN FREE TEXT DISPLAY PLAN FREE TEXT DISPLAY PLAN FREE TEXT DISPLAY PLAN FREE TEXT DISPLAY PLAN FREE TEXT DISPLAY PLAN FREE TEXT DISPLAY PLAN FREE TEXT DISPLAY PLAN FREE TEXT DISPLAY PLAN FREE TEXT DISPLAY PLAN FREE TEXT DISPLAY PLAN FREE TEXT

## 2019-10-09 ENCOUNTER — CARE COORDINATION (OUTPATIENT)
Dept: CARE COORDINATION | Age: 54
End: 2019-10-09

## 2019-10-30 ENCOUNTER — CARE COORDINATION (OUTPATIENT)
Dept: CARE COORDINATION | Age: 54
End: 2019-10-30

## 2019-11-01 DIAGNOSIS — S82.832D OTHER CLOSED FRACTURE OF DISTAL END OF LEFT FIBULA WITH ROUTINE HEALING, SUBSEQUENT ENCOUNTER: Primary | ICD-10-CM

## 2019-11-06 ENCOUNTER — CARE COORDINATION (OUTPATIENT)
Dept: CARE COORDINATION | Age: 54
End: 2019-11-06

## 2019-11-14 ENCOUNTER — CARE COORDINATION (OUTPATIENT)
Dept: CARE COORDINATION | Age: 54
End: 2019-11-14

## 2019-12-11 ENCOUNTER — OFFICE VISIT (OUTPATIENT)
Dept: INTERNAL MEDICINE CLINIC | Age: 54
End: 2019-12-11
Payer: MEDICAID

## 2019-12-11 VITALS
BODY MASS INDEX: 22.51 KG/M2 | WEIGHT: 148 LBS | HEART RATE: 96 BPM | DIASTOLIC BLOOD PRESSURE: 70 MMHG | OXYGEN SATURATION: 97 % | SYSTOLIC BLOOD PRESSURE: 112 MMHG

## 2019-12-11 DIAGNOSIS — I48.91 ATRIAL FIBRILLATION, UNSPECIFIED TYPE (HCC): ICD-10-CM

## 2019-12-11 DIAGNOSIS — R20.2 NUMBNESS AND TINGLING OF LEFT UPPER EXTREMITY: Primary | ICD-10-CM

## 2019-12-11 DIAGNOSIS — R20.0 NUMBNESS AND TINGLING OF LEFT UPPER EXTREMITY: Primary | ICD-10-CM

## 2019-12-11 DIAGNOSIS — R07.9 CHEST PAIN, UNSPECIFIED TYPE: ICD-10-CM

## 2019-12-11 PROCEDURE — G8420 CALC BMI NORM PARAMETERS: HCPCS | Performed by: PHYSICIAN ASSISTANT

## 2019-12-11 PROCEDURE — 4004F PT TOBACCO SCREEN RCVD TLK: CPT | Performed by: PHYSICIAN ASSISTANT

## 2019-12-11 PROCEDURE — G8484 FLU IMMUNIZE NO ADMIN: HCPCS | Performed by: PHYSICIAN ASSISTANT

## 2019-12-11 PROCEDURE — 99214 OFFICE O/P EST MOD 30 MIN: CPT | Performed by: PHYSICIAN ASSISTANT

## 2019-12-11 PROCEDURE — 3017F COLORECTAL CA SCREEN DOC REV: CPT | Performed by: PHYSICIAN ASSISTANT

## 2019-12-11 PROCEDURE — G8427 DOCREV CUR MEDS BY ELIG CLIN: HCPCS | Performed by: PHYSICIAN ASSISTANT

## 2019-12-11 ASSESSMENT — ENCOUNTER SYMPTOMS
SHORTNESS OF BREATH: 0
VOMITING: 0
NAUSEA: 0
EYE ITCHING: 0
ABDOMINAL PAIN: 0
RHINORRHEA: 0
CONSTIPATION: 0
EYE PAIN: 0
DIARRHEA: 0
COUGH: 0
EYE DISCHARGE: 0
BLOOD IN STOOL: 0
BACK PAIN: 0
TROUBLE SWALLOWING: 0
VOICE CHANGE: 0
SORE THROAT: 0
COLOR CHANGE: 0
SINUS PAIN: 0
CHEST TIGHTNESS: 0
WHEEZING: 0

## 2020-01-08 ENCOUNTER — HOSPITAL ENCOUNTER (EMERGENCY)
Age: 55
Discharge: HOME | End: 2020-01-08
Payer: MEDICAID

## 2020-01-08 VITALS
WEIGHT: 146 LBS | DIASTOLIC BLOOD PRESSURE: 70 MMHG | HEART RATE: 76 BPM | TEMPERATURE: 97.7 F | RESPIRATION RATE: 21 BRPM | BODY MASS INDEX: 22.13 KG/M2 | SYSTOLIC BLOOD PRESSURE: 125 MMHG | OXYGEN SATURATION: 99 % | HEIGHT: 68 IN

## 2020-01-08 PROCEDURE — 99282 EMERGENCY DEPT VISIT SF MDM: CPT

## 2020-01-08 PROCEDURE — 2500000003 HC RX 250 WO HCPCS: Performed by: PHYSICIAN ASSISTANT

## 2020-01-08 PROCEDURE — 10061 I&D ABSCESS COMP/MULTIPLE: CPT

## 2020-01-08 RX ORDER — CEPHALEXIN 500 MG/1
500 CAPSULE ORAL 4 TIMES DAILY
Qty: 40 CAPSULE | Refills: 0 | Status: SHIPPED | OUTPATIENT
Start: 2020-01-08 | End: 2020-04-29 | Stop reason: SDUPTHER

## 2020-01-08 RX ORDER — LIDOCAINE HYDROCHLORIDE 10 MG/ML
20 INJECTION, SOLUTION INFILTRATION; PERINEURAL ONCE
Status: COMPLETED | OUTPATIENT
Start: 2020-01-08 | End: 2020-01-08

## 2020-01-08 RX ORDER — SULFAMETHOXAZOLE AND TRIMETHOPRIM 800; 160 MG/1; MG/1
1 TABLET ORAL 2 TIMES DAILY
Qty: 20 TABLET | Refills: 0 | Status: SHIPPED | OUTPATIENT
Start: 2020-01-08 | End: 2020-01-18

## 2020-01-08 RX ADMIN — LIDOCAINE HYDROCHLORIDE 20 ML: 10 INJECTION, SOLUTION INFILTRATION; PERINEURAL at 14:03

## 2020-01-08 ASSESSMENT — PAIN DESCRIPTION - LOCATION: LOCATION: BUTTOCKS

## 2020-01-08 ASSESSMENT — PAIN SCALES - GENERAL
PAINLEVEL_OUTOF10: 5
PAINLEVEL_OUTOF10: 5

## 2020-01-08 ASSESSMENT — PAIN DESCRIPTION - ORIENTATION: ORIENTATION: RIGHT

## 2020-01-08 ASSESSMENT — PAIN DESCRIPTION - PAIN TYPE: TYPE: ACUTE PAIN

## 2020-01-08 ASSESSMENT — PAIN DESCRIPTION - FREQUENCY: FREQUENCY: INTERMITTENT

## 2020-01-08 ASSESSMENT — PAIN DESCRIPTION - DESCRIPTORS: DESCRIPTORS: BURNING

## 2020-01-08 NOTE — ED PROVIDER NOTES
[address] ED  eMERGENCY dEPARTMENT eNCOUnter      Pt Name: Jack Conrad  MRN: [id number]  [place] 1965  Date of evaluation: 1/8/20      CHIEF COMPLAINT:  Chief Complaint   Patient presents with    Abscess     right buttock       HISTORY OF PRESENT ILLNESS    Jack Conrad is a 47 y.o. male who presents to the emergency room complaining of abscess to the right buttock. States that they noticed this area painful, red and swollen for the past 1.5 weeks. Denies drainage, fever, chills, nausea. Emesis, abd pain. Pt has had them before. He is not diabetic. No other complaints. Nursing Notes were reviewed. REVIEW OF SYSTEMS       Constitutional:  Per HPI  Eyes: No visual changes. Neck: No neck pain. Respiratory: Denies recent shortness of breath. Cardiac:  Denies recent chest pain. GI:  Per HPI  Musculoskeletal: Denies focal weakness. Neurologic: Denies headache or focal weakness. Skin:  rash    Negative in 10 essential Systems except as mentioned above and in the HPI.       PAST MEDICAL HISTORY   PMH:  has a past medical history of Abnormal echocardiogram, Arrhythmia, AS (aortic stenosis), Atrial fibrillation ([address] Benign prostatic hyperplasia, Biatrial enlargement, Central perforation of tympanic membrane, right, Chest pain, Chews tobacco, CHF (congestive heart failure), NYHA class I, acute on chronic, combined ([address] Chronic back pain, Conductive hearing loss of right ear, COPD (chronic obstructive pulmonary disease) ([address] Diverticulitis, Fatigue, History of cocaine abuse ([address] Household contact with history of methicillin resistant Staphylococcus aureus (MRSA) infection, Lung nodule, Mass of upper lobe of left lung, Mild concentric left ventricular hypertrophy, Murmur, cardiac, Near syncope, Nonrheumatic aortic (valve) stenosis, Palpitations, Panlobular emphysema (HCC), Pneumonia of both upper lobes due to infectious organism [facility]), Right-sided tinnitus, Severe aortic

## 2020-01-08 NOTE — ED NOTES
Pt discharged in stable condition with Rx's and discharge instructions. Pt ambulates to door with steady gait and without assistance.       Elham Zhao RN  01/08/20 [id number]

## 2020-01-09 ENCOUNTER — CARE COORDINATION (OUTPATIENT)
Dept: CARE COORDINATION | Age: 55
End: 2020-01-09

## 2020-01-09 ENCOUNTER — TELEPHONE (OUTPATIENT)
Dept: INTERNAL MEDICINE CLINIC | Age: 55
End: 2020-01-09

## 2020-01-09 NOTE — CARE COORDINATION
Ambulatory Care Coordination ED Follow up Call       Reason for ED Visit:  abscess  Care Management Risk Score: CMRS 10    Patient was called to follow up with most recent ER visit. There was no answer. A message was left on voicemail to have patient call back regarding ER visit. Office number given [phone].     FU appts/Provider:    Future Appointments   Date Time Provider Blayne Garber   2/5/2020 10:45 AM Rashida Simpson MD Resp Spec [address] Maintenance Due   Topic Date Due    Hepatitis C screen  1965    Shingles Vaccine (1 of 2) 02/13/2015    Colon cancer screen colonoscopy  02/13/2015    Flu vaccine (1) 09/01/2019

## 2020-01-13 ENCOUNTER — OFFICE VISIT (OUTPATIENT)
Dept: INTERNAL MEDICINE CLINIC | Age: 55
End: 2020-01-13
Payer: MEDICAID

## 2020-01-13 VITALS
BODY MASS INDEX: 22.88 KG/M2 | HEIGHT: 68 IN | HEART RATE: 83 BPM | DIASTOLIC BLOOD PRESSURE: 78 MMHG | SYSTOLIC BLOOD PRESSURE: 118 MMHG | OXYGEN SATURATION: 97 % | WEIGHT: 151 LBS

## 2020-01-13 PROCEDURE — 3017F COLORECTAL CA SCREEN DOC REV: CPT | Performed by: INTERNAL MEDICINE

## 2020-01-13 PROCEDURE — G8427 DOCREV CUR MEDS BY ELIG CLIN: HCPCS | Performed by: INTERNAL MEDICINE

## 2020-01-13 PROCEDURE — 4004F PT TOBACCO SCREEN RCVD TLK: CPT | Performed by: INTERNAL MEDICINE

## 2020-01-13 PROCEDURE — G8420 CALC BMI NORM PARAMETERS: HCPCS | Performed by: INTERNAL MEDICINE

## 2020-01-13 PROCEDURE — 99213 OFFICE O/P EST LOW 20 MIN: CPT | Performed by: INTERNAL MEDICINE

## 2020-01-13 PROCEDURE — G8484 FLU IMMUNIZE NO ADMIN: HCPCS | Performed by: INTERNAL MEDICINE

## 2020-01-13 ASSESSMENT — PATIENT HEALTH QUESTIONNAIRE - PHQ9
2. FEELING DOWN, DEPRESSED OR HOPELESS: 0
1. LITTLE INTEREST OR PLEASURE IN DOING THINGS: 0
SUM OF ALL RESPONSES TO PHQ QUESTIONS 1-9: 0
SUM OF ALL RESPONSES TO PHQ9 QUESTIONS 1 & 2: 0
SUM OF ALL RESPONSES TO PHQ QUESTIONS 1-9: 0

## 2020-01-13 NOTE — PROGRESS NOTES
Subjective:      Chief Complaint   Patient presents with    Follow-Up from Hospital     F/U from [facility] ER for abscess to the right buttock, Pt states area is still sore        Patient ID: Tanner Altamirano is a 47 y.o. male. Visit Information    Have you changed or started any medications since your last visit including any over-the-counter medicines, vitamins, or herbal medicines? no   Are you having any side effects from any of your medications? -  no  Have you stopped taking any of your medications? Is so, why? -  no    Have you seen any other physician or provider since your last visit? No  Have you had any other diagnostic tests since your last visit? No  Have you been seen in the emergency room and/or had an admission to a hospital since we last saw you? Yes - Records Obtained  Have you had your routine dental cleaning in the past 6 months? no    Have you activated your [employer] account? If not, what are your barriers? No:      Patient Care Team:  Jerrica Cox MD as PCP - General (Internal Medicine)  Jerrica Cox MD as PCP - [facility] Provider  Dhara Lofton MD as Consulting Physician (Pulmonary Disease)    Medical History Review  Past Medical, Family, and Social History reviewed and does not contribute to the patient presenting condition    Health Maintenance   Topic Date Due    Hepatitis C screen  1965    Shingles Vaccine (1 of 2) 02/13/2015    Colon cancer screen colonoscopy  02/13/2015    Flu vaccine (1) 09/01/2019    Lipid screen  03/28/2023    DTaP/Tdap/Td vaccine (3 - Td) 09/18/2025    Pneumococcal 0-64 years Vaccine  Completed    HIV screen  Completed       HPI-patient has multiple medical problems which include congestive heart failure, atrial fibrillation on anticoagulation. He has h/o  aorctic valve surgery. Patient noticed a boil in his right buttock area. Symptoms are going on for last 2 weeks or so. He went [facility] emergency room.   He was prescribed

## 2020-01-24 ASSESSMENT — ENCOUNTER SYMPTOMS
APNEA: 0
BLOOD IN STOOL: 0
CHEST TIGHTNESS: 0
COLOR CHANGE: 0
CHOKING: 0
ABDOMINAL DISTENTION: 0
DIARRHEA: 0
EYE ITCHING: 0
EYE REDNESS: 0
SHORTNESS OF BREATH: 0
COUGH: 0
CONSTIPATION: 0
ABDOMINAL PAIN: 0
BACK PAIN: 0
EYE PAIN: 0
EYE DISCHARGE: 0

## 2020-04-01 ENCOUNTER — TELEPHONE (OUTPATIENT)
Dept: INTERNAL MEDICINE CLINIC | Age: 55
End: 2020-04-01

## 2020-04-01 RX ORDER — SULFAMETHOXAZOLE AND TRIMETHOPRIM 800; 160 MG/1; MG/1
1 TABLET ORAL 2 TIMES DAILY
Qty: 20 TABLET | Refills: 0 | Status: SHIPPED | OUTPATIENT
Start: 2020-04-01 | End: 2020-04-11

## 2020-04-20 ENCOUNTER — TELEPHONE (OUTPATIENT)
Dept: INTERNAL MEDICINE CLINIC | Age: 55
End: 2020-04-20

## 2020-04-20 RX ORDER — LORATADINE 10 MG/1
10 TABLET ORAL DAILY
Qty: 10 TABLET | Refills: 0 | Status: SHIPPED | OUTPATIENT
Start: 2020-04-20

## 2020-04-20 RX ORDER — FLUTICASONE PROPIONATE 50 MCG
1 SPRAY, SUSPENSION (ML) NASAL DAILY
Qty: 2 BOTTLE | Refills: 1 | Status: SHIPPED | OUTPATIENT
Start: 2020-04-20 | End: 2020-07-20 | Stop reason: SDUPTHER

## 2020-04-20 NOTE — TELEPHONE ENCOUNTER
Patients wife called in stating that the pt has no taste and nasal drain. It has been going for three days and would like something sent over to the pharmacy for him. Please advise.

## 2020-04-23 ENCOUNTER — TELEPHONE (OUTPATIENT)
Dept: INTERNAL MEDICINE CLINIC | Age: 55
End: 2020-04-23

## 2020-04-29 RX ORDER — CEPHALEXIN 500 MG/1
500 CAPSULE ORAL 4 TIMES DAILY
Qty: 40 CAPSULE | Refills: 0 | Status: SHIPPED | OUTPATIENT
Start: 2020-04-29 | End: 2020-05-09

## 2020-05-20 ENCOUNTER — VIRTUAL VISIT (OUTPATIENT)
Dept: INTERNAL MEDICINE CLINIC | Age: 55
End: 2020-05-20
Payer: MEDICAID

## 2020-05-20 VITALS — WEIGHT: 149 LBS | BODY MASS INDEX: 22.58 KG/M2 | HEIGHT: 68 IN

## 2020-05-20 PROCEDURE — 99213 OFFICE O/P EST LOW 20 MIN: CPT | Performed by: PHYSICIAN ASSISTANT

## 2020-05-20 RX ORDER — CEPHALEXIN 500 MG/1
CAPSULE ORAL
COMMUNITY
Start: 2020-05-12 | End: 2020-07-30

## 2020-05-20 RX ORDER — ALBUTEROL SULFATE 90 UG/1
2 AEROSOL, METERED RESPIRATORY (INHALATION) EVERY 6 HOURS PRN
COMMUNITY
End: 2020-10-27 | Stop reason: SDUPTHER

## 2020-05-20 NOTE — PROGRESS NOTES
The patient and/or health care decision maker are aware that the patient may receive a bill for this telephone service, depending on her/his insurance coverage, and provided verbal consent to proceed: Yes    Patient presents via telephone due to concerns of erectile dysfunction. Symptoms present for the past 6 months. Reports difficulty achieving and maintaining erection. Patient feels symptoms may be related to medications. He denies penile pain, numbness, or deformity. No loss of libido. Patient with history of atrial fibrillation, aortic valve disease patient status post TAVR procedure. He denies recent chest pain/pressure at rest or with exertion. Stress test within the past year low risk for ischemia. Discussed treatment options, plan to order labs, discussed tobacco cessation, recommend he discuss use of PDE5 inhibitor with cardiologist prior to starting medications. Ralph George is a 54 y.o. male being evaluated by a Virtual Visit encounter to address concerns as mentioned above. A caregiver was present when appropriate. Due to this being a TeleHealth encounter (During RPFPN-78 public health emergency), evaluation of the following organ systems was limited: Vitals/Constitutional/EENT/Resp/CV/GI//MS/Neuro/Skin/Heme-Lymph-Imm. Pursuant to the emergency declaration under the [facility] [address] authority and the [employer] Act, this Virtual Visit was conducted with patient's (and/or legal guardian's) consent, to reduce the patient's risk of exposure to COVID-19 and provide necessary medical care. The patient (and/or legal guardian) has also been advised to contact this office for worsening conditions or problems, and seek emergency medical treatment and/or call 911 if deemed necessary.     Total time spent for this encounter: 13 mins    Services were provided through a video synchronous discussion virtually

## 2020-06-02 ENCOUNTER — TELEPHONE (OUTPATIENT)
Dept: INTERNAL MEDICINE CLINIC | Age: 55
End: 2020-06-02

## 2020-06-11 RX ORDER — TADALAFIL 10 MG/1
10 TABLET ORAL PRN
Qty: 10 TABLET | Refills: 3 | Status: SHIPPED | OUTPATIENT
Start: 2020-06-11 | End: 2020-12-15

## 2020-07-20 RX ORDER — FLUTICASONE PROPIONATE 50 MCG
1 SPRAY, SUSPENSION (ML) NASAL DAILY
Qty: 2 BOTTLE | Refills: 1 | Status: SHIPPED | OUTPATIENT
Start: 2020-07-20 | End: 2020-11-20 | Stop reason: SDUPTHER

## 2020-07-30 ENCOUNTER — HOSPITAL ENCOUNTER (OUTPATIENT)
Dept: CT IMAGING | Facility: CLINIC | Age: 55
Discharge: HOME | End: 2020-08-01
Payer: MEDICAID

## 2020-07-30 ENCOUNTER — HOSPITAL ENCOUNTER (OUTPATIENT)
Age: 55
Setting detail: SPECIMEN
Discharge: HOME | End: 2020-07-30
Payer: MEDICAID

## 2020-07-30 ENCOUNTER — TELEPHONE (OUTPATIENT)
Dept: INTERNAL MEDICINE CLINIC | Age: 55
End: 2020-07-30

## 2020-07-30 ENCOUNTER — OFFICE VISIT (OUTPATIENT)
Dept: INTERNAL MEDICINE CLINIC | Age: 55
End: 2020-07-30
Payer: MEDICAID

## 2020-07-30 VITALS
HEART RATE: 77 BPM | DIASTOLIC BLOOD PRESSURE: 70 MMHG | SYSTOLIC BLOOD PRESSURE: 110 MMHG | OXYGEN SATURATION: 97 % | TEMPERATURE: 96.6 F | WEIGHT: 150 LBS | BODY MASS INDEX: 22.73 KG/M2 | HEIGHT: 68 IN

## 2020-07-30 LAB
ANION GAP SERPL CALCULATED.3IONS-SCNC: 11 MMOL/L (ref 9–17)
BUN BLDV-MCNC: 16 MG/DL (ref 6–20)
BUN/CREAT BLD: NORMAL (ref 9–20)
CALCIUM SERPL-MCNC: 9.5 MG/DL (ref 8.6–10.4)
CHLORIDE BLD-SCNC: 100 MMOL/L (ref 98–107)
CO2: 28 MMOL/L (ref 20–31)
CREAT SERPL-MCNC: 0.84 MG/DL (ref 0.7–1.2)
GFR AFRICAN AMERICAN: >60 ML/MIN
GFR NON-AFRICAN AMERICAN: >60 ML/MIN
GFR SERPL CREATININE-BSD FRML MDRD: NORMAL ML/MIN/{1.73_M2}
GFR SERPL CREATININE-BSD FRML MDRD: NORMAL ML/MIN/{1.73_M2}
GLUCOSE BLD-MCNC: 79 MG/DL (ref 70–99)
POTASSIUM SERPL-SCNC: 4.8 MMOL/L (ref 3.7–5.3)
SODIUM BLD-SCNC: 139 MMOL/L (ref 135–144)

## 2020-07-30 PROCEDURE — 2580000003 HC RX 258: Performed by: INTERNAL MEDICINE

## 2020-07-30 PROCEDURE — G8420 CALC BMI NORM PARAMETERS: HCPCS | Performed by: INTERNAL MEDICINE

## 2020-07-30 PROCEDURE — 99214 OFFICE O/P EST MOD 30 MIN: CPT | Performed by: INTERNAL MEDICINE

## 2020-07-30 PROCEDURE — G8926 SPIRO NO PERF OR DOC: HCPCS | Performed by: INTERNAL MEDICINE

## 2020-07-30 PROCEDURE — 3017F COLORECTAL CA SCREEN DOC REV: CPT | Performed by: INTERNAL MEDICINE

## 2020-07-30 PROCEDURE — 4004F PT TOBACCO SCREEN RCVD TLK: CPT | Performed by: INTERNAL MEDICINE

## 2020-07-30 PROCEDURE — G8427 DOCREV CUR MEDS BY ELIG CLIN: HCPCS | Performed by: INTERNAL MEDICINE

## 2020-07-30 PROCEDURE — 74177 CT ABD & PELVIS W/CONTRAST: CPT

## 2020-07-30 PROCEDURE — 6360000004 HC RX CONTRAST MEDICATION: Performed by: INTERNAL MEDICINE

## 2020-07-30 PROCEDURE — 3023F SPIROM DOC REV: CPT | Performed by: INTERNAL MEDICINE

## 2020-07-30 RX ORDER — SODIUM CHLORIDE 0.9 % (FLUSH) 0.9 %
10 SYRINGE (ML) INJECTION PRN
Status: DISCONTINUED | OUTPATIENT
Start: 2020-07-30 | End: 2020-08-02 | Stop reason: HOSPADM

## 2020-07-30 RX ORDER — DILTIAZEM HYDROCHLORIDE 120 MG/1
120 CAPSULE, COATED, EXTENDED RELEASE ORAL DAILY
COMMUNITY
End: 2021-11-11

## 2020-07-30 RX ORDER — 0.9 % SODIUM CHLORIDE 0.9 %
80 INTRAVENOUS SOLUTION INTRAVENOUS ONCE
Status: COMPLETED | OUTPATIENT
Start: 2020-07-30 | End: 2020-07-30

## 2020-07-30 RX ORDER — METRONIDAZOLE 500 MG/1
500 TABLET ORAL 3 TIMES DAILY
Qty: 30 TABLET | Refills: 0 | Status: SHIPPED | OUTPATIENT
Start: 2020-07-30 | End: 2020-08-09

## 2020-07-30 RX ORDER — CIPROFLOXACIN 500 MG/1
500 TABLET, FILM COATED ORAL 2 TIMES DAILY
Qty: 20 TABLET | Refills: 0 | Status: SHIPPED | OUTPATIENT
Start: 2020-07-30 | End: 2020-08-09

## 2020-07-30 RX ADMIN — Medication 10 ML: at 12:39

## 2020-07-30 RX ADMIN — IOHEXOL 50 ML: 240 INJECTION, SOLUTION INTRATHECAL; INTRAVASCULAR; INTRAVENOUS; ORAL at 12:39

## 2020-07-30 RX ADMIN — SODIUM CHLORIDE 80 ML: 9 INJECTION, SOLUTION INTRAVENOUS at 12:38

## 2020-07-30 RX ADMIN — IOVERSOL 75 ML: 741 INJECTION INTRA-ARTERIAL; INTRAVENOUS at 12:39

## 2020-07-30 ASSESSMENT — PATIENT HEALTH QUESTIONNAIRE - PHQ9
SUM OF ALL RESPONSES TO PHQ9 QUESTIONS 1 & 2: 0
2. FEELING DOWN, DEPRESSED OR HOPELESS: 0
SUM OF ALL RESPONSES TO PHQ QUESTIONS 1-9: 0
1. LITTLE INTEREST OR PLEASURE IN DOING THINGS: 0
SUM OF ALL RESPONSES TO PHQ QUESTIONS 1-9: 0

## 2020-07-30 NOTE — PROGRESS NOTES
Subjective:      Chief Complaint   Patient presents with    Abdominal Pain     Pt states he has sharp abdominal pain, Pt states he had Divericulitis in the past    Groin Pain     right side groin pain     Diarrhea     Pt states he has yellow BM        Patient ID: Aquilino Yee is a 54 y.o. male. Visit Information    Have you changed or started any medications since your last visit including any over-the-counter medicines, vitamins, or herbal medicines? no   Are you having any side effects from any of your medications? -  no  Have you stopped taking any of your medications? Is so, why? -  no    Have you seen any other physician or provider since your last visit? No  Have you had any other diagnostic tests since your last visit? No  Have you been seen in the emergency room and/or had an admission to a hospital since we last saw you? No  Have you had your routine dental cleaning in the past 6 months? no    Have you activated your [employer] account? If not, what are your barriers?  No:      Patient Care Team:  Ruperto Riggs MD as PCP - General (Internal Medicine)  Ruperto Riggs MD as PCP - [facility] Provider  Jony Ruvalcaba MD as Consulting Physician (Pulmonary Disease)    Medical History Review  Past Medical, Family, and Social History reviewed and does not contribute to the patient presenting condition    Health Maintenance   Topic Date Due    Hepatitis C screen  1965    Shingles Vaccine (1 of 2) 02/13/2015    Colon cancer screen colonoscopy  02/13/2015    Flu vaccine (1) 09/01/2020    Lipid screen  03/28/2023    DTaP/Tdap/Td vaccine (3 - Td) 09/18/2025    Pneumococcal 0-64 years Vaccine  Completed    HIV screen  Completed    Hepatitis A vaccine  Aged Out    Hepatitis B vaccine  Aged Out    Hib vaccine  Aged Out    Meningococcal (ACWY) vaccine  Aged Out       HPI     HPI   1) Location/Symptom - Abdominal Pain , loose stools   2) Timing/Onset: 2 days   3) Context/Setting: has H/o Diverticulitis   4) Quality: sharp pain   5) Duration: continuous   6) Modifying Factors:  Did not eat anything from outside ,no Nausea/vomiting   7) Severity: moderate       Review of Systems   Constitutional: Negative for activity change, appetite change, chills and diaphoresis. HENT: Negative for congestion, dental problem, ear discharge, facial swelling and hearing loss. Respiratory: Negative for apnea, cough, chest tightness, shortness of breath and wheezing. Cardiovascular: Negative for chest pain and leg swelling. Gastrointestinal: Positive for abdominal pain and diarrhea. Negative for abdominal distention and constipation. Genitourinary: Negative for difficulty urinating, dysuria, enuresis, flank pain and frequency. Musculoskeletal: Negative for arthralgias, back pain, gait problem and joint swelling. Skin: Negative for color change, pallor and rash. Neurological: Negative for dizziness, seizures, facial asymmetry, light-headedness, numbness and headaches. Psychiatric/Behavioral: Negative for agitation, behavioral problems, confusion, decreased concentration and dysphoric mood. Objective:   Physical Exam  Vitals signs and nursing note reviewed. Constitutional:       General: He is not in acute distress. Appearance: He is well-developed. He is not diaphoretic. HENT:      Head: Normocephalic and atraumatic. Mouth/Throat:      Pharynx: No oropharyngeal exudate. Eyes:      General: No scleral icterus. Right eye: No discharge. Left eye: No discharge. Conjunctiva/sclera: Conjunctivae normal.      Pupils: Pupils are equal, round, and reactive to light. Neck:      Musculoskeletal: Normal range of motion and neck supple. Thyroid: No thyromegaly. Vascular: No JVD. Trachea: No tracheal deviation. Cardiovascular:      Rate and Rhythm: Normal rate. Heart sounds: Normal heart sounds. No murmur. No gallop.     Pulmonary:      Effort: recognition Dragon dictation software. Although every effort was made to ensure the accuracy of this automated transcription, some errors in transcription may have occurred.

## 2020-07-30 NOTE — TELEPHONE ENCOUNTER
[facility] radiology called to report abnormal Ct results :   Impression    1.  Marked colonic wall thickening involving the mid ascending colon to the    hepatic flexure with adjacent inflammatory change.  There is sparing of the    cecum, terminal ileum and appendix.  While this may represent a localized    colitis, an underlying neoplasm cannot be excluded.  No bowel obstruction or    perforation.         2.  Newly identified hypoattenuating liver lesions as compared to 2013.     While these may represent cysts or hemangiomas, follow-up imaging or further    characterization with contrast MRI should be considered based on the    underlying cause of the colon finding.

## 2020-07-30 NOTE — TELEPHONE ENCOUNTER
Ct Abdomen  , adelaida concerning for Colitis   Continue with antibiotics   Adelaida will Needs Colonoscopy later on   Will Discuss during his visit in 2 week

## 2020-08-02 ASSESSMENT — ENCOUNTER SYMPTOMS
CHEST TIGHTNESS: 0
ABDOMINAL DISTENTION: 0
WHEEZING: 0
BACK PAIN: 0
COUGH: 0
SHORTNESS OF BREATH: 0
DIARRHEA: 1
CONSTIPATION: 0
COLOR CHANGE: 0
APNEA: 0
FACIAL SWELLING: 0
ABDOMINAL PAIN: 1

## 2020-08-10 ENCOUNTER — TELEPHONE (OUTPATIENT)
Dept: INTERNAL MEDICINE CLINIC | Age: 55
End: 2020-08-10

## 2020-08-13 ENCOUNTER — APPOINTMENT (OUTPATIENT)
Dept: CT IMAGING | Age: 55
End: 2020-08-13
Payer: MEDICAID

## 2020-08-13 ENCOUNTER — OFFICE VISIT (OUTPATIENT)
Dept: INTERNAL MEDICINE CLINIC | Age: 55
End: 2020-08-13
Payer: MEDICAID

## 2020-08-13 ENCOUNTER — HOSPITAL ENCOUNTER (EMERGENCY)
Age: 55
Discharge: HOME | End: 2020-08-13
Attending: EMERGENCY MEDICINE
Payer: MEDICAID

## 2020-08-13 VITALS
TEMPERATURE: 97.2 F | SYSTOLIC BLOOD PRESSURE: 122 MMHG | WEIGHT: 154 LBS | HEIGHT: 68 IN | DIASTOLIC BLOOD PRESSURE: 82 MMHG | HEART RATE: 107 BPM | BODY MASS INDEX: 23.34 KG/M2 | OXYGEN SATURATION: 97 %

## 2020-08-13 VITALS
BODY MASS INDEX: 22.81 KG/M2 | WEIGHT: 154 LBS | DIASTOLIC BLOOD PRESSURE: 60 MMHG | RESPIRATION RATE: 17 BRPM | TEMPERATURE: 97.7 F | SYSTOLIC BLOOD PRESSURE: 108 MMHG | OXYGEN SATURATION: 97 % | HEART RATE: 125 BPM | HEIGHT: 69 IN

## 2020-08-13 LAB
-: NORMAL
-: NORMAL
ABSOLUTE EOS #: 0.1 K/UL (ref 0–0.4)
ABSOLUTE IMMATURE GRANULOCYTE: ABNORMAL K/UL (ref 0–0.3)
ABSOLUTE LYMPH #: 1.5 K/UL (ref 1–4.8)
ABSOLUTE MONO #: 1.1 K/UL (ref 0.1–1.3)
ALBUMIN SERPL-MCNC: 3.8 G/DL (ref 3.5–5.2)
ALBUMIN/GLOBULIN RATIO: ABNORMAL (ref 1–2.5)
ALP BLD-CCNC: 67 U/L (ref 40–129)
ALT SERPL-CCNC: 23 U/L (ref 5–41)
ANION GAP SERPL CALCULATED.3IONS-SCNC: 7 MMOL/L (ref 9–17)
AST SERPL-CCNC: 23 U/L
BASOPHILS # BLD: 0 % (ref 0–2)
BASOPHILS ABSOLUTE: 0 K/UL (ref 0–0.2)
BILIRUB SERPL-MCNC: 0.72 MG/DL (ref 0.3–1.2)
BUN BLDV-MCNC: 24 MG/DL (ref 6–20)
BUN/CREAT BLD: ABNORMAL (ref 9–20)
CALCIUM SERPL-MCNC: 8.7 MG/DL (ref 8.6–10.4)
CHLORIDE BLD-SCNC: 108 MMOL/L (ref 98–107)
CO2: 23 MMOL/L (ref 20–31)
CREAT SERPL-MCNC: 0.82 MG/DL (ref 0.7–1.2)
DIFFERENTIAL TYPE: ABNORMAL
EOSINOPHILS RELATIVE PERCENT: 1 % (ref 0–4)
GFR AFRICAN AMERICAN: >60 ML/MIN
GFR NON-AFRICAN AMERICAN: >60 ML/MIN
GFR SERPL CREATININE-BSD FRML MDRD: ABNORMAL ML/MIN/{1.73_M2}
GFR SERPL CREATININE-BSD FRML MDRD: ABNORMAL ML/MIN/{1.73_M2}
GLUCOSE BLD-MCNC: 101 MG/DL (ref 70–99)
HCT VFR BLD CALC: 48.1 % (ref 41–53)
HEMOGLOBIN: 16.4 G/DL (ref 13.5–17.5)
IMMATURE GRANULOCYTES: ABNORMAL %
INR BLD: 1.1
LACTIC ACID: 0.6 MMOL/L (ref 0.5–2.2)
LIPASE: 19 U/L (ref 13–60)
LYMPHOCYTES # BLD: 11 % (ref 24–44)
MCH RBC QN AUTO: 31.1 PG (ref 26–34)
MCHC RBC AUTO-ENTMCNC: 34 G/DL (ref 31–37)
MCV RBC AUTO: 91.2 FL (ref 80–100)
MONOCYTES # BLD: 9 % (ref 1–7)
NRBC AUTOMATED: ABNORMAL PER 100 WBC
PDW BLD-RTO: 14.4 % (ref 11.5–14.9)
PLATELET # BLD: 171 K/UL (ref 150–450)
PLATELET ESTIMATE: ABNORMAL
PMV BLD AUTO: 9.6 FL (ref 6–12)
POTASSIUM SERPL-SCNC: 4.1 MMOL/L (ref 3.7–5.3)
PROTHROMBIN TIME: 14.4 SEC (ref 11.8–14.6)
RBC # BLD: 5.27 M/UL (ref 4.5–5.9)
RBC # BLD: ABNORMAL 10*6/UL
REASON FOR REJECTION: NORMAL
REASON FOR REJECTION: NORMAL
SEG NEUTROPHILS: 79 % (ref 36–66)
SEGMENTED NEUTROPHILS ABSOLUTE COUNT: 10.6 K/UL (ref 1.3–9.1)
SODIUM BLD-SCNC: 138 MMOL/L (ref 135–144)
TOTAL PROTEIN: 6.1 G/DL (ref 6.4–8.3)
WBC # BLD: 13.3 K/UL (ref 3.5–11)
WBC # BLD: ABNORMAL 10*3/UL
ZZ NTE CLEAN UP: ORDERED TEST: NORMAL
ZZ NTE CLEAN UP: ORDERED TEST: NORMAL
ZZ NTE WITH NAME CLEAN UP: SPECIMEN SOURCE: NORMAL
ZZ NTE WITH NAME CLEAN UP: SPECIMEN SOURCE: NORMAL

## 2020-08-13 PROCEDURE — 99214 OFFICE O/P EST MOD 30 MIN: CPT | Performed by: INTERNAL MEDICINE

## 2020-08-13 PROCEDURE — 36415 COLL VENOUS BLD VENIPUNCTURE: CPT

## 2020-08-13 PROCEDURE — 99284 EMERGENCY DEPT VISIT MOD MDM: CPT

## 2020-08-13 PROCEDURE — 6360000004 HC RX CONTRAST MEDICATION: Performed by: PHYSICIAN ASSISTANT

## 2020-08-13 PROCEDURE — 4004F PT TOBACCO SCREEN RCVD TLK: CPT | Performed by: INTERNAL MEDICINE

## 2020-08-13 PROCEDURE — 85025 COMPLETE CBC W/AUTO DIFF WBC: CPT

## 2020-08-13 PROCEDURE — 80053 COMPREHEN METABOLIC PANEL: CPT

## 2020-08-13 PROCEDURE — 96375 TX/PRO/DX INJ NEW DRUG ADDON: CPT

## 2020-08-13 PROCEDURE — 6360000002 HC RX W HCPCS: Performed by: PHYSICIAN ASSISTANT

## 2020-08-13 PROCEDURE — 74177 CT ABD & PELVIS W/CONTRAST: CPT

## 2020-08-13 PROCEDURE — 3017F COLORECTAL CA SCREEN DOC REV: CPT | Performed by: INTERNAL MEDICINE

## 2020-08-13 PROCEDURE — 96376 TX/PRO/DX INJ SAME DRUG ADON: CPT

## 2020-08-13 PROCEDURE — 74174 CTA ABD&PLVS W/CONTRAST: CPT

## 2020-08-13 PROCEDURE — G8420 CALC BMI NORM PARAMETERS: HCPCS | Performed by: INTERNAL MEDICINE

## 2020-08-13 PROCEDURE — 83605 ASSAY OF LACTIC ACID: CPT

## 2020-08-13 PROCEDURE — 2580000003 HC RX 258: Performed by: PHYSICIAN ASSISTANT

## 2020-08-13 PROCEDURE — 85610 PROTHROMBIN TIME: CPT

## 2020-08-13 PROCEDURE — 96374 THER/PROPH/DIAG INJ IV PUSH: CPT

## 2020-08-13 PROCEDURE — 83690 ASSAY OF LIPASE: CPT

## 2020-08-13 PROCEDURE — G8427 DOCREV CUR MEDS BY ELIG CLIN: HCPCS | Performed by: INTERNAL MEDICINE

## 2020-08-13 RX ORDER — ONDANSETRON 4 MG/1
4 TABLET, FILM COATED ORAL EVERY 8 HOURS PRN
Qty: 20 TABLET | Refills: 0 | Status: SHIPPED | OUTPATIENT
Start: 2020-08-13 | End: 2020-08-13 | Stop reason: SDUPTHER

## 2020-08-13 RX ORDER — 0.9 % SODIUM CHLORIDE 0.9 %
80 INTRAVENOUS SOLUTION INTRAVENOUS ONCE
Status: COMPLETED | OUTPATIENT
Start: 2020-08-13 | End: 2020-08-13

## 2020-08-13 RX ORDER — ONDANSETRON 2 MG/ML
4 INJECTION INTRAMUSCULAR; INTRAVENOUS ONCE
Status: COMPLETED | OUTPATIENT
Start: 2020-08-13 | End: 2020-08-13

## 2020-08-13 RX ORDER — DICYCLOMINE HYDROCHLORIDE 10 MG/1
10 CAPSULE ORAL EVERY 6 HOURS PRN
Qty: 20 CAPSULE | Refills: 0 | Status: SHIPPED | OUTPATIENT
Start: 2020-08-13 | End: 2020-08-13 | Stop reason: SDUPTHER

## 2020-08-13 RX ORDER — KETOROLAC TROMETHAMINE 30 MG/ML
30 INJECTION, SOLUTION INTRAMUSCULAR; INTRAVENOUS ONCE
Status: COMPLETED | OUTPATIENT
Start: 2020-08-13 | End: 2020-08-13

## 2020-08-13 RX ORDER — DICYCLOMINE HYDROCHLORIDE 10 MG/1
10 CAPSULE ORAL EVERY 6 HOURS PRN
Qty: 20 CAPSULE | Refills: 0 | Status: SHIPPED | OUTPATIENT
Start: 2020-08-13 | End: 2021-11-11

## 2020-08-13 RX ORDER — SODIUM CHLORIDE 0.9 % (FLUSH) 0.9 %
10 SYRINGE (ML) INJECTION PRN
Status: DISCONTINUED | OUTPATIENT
Start: 2020-08-13 | End: 2020-08-14 | Stop reason: HOSPADM

## 2020-08-13 RX ORDER — 0.9 % SODIUM CHLORIDE 0.9 %
1000 INTRAVENOUS SOLUTION INTRAVENOUS ONCE
Status: COMPLETED | OUTPATIENT
Start: 2020-08-13 | End: 2020-08-13

## 2020-08-13 RX ORDER — MORPHINE SULFATE 4 MG/ML
2 INJECTION, SOLUTION INTRAMUSCULAR; INTRAVENOUS ONCE
Status: COMPLETED | OUTPATIENT
Start: 2020-08-13 | End: 2020-08-13

## 2020-08-13 RX ORDER — ONDANSETRON 4 MG/1
4 TABLET, FILM COATED ORAL EVERY 8 HOURS PRN
Qty: 20 TABLET | Refills: 0 | Status: SHIPPED | OUTPATIENT
Start: 2020-08-13 | End: 2021-11-11

## 2020-08-13 RX ADMIN — IOVERSOL 100 ML: 741 INJECTION INTRA-ARTERIAL; INTRAVENOUS at 22:03

## 2020-08-13 RX ADMIN — ONDANSETRON 4 MG: 2 INJECTION INTRAMUSCULAR; INTRAVENOUS at 22:53

## 2020-08-13 RX ADMIN — Medication 10 ML: at 21:04

## 2020-08-13 RX ADMIN — SODIUM CHLORIDE 80 ML: 9 INJECTION, SOLUTION INTRAVENOUS at 21:04

## 2020-08-13 RX ADMIN — SODIUM CHLORIDE 1000 ML: 9 INJECTION, SOLUTION INTRAVENOUS at 17:57

## 2020-08-13 RX ADMIN — KETOROLAC TROMETHAMINE 30 MG: 30 INJECTION, SOLUTION INTRAMUSCULAR; INTRAVENOUS at 22:55

## 2020-08-13 RX ADMIN — SODIUM CHLORIDE 80 ML: 9 INJECTION, SOLUTION INTRAVENOUS at 22:03

## 2020-08-13 RX ADMIN — MORPHINE SULFATE 2 MG: 4 INJECTION, SOLUTION INTRAMUSCULAR; INTRAVENOUS at 17:57

## 2020-08-13 RX ADMIN — ONDANSETRON 4 MG: 2 INJECTION INTRAMUSCULAR; INTRAVENOUS at 17:57

## 2020-08-13 RX ADMIN — Medication 10 ML: at 22:03

## 2020-08-13 RX ADMIN — IOVERSOL 75 ML: 741 INJECTION INTRA-ARTERIAL; INTRAVENOUS at 21:04

## 2020-08-13 ASSESSMENT — PATIENT HEALTH QUESTIONNAIRE - PHQ9
SUM OF ALL RESPONSES TO PHQ QUESTIONS 1-9: 0
SUM OF ALL RESPONSES TO PHQ QUESTIONS 1-9: 0
1. LITTLE INTEREST OR PLEASURE IN DOING THINGS: 0
2. FEELING DOWN, DEPRESSED OR HOPELESS: 0
SUM OF ALL RESPONSES TO PHQ9 QUESTIONS 1 & 2: 0

## 2020-08-13 ASSESSMENT — ENCOUNTER SYMPTOMS
ABDOMINAL PAIN: 1
DIARRHEA: 1
HEMATEMESIS: 0
HEMATOCHEZIA: 0

## 2020-08-13 ASSESSMENT — PAIN SCALES - GENERAL
PAINLEVEL_OUTOF10: 7
PAINLEVEL_OUTOF10: 7
PAINLEVEL_OUTOF10: 5

## 2020-08-13 NOTE — ED PROVIDER NOTES
[address]  eMERGENCY dEPARTMENT eNCOUnter      Pt Name: Stephanie Rosario  MRN: [id number]  [place] 1965  Date of evaluation: 8/13/20      CHIEF COMPLAINT       Chief Complaint   Patient presents with    Abdominal Pain    Diarrhea    Emesis         HISTORY OF PRESENT ILLNESS    Stephanie Rosario is a 54 y.o. male who presents complaining of some abdominal pain cramping and some diarrhea he has recently been taking antibiotics for colitis he was sent here by his primary care provider for evaluation. The history is provided by the patient. Abdominal Pain   Pain location:  LLQ and RLQ  Pain quality: aching    Pain radiates to:  Does not radiate  Pain severity:  Moderate  Onset quality:  Sudden  Duration:  12 hours  Timing:  Constant  Progression:  Worsening  Chronicity:  Recurrent  Context: not alcohol use    Context comment:  Recent atb for colitis  Relieved by:  Nothing  Worsened by:  Nothing  Ineffective treatments:  None tried  Associated symptoms: diarrhea    Associated symptoms: no anorexia, no chest pain, no chills, no dysuria, no fever, no hematemesis, no hematochezia, no hematuria and no melena        REVIEW OF SYSTEMS       Review of Systems   Constitutional: Negative for chills and fever. Cardiovascular: Negative for chest pain. Gastrointestinal: Positive for abdominal pain and diarrhea. Negative for anorexia, hematemesis, hematochezia and melena. Genitourinary: Negative for dysuria and hematuria. All other systems reviewed and are negative.       PAST MEDICAL HISTORY     Past Medical History:   Diagnosis Date    Abnormal echocardiogram 02/2018    Arrhythmia     AS (aortic stenosis)     Atrial fibrillation (HCC)     Benign prostatic hyperplasia     Biatrial enlargement     Central perforation of tympanic membrane, right     Chest pain     Chews tobacco     CHF (congestive heart failure), NYHA class I, acute on chronic, combined (HCC)     Chronic back pain     Conductive SOCIAL HISTORY      reports that he has been smoking cigarettes. He has a 10.00 pack-year smoking history. His smokeless tobacco use includes chew. He reports previous alcohol use. He reports current drug use. Drug: Cocaine. PHYSICAL EXAM     INITIAL VITALS: /60   Pulse 125   Temp 97.7 °F (36.5 °C) (Oral)   Resp 17   Ht 5' 9\" (1.753 m)   Wt 154 lb (69.9 kg)   SpO2 97%   BMI 22.74 kg/m²      Physical Exam  Vitals signs and nursing note reviewed. Constitutional:       Appearance: He is well-developed. HENT:      Head: Normocephalic and atraumatic. Cardiovascular:      Rate and Rhythm: Normal rate and regular rhythm. Heart sounds: Normal heart sounds. Pulmonary:      Effort: Pulmonary effort is normal.      Breath sounds: Normal breath sounds. Abdominal:      General: Bowel sounds are normal.      Palpations: Abdomen is soft. Tenderness: There is abdominal tenderness (lower quadrants tender. no rebound). Musculoskeletal: Normal range of motion. Skin:     General: Skin is warm. Neurological:      Mental Status: He is alert and oriented to person, place, and time. MEDICAL DECISION MAKING:     Patient had CT scan of the abdomen pelvis we did labs which were unremarkable while here is here in the emergency department the CT abdomen pelvis showed some possible mesenteric artery ischemic changes so a CTA was done of the abdomen pelvis that showed no ischemia there is some narrowing in the superior mesenteric artery but no ischemic changes are noted. He was given Toradol Zofran morphine for pain and he will be discharged home with Zofran as well as Bentyl recommend clear liquid diet small amount frequently advance diet as tolerated avoid any fatty greasy foods avoid alcohol caffeine smoking and other irritants. He is to follow-up with Dr. Phuong Mendez in 1 to 2 days for recheck recheck.   I recommend he return to the emergency department for any worsening of symptoms or any other concerns. He is agreeable with plan and verbalized understanding of discharge instructions    DIAGNOSTIC RESULTS     EKG: All EKG's are interpreted by the Emergency Department Physician who either signs or Co-signs this chart in the absence of acardiologist.        RADIOLOGY:Allplain film, CT, MRI, and formal ultrasound images (except ED bedside ultrasound) are read by the radiologist and the images and interpretations are directly viewed by the emergency physician. LABS:All lab results were reviewed by myself, and all abnormals are listed below.   Labs Reviewed   CBC WITH AUTO DIFFERENTIAL - Abnormal; Notable for the following components:       Result Value    WBC 13.3 (*)     Seg Neutrophils 79 (*)     Lymphocytes 11 (*)     Monocytes 9 (*)     Segs Absolute 10.60 (*)     All other components within normal limits   COMPREHENSIVE METABOLIC PANEL W/ REFLEX TO MG FOR LOW K - Abnormal; Notable for the following components:    Glucose 101 (*)     BUN 24 (*)     Chloride 108 (*)     Anion Gap 7 (*)     Total Protein 6.1 (*)     All other components within normal limits   PROTIME-INR   SPECIMEN REJECTION   SPECIMEN REJECTION   LACTIC ACID   LIPASE         EMERGENCY DEPARTMENT COURSE:   Vitals:    Vitals:    08/13/20 1800 08/13/20 1815 08/13/20 1900 08/13/20 2015   BP: 105/62 94/65 103/67 108/60   Pulse:       Resp:       Temp:       TempSrc:       SpO2:       Weight:       Height:           The patient was given the following medications while in the emergency department:  Orders Placed This Encounter   Medications    0.9 % sodium chloride bolus    morphine sulfate (PF) injection 2 mg    ondansetron (ZOFRAN) injection 4 mg    ioversol (OPTIRAY) 74 % injection 75 mL    0.9 % sodium chloride bolus    sodium chloride flush 0.9 % injection 10 mL    0.9 % sodium chloride bolus    ioversol (OPTIRAY) 74 % injection 100 mL    sodium chloride flush 0.9 % injection 10 mL    ketorolac (TORADOL) injection

## 2020-08-13 NOTE — PROGRESS NOTES
Subjective:      Chief Complaint   Patient presents with    Results     Review abdominal Ct and BMP    Abdominal Pain     f/u on abdominal pain, Pt states his pain is not any better he has taking medication    Nausea & Vomiting     started yesterday        Patient ID: Jeff Jiménez is a 54 y.o. male. Visit Information    Have you changed or started any medications since your last visit including any over-the-counter medicines, vitamins, or herbal medicines? no   Are you having any side effects from any of your medications? -  no  Have you stopped taking any of your medications? Is so, why? -  no    Have you seen any other physician or provider since your last visit? No  Have you had any other diagnostic tests since your last visit? Yes - Records Obtained  Have you been seen in the emergency room and/or had an admission to a hospital since we last saw you? No  Have you had your routine dental cleaning in the past 6 months? no    Have you activated your [facility] account? If not, what are your barriers?  No:      Patient Care Team:  Nasir Fischer MD as PCP - General (Internal Medicine)  Nasir Fischer MD as PCP - [facility]  Sulaiman Lazo MD as Consulting Physician (Pulmonary Disease)    Medical History Review  Past Medical, Family, and Social History reviewed and does not contribute to the patient presenting condition    Health Maintenance   Topic Date Due    Hepatitis C screen  1965    Shingles Vaccine (1 of 2) 02/13/2015    Colon cancer screen colonoscopy  02/13/2015    Flu vaccine (1) 09/01/2020    Lipid screen  03/28/2023    DTaP/Tdap/Td vaccine (3 - Td) 09/18/2025    Pneumococcal 0-64 years Vaccine  Completed    HIV screen  Completed    Hepatitis A vaccine  Aged Out    Hepatitis B vaccine  Aged Out    Hib vaccine  Aged Out    Meningococcal (ACWY) vaccine  Aged Out       HPI       HPI   1) Location/Symptom - abdominal; pain , lower abdomen   2) Timing/Onset: seen 2 weeks ago , Was given antibiotics . , Pain improved , but came back , Last Night , No blood in stools , Associated with Night sweats   3) Context/Setting: Loose stools   4) Quality:  Cramping in nature  5) Duration: Intermittent in nature   6) Modifying Factors: he was lifting heavy stuff   7) Severity: moderate   Completed antibiotics     Had CT abdomen 7/30     1.  Marked colonic wall thickening involving the mid ascending colon to the    hepatic flexure with adjacent inflammatory change.  There is sparing of the    cecum, terminal ileum and appendix.  While this may represent a localized    colitis, an underlying neoplasm cannot be excluded.  No bowel obstruction or    perforation.         2.  Newly identified hypoattenuating liver lesions as compared to 2013. While these may represent cysts or hemangiomas, follow-up imaging or further    characterization with contrast MRI should be considered based on the    underlying cause of the colon finding.               Review of Systems   Constitutional: Positive for appetite change, fatigue and unexpected weight change. Negative for activity change, chills and diaphoresis. HENT: Negative for congestion, dental problem, ear discharge, facial swelling and hearing loss. Respiratory: Negative for apnea, cough, chest tightness, shortness of breath and wheezing. Cardiovascular: Negative for chest pain and leg swelling. Gastrointestinal: Positive for abdominal pain, diarrhea and nausea. Negative for abdominal distention and constipation. Genitourinary: Negative for difficulty urinating, dysuria, enuresis, flank pain and frequency. Musculoskeletal: Negative for arthralgias, back pain, gait problem and joint swelling. Skin: Negative for color change, pallor and rash. Neurological: Negative for dizziness, seizures, facial asymmetry, light-headedness, numbness and headaches.    Psychiatric/Behavioral: Negative for agitation, behavioral problems, confusion, decreased concentration and dysphoric mood. Objective:   Physical Exam  Vitals signs and nursing note reviewed. Constitutional:       General: He is not in acute distress. Appearance: He is well-developed. He is not diaphoretic. Comments: Thin built person   HENT:      Head: Normocephalic and atraumatic. Mouth/Throat:      Pharynx: No oropharyngeal exudate. Eyes:      General: No scleral icterus. Right eye: No discharge. Left eye: No discharge. Conjunctiva/sclera: Conjunctivae normal.      Pupils: Pupils are equal, round, and reactive to light. Neck:      Musculoskeletal: Normal range of motion and neck supple. Thyroid: No thyromegaly. Vascular: No JVD. Trachea: No tracheal deviation. Cardiovascular:      Rate and Rhythm: Normal rate. Heart sounds: Normal heart sounds. No murmur. No gallop. Pulmonary:      Effort: Pulmonary effort is normal. No respiratory distress. Breath sounds: Normal breath sounds. No stridor. No wheezing or rales. Abdominal:      General: Bowel sounds are normal. There is no distension. Palpations: Abdomen is soft. Tenderness: There is abdominal tenderness (Generalized tenderness, no guarding rigidity). There is no guarding or rebound. Musculoskeletal: Normal range of motion. General: No tenderness. Skin:     General: Skin is warm and dry. Findings: No erythema or rash. Neurological:      Mental Status: He is alert and oriented to person, place, and time.          Assessment / Plan:   Patient is abdominal pain, history of diverticulitis  Failed outpatient antibiotic treatment  Has abnormal liver lesion, will need MRI liver  Patient mentioned that, it would be better if patient get admitted for IV antibiotic  Patient voiced understanding  We will admit patient in the hospital    Electronically signed by Emigdio Villanueva MD on 8/16/20 at 6:17 PM EDT

## 2020-08-14 ENCOUNTER — CARE COORDINATION (OUTPATIENT)
Dept: CARE COORDINATION | Age: 55
End: 2020-08-14

## 2020-08-14 NOTE — CARE COORDINATION
Patient contacted regarding recent discharge and COVID-19 risk. Discussed COVID-19 related testing which was not done at this time. Test results were not done. Patient informed of results, if available? Care Transition Nurse/ Ambulatory Care Manager contacted the patient by telephone to perform post discharge assessment. Verified name and  with patient as identifiers. Patient has following risk factors of: heart failure, COPD and pneumonia. CTN/ACM reviewed discharge instructions, medical action plan and red flags related to discharge diagnosis. Reviewed and educated them on any new and changed medications related to discharge diagnosis. Advised obtaining a 90-day supply of all daily and as-needed medications. Education provided regarding infection prevention, and signs and symptoms of COVID-19 and when to seek medical attention with patient who verbalized understanding. Discussed exposure protocols and quarantine from [address] you at higher risk for severe illness  and given an opportunity for questions and concerns. The patient agrees to contact the COVID-19 hotline 003-585-4129 or PCP office for questions related to their healthcare. CTN/ACM provided contact information for future reference. From CDC: Are you at higher risk for severe illness?  Wash your hands often.  Avoid close contact (6 feet, which is about two arm lengths) with people who are sick.  Put distance between yourself and other people if COVID-19 is spreading in your community.  Clean and disinfect frequently touched surfaces.  Avoid all cruise travel and non-essential air travel.  Call your healthcare professional if you have concerns about COVID-19 and your underlying condition or if you are sick.     For more information on steps you can take to protect yourself, see CDC's How to Protect Yourself    Pt will be further monitored by COVID Loop Team based on severity of symptoms and risk factors. Future Appointments   Date Time Provider Blayne Shirlene   8/17/2020  2:00 PM Seble Goldstein MD [facility]           Patient referred for LOOP Program:   Patient's preferred e-mail:  [email or web address]  Patient's preferred phone number: [phone]  Care Plan: self monitoring  LOOP Provider: [place]    Patient to the ED with abdominal pain d/t gastroenteritis. He is eating broth and drinking water. Encouraged to make sure he is drinking fluids and no juice or milk and to eat easily digested foods such as brat diet. His girlfriend is gone now to  his script for bentyl. He has f/u with PCP on Monday.    Enrolled patient in Loop program.

## 2020-08-16 ASSESSMENT — ENCOUNTER SYMPTOMS
WHEEZING: 0
APNEA: 0
CHEST TIGHTNESS: 0
ABDOMINAL DISTENTION: 0
COLOR CHANGE: 0
COUGH: 0
DIARRHEA: 1
FACIAL SWELLING: 0
BACK PAIN: 0
NAUSEA: 1
SHORTNESS OF BREATH: 0
ABDOMINAL PAIN: 1
CONSTIPATION: 0

## 2020-08-17 ENCOUNTER — OFFICE VISIT (OUTPATIENT)
Dept: INTERNAL MEDICINE CLINIC | Age: 55
End: 2020-08-17
Payer: MEDICAID

## 2020-08-17 VITALS
DIASTOLIC BLOOD PRESSURE: 76 MMHG | HEIGHT: 69 IN | SYSTOLIC BLOOD PRESSURE: 104 MMHG | BODY MASS INDEX: 23.25 KG/M2 | OXYGEN SATURATION: 96 % | TEMPERATURE: 97.1 F | WEIGHT: 157 LBS | HEART RATE: 95 BPM

## 2020-08-17 PROCEDURE — G8926 SPIRO NO PERF OR DOC: HCPCS | Performed by: INTERNAL MEDICINE

## 2020-08-17 PROCEDURE — 99213 OFFICE O/P EST LOW 20 MIN: CPT | Performed by: INTERNAL MEDICINE

## 2020-08-17 PROCEDURE — 4004F PT TOBACCO SCREEN RCVD TLK: CPT | Performed by: INTERNAL MEDICINE

## 2020-08-17 PROCEDURE — 3023F SPIROM DOC REV: CPT | Performed by: INTERNAL MEDICINE

## 2020-08-17 PROCEDURE — G8427 DOCREV CUR MEDS BY ELIG CLIN: HCPCS | Performed by: INTERNAL MEDICINE

## 2020-08-17 PROCEDURE — G8420 CALC BMI NORM PARAMETERS: HCPCS | Performed by: INTERNAL MEDICINE

## 2020-08-17 PROCEDURE — 3017F COLORECTAL CA SCREEN DOC REV: CPT | Performed by: INTERNAL MEDICINE

## 2020-08-17 RX ORDER — TAMSULOSIN HYDROCHLORIDE 0.4 MG/1
0.4 CAPSULE ORAL DAILY
Qty: 30 CAPSULE | Refills: 0 | Status: SHIPPED | OUTPATIENT
Start: 2020-08-17 | End: 2021-11-11

## 2020-08-17 ASSESSMENT — PATIENT HEALTH QUESTIONNAIRE - PHQ9
SUM OF ALL RESPONSES TO PHQ9 QUESTIONS 1 & 2: 0
1. LITTLE INTEREST OR PLEASURE IN DOING THINGS: 0
SUM OF ALL RESPONSES TO PHQ QUESTIONS 1-9: 0
SUM OF ALL RESPONSES TO PHQ QUESTIONS 1-9: 0
2. FEELING DOWN, DEPRESSED OR HOPELESS: 0

## 2020-08-17 NOTE — PATIENT INSTRUCTIONS
Patient Education        Diverticulitis: Care Instructions  Overview     Diverticulitis occurs when pouches form in the wall of the colon and become inflamed or infected. It can be very painful. Doctors aren't sure what causes diverticulitis. There is no proof that foods such as nuts, seeds, or berries cause it or make it worse. A low-fiber diet may cause the colon to work harder to push stool forward. Pouches may form because of this extra work. It may be hard to think about healthy eating while you're in pain. But as you recover, you might think about how you can use healthy eating for overall better health. Healthy eating may help you avoid future attacks. Follow-up care is a key part of your treatment and safety. Be sure to make and go to all appointments, and call your doctor if you are having problems. It's also a good idea to know your test results and keep a list of the medicines you take. How can you care for yourself at home? · Drink plenty of fluids, enough so that your urine is light yellow or clear like water. If you have kidney, heart, or liver disease and have to limit fluids, talk with your doctor before you increase the amount of fluids you drink. · Stay with liquids or a bland diet (plain rice, bananas, dry toast or crackers, applesauce) until you are feeling better. Then you can return to regular foods and slowly increase the amount of fiber in your diet. · Use a heating pad set on low on your belly to relieve mild cramps and pain. · Get extra rest until you are feeling better. · Be safe with medicines. Read and follow all instructions on the label. ? If the doctor gave you a prescription medicine for pain, take it as prescribed. ? If you are not taking a prescription pain medicine, ask your doctor if you can take an over-the-counter medicine. · If your doctor prescribed antibiotics, take them as directed. Do not stop taking them just because you feel better.  You need to take the full course of antibiotics. · Do not use laxatives or enemas unless your doctor tells you to use them. When should you call for help? Call your doctor now or seek immediate medical care if:  · You have a fever. · You are vomiting. · You have new or worse belly pain. · You cannot pass stools or gas. Watch closely for changes in your health, and be sure to contact your doctor if you have any problems. Where can you learn more? Go to https://Vital Connect.[employer]. org and sign in to your [employer] account. Enter H901 in the "[employer]" box to learn more about \"Diverticulitis: Care Instructions. \"     If you do not have an account, please click on the \"Sign Up Now\" link. Current as of: August 12, 2019               Content Version: 12.5  © 5477-5127 [employer]. Care instructions adapted under license by [facility] ([facility]). If you have questions about a medical condition or this instruction, always ask your healthcare professional. [place] [address] any warranty or liability for your use of this information. Patient Education        Learning About Diverticulosis and Diverticulitis  What are diverticulosis and diverticulitis? In diverticulosis and diverticulitis, pouches called diverticula form in the wall of the large intestine, or colon. · In diverticulosis, the pouches do not cause any pain or other symptoms. · In diverticulitis, the pouches get inflamed or infected and cause symptoms. Doctors aren't sure what causes these pouches in the colon. But they think that a low-fiber diet may play a role. Without fiber to add bulk to the stool, the colon has to work harder than normal to push the stool forward. The pressure from this may cause pouches to form in weak spots along the colon. Some people with diverticulosis get diverticulitis. But experts don't know why this happens. What are the symptoms? · In diverticulosis, most people don't have symptoms. safety. Be sure to make and go to all appointments, and call your doctor if you are having problems. It's also a good idea to know your test results and keep a list of the medicines you take. Where can you learn more? Go to https://chhira.[employer]. org and sign in to your [employer] account. Enter L758 in the [employer] box to learn more about \"Learning About Diverticulosis and Diverticulitis. \"     If you do not have an account, please click on the \"Sign Up Now\" link. Current as of: August 12, 2019               Content Version: 12.5  © 6198-3509 [employer]. Care instructions adapted under license by [facility] ([facility]). If you have questions about a medical condition or this instruction, always ask your healthcare professional. [address] any warranty or liability for your use of this information.

## 2020-08-17 NOTE — PROGRESS NOTES
Subjective:     Chief Complaint   Patient presents with    Follow-Up from Hospital     f/u from [facility] for abdominal pain, pt states he still has some pain but the cramping is a lot better     Other     Pt was dx with a enlarge prostate in the past and was on medication but he has stop taken medication         Patient ID: Vivi Quan is a 54 y.o. male. Visit Information    Have you changed or started any medications since your last visit including any over-the-counter medicines, vitamins, or herbal medicines? no   Are you having any side effects from any of your medications? -  no  Have you stopped taking any of your medications? Is so, why? -  no    Have you seen any other physician or provider since your last visit? Yes - Records Obtained  Have you had any other diagnostic tests since your last visit? Yes - Records Obtained  Have you been seen in the emergency room and/or had an admission to a hospital since we last saw you? Yes - Records Obtained  Have you had your routine dental cleaning in the past 6 months? no    Have you activated your [employer] account? If not, what are your barriers?  No:      Patient Care Team:  Dorothy Sky MD as PCP - General (Internal Medicine)  Dorothy Sky MD as PCP - [facility]  Ly Celis MD as Consulting Physician (Pulmonary Disease)  Sarah Harrison RN as Ambulatory Care Manager    Medical History Review  Past Medical, Family, and Social History reviewed and does not contribute to the patient presenting condition    Health Maintenance   Topic Date Due    Hepatitis C screen  1965    Shingles Vaccine (1 of 2) 02/13/2015    Colon cancer screen colonoscopy  02/13/2015    Flu vaccine (1) 09/01/2020    Lipid screen  03/28/2023    DTaP/Tdap/Td vaccine (3 - Td) 09/18/2025    Pneumococcal 0-64 years Vaccine  Completed    HIV screen  Completed    Hepatitis A vaccine  Aged Out    Hepatitis B vaccine  Aged Out    Hib vaccine  Aged C/ [address] 19 Meningococcal (ACWY) vaccine  Aged Out       HPI-patient is here for worrisome multiple medical problems. He has atrial fibrillation, on anticoagulation, diverticulitis. Patient was recently seen in emergency room, underwent CT abdomen. He mentioned that his abdominal pain, loose stools has improved  . Review of Systems   Constitutional: Negative for activity change, appetite change, chills and diaphoresis. HENT: Negative for congestion, dental problem, ear discharge, facial swelling and hearing loss. Respiratory: Negative for apnea, cough, chest tightness, shortness of breath and wheezing. Cardiovascular: Negative for chest pain and leg swelling. Gastrointestinal: Positive for abdominal pain, diarrhea and nausea. Negative for abdominal distention and constipation. Genitourinary: Negative for difficulty urinating, dysuria, enuresis, flank pain and frequency. Musculoskeletal: Negative for arthralgias, back pain, gait problem and joint swelling. Skin: Negative for color change, pallor and rash. Neurological: Negative for dizziness, seizures, facial asymmetry, light-headedness, numbness and headaches. Psychiatric/Behavioral: Negative for agitation, behavioral problems, confusion, decreased concentration and dysphoric mood. Objective:   Physical Exam  Vitals signs and nursing note reviewed. Constitutional:       General: He is not in acute distress. Appearance: He is well-developed. He is not diaphoretic. Comments: Thin built person   HENT:      Head: Normocephalic and atraumatic. Mouth/Throat:      Pharynx: No oropharyngeal exudate. Eyes:      General: No scleral icterus. Right eye: No discharge. Left eye: No discharge. Conjunctiva/sclera: Conjunctivae normal.      Pupils: Pupils are equal, round, and reactive to light. Neck:      Musculoskeletal: Normal range of motion and neck supple. Thyroid: No thyromegaly. Vascular: No JVD. Trachea: No tracheal deviation. Cardiovascular:      Rate and Rhythm: Normal rate. Heart sounds: Normal heart sounds. No murmur. No gallop. Pulmonary:      Effort: Pulmonary effort is normal. No respiratory distress. Breath sounds: Normal breath sounds. No stridor. No wheezing or rales. Abdominal:      General: Bowel sounds are normal. There is no distension. Palpations: Abdomen is soft. Tenderness: There is no abdominal tenderness. There is no guarding or rebound. Musculoskeletal: Normal range of motion. General: No tenderness. Skin:     General: Skin is warm and dry. Findings: No erythema or rash. Neurological:      Mental Status: He is alert and oriented to person, place, and time. Assessment / Plan:   1. Benign prostatic hyperplasia without lower urinary tract symptoms  - tamsulosin (FLOMAX) 0.4 MG capsule; Take 1 capsule by mouth daily  Dispense: 30 capsule; Refill: 0    2. Chronic obstructive pulmonary disease, unspecified COPD type ([address]  Stable    3. Diverticulitis  Completed antibiotics  Patient went CT scan of abdomen recently  Abdominal pain is improved    He has a liver lesion on recent CT scan, advised to get MRI abdomen with liver protocol as ordered  last visit    · Return in about 3 months (around 11/17/2020). · Reviewed prior labs and health maintenance. · Discussed use, benefit, and side effects of prescribed medications. Barriers to medication compliance addressed. All patient questions answered. Pt voiced understanding. MD YUNIOR [facility]  8/18/2020, 5:46 PM    Please note that this chart was generated using voice recognition Dragon dictation software. Although every effort was made to ensure the accuracy of this automated transcription, some errors in transcription may have occurred.

## 2020-08-18 ASSESSMENT — ENCOUNTER SYMPTOMS
CHEST TIGHTNESS: 0
WHEEZING: 0
SHORTNESS OF BREATH: 0
APNEA: 0
CONSTIPATION: 0
BACK PAIN: 0
ABDOMINAL DISTENTION: 0
NAUSEA: 1
COLOR CHANGE: 0
DIARRHEA: 1
ABDOMINAL PAIN: 1
FACIAL SWELLING: 0
COUGH: 0

## 2020-08-21 ENCOUNTER — TELEPHONE (OUTPATIENT)
Dept: INTERNAL MEDICINE CLINIC | Age: 55
End: 2020-08-21

## 2020-08-28 ENCOUNTER — TELEPHONE (OUTPATIENT)
Dept: INTERNAL MEDICINE CLINIC | Age: 55
End: 2020-08-28

## 2020-08-28 NOTE — TELEPHONE ENCOUNTER
Pts caregiver called & stated Dr. Hagen Dinosaur started pt on tamsulosin (FLOMAX) 0.4 MG capsule and pt is complaining that it makes  Him too tired and pt wants to know if there is an alternative that won't make him sleepy. Pt has tried to take @ night & he still wakes up groggy in the morning. Please advise.     No Known Allergies

## 2020-09-17 ENCOUNTER — TELEPHONE (OUTPATIENT)
Dept: INTERNAL MEDICINE CLINIC | Age: 55
End: 2020-09-17

## 2020-09-17 RX ORDER — LORAZEPAM 0.5 MG/1
0.5 TABLET ORAL 3 TIMES DAILY PRN
Qty: 2 TABLET | Refills: 0 | Status: SHIPPED | OUTPATIENT
Start: 2020-09-17 | End: 2020-09-19

## 2020-09-17 NOTE — TELEPHONE ENCOUNTER
Patient is scheduled for MRI tomorrow and he is extremely claussrophobic and would like an Rx for a sanative to help relax. Please advise.      [facility] [place]

## 2020-09-18 ENCOUNTER — TELEPHONE (OUTPATIENT)
Dept: INTERNAL MEDICINE CLINIC | Age: 55
End: 2020-09-18

## 2020-09-18 ENCOUNTER — HOSPITAL ENCOUNTER (OUTPATIENT)
Dept: MRI IMAGING | Age: 55
Discharge: HOME | End: 2020-09-20
Payer: MEDICAID

## 2020-09-18 VITALS — BODY MASS INDEX: 22.07 KG/M2 | WEIGHT: 149 LBS | HEIGHT: 69 IN

## 2020-09-18 PROCEDURE — A9579 GAD-BASE MR CONTRAST NOS,1ML: HCPCS | Performed by: INTERNAL MEDICINE

## 2020-09-18 PROCEDURE — 2580000003 HC RX 258: Performed by: INTERNAL MEDICINE

## 2020-09-18 PROCEDURE — 74183 MRI ABD W/O CNTR FLWD CNTR: CPT

## 2020-09-18 PROCEDURE — 6360000004 HC RX CONTRAST MEDICATION: Performed by: INTERNAL MEDICINE

## 2020-09-18 RX ORDER — SODIUM CHLORIDE 0.9 % (FLUSH) 0.9 %
10 SYRINGE (ML) INJECTION PRN
Status: DISCONTINUED | OUTPATIENT
Start: 2020-09-18 | End: 2020-09-21 | Stop reason: HOSPADM

## 2020-09-18 RX ORDER — 0.9 % SODIUM CHLORIDE 0.9 %
50 INTRAVENOUS SOLUTION INTRAVENOUS ONCE
Status: COMPLETED | OUTPATIENT
Start: 2020-09-18 | End: 2020-09-18

## 2020-09-18 RX ADMIN — GADOTERIDOL 15 ML: 279.3 INJECTION, SOLUTION INTRAVENOUS at 08:38

## 2020-09-18 RX ADMIN — SODIUM CHLORIDE 50 ML: 9 INJECTION, SOLUTION INTRAVENOUS at 08:38

## 2020-09-18 RX ADMIN — Medication 10 ML: at 08:38

## 2020-09-29 NOTE — CONSULTS
[address] [facility])            Consult        Date of Admission:  2/10/2018  Date of Consultation:  2/12/2018      PCP:  No primary care provider on file. Reason for the consult:  Atrial fibrillation    History of Present Illness:  Laci Brady is a 46 y.o. male  who presents with right gluteal abscess, we were asked to see him for atrial fibrillation. Patient usually sees Dr. Liza Rueda in the office. Cardiac-wise he denies any chest pains or shortness of breath no dizziness nor  lightheadedness  History of atrial fibrillation and aortic stenosis    PMH:   has a past medical history of Chronic back pain and Murmur, cardiac. Aortic stenosis atrial fibrillation    PSH:   has a past surgical history that includes Inguinal hernia repair (Right) and cyst removal (Left). Allergies:  No Known Allergies     Home Meds:    Prior to Admission medications    Medication Sig Start Date End Date Taking? Authorizing Provider   aspirin EC 81 MG EC tablet Take 1 tablet by mouth daily.  3/27/15  Yes Darline Gomez MD   Gauze Pads & Dressings 4\"X4\" PADS Patient to do dressing changes bid 3/28/17   Cheryl Sanches MD        [facility] Meds:    Current Facility-Administered Medications   Medication Dose Route Frequency Provider Last Rate Last Dose    vancomycin (VANCOCIN) 1,250 mg in dextrose 5 % 250 mL IVPB  1,250 mg Intravenous Q12H Traci Rangel .7 mL/hr at 02/12/18 1404 1,250 mg at 02/12/18 1404    morphine injection 1 mg  1 mg Intravenous Q5 Min PRN Ida Marquez MD        HYDROmorphone (DILAUDID) injection 0.5 mg  0.5 mg Intravenous Q5 Min PRN Ida Marquez MD        fentaNYL (SUBLIMAZE) injection 25 mcg  25 mcg Intravenous Q5 Min PRN Ida Marquez MD        HYDROmorphone (DILAUDID) injection 0.5 mg  0.5 mg Intravenous Q5 Min PRN Ida Marquez MD        oxyCODONE-acetaminophen (PERCOCET) 5-325 MG per tablet 1 tablet  1 tablet Oral PRN Ida Marquez MD        Or    oxyCODONE-acetaminophen (PERCOCET) 5-325 MG
Erectile dysfunction is stable.  Continue sildenafil 100 mg p.o. every 72 hours PRN.  
0.10 02/10/2018    BASOSABS 0.00 02/10/2018    DIFFTYPE NOT REPORTED 02/10/2018     BMP:    Lab Results   Component Value Date     02/10/2018    K 3.9 02/10/2018     02/10/2018    CO2 22 02/10/2018    BUN 26 02/10/2018    LABALBU 4.2 06/18/2012    CREATININE 0.74 02/12/2018    CALCIUM 8.6 02/10/2018    GFRAA >60 02/12/2018    LABGLOM >60 02/12/2018    GLUCOSE 112 02/10/2018       IMPRESSION/RECOMMENDATIONS:      Patient Active Problem List   Diagnosis    Back pain    Tobacco abuse    Atrial fibrillation (HCC)    Thrush, oral    Fatigue    Left foot burn    Cellulitis    Cellulitis of buttock     Right gluteal abscess    Plan incision and drainage of abscess in OR today, NPO for procedure  Will likely need home care for daily packing changes on discharge following incision and drainage  Recommend smoking cessation, discussed with patient    Electronically signed by Sandrita Quiles DO on 2/12/2018 at 2:23 PM

## 2020-10-05 ENCOUNTER — OFFICE VISIT (OUTPATIENT)
Dept: UROLOGY | Age: 55
End: 2020-10-05
Payer: MEDICAID

## 2020-10-05 VITALS — HEART RATE: 75 BPM | TEMPERATURE: 97.3 F | DIASTOLIC BLOOD PRESSURE: 70 MMHG | SYSTOLIC BLOOD PRESSURE: 126 MMHG

## 2020-10-05 PROCEDURE — 4004F PT TOBACCO SCREEN RCVD TLK: CPT | Performed by: UROLOGY

## 2020-10-05 PROCEDURE — G8484 FLU IMMUNIZE NO ADMIN: HCPCS | Performed by: UROLOGY

## 2020-10-05 PROCEDURE — 99204 OFFICE O/P NEW MOD 45 MIN: CPT | Performed by: UROLOGY

## 2020-10-05 PROCEDURE — G8420 CALC BMI NORM PARAMETERS: HCPCS | Performed by: UROLOGY

## 2020-10-05 PROCEDURE — 3017F COLORECTAL CA SCREEN DOC REV: CPT | Performed by: UROLOGY

## 2020-10-05 PROCEDURE — G8427 DOCREV CUR MEDS BY ELIG CLIN: HCPCS | Performed by: UROLOGY

## 2020-10-05 RX ORDER — TADALAFIL 5 MG/1
5 TABLET ORAL DAILY
Qty: 90 TABLET | Refills: 3 | Status: SHIPPED | OUTPATIENT
Start: 2020-10-05 | End: 2021-11-11

## 2020-10-05 ASSESSMENT — ENCOUNTER SYMPTOMS
SHORTNESS OF BREATH: 0
ABDOMINAL PAIN: 0
EYE REDNESS: 0
COLOR CHANGE: 0
BACK PAIN: 0
EYE PAIN: 0
VOMITING: 0
COUGH: 0
NAUSEA: 0
WHEEZING: 0

## 2020-10-05 NOTE — PROGRESS NOTES
[address] [address]  Dept: [phone]  Dept Fax: [phone] [facility] Urology Office Note - New patient    Patient:  Marina Vazquez  YOB: 1965  Date: 10/5/2020    The patient is a 54 y.o. male who presents todayfor evaluation of the following problems:   Chief Complaint   Patient presents with    Benign Prostatic Hypertrophy     new pt enlarged prostATE ct in aug     Erectile Dysfunction     cannot get an erection,     referred by Chanelle Kruger MD.      HPI  BPH/LUTS:  Patient is here today for lower urinary tract symptoms which started a few year(s) ago. Recently the urinary symptoms are: are worsening  Current medical Rx for BPH/LUTS: tried tamsulosin but stopped due to fatigue  Lower urinary tract symptoms: frequency, hesitancy, decreased urinary stream and nocturia, 4-5 times per night    Pt has ED. Started about one year ago. Has trouble achieving and maintaining erections. Has good libido. No fatigue. Has not tried oral meds. (Patient's old records have been requested, reviewed and summarized in today's note.)    Summary of old records: N/A    Additional History: N/A    Procedures Today: N/A    Last several PSA's:  No results found for: PSA  Last total testosterone:  No results found for: TESTOSTERONE  Urinalysis today:  No results found for this visit on 10/05/20.     AUA Symptom Score (10/5/2020):  INCOMPLETE EMPTYING: How often have you had the sensation of not emptying your bladder?: Not at all  FREQUENCY: How often do you have to urinate less than every two hours?: Not at all  INTERMITTENCY: How often have you found you stopped and started again several times when you urinated?: Not at all  URGENCY: How often have you found it difficult to postpone urination?: Not at all  WEAK STREAM: How often have you had a weak urinary stream?: Not at all  STRAINING: How often have you had to strain to start  urination?: Not at all  NOCTURIA: How many times did you typically get up at night to uriniate?: 5 Times  TOTAL I-PSS SCORE[de-identified] 5  How would you feel if you were to spend the rest of your life with your urinary condition?: Mixe    Last BUN and creatinine:  Lab Results   Component Value Date    BUN 24 (H) 08/13/2020     Lab Results   Component Value Date    CREATININE 0.82 08/13/2020       Additional Lab/Culture results: none    Imaging Reviewed during this Office Visit: none  (results were independently reviewed by physician and radiology report verified)    PAST MEDICAL, FAMILY AND SOCIAL HISTORY:  Past Medical History:   Diagnosis Date    Abnormal echocardiogram 02/2018    Arrhythmia     AS (aortic stenosis)     Atrial fibrillation (HCC)     Benign prostatic hyperplasia     Biatrial enlargement     Central perforation of tympanic membrane, right     Chest pain     Chews tobacco     CHF (congestive heart failure), NYHA class I, acute on chronic, combined ([address]     Chronic back pain     Conductive hearing loss of right ear     COPD (chronic obstructive pulmonary disease) ([address]     Diverticulitis     Fatigue     History of cocaine abuse ([address] 01/2018    Household contact with history of methicillin resistant Staphylococcus aureus (MRSA) infection     Lung nodule     Mass of upper lobe of left lung     Mild concentric left ventricular hypertrophy     Murmur, cardiac     Near syncope     Nonrheumatic aortic (valve) stenosis     Palpitations     Panlobular emphysema ([address]     Pneumonia of both upper lobes due to infectious organism     Right-sided tinnitus     Severe aortic stenosis     Shortness of breath     Tobacco abuse      Past Surgical History:   Procedure Laterality Date    AORTIC VALVE REPLACEMENT  12/2018    CYST REMOVAL Left     INGUINAL HERNIA REPAIR Right     ND DRAIN SKIN ABSCESS COMPLIC N/A 1/33/8727    GLUTEAL INCISION AND DRAINAGE performed by distress. Neuro: Alert and oriented to person, place and time. Psych: Mood normal, affect normal  Skin: No rash noted  HEENT: Head: Normocephalic and atraumatic  Conjunctivae and EOM are normal. Pupils are equal, round  Nose: Normal  Right External Ear: Normal; Left External Ear: Normal  Mouth: Mucosa Moist  Neck: Supple  Lungs:Respiratory effort is normal  Cardiovascular: Warm & Pink  Abdomen: Soft, non-tender, non-distendedwith no CVA,  No flank tenderness,  Orhepatosplenomegaly   Lymphatics: No palpable lymphadenopathy. Bladder non-tender and not distended. Musculoskeletal: Normal gait and station  Penis normal and circumcised  Urethral meatus normal  Scrotal exam normal  Testicles normal bilaterally  Epididymis normal bilaterally  No evidence of inguinal hernia  Normal rectal tone with no masses  Prostate: smooth, symmetric, no nodule. Assessment and Plan      1. BPH with obstruction/lower urinary tract symptoms    2. Weak urinary stream    3. Nocturia    4. Frequency of urination    5. Erectile dysfunction due to arterial insufficiency    6. Prostate cancer screening           Plan:  Tadalafil daily for ED and bph  psa  F/u 6 weeks       Prescriptions Ordered:  Orders Placed This Encounter   Medications    tadalafil (CIALIS) 5 MG tablet     Sig: Take 1 tablet by mouth daily     Dispense:  90 tablet     Refill:  3      Orders Placed:  Orders Placed This Encounter   Procedures    PSA, Diagnostic     Standing Status:   Future     Standing Expiration Date:   9/30/2021             Tiarra Burgos MD    Agree with the ROS entered by the MA.

## 2020-10-05 NOTE — PROGRESS NOTES
Review of Systems   Constitutional: Negative for appetite change, chills and fever. Eyes: Negative for pain, redness and visual disturbance. Respiratory: Negative for cough, shortness of breath and wheezing. Cardiovascular: Negative for chest pain and leg swelling. Gastrointestinal: Negative for abdominal pain, nausea and vomiting. Genitourinary: Positive for frequency (at night). Negative for difficulty urinating, discharge, dysuria, flank pain, hematuria, scrotal swelling, testicular pain and urgency. Musculoskeletal: Negative for back pain, joint swelling and myalgias. Skin: Negative for color change, rash and wound. Neurological: Negative for dizziness, tremors and numbness. Hematological: Negative for adenopathy. Does not bruise/bleed easily.

## 2020-10-15 ENCOUNTER — TELEPHONE (OUTPATIENT)
Dept: INTERNAL MEDICINE CLINIC | Age: 55
End: 2020-10-15

## 2020-10-15 RX ORDER — AZITHROMYCIN 250 MG/1
250 TABLET, FILM COATED ORAL SEE ADMIN INSTRUCTIONS
Qty: 6 TABLET | Refills: 0 | Status: SHIPPED | OUTPATIENT
Start: 2020-10-15 | End: 2020-10-20

## 2020-10-15 NOTE — TELEPHONE ENCOUNTER
Sick Call    Mynor Galvez   1965   [id number]   Ivan Cowan MD   No Known Allergies     Last Visit: 08/17/20    Reason for No Same Day Appt: patient is in [facility] [employer]    Complaint: h/o bronchitis--now starting with chest congestion--coughing with colored mucous--nasal drainage causaing gagging      Ph[facility]:  [facility]

## 2020-10-27 RX ORDER — ALBUTEROL SULFATE 90 UG/1
2 AEROSOL, METERED RESPIRATORY (INHALATION) EVERY 6 HOURS PRN
Qty: 1 INHALER | Refills: 5 | Status: SHIPPED | OUTPATIENT
Start: 2020-10-27 | End: 2021-10-07 | Stop reason: SDUPTHER

## 2020-10-27 NOTE — TELEPHONE ENCOUNTER
Medication: albuterol inhaler  Last visit: 08/17/20  Next visit: 11/13/2020  Last refill:   Pharmacy: [facility] [place]

## 2020-11-03 ENCOUNTER — TELEPHONE (OUTPATIENT)
Dept: INTERNAL MEDICINE CLINIC | Age: 55
End: 2020-11-03

## 2020-11-03 PROBLEM — I48.91 A-FIB (HCC): Status: RESOLVED | Noted: 2018-09-01 | Resolved: 2020-11-03

## 2020-11-03 RX ORDER — DOXYCYCLINE HYCLATE 100 MG
100 TABLET ORAL 2 TIMES DAILY
Qty: 20 TABLET | Refills: 0 | Status: SHIPPED | OUTPATIENT
Start: 2020-11-03 | End: 2020-11-13

## 2020-11-03 NOTE — TELEPHONE ENCOUNTER
Writer spoke w/ pts girlfriend and informed her that medication was sent to pharmacy & to call & schedule appt if boil doesn't improve in the next couple of days. Girlfriend voiced understanding.

## 2020-11-03 NOTE — TELEPHONE ENCOUNTER
Pts girlfriend called & stated pt has developed a boil on buttock and is requesting an atbx. The last time pt had a boil he had to have it lanced and doesn't want to go through that again.     No Known Allergies    Last appt 8/17/20  Next Appt: 11/13/20

## 2020-11-13 ENCOUNTER — TELEPHONE (OUTPATIENT)
Dept: UROLOGY | Age: 55
End: 2020-11-13

## 2020-11-16 ENCOUNTER — OFFICE VISIT (OUTPATIENT)
Dept: UROLOGY | Age: 55
End: 2020-11-16
Payer: MEDICAID

## 2020-11-16 ENCOUNTER — TELEPHONE (OUTPATIENT)
Dept: INTERNAL MEDICINE CLINIC | Age: 55
End: 2020-11-16

## 2020-11-16 VITALS
HEIGHT: 68 IN | SYSTOLIC BLOOD PRESSURE: 130 MMHG | HEART RATE: 89 BPM | TEMPERATURE: 97.2 F | BODY MASS INDEX: 22.58 KG/M2 | DIASTOLIC BLOOD PRESSURE: 63 MMHG | WEIGHT: 149 LBS

## 2020-11-16 PROCEDURE — G8427 DOCREV CUR MEDS BY ELIG CLIN: HCPCS | Performed by: UROLOGY

## 2020-11-16 PROCEDURE — 3017F COLORECTAL CA SCREEN DOC REV: CPT | Performed by: UROLOGY

## 2020-11-16 PROCEDURE — G8420 CALC BMI NORM PARAMETERS: HCPCS | Performed by: UROLOGY

## 2020-11-16 PROCEDURE — G8484 FLU IMMUNIZE NO ADMIN: HCPCS | Performed by: UROLOGY

## 2020-11-16 PROCEDURE — 4004F PT TOBACCO SCREEN RCVD TLK: CPT | Performed by: UROLOGY

## 2020-11-16 PROCEDURE — 99214 OFFICE O/P EST MOD 30 MIN: CPT | Performed by: UROLOGY

## 2020-11-16 ASSESSMENT — ENCOUNTER SYMPTOMS
CONSTIPATION: 0
NAUSEA: 0
SHORTNESS OF BREATH: 0
EYE PAIN: 0
WHEEZING: 0
DIARRHEA: 0
VOMITING: 0
ABDOMINAL PAIN: 0
BACK PAIN: 0
COUGH: 0
EYE REDNESS: 0

## 2020-11-16 NOTE — PROGRESS NOTES
Review of Systems   Constitutional: Negative for appetite change, chills and fatigue. Eyes: Negative for pain, redness and visual disturbance. Respiratory: Negative for cough, shortness of breath and wheezing. Cardiovascular: Negative for chest pain and leg swelling. Gastrointestinal: Negative for abdominal pain, constipation, diarrhea, nausea and vomiting. Genitourinary: Negative for difficulty urinating, dysuria, flank pain, frequency, hematuria and urgency. Musculoskeletal: Negative for back pain, joint swelling and myalgias. Skin: Negative for rash and wound. Neurological: Negative for dizziness, weakness and numbness. Hematological: Bruises/bleeds easily.

## 2020-11-16 NOTE — PROGRESS NOTES
08/13/2020     Lab Results   Component Value Date    CREATININE 0.82 08/13/2020       Additional Lab/Culture results: PSA 0.68    Imaging Reviewed during this Office Visit: none  (results were independently reviewed by physician and radiology report verified)    PAST MEDICAL, FAMILY AND SOCIAL HISTORY UPDATE:  Past Medical History:   Diagnosis Date    Abnormal echocardiogram 02/2018    Arrhythmia     AS (aortic stenosis)     Atrial fibrillation (HCC)     Benign prostatic hyperplasia     Biatrial enlargement     Central perforation of tympanic membrane, right     Chest pain     Chews tobacco     CHF (congestive heart failure), NYHA class I, acute on chronic, combined (HCC)     Chronic back pain     Conductive hearing loss of right ear     COPD (chronic obstructive pulmonary disease) [facility]     Diverticulitis     Fatigue     History of cocaine abuse ([facility] 01/2018    Household contact with history of methicillin resistant Staphylococcus aureus (MRSA) infection     Lung nodule     Mass of upper lobe of left lung     Mild concentric left ventricular hypertrophy     Murmur, cardiac     Near syncope     Nonrheumatic aortic (valve) stenosis     Palpitations     Panlobular emphysema (HCC)     Pneumonia of both upper lobes due to infectious organism     Right-sided tinnitus     Severe aortic stenosis     Shortness of breath     Tobacco abuse      Past Surgical History:   Procedure Laterality Date    AORTIC VALVE REPLACEMENT  12/2018    CYST REMOVAL Left     INGUINAL HERNIA REPAIR Right     NH DRAIN SKIN ABSCESS COMPLIC N/A 1/18/4832    GLUTEAL INCISION AND DRAINAGE performed by Mayra Jack DO at [address]     Family History   Problem Relation Age of Onset    Depression Mother     COPD Father     COPD Paternal Grandfather      Outpatient Medications Marked as Taking for the 11/16/20 encounter (Office Visit) with Khadijah Alcocer MD   Medication Sig Dispense Refill    albuterol sulfate  (90 Base) MCG/ACT inhaler Inhale 2 puffs into the lungs every 6 hours as needed for Shortness of Breath 1 Inhaler 5    tadalafil (CIALIS) 5 MG tablet Take 1 tablet by mouth daily 90 tablet 3    tamsulosin (FLOMAX) 0.4 MG capsule Take 1 capsule by mouth daily 30 capsule 0    dicyclomine (BENTYL) 10 MG capsule Take 1 capsule by mouth every 6 hours as needed (cramps) 20 capsule 0    ondansetron (ZOFRAN) 4 MG tablet Take 1 tablet by mouth every 8 hours as needed for Nausea 20 tablet 0    dilTIAZem (CARDIZEM CD) 120 MG extended release capsule Take 120 mg by mouth daily      fluticasone (FLONASE) 50 MCG/ACT nasal spray 1 spray by Each Nostril route daily 2 Bottle 1    tadalafil (CIALIS) 10 MG tablet Take 1 tablet by mouth as needed for Erectile Dysfunction 10 tablet 3    loratadine (CLARITIN) 10 MG tablet Take 1 tablet by mouth daily 10 tablet 0    apixaban (ELIQUIS) 5 MG TABS tablet Take 1 tablet by mouth 2 times daily 60 tablet 3    aspirin 81 MG chewable tablet Take 1 tablet by mouth daily 30 tablet 3       Patient has no known allergies. Social History     Tobacco Use   Smoking Status Current Every Day Smoker    Packs/day: 0.25    Years: 40.00    Pack years: 10.00    Types: Cigarettes   Smokeless Tobacco Current User    Types: Chew   Tobacco Comment    pt  previously smoked 1.5 ppd;     (Ifpatient a smoker, smoking cessation counseling offered)    Social History     Substance and Sexual Activity   Alcohol Use Not Currently       REVIEW OF SYSTEMS:  Review of Systems    Physical Exam:      Vitals:    11/16/20 0850   BP: 130/63   Pulse: 89   Temp: 97.2 °F (36.2 °C)     Body mass index is 22.66 kg/m². Patient is a 54 y.o. male in no acute distress and alert and oriented to person, place and time. Physical Exam  Constitutional: Patient in no acute distress. Neuro: Alert and oriented to person, place and time.   Psych: Mood normal, affect normal  Skin: No rash noted  HEENT: Head: Normocephalic andatraumatic  Conjunctivae and EOM are normal. Pupils are equal, round  Nose:Normal  Right External Ear: Normal; Left External Ear: Normal  Mouth: Mucosa Moist  Neck: Supple  Lungs: Respiratory effort is normal  Cardiovascular: Warm & Pink  Abdomen: Soft, non-tender, non-distended with no CVA,  No flank tenderness,  Or hepatosplenomegaly       Assessment and Plan      1. BPH with obstruction/lower urinary tract symptoms    2. Weak urinary stream    3. Nocturia    4. Frequency of urination    5. Erectile dysfunction due to arterial insufficiency    6. Prostate cancer screening           Plan:   F/u 6 mo  Cont tadalafil      Return in about 6 months (around 5/16/2021). Prescriptions Ordered:  No orders of the defined types were placed in this encounter. Orders Placed:  No orders of the defined types were placed in this encounter. Ruddy Merida MD    Agree with the ROS entered by the MA.

## 2020-11-20 RX ORDER — FLUTICASONE PROPIONATE 50 MCG
1 SPRAY, SUSPENSION (ML) NASAL DAILY
Qty: 2 BOTTLE | Refills: 1 | Status: SHIPPED | OUTPATIENT
Start: 2020-11-20 | End: 2021-04-12 | Stop reason: SDUPTHER

## 2020-11-20 NOTE — TELEPHONE ENCOUNTER
Medication: Flonase  Last visit: 8/17/20  Next visit: Visit date not found  Last refill: 7/20/20  Pharmacy: [facility]

## 2020-12-15 ENCOUNTER — VIRTUAL VISIT (OUTPATIENT)
Dept: INTERNAL MEDICINE CLINIC | Age: 55
End: 2020-12-15
Payer: MEDICAID

## 2020-12-15 ENCOUNTER — TELEPHONE (OUTPATIENT)
Dept: INTERNAL MEDICINE CLINIC | Age: 55
End: 2020-12-15

## 2020-12-15 PROCEDURE — 99213 OFFICE O/P EST LOW 20 MIN: CPT | Performed by: NURSE PRACTITIONER

## 2020-12-15 RX ORDER — ACETAMINOPHEN 500 MG
1000 TABLET ORAL 3 TIMES DAILY PRN
Qty: 200 TABLET | Refills: 1 | Status: SHIPPED | OUTPATIENT
Start: 2020-12-15 | End: 2021-04-12 | Stop reason: SDUPTHER

## 2020-12-15 NOTE — PROGRESS NOTES
Ivis Soriano (:  1965) has requested an audio/video evaluation for the following concern(s):    Left shoulder pain, which he states has been going on for years but seems to be worsening. He rates the pain as aching, severe, 8 out of 10 at times, radiating into head/ear and down to elbow at times, worse with shoveling dirt at his job and certain movements, better with heat. He states that he has had an x-ray in the past, however no recent x-ray available in [facility] or Care Everywhere. He has not had physical therapy but states he does not think he would have time to do it. He is currently working in [employer]. Review of Systems   Constitutional: Negative for chills, fatigue and fever. HENT: Negative for ear pain and hearing loss. Respiratory: Negative for cough, shortness of breath and wheezing. Cardiovascular: Negative for chest pain, palpitations and leg swelling. Gastrointestinal: Negative for abdominal pain, nausea and vomiting. Musculoskeletal: Positive for arthralgias. Negative for joint swelling. Left shoulder pain, intermittent for years, worsening. 8/10 at worst, radiates into head/ear, down to elbow  No numbness in fingers  Worse with shoveling dirt at work, worse with certain movements  Heating pad helps, icy hot   Skin: Negative for color change and rash. Neurological: Negative for weakness and numbness. Prior to Visit Medications    Medication Sig Taking?  Authorizing Provider   nicotine (NICODERM CQ) 7 MG/24HR Place 1 patch onto the skin every 24 hours Yes BENEDICT Escamilla CNP   acetaminophen (TYLENOL) 500 MG tablet Take 2 tablets by mouth 3 times daily as needed for Pain Yes BENEDICT Escamilla CNP   fluticasone (FLONASE) 50 MCG/ACT nasal spray 1 spray by Each Nostril route daily Yes Wilmer Cobb MD albuterol sulfate  (90 Base) MCG/ACT inhaler Inhale 2 puffs into the lungs every 6 hours as needed for Shortness of Breath Yes Laney Arauz MD   tadalafil (CIALIS) 5 MG tablet Take 1 tablet by mouth daily Yes Glenda Koch MD   tamsulosin (FLOMAX) 0.4 MG capsule Take 1 capsule by mouth daily Yes Laney Arauz MD   dicyclomine (BENTYL) 10 MG capsule Take 1 capsule by mouth every 6 hours as needed (cramps) Yes Aura Jovel PA-C   ondansetron (ZOFRAN) 4 MG tablet Take 1 tablet by mouth every 8 hours as needed for Nausea Yes Aura Jovel PA-C   dilTIAZem (CARDIZEM CD) 120 MG extended release capsule Take 120 mg by mouth daily Yes Elvin Mann MD   loratadine (CLARITIN) 10 MG tablet Take 1 tablet by mouth daily Yes Laney Arauz MD   apixaban (ELIQUIS) 5 MG TABS tablet Take 1 tablet by mouth 2 times daily Yes Rudine Nyhan, MD   aspirin 81 MG chewable tablet Take 1 tablet by mouth daily Yes Rudine Nyhan, MD       Social History     Tobacco Use    Smoking status: Former Smoker     Packs/day: 0.25     Years: 40.00     Pack years: 10.00    Smokeless tobacco: Current User     Types: Chew    Tobacco comment: pt  previously smoked 1.5 ppd;   Substance Use Topics    Alcohol use: Not Currently    Drug use: Yes     Types: Cocaine     Comment: drug abuse includes crack cocaine; last used around February 2020        No Known Allergies,   Past Medical History:   Diagnosis Date    Abnormal echocardiogram 02/2018    Arrhythmia     AS (aortic stenosis)     Atrial fibrillation (HCC)     Benign prostatic hyperplasia     Biatrial enlargement     Central perforation of tympanic membrane, right     Chest pain     Chews tobacco     CHF (congestive heart failure), NYHA class I, acute on chronic, combined (HCC)     Chronic back pain     Conductive hearing loss of right ear     COPD (chronic obstructive pulmonary disease) (HCC)     Diverticulitis     Fatigue  History of cocaine abuse ([facility] [address] 01/2018    Household contact with history of methicillin resistant Staphylococcus aureus (MRSA) infection     Lung nodule     Mass of upper lobe of left lung     Mild concentric left ventricular hypertrophy     Murmur, cardiac     Near syncope     Nonrheumatic aortic (valve) stenosis     Palpitations     Panlobular emphysema (HCC)     Pneumonia of both upper lobes due to infectious organism     Right-sided tinnitus     Severe aortic stenosis     Shortness of breath     Tobacco abuse    ,   Past Surgical History:   Procedure Laterality Date    AORTIC VALVE REPLACEMENT  12/2018    CYST REMOVAL Left     INGUINAL HERNIA REPAIR Right     [place] DRAIN SKIN ABSCESS COMPLIC N/A 8/92/6112    GLUTEAL INCISION AND DRAINAGE performed by Moises Chau DO at [address]   ,   Social History     Tobacco Use    Smoking status: Former Smoker     Packs/day: 0.25     Years: 40.00     Pack years: 10.00    Smokeless tobacco: Current User     Types: Chew    Tobacco comment: pt  previously smoked 1.5 ppd;   Substance Use Topics    Alcohol use: Not Currently    Drug use: Yes     Types: Cocaine     Comment: drug abuse includes crack cocaine; last used around February 2020       PHYSICAL EXAMINATION:  Patient was not physically examined as this is a virtual visit. Telephone visit only. Patient-Reported Vitals 12/15/2020   Patient-Reported Weight 148  lbs   Patient-Reported Height 5'8        Visit Diagnoses and Associated Orders     Chronic left shoulder pain    -  Primary    Worsening, check Xray. Patient to use tylenol 1000 mg bid routinely x 7 days, TID prn.    acetaminophen (TYLENOL) 500 MG tablet [102]      XR SHOULDER LEFT (MIN 2 VIEWS) [03738 Custom]   - Future Order         Former smoker        Patient using nicoderm patches and doing well, discussed weaning from patches.     nicotine (Laurence Null) 7 MG/24HR [80810]                Visit Diagnoses and Associated Orders Chronic left shoulder pain    -  Primary    Worsening, check Xray. Patient to use tylenol 1000 mg bid routinely x 7 days, TID prn.    acetaminophen (TYLENOL) 500 MG tablet [102]      XR SHOULDER LEFT (MIN 2 VIEWS) [84189 Custom]   - Future Order         Former smoker        Patient using nicoderm patches and doing well, discussed weaning from patches. nicotine (Willim Volcano) 7 MG/24HR [10237]                 Return in about 2 weeks (around 12/29/2020) for shoulder pain, or sooner if needed. Documentation:  I communicated with the patient and/or health care decision maker about current symptoms, diagnoses, treatment plan, limitations of telephone visit. Details of this discussion including any medical advice provided: Patient to avoid aggravating shoulder pain, use heat/ice as needed, Tylenol. Avoid NSAIDs due to Eliquis. Consider PT referral.  Patient encouraged to make in person appointment with PCP for follow-up. I affirm this is a Patient Initiated Episode with a Patient who has not had a related appointment within my department in the past 7 days or scheduled within the next 24 hours.     Patient identification was verified at the start of the visit: Yes    Total Time: minutes: 11-20 minutes    Note: not billable if this call serves to triage the patient into an appointment for the relevant concern      BENEDICT Rascon - CNP

## 2020-12-15 NOTE — TELEPHONE ENCOUNTER
Left message for patient to call us back at office to check out from his vv with khadijah    He has an xray order I will mail to home address on file

## 2020-12-16 ASSESSMENT — ENCOUNTER SYMPTOMS
WHEEZING: 0
SHORTNESS OF BREATH: 0
NAUSEA: 0
ABDOMINAL PAIN: 0
COLOR CHANGE: 0
COUGH: 0
VOMITING: 0

## 2021-01-18 ENCOUNTER — HOSPITAL ENCOUNTER (OUTPATIENT)
Dept: GENERAL RADIOLOGY | Facility: CLINIC | Age: 56
Discharge: HOME | End: 2021-01-20
Payer: MEDICAID

## 2021-01-18 ENCOUNTER — HOSPITAL ENCOUNTER (OUTPATIENT)
Facility: CLINIC | Age: 56
Discharge: HOME | End: 2021-01-20
Payer: MEDICAID

## 2021-01-18 DIAGNOSIS — M25.512 CHRONIC LEFT SHOULDER PAIN: ICD-10-CM

## 2021-01-18 DIAGNOSIS — G89.29 CHRONIC LEFT SHOULDER PAIN: ICD-10-CM

## 2021-01-18 PROCEDURE — 73030 X-RAY EXAM OF SHOULDER: CPT

## 2021-01-19 ENCOUNTER — TELEPHONE (OUTPATIENT)
Dept: INTERNAL MEDICINE CLINIC | Age: 56
End: 2021-01-19

## 2021-01-19 NOTE — TELEPHONE ENCOUNTER
Please let patient know his Xray shows mild OA of shoulder, no acute fracture or dislocation. It also shows a lung nodule which appears similar to imaging done in 2018. Please have him schedule an in person visit with Dr Prakash Hong to discuss both.

## 2021-02-23 ENCOUNTER — TELEPHONE (OUTPATIENT)
Dept: INTERNAL MEDICINE CLINIC | Age: 56
End: 2021-02-23

## 2021-02-23 DIAGNOSIS — L02.92 BOIL: Primary | ICD-10-CM

## 2021-02-23 RX ORDER — SULFAMETHOXAZOLE AND TRIMETHOPRIM 800; 160 MG/1; MG/1
1 TABLET ORAL 2 TIMES DAILY
Qty: 14 TABLET | Refills: 0 | Status: SHIPPED | OUTPATIENT
Start: 2021-02-23 | End: 2021-03-02

## 2021-02-23 NOTE — TELEPHONE ENCOUNTER
Spoke with patient, he expressed understanding.  Advised to call for appt if symptoms do not improve

## 2021-02-23 NOTE — TELEPHONE ENCOUNTER
Sick Call    Martin Casillas   1965   [id number]   Bell Waite MD   No Known Allergies     Last Visit: 12/15/2020  Reason for No Same Day Appt:   Complaint: another boil on butt ox, requesting antibiotic please advise.         Pharmacy: [facility] [place]

## 2021-04-12 ENCOUNTER — OFFICE VISIT (OUTPATIENT)
Dept: INTERNAL MEDICINE CLINIC | Age: 56
End: 2021-04-12
Payer: MEDICAID

## 2021-04-12 ENCOUNTER — HOSPITAL ENCOUNTER (OUTPATIENT)
Age: 56
Setting detail: SPECIMEN
Discharge: HOME | End: 2021-04-12
Payer: MEDICAID

## 2021-04-12 ENCOUNTER — NURSE ONLY (OUTPATIENT)
Dept: FAMILY MEDICINE CLINIC | Age: 56
End: 2021-04-12

## 2021-04-12 ENCOUNTER — HOSPITAL ENCOUNTER (OUTPATIENT)
Dept: GENERAL RADIOLOGY | Facility: CLINIC | Age: 56
Discharge: HOME | End: 2021-04-14
Payer: MEDICAID

## 2021-04-12 ENCOUNTER — HOSPITAL ENCOUNTER (OUTPATIENT)
Facility: CLINIC | Age: 56
Discharge: HOME | End: 2021-04-14
Payer: MEDICAID

## 2021-04-12 VITALS
SYSTOLIC BLOOD PRESSURE: 110 MMHG | DIASTOLIC BLOOD PRESSURE: 68 MMHG | TEMPERATURE: 97.3 F | HEIGHT: 68 IN | BODY MASS INDEX: 24.74 KG/M2 | WEIGHT: 163.2 LBS

## 2021-04-12 DIAGNOSIS — R05.9 COUGH: ICD-10-CM

## 2021-04-12 DIAGNOSIS — I48.91 ATRIAL FIBRILLATION, UNSPECIFIED TYPE (HCC): ICD-10-CM

## 2021-04-12 DIAGNOSIS — G89.29 CHRONIC LEFT SHOULDER PAIN: ICD-10-CM

## 2021-04-12 DIAGNOSIS — Z12.11 COLON CANCER SCREENING: Primary | ICD-10-CM

## 2021-04-12 DIAGNOSIS — J30.1 ALLERGIC RHINITIS DUE TO POLLEN, UNSPECIFIED SEASONALITY: ICD-10-CM

## 2021-04-12 DIAGNOSIS — M25.512 CHRONIC LEFT SHOULDER PAIN: ICD-10-CM

## 2021-04-12 DIAGNOSIS — J44.9 CHRONIC OBSTRUCTIVE PULMONARY DISEASE, UNSPECIFIED COPD TYPE (HCC): ICD-10-CM

## 2021-04-12 DIAGNOSIS — Z20.822 ENCOUNTER FOR LABORATORY TESTING FOR COVID-19 VIRUS: Primary | ICD-10-CM

## 2021-04-12 DIAGNOSIS — I42.8 NICM (NONISCHEMIC CARDIOMYOPATHY) (HCC): ICD-10-CM

## 2021-04-12 PROCEDURE — G8420 CALC BMI NORM PARAMETERS: HCPCS | Performed by: INTERNAL MEDICINE

## 2021-04-12 PROCEDURE — 99214 OFFICE O/P EST MOD 30 MIN: CPT | Performed by: INTERNAL MEDICINE

## 2021-04-12 PROCEDURE — G8926 SPIRO NO PERF OR DOC: HCPCS | Performed by: INTERNAL MEDICINE

## 2021-04-12 PROCEDURE — 3023F SPIROM DOC REV: CPT | Performed by: INTERNAL MEDICINE

## 2021-04-12 PROCEDURE — 3017F COLORECTAL CA SCREEN DOC REV: CPT | Performed by: INTERNAL MEDICINE

## 2021-04-12 PROCEDURE — G8427 DOCREV CUR MEDS BY ELIG CLIN: HCPCS | Performed by: INTERNAL MEDICINE

## 2021-04-12 PROCEDURE — 4004F PT TOBACCO SCREEN RCVD TLK: CPT | Performed by: INTERNAL MEDICINE

## 2021-04-12 PROCEDURE — 71046 X-RAY EXAM CHEST 2 VIEWS: CPT

## 2021-04-12 RX ORDER — DILTIAZEM HYDROCHLORIDE 120 MG/1
120 CAPSULE, EXTENDED RELEASE ORAL DAILY
COMMUNITY
Start: 2020-02-07 | End: 2021-10-07 | Stop reason: ALTCHOICE

## 2021-04-12 RX ORDER — AZITHROMYCIN 250 MG/1
250 TABLET, FILM COATED ORAL SEE ADMIN INSTRUCTIONS
Qty: 6 TABLET | Refills: 0 | Status: SHIPPED | OUTPATIENT
Start: 2021-04-12 | End: 2021-04-17

## 2021-04-12 RX ORDER — PREDNISONE 20 MG/1
20 TABLET ORAL DAILY
Qty: 5 TABLET | Refills: 0 | Status: SHIPPED | OUTPATIENT
Start: 2021-04-12 | End: 2021-04-17

## 2021-04-12 RX ORDER — ACETAMINOPHEN 500 MG
1000 TABLET ORAL 3 TIMES DAILY PRN
Qty: 200 TABLET | Refills: 1 | Status: SHIPPED | OUTPATIENT
Start: 2021-04-12 | End: 2022-10-07

## 2021-04-12 RX ORDER — FLUTICASONE PROPIONATE 50 MCG
1 SPRAY, SUSPENSION (ML) NASAL DAILY
Qty: 2 BOTTLE | Refills: 1 | Status: SHIPPED | OUTPATIENT
Start: 2021-04-12

## 2021-04-12 ASSESSMENT — PATIENT HEALTH QUESTIONNAIRE - PHQ9
SUM OF ALL RESPONSES TO PHQ9 QUESTIONS 1 & 2: 0
2. FEELING DOWN, DEPRESSED OR HOPELESS: 0
SUM OF ALL RESPONSES TO PHQ QUESTIONS 1-9: 0

## 2021-04-12 NOTE — PROGRESS NOTES
done    COVID-19 Vaccine (1) Never done    Shingles Vaccine (1 of 2) Never done    Colon cancer screen colonoscopy  Never done    Flu vaccine (Season Ended) 09/01/2021    Lipid screen  03/28/2023    DTaP/Tdap/Td vaccine (3 - Td) 09/18/2025    Pneumococcal 0-64 years Vaccine  Completed    HIV screen  Completed    Hepatitis A vaccine  Aged Out    Hepatitis B vaccine  Aged Out    Hib vaccine  Aged Out    Meningococcal (ACWY) vaccine  Aged Out

## 2021-04-13 DIAGNOSIS — R05.9 COUGH: ICD-10-CM

## 2021-04-14 LAB
SARS-COV-2: NORMAL
SARS-COV-2: NOT DETECTED
SOURCE: NORMAL

## 2021-04-15 ENCOUNTER — TELEPHONE (OUTPATIENT)
Dept: PRIMARY CARE CLINIC | Age: 56
End: 2021-04-15

## 2021-04-16 ENCOUNTER — TELEPHONE (OUTPATIENT)
Dept: INTERNAL MEDICINE CLINIC | Age: 56
End: 2021-04-16

## 2021-04-16 DIAGNOSIS — B37.0 ORAL THRUSH: Primary | ICD-10-CM

## 2021-04-16 NOTE — TELEPHONE ENCOUNTER
Sick Call    Lisa Maynard   1965   [id number]   Elijah Hightower MD   No Known Allergies     Last Visit: 4/12/2021  Reason for No Same Day Appt:     Complaint:  [place] in his mouth, requesting Rx      Pharmacy: [facility] / [place]

## 2021-04-18 ASSESSMENT — ENCOUNTER SYMPTOMS
DIARRHEA: 1
APNEA: 0
CHEST TIGHTNESS: 0
SHORTNESS OF BREATH: 1
COLOR CHANGE: 0
ABDOMINAL DISTENTION: 0
ABDOMINAL PAIN: 0
WHEEZING: 0
FACIAL SWELLING: 0
COUGH: 1
BACK PAIN: 0
RHINORRHEA: 1
CONSTIPATION: 0

## 2021-04-27 ENCOUNTER — TELEPHONE (OUTPATIENT)
Dept: INTERNAL MEDICINE CLINIC | Age: 56
End: 2021-04-27

## 2021-04-27 DIAGNOSIS — L02.92 BOIL: Primary | ICD-10-CM

## 2021-04-27 NOTE — TELEPHONE ENCOUNTER
Patient has another boil on his butt that he was prescribed medication before, patient ask that the medication be called in again at [facility] in [place]. Please Advise.

## 2021-04-28 RX ORDER — DOXYCYCLINE HYCLATE 100 MG
100 TABLET ORAL 2 TIMES DAILY
Qty: 14 TABLET | Refills: 0 | Status: SHIPPED | OUTPATIENT
Start: 2021-04-28 | End: 2021-05-05

## 2021-05-27 ENCOUNTER — TELEPHONE (OUTPATIENT)
Dept: INTERNAL MEDICINE CLINIC | Age: 56
End: 2021-05-27

## 2021-05-27 NOTE — TELEPHONE ENCOUNTER
Writer attempted to reach pt by phone regarding 3rd no show appt gul[facility] w/ Dr. Marilu Reese on 5/27/21. No answer, not available to accept calls at this time. Ltr mailed.

## 2021-07-06 ENCOUNTER — TELEPHONE (OUTPATIENT)
Dept: INTERNAL MEDICINE CLINIC | Age: 56
End: 2021-07-06

## 2021-07-06 ENCOUNTER — NURSE TRIAGE (OUTPATIENT)
Dept: OTHER | Facility: CLINIC | Age: 56
End: 2021-07-06

## 2021-07-06 NOTE — TELEPHONE ENCOUNTER
----- Message from Kalee Js sent at 7/6/2021  2:14 PM EDT -----  Subject: Appointment Request    Reason for Call: Urgent (Patient Request) ED Follow Up Visit    QUESTIONS  Type of Appointment? Established Patient  Reason for appointment request? No appointments available during search  Additional Information for Provider? PT was in the ER last week for   coughing up blood, mouthfuls of blood. They said that he had bronchitis   but he did not have any other the other symptoms. They did do a chest a   X-Ray, Was prescribed a Z-Pac. He feels better, not coughing up the blood   anymore. They did find 2 nodules or spots on his lungs. [phone] - his   g/f - [employer] - phone number. Would like to get ASAP so he can have   the surgery. ---------------------------------------------------------------------------  --------------  Henry RODRIGUEZ  What is the best way for the office to contact you? OK to leave message on   voicemail  Preferred Call Back Phone Number? [phone]  ---------------------------------------------------------------------------  --------------  SCRIPT ANSWERS  Relationship to Patient? Self  Appointment reason? Well Care/Follow Ups  Select a Well Care/Follow Ups appointment reason? Adult ED Follow Up   [Emergency Room, Emergency Department]  (Patient requests to see provider urgently. )? Yes  Do you have any questions for your primary care provider that need to be   answered prior to your appointment? No  Have you been diagnosed with, awaiting test results for, or told that you   are suspected of having COVID-19 (Coronavirus)? (If patient has tested   negative or was tested as a requirement for work, school, or travel and   not based on symptoms, answer no)? No  Do you currently have flu-like symptoms including fever or chills, cough,   shortness of breath, difficulty breathing, or new loss of taste or smell?    No  Have you had close contact with someone with COVID-19 in the last 14 days?   No  (Service Expert  click yes below to proceed with [employer] As Usual   Scheduling)?  Yes

## 2021-07-07 ENCOUNTER — OFFICE VISIT (OUTPATIENT)
Dept: INTERNAL MEDICINE CLINIC | Age: 56
End: 2021-07-07
Payer: MEDICAID

## 2021-07-07 VITALS
DIASTOLIC BLOOD PRESSURE: 76 MMHG | SYSTOLIC BLOOD PRESSURE: 128 MMHG | WEIGHT: 156 LBS | OXYGEN SATURATION: 98 % | HEART RATE: 97 BPM | BODY MASS INDEX: 23.64 KG/M2 | HEIGHT: 68 IN

## 2021-07-07 DIAGNOSIS — F14.10 COCAINE ABUSE (HCC): ICD-10-CM

## 2021-07-07 DIAGNOSIS — J43.1 PANLOBULAR EMPHYSEMA (HCC): ICD-10-CM

## 2021-07-07 DIAGNOSIS — R04.2 HEMOPTYSIS: ICD-10-CM

## 2021-07-07 DIAGNOSIS — J44.9 CHRONIC OBSTRUCTIVE PULMONARY DISEASE, UNSPECIFIED COPD TYPE (HCC): ICD-10-CM

## 2021-07-07 DIAGNOSIS — Z12.11 ENCOUNTER FOR SCREENING FOR MALIGNANT NEOPLASM OF COLON: Primary | ICD-10-CM

## 2021-07-07 PROCEDURE — G8420 CALC BMI NORM PARAMETERS: HCPCS | Performed by: INTERNAL MEDICINE

## 2021-07-07 PROCEDURE — 3023F SPIROM DOC REV: CPT | Performed by: INTERNAL MEDICINE

## 2021-07-07 PROCEDURE — 99214 OFFICE O/P EST MOD 30 MIN: CPT | Performed by: INTERNAL MEDICINE

## 2021-07-07 PROCEDURE — 3017F COLORECTAL CA SCREEN DOC REV: CPT | Performed by: INTERNAL MEDICINE

## 2021-07-07 PROCEDURE — G8427 DOCREV CUR MEDS BY ELIG CLIN: HCPCS | Performed by: INTERNAL MEDICINE

## 2021-07-07 PROCEDURE — G8926 SPIRO NO PERF OR DOC: HCPCS | Performed by: INTERNAL MEDICINE

## 2021-07-07 PROCEDURE — 4004F PT TOBACCO SCREEN RCVD TLK: CPT | Performed by: INTERNAL MEDICINE

## 2021-07-07 ASSESSMENT — ENCOUNTER SYMPTOMS
COUGH: 1
BLOOD IN STOOL: 0
EYE DISCHARGE: 0
TROUBLE SWALLOWING: 0
DIARRHEA: 0
WHEEZING: 0
EYE PAIN: 0
ABDOMINAL DISTENTION: 0
COLOR CHANGE: 0
SHORTNESS OF BREATH: 0

## 2021-07-07 NOTE — PROGRESS NOTES
Visit Information    Have you changed or started any medications since your last visit including any over-the-counter medicines, vitamins, or herbal medicines? no   Are you having any side effects from any of your medications? -  no  Have you stopped taking any of your medications? Is so, why? -  no    Have you seen any other physician or provider since your last visit? Yes - Records Obtained  Have you had any other diagnostic tests since your last visit? Yes - Records Obtained  Have you been seen in the emergency room and/or had an admission to a hospital since we last saw you? Yes - Records Obtained  Have you had your routine dental cleaning in the past 6 months? yes -     Have you activated your [employer] account? If not, what are your barriers?  No:      Patient Care Team:  Farhad Monson MD as PCP - General (Internal Medicine)  Farhad Monson MD as PCP - [facility]  Senait Knott MD as Consulting Physician (Pulmonary Disease)    Medical History Review  Past Medical, Family, and Social History reviewed and does not contribute to the patient presenting condition    Health Maintenance   Topic Date Due    Hepatitis C screen  Never done    COVID-19 Vaccine (1) Never done    Colon cancer screen colonoscopy  Never done    Shingles Vaccine (1 of 2) Never done    Flu vaccine (1) 09/01/2021    Lipid screen  03/28/2023    DTaP/Tdap/Td vaccine (3 - Td or Tdap) 09/18/2025    Pneumococcal 0-64 years Vaccine (2 of 2 - PPSV23) 02/13/2030    HIV screen  Completed    Hepatitis A vaccine  Aged Out    Hepatitis B vaccine  Aged Out    Hib vaccine  Aged Out    Meningococcal (ACWY) vaccine  Aged Out

## 2021-07-07 NOTE — PROGRESS NOTES
[address] [address]  Dept: [phone]  Dept Fax: [phone]    Sherif Cleary is a 64 y.o. male who presents today for his medical conditions/complaintsas noted below.   Sherif Cleary is c/o of   Chief Complaint   Patient presents with    Hemoptysis     [facility]          HPI:     Was seen in [address] clinic  Had hemoptyis  Seen in [address]  cxr lung nodule  No masses noted        Hemoglobin A1C (%)   Date Value   03/28/2018 5.3             ( goal A1Cis < 7)   No results found for: LABMICR  LDL Cholesterol (mg/dL)   Date Value   03/28/2018 60       (goal LDL is <100)   AST (U/L)   Date Value   08/13/2020 23     ALT (U/L)   Date Value   08/13/2020 23     BUN (mg/dL)   Date Value   08/13/2020 24 (H)     BP Readings from Last 3 Encounters:   07/07/21 128/76   04/12/21 110/68   11/16/20 130/63          (goal 120/80)    Past Medical History:   Diagnosis Date    Abnormal echocardiogram 02/2018    Arrhythmia     AS (aortic stenosis)     Atrial fibrillation (HCC)     Benign prostatic hyperplasia     Biatrial enlargement     Central perforation of tympanic membrane, right     Chest pain     Chews tobacco     CHF (congestive heart failure), NYHA class I, acute on chronic, combined (HCC)     Chronic back pain     Conductive hearing loss of right ear     COPD (chronic obstructive pulmonary disease) (HCC)     Diverticulitis     Fatigue     History of cocaine abuse ([facility] [address] 01/2018    Household contact with history of methicillin resistant Staphylococcus aureus (MRSA) infection     Lung nodule     Mass of upper lobe of left lung     Mild concentric left ventricular hypertrophy     Murmur, cardiac     Near syncope     Nonrheumatic aortic (valve) stenosis     Palpitations     Panlobular emphysema (HCC)     Pneumonia of both upper lobes due to infectious organism     Right-sided tinnitus     Severe aortic stenosis     Shortness of breath     Tobacco abuse       Past Surgical History:   Procedure Laterality Date    AORTIC VALVE REPLACEMENT  12/2018    CYST REMOVAL Left     INGUINAL HERNIA REPAIR Right     [place] DRAIN SKIN ABSCESS COMPLIC N/A 6/88/3484    GLUTEAL INCISION AND DRAINAGE performed by Teja Min DO at [address]       Family History   Problem Relation Age of Onset    Depression Mother     COPD Father     COPD Paternal Grandfather        Social History     Tobacco Use    Smoking status: Former Smoker     Packs/day: 0.25     Years: 40.00     Pack years: 10.00    Smokeless tobacco: Current User     Types: Chew    Tobacco comment: pt  previously smoked 1.5 ppd;   Substance Use Topics    Alcohol use: Not Currently      Current Outpatient Medications   Medication Sig Dispense Refill    dilTIAZem (TIAZAC) 120 MG extended release capsule Take 120 mg by mouth daily      fluticasone (FLONASE) 50 MCG/ACT nasal spray 1 spray by Each Nostril route daily 2 Bottle 1    acetaminophen (TYLENOL) 500 MG tablet Take 2 tablets by mouth 3 times daily as needed for Pain 200 tablet 1    albuterol sulfate  (90 Base) MCG/ACT inhaler Inhale 2 puffs into the lungs every 6 hours as needed for Shortness of Breath 1 Inhaler 5    tadalafil (CIALIS) 5 MG tablet Take 1 tablet by mouth daily 90 tablet 3    tamsulosin (FLOMAX) 0.4 MG capsule Take 1 capsule by mouth daily 30 capsule 0    dicyclomine (BENTYL) 10 MG capsule Take 1 capsule by mouth every 6 hours as needed (cramps) 20 capsule 0    ondansetron (ZOFRAN) 4 MG tablet Take 1 tablet by mouth every 8 hours as needed for Nausea 20 tablet 0    dilTIAZem (CARDIZEM CD) 120 MG extended release capsule Take 120 mg by mouth daily      loratadine (CLARITIN) 10 MG tablet Take 1 tablet by mouth daily 10 tablet 0    apixaban (ELIQUIS) 5 MG TABS tablet Take 1 tablet by mouth 2 times daily 60 tablet 3    aspirin 81 MG chewable tablet Take 1 tablet by mouth daily 30 tablet 3    nicotine (NICODERM CQ) 7 MG/24HR Place 1 patch onto the skin every 24 hours 30 patch 1     No current facility-administered medications for this visit. No Known Allergies    Health Maintenance   Topic Date Due    Hepatitis C screen  Never done    COVID-19 Vaccine (1) Never done    Colon cancer screen colonoscopy  Never done    Shingles Vaccine (1 of 2) Never done    Flu vaccine (1) 09/01/2021    Lipid screen  03/28/2023    DTaP/Tdap/Td vaccine (3 - Td or Tdap) 09/18/2025    Pneumococcal 0-64 years Vaccine (2 of 2 - PPSV23) 02/13/2030    HIV screen  Completed    Hepatitis A vaccine  Aged Out    Hepatitis B vaccine  Aged Out    Hib vaccine  Aged Out    Meningococcal (ACWY) vaccine  Aged Out       Subjective:     Review of Systems   Constitutional: Negative for appetite change, diaphoresis and fatigue. HENT: Negative for ear discharge and trouble swallowing. Eyes: Negative for pain and discharge. Respiratory: Positive for cough. Negative for shortness of breath and wheezing. Cardiovascular: Negative for chest pain and palpitations. Gastrointestinal: Negative for abdominal distention, blood in stool and diarrhea. Endocrine: Negative for polydipsia and polyphagia. Genitourinary: Negative for difficulty urinating and frequency. Musculoskeletal: Negative for gait problem, myalgias and neck pain. Skin: Negative for color change and rash. Allergic/Immunologic: Negative for environmental allergies and food allergies. Neurological: Negative for dizziness and headaches. Hematological: Negative for adenopathy. Does not bruise/bleed easily. Psychiatric/Behavioral: Negative for behavioral problems and sleep disturbance. Objective:     Physical Exam  Constitutional:       Appearance: He is well-developed. He is not diaphoretic. HENT:      Head: Normocephalic and atraumatic. Eyes:      General:         Right eye: No discharge. Left eye: No discharge.       Extraocular Movements: Right eye: Normal extraocular motion. Left eye: Normal extraocular motion. Conjunctiva/sclera: Conjunctivae normal.      Right eye: Right conjunctiva is not injected. Left eye: Left conjunctiva is not injected. Neck:      Thyroid: No thyroid mass or thyromegaly. Vascular: No JVD. Cardiovascular:      Rate and Rhythm: Normal rate and regular rhythm. Heart sounds: No murmur heard. No friction rub. Pulmonary:      Effort: Pulmonary effort is normal. No tachypnea, bradypnea, accessory muscle usage or respiratory distress. Breath sounds: Normal breath sounds. No wheezing or rales. Abdominal:      General: Bowel sounds are normal. There is no distension. Palpations: Abdomen is soft. Tenderness: There is no abdominal tenderness. There is no rebound. Musculoskeletal:         General: No tenderness. Normal range of motion. Cervical back: Normal range of motion and neck supple. No edema or erythema. Lymphadenopathy:      Head:      Right side of head: No submental or submandibular adenopathy. Left side of head: No submental or submandibular adenopathy. Cervical: No cervical adenopathy. Skin:     General: Skin is warm. Coloration: Skin is not pale. Findings: No bruising, ecchymosis or rash. Neurological:      Mental Status: He is alert and oriented to person, place, and time. Cranial Nerves: No cranial nerve deficit. Sensory: No sensory deficit. Motor: No atrophy or abnormal muscle tone. Coordination: Coordination normal.   Psychiatric:         Mood and Affect: Mood is not anxious. Affect is not angry. Speech: Speech is not slurred. Behavior: Behavior normal. Behavior is not aggressive. Thought Content: Thought content does not include homicidal ideation. Cognition and Memory: Memory is not impaired.        /76   Pulse 97   Ht 5' 8\" (1.727 m)   Wt 156 lb (70.8 kg)   SpO2 98% BMI 23.72 kg/m²     Assessment:       Diagnosis Orders   1. Encounter for screening for malignant neoplasm of colon  Cologuard (For External Results Only)    CT lung screen [Initial/Annual]   2. Hemoptysis     3. Cocaine abuse ([facility]     4. Chronic obstructive pulmonary disease, unspecified COPD type ([facility]     5. Panlobular emphysema ([facility]               Plan:      No follow-ups on file. Orders Placed This Encounter   Procedures    Cologuard (For External Results Only)     This test is performed by an external laboratory and is used for result attachment only. It is required that this order requisition be faxed to: [facility] @ [phone]. See [email or web address] for further information. Standing Status:   Future     Standing Expiration Date:   7/7/2022    CT lung screen [Initial/Annual]     Age: 64 y.o. Smoking History:   Social History    Tobacco Use      Smoking status: Former Smoker        Packs/day: 0.25        Years: 40.00        Pack years: 10      Smokeless tobacco: Current User        Types: Chew      Tobacco comment: pt  previously smoked 1.5 ppd;    Vaping Use      Vaping Use: Never used    Alcohol use: Not Currently    Drug use: Yes      Types: Cocaine      Comment: drug abuse includes crack cocaine; last used around February 2020    Pack years: 10  Last CT lung screen: No previous lung cancer screening exam     Standing Status:   Future     Standing Expiration Date:   7/7/2022     Order Specific Question:   Is there documentation of shared decision making? Answer:   Yes     Order Specific Question:   Does the patient show any signs or symptoms of lung cancer? Answer:   No     Order Specific Question:   Is this the first (baseline) CT or an annual exam?     Answer:   Baseline [1]     Order Specific Question:   Is this a low dose CT or a routine CT? Answer:   Low Dose CT [1]     Order Specific Question:   Smoking Status?      Answer:   Current Every Day Smoker [1]     Order Specific Question:   Smoking packs per day? Answer:   0.25     Order Specific Question:   Years smoking? Answer:   40     No orders of the defined types were placed in this encounter. Was seen in [address] clinic  Had hemoptyis  Seen in [address]  cxr lung nodule  No masses noted  On eliquis for afib  Also has prothetic valve    anjali do lung screen  councelled to quit smoking     Patient given educational materials - see patient instructions. Discussed use, benefit, and side effects of prescribed medications. All patientquestions answered. Pt voiced understanding. Reviewed health maintenance. Instructedto continue current medications, diet and exercise. Patient agreed with treatmentplan. Follow up as directed.      Electronically signed by Caty Burnette MD on 7/7/2021 at 4:43 PM

## 2021-08-28 ENCOUNTER — APPOINTMENT (OUTPATIENT)
Dept: GENERAL RADIOLOGY | Age: 56
End: 2021-08-28
Payer: MEDICAID

## 2021-08-28 ENCOUNTER — HOSPITAL ENCOUNTER (EMERGENCY)
Age: 56
Discharge: HOME | End: 2021-08-28
Attending: EMERGENCY MEDICINE
Payer: MEDICAID

## 2021-08-28 VITALS
WEIGHT: 148 LBS | HEIGHT: 69 IN | HEART RATE: 87 BPM | SYSTOLIC BLOOD PRESSURE: 118 MMHG | BODY MASS INDEX: 21.92 KG/M2 | TEMPERATURE: 97.8 F | OXYGEN SATURATION: 99 % | DIASTOLIC BLOOD PRESSURE: 76 MMHG | RESPIRATION RATE: 13 BRPM

## 2021-08-28 DIAGNOSIS — E86.0 DEHYDRATION: Primary | ICD-10-CM

## 2021-08-28 LAB
-: ABNORMAL
ABSOLUTE EOS #: 0.1 K/UL (ref 0–0.4)
ABSOLUTE IMMATURE GRANULOCYTE: ABNORMAL K/UL (ref 0–0.3)
ABSOLUTE LYMPH #: 1.6 K/UL (ref 1–4.8)
ABSOLUTE MONO #: 0.4 K/UL (ref 0.1–1.3)
ALBUMIN SERPL-MCNC: 3.8 G/DL (ref 3.5–5.2)
ALBUMIN/GLOBULIN RATIO: ABNORMAL (ref 1–2.5)
ALP BLD-CCNC: 103 U/L (ref 40–129)
ALT SERPL-CCNC: 16 U/L (ref 5–41)
AMORPHOUS: ABNORMAL
ANION GAP SERPL CALCULATED.3IONS-SCNC: 8 MMOL/L (ref 9–17)
AST SERPL-CCNC: 21 U/L
BACTERIA: ABNORMAL
BASOPHILS # BLD: 1 % (ref 0–2)
BASOPHILS ABSOLUTE: 0.1 K/UL (ref 0–0.2)
BILIRUB SERPL-MCNC: 0.6 MG/DL (ref 0.3–1.2)
BILIRUBIN URINE: NEGATIVE
BNP INTERPRETATION: ABNORMAL
BUN BLDV-MCNC: 13 MG/DL (ref 6–20)
BUN/CREAT BLD: ABNORMAL (ref 9–20)
CALCIUM SERPL-MCNC: 8.7 MG/DL (ref 8.6–10.4)
CASTS UA: ABNORMAL /LPF
CHLORIDE BLD-SCNC: 106 MMOL/L (ref 98–107)
CHP ED QC CHECK: YES
CO2: 24 MMOL/L (ref 20–31)
COLOR: YELLOW
COMMENT UA: ABNORMAL
CREAT SERPL-MCNC: 0.88 MG/DL (ref 0.7–1.2)
CRYSTALS, UA: ABNORMAL /HPF
CRYSTALS, UA: ABNORMAL /HPF
DIFFERENTIAL TYPE: ABNORMAL
EOSINOPHILS RELATIVE PERCENT: 2 % (ref 0–4)
EPITHELIAL CELLS UA: ABNORMAL /HPF
GFR AFRICAN AMERICAN: >60 ML/MIN
GFR NON-AFRICAN AMERICAN: >60 ML/MIN
GFR SERPL CREATININE-BSD FRML MDRD: ABNORMAL ML/MIN/{1.73_M2}
GFR SERPL CREATININE-BSD FRML MDRD: ABNORMAL ML/MIN/{1.73_M2}
GLUCOSE BLD-MCNC: 114 MG/DL
GLUCOSE BLD-MCNC: 126 MG/DL (ref 70–99)
GLUCOSE URINE: NEGATIVE
HCT VFR BLD CALC: 39.5 % (ref 41–53)
HEMOGLOBIN: 13.4 G/DL (ref 13.5–17.5)
IMMATURE GRANULOCYTES: ABNORMAL %
KETONES, URINE: NEGATIVE
LEUKOCYTE ESTERASE, URINE: ABNORMAL
LYMPHOCYTES # BLD: 30 % (ref 24–44)
MAGNESIUM: 1.9 MG/DL (ref 1.6–2.6)
MCH RBC QN AUTO: 30.4 PG (ref 26–34)
MCHC RBC AUTO-ENTMCNC: 33.8 G/DL (ref 31–37)
MCV RBC AUTO: 89.8 FL (ref 80–100)
MONOCYTES # BLD: 9 % (ref 1–7)
MUCUS: ABNORMAL
NITRITE, URINE: NEGATIVE
NRBC AUTOMATED: ABNORMAL PER 100 WBC
OTHER OBSERVATIONS UA: ABNORMAL
PDW BLD-RTO: 14.1 % (ref 11.5–14.9)
PH UA: 5.5 (ref 5–8)
PLATELET # BLD: 153 K/UL (ref 150–450)
PLATELET ESTIMATE: ABNORMAL
PMV BLD AUTO: 9.8 FL (ref 6–12)
POTASSIUM SERPL-SCNC: 3.4 MMOL/L (ref 3.7–5.3)
PRO-BNP: 819 PG/ML
PROTEIN UA: NEGATIVE
RBC # BLD: 4.4 M/UL (ref 4.5–5.9)
RBC # BLD: ABNORMAL 10*6/UL
RBC UA: ABNORMAL /HPF
RENAL EPITHELIAL, UA: ABNORMAL /HPF
SEG NEUTROPHILS: 58 % (ref 36–66)
SEGMENTED NEUTROPHILS ABSOLUTE COUNT: 3.1 K/UL (ref 1.3–9.1)
SODIUM BLD-SCNC: 138 MMOL/L (ref 135–144)
SPECIFIC GRAVITY UA: 1.02 (ref 1–1.03)
TOTAL PROTEIN: 5.7 G/DL (ref 6.4–8.3)
TRICHOMONAS: ABNORMAL
TROPONIN INTERP: NORMAL
TROPONIN INTERP: NORMAL
TROPONIN T: NORMAL NG/ML
TROPONIN T: NORMAL NG/ML
TROPONIN, HIGH SENSITIVITY: 8 NG/L (ref 0–22)
TROPONIN, HIGH SENSITIVITY: 9 NG/L (ref 0–22)
TURBIDITY: ABNORMAL
URINE HGB: NEGATIVE
UROBILINOGEN, URINE: NORMAL
WBC # BLD: 5.3 K/UL (ref 3.5–11)
WBC # BLD: ABNORMAL 10*3/UL
WBC UA: ABNORMAL /HPF
YEAST: ABNORMAL

## 2021-08-28 PROCEDURE — 83880 ASSAY OF NATRIURETIC PEPTIDE: CPT

## 2021-08-28 PROCEDURE — 84484 ASSAY OF TROPONIN QUANT: CPT

## 2021-08-28 PROCEDURE — 36415 COLL VENOUS BLD VENIPUNCTURE: CPT

## 2021-08-28 PROCEDURE — 81001 URINALYSIS AUTO W/SCOPE: CPT

## 2021-08-28 PROCEDURE — 99285 EMERGENCY DEPT VISIT HI MDM: CPT

## 2021-08-28 PROCEDURE — 80053 COMPREHEN METABOLIC PANEL: CPT

## 2021-08-28 PROCEDURE — 93005 ELECTROCARDIOGRAM TRACING: CPT | Performed by: STUDENT IN AN ORGANIZED HEALTH CARE EDUCATION/TRAINING PROGRAM

## 2021-08-28 PROCEDURE — 85025 COMPLETE CBC W/AUTO DIFF WBC: CPT

## 2021-08-28 PROCEDURE — 2580000003 HC RX 258: Performed by: EMERGENCY MEDICINE

## 2021-08-28 PROCEDURE — 6370000000 HC RX 637 (ALT 250 FOR IP): Performed by: STUDENT IN AN ORGANIZED HEALTH CARE EDUCATION/TRAINING PROGRAM

## 2021-08-28 PROCEDURE — 87086 URINE CULTURE/COLONY COUNT: CPT

## 2021-08-28 PROCEDURE — 71045 X-RAY EXAM CHEST 1 VIEW: CPT

## 2021-08-28 PROCEDURE — 83735 ASSAY OF MAGNESIUM: CPT

## 2021-08-28 RX ORDER — POTASSIUM BICARBONATE 25 MEQ/1
25 TABLET, EFFERVESCENT ORAL ONCE
Status: COMPLETED | OUTPATIENT
Start: 2021-08-28 | End: 2021-08-28

## 2021-08-28 RX ORDER — CEPHALEXIN 500 MG/1
500 CAPSULE ORAL 4 TIMES DAILY
Qty: 40 CAPSULE | Refills: 0 | Status: SHIPPED | OUTPATIENT
Start: 2021-08-28 | End: 2021-09-07

## 2021-08-28 RX ORDER — 0.9 % SODIUM CHLORIDE 0.9 %
500 INTRAVENOUS SOLUTION INTRAVENOUS ONCE
Status: COMPLETED | OUTPATIENT
Start: 2021-08-28 | End: 2021-08-28

## 2021-08-28 RX ADMIN — POTASSIUM BICARBONATE 25 MEQ: 977.5 TABLET, EFFERVESCENT ORAL at 19:45

## 2021-08-28 RX ADMIN — SODIUM CHLORIDE 500 ML: 9 INJECTION, SOLUTION INTRAVENOUS at 18:45

## 2021-08-28 ASSESSMENT — ENCOUNTER SYMPTOMS
DIARRHEA: 0
BACK PAIN: 0
SINUS PAIN: 0
VOICE CHANGE: 0
RESPIRATORY NEGATIVE: 1
ABDOMINAL DISTENTION: 0
ABDOMINAL PAIN: 0
NAUSEA: 0
EYES NEGATIVE: 1
TROUBLE SWALLOWING: 0
VOMITING: 0

## 2021-08-28 NOTE — ED NOTES
Patient reports that he has been having 3-5 loss of consciousness a day for the past week. Patient states he wakes up and is slightly confused then goes back to baseline. Patient denies any history of TIA or epilepsy. Patient admits to using cocaine occasionally last use was two days ago. Patient alert and oriented x4 answers all questions appropriately but doses off during conversation at times.        Jadon Hairston RN  08/28/21 [id number]

## 2021-08-28 NOTE — ED NOTES
[place] mist and cup of ice given to patient, resting comfortably, call bell within reach and family at [address] \[facility] [place] [facility]\""  08/28/21 1448

## 2021-08-28 NOTE — ED PROVIDER NOTES
EMERGENCY DEPARTMENT ENCOUNTER   ATTENDING ATTESTATION     Pt Name: Eric Henson  MRN: [id number]  [place] 1965  Date of evaluation: 8/28/21       Eric Henson is a 64 y.o. male who presents with Loss of Consciousness      MDM:   Feeling tired, states he keeps falling asleep  Out in heat working all day  His tongue is dry  Suspect dehydration  Do not suspect cardiac syncope or stroke or sepsis    Vitals:   Vitals:    08/28/21 1730 08/28/21 1745 08/28/21 1800 08/28/21 1815   BP: 114/71 117/68 115/81 118/76   Pulse: 87 80 84 87   Resp: 17 14 15 13   Temp:       TempSrc:       SpO2:  100%  99%   Weight:       Height:             I personally evaluated and examined the patient in conjunction with the resident and agree with the assessment, treatment plan, and disposition of the patient as recorded by the resident. I performed a history and physical examination of the patient and discussed management with the resident. I reviewed the residents note and agree with the documented findings and plan of care. Any areas of disagreement are noted on the chart. I was personally present for the key portions of any procedures. I have documented in the chart those procedures where I was not present during the key portions. I have personally reviewed all images and agree with the resident's interpretation. I have reviewed the emergency nurses triage note. I agree with the chief complaint, past medical history, past surgical history, allergies, medications, social and family history as documented unless otherwise noted.     Allison Hitchcock MD  Attending Emergency Physician            Allison Hitchcock MD  08/28/21 2783

## 2021-08-28 NOTE — ED PROVIDER NOTES
[address] ED  Emergency Department Encounter  Emergency Medicine Resident     Pt Name: Chase Moss  YMY:[id number]  Birthdate 1965  Date of evaluation: 8/28/21  PCP:  Opal Copeland MD    [address] [place] [address]       Chief Complaint   Patient presents with    Loss of Consciousness       HISTORY OF PRESENT ILLNESS  (Location/Symptom, Timing/Onset, Context/Setting, Quality, Duration, ModifyingFactors, Severity.)      Chase Moss is a 64 y.o. male presents the emergency department for evaluation of 1 week of feeling overly fatigued. Patient states that he will sit down on a chair and immediately fall asleep. States that it never happens while he is standing, states that he just feels very tired and weak. Patient also states that he has not been eating and drinking normally and has been doing a lot of extra work in the yard. Patient states that he does smoke occasionally as well as recreationally used cocaine twice a week. Patient states that he has not had any recent changes in his diet, only takes Eliquis for his atrial fibrillation but does not take his other medications as prescribed.     PAST MEDICAL / SURGICAL / SOCIAL /FAMILY HISTORY      has a past medical history of Abnormal echocardiogram, Arrhythmia, AS (aortic stenosis), Atrial fibrillation (HCC), Benign prostatic hyperplasia, Biatrial enlargement, Central perforation of tympanic membrane, right, Chest pain, Chews tobacco, CHF (congestive heart failure), NYHA class I, acute on chronic, combined ([address] Chronic back pain, Conductive hearing loss of right ear, COPD (chronic obstructive pulmonary disease) ([address] Diverticulitis, Fatigue, History of cocaine abuse ([address] Household contact with history of methicillin resistant Staphylococcus aureus (MRSA) infection, Lung nodule, Mass of upper lobe of left lung, Mild concentric left ventricular hypertrophy, Murmur, cardiac, Near syncope, Nonrheumatic aortic (valve) stenosis, Palpitations, Panlobular emphysema ([facility] Pneumonia of both upper lobes due to infectious organism, Right-sided tinnitus, Severe aortic stenosis, Shortness of breath, and Tobacco abuse. No other pertinent PMH on review with patient/guardian. has a past surgical history that includes Inguinal hernia repair (Right); cyst removal (Left); pr drain skin abscess complic (N/A, 4/05/5987); and Aortic valve replacement (12/2018). No other pertinent PSH on review with patient/guardian. Social History     Socioeconomic History    Marital status:      Spouse name: Not on file    Number of children: Not on file    Years of education: Not on file    Highest education level: Not on file   Occupational History    Not on file   Tobacco Use    Smoking status: Former Smoker     Packs/day: 0.25     Years: 40.00     Pack years: 10.00    Smokeless tobacco: Current User     Types: Chew    Tobacco comment: pt  previously smoked 1.5 ppd;   Vaping Use    Vaping Use: Never used   Substance and Sexual Activity    Alcohol use: Not Currently    Drug use: Yes     Types: Cocaine     Comment: drug abuse includes crack cocaine; last used earlier this week    Sexual activity: Yes     Partners: Female   Other Topics Concern    Not on file   Social History Narrative    Not on file     Social Determinants of Health     Financial Resource Strain:     Difficulty of Paying Living Expenses:    Food Insecurity:     Worried About Running Out of Food in the Last Year:     [address] in the Last Year:    Transportation Needs:     Lack of Transportation (Medical):      Lack of Transportation (Non-Medical):    Physical Activity:     Days of Exercise per Week:     Minutes of Exercise per Session:    Stress:     Feeling of Stress :    Social Connections:     Frequency of Communication with Friends and Family:     Frequency of Social Gatherings with Friends and Family:     Attends Church Services:     Active Member of Clubs or Organizations:     Attends Club or Organization Meetings:     Marital Status:    Intimate Partner Violence:     Fear of Current or Ex-Partner:     Emotionally Abused:     Physically Abused:     Sexually Abused:        I counseled the patient against using tobacco products. Family History   Problem Relation Age of Onset    Depression Mother     COPD Father     COPD Paternal Grandfather      No other pertinent FamHx on review with patient/guardian. Allergies:  Patient has no known allergies. Home Medications:  Prior to Admission medications    Medication Sig Start Date End Date Taking?  Authorizing Provider   cephALEXin (KEFLEX) 500 MG capsule Take 1 capsule by mouth 4 times daily for 10 days 8/28/21 9/7/21 Yes Spohie Ricardo MD   dilTIAZem [facility]) 120 MG extended release capsule Take 120 mg by mouth daily 2/7/20   Historical Provider, MD   fluticasone (FLONASE) 50 MCG/ACT nasal spray 1 spray by Each Nostril route daily 4/12/21   Nereida Alvarez MD   acetaminophen (TYLENOL) 500 MG tablet Take 2 tablets by mouth 3 times daily as needed for Pain 4/12/21   Nereida Alvarez MD   nicotine (NICODERM CQ) 7 MG/24HR Place 1 patch onto the skin every 24 hours 12/15/20 4/12/21  BENEDICT Bhardwaj - JESUS MANUEL   albuterol sulfate  (90 Base) MCG/ACT inhaler Inhale 2 puffs into the lungs every 6 hours as needed for Shortness of Breath 10/27/20   Nereida Alvarez MD   tadalafil (CIALIS) 5 MG tablet Take 1 tablet by mouth daily 10/5/20   Kaelyn Alvarez MD   tamsulosin (FLOMAX) 0.4 MG capsule Take 1 capsule by mouth daily 8/17/20   Nereida Alvarez MD   dicyclomine (BENTYL) 10 MG capsule Take 1 capsule by mouth every 6 hours as needed (cramps) 8/13/20   Tor Jovel PA-C   ondansetron (ZOFRAN) 4 MG tablet Take 1 tablet by mouth every 8 hours as needed for Nausea 8/13/20   Tor Jovel PA-C   dilTIAZem (CARDIZEM CD) 120 MG extended release capsule Take 120 mg by mouth daily    Historical Provider, MD   loratadine (CLARITIN) 10 MG tablet Take 1 tablet by mouth daily 4/20/20   Jessika Tran MD   apixaban (ELIQUIS) 5 MG TABS tablet Take 1 tablet by mouth 2 times daily 1/7/19   Gae Aase, MD   aspirin 81 MG chewable tablet Take 1 tablet by mouth daily 1/7/19   Gae Aase, MD       REVIEW OF SYSTEMS    (2-9 systems for level 4, 10 ormore for level 5)      Review of Systems   Constitutional: Positive for activity change and fatigue. Negative for appetite change, chills and fever. HENT: Negative for congestion, sinus pain, trouble swallowing and voice change. Eyes: Negative. Respiratory: Negative. Cardiovascular: Negative. Gastrointestinal: Negative for abdominal distention, abdominal pain, diarrhea, nausea and vomiting. Genitourinary: Negative. Musculoskeletal: Positive for arthralgias. Negative for back pain, gait problem, myalgias and neck pain. Skin: Negative. Neurological: Negative. Psychiatric/Behavioral: Negative. PHYSICAL EXAM   (up to 7 for level 4, 8 or more for level 5)      INITIAL VITALS:   /76   Pulse 87   Temp 97.8 °F (36.6 °C) (Oral)   Resp 13   Ht 5' 9\" (1.753 m)   Wt 148 lb (67.1 kg)   SpO2 99%   BMI 21.86 kg/m²     Physical Exam  Constitutional:       General: He is not in acute distress. Appearance: He is not ill-appearing or toxic-appearing. HENT:      Right Ear: Tympanic membrane normal.      Left Ear: Tympanic membrane normal.      Nose: Nose normal.      Mouth/Throat:      Mouth: Mucous membranes are moist.      Pharynx: Oropharynx is clear. Eyes:      Extraocular Movements: Extraocular movements intact. Conjunctiva/sclera: Conjunctivae normal.      Pupils: Pupils are equal, round, and reactive to light. Cardiovascular:      Rate and Rhythm: Normal rate and regular rhythm. Pulses: Normal pulses. Heart sounds: Normal heart sounds. Pulmonary:      Effort: Pulmonary effort is normal.      Breath sounds: Normal breath sounds. Abdominal:      General: Abdomen is flat. Palpations: Abdomen is soft. Musculoskeletal:         General: Normal range of motion. Cervical back: Normal range of motion and neck supple. Skin:     General: Skin is warm and dry. Capillary Refill: Capillary refill takes less than 2 seconds. Neurological:      General: No focal deficit present. Mental Status: He is easily aroused. Psychiatric:         Mood and Affect: Mood normal.         Behavior: Behavior is cooperative.          DIFFERENTIAL  DIAGNOSIS     DDX: ACS, pneumonia, UTI, pre-syncope    PLAN (LABS / IMAGING / EKG):  Orders Placed This Encounter   Procedures    Culture, Urine    XR CHEST PORTABLE    CBC Auto Differential    Comprehensive Metabolic Panel w/ Reflex to MG    Urinalysis Reflex to Culture    Brain Natriuretic Peptide    Troponin    Magnesium    Microscopic Urinalysis    Troponin    POCT Glucose    EKG 12 Lead       MEDICATIONS ORDERED:  Orders Placed This Encounter   Medications    0.9 % sodium chloride bolus    DISCONTD: potassium bicarb-citric acid (EFFER-K) effervescent tablet 20 mEq    potassium bicarbonate (K-LYTE) disintegrating tablet 25 mEq    cephALEXin (KEFLEX) 500 MG capsule     Sig: Take 1 capsule by mouth 4 times daily for 10 days     Dispense:  40 capsule     Refill:  0           DIAGNOSTIC RESULTS / EMERGENCY DEPARTMENT COURSE / MDM     LABS:  Results for orders placed or performed during the hospital encounter of 08/28/21   CBC Auto Differential   Result Value Ref Range    WBC 5.3 3.5 - 11.0 k/uL    RBC 4.40 (L) 4.5 - 5.9 m/uL    Hemoglobin 13.4 (L) 13.5 - 17.5 g/dL    Hematocrit 39.5 (L) 41 - 53 %    MCV 89.8 80 - 100 fL    MCH 30.4 26 - 34 pg    MCHC 33.8 31 - 37 g/dL    RDW 14.1 11.5 - 14.9 %    Platelets 355 914 - 518 k/uL    MPV 9.8 6.0 - 12.0 fL    NRBC Automated NOT REPORTED per 100 WBC    Differential Type NOT REPORTED     Seg Neutrophils 58 36 - 66 %    Lymphocytes 30 24 - 44 % Monocytes 9 (H) 1 - 7 %    Eosinophils % 2 0 - 4 %    Basophils 1 0 - 2 %    Immature Granulocytes NOT REPORTED 0 %    Segs Absolute 3.10 1.3 - 9.1 k/uL    Absolute Lymph # 1.60 1.0 - 4.8 k/uL    Absolute Mono # 0.40 0.1 - 1.3 k/uL    Absolute Eos # 0.10 0.0 - 0.4 k/uL    Basophils Absolute 0.10 0.0 - 0.2 k/uL    Absolute Immature Granulocyte NOT REPORTED 0.00 - 0.30 k/uL    WBC Morphology NOT REPORTED     RBC Morphology NOT REPORTED     Platelet Estimate NOT REPORTED    Comprehensive Metabolic Panel w/ Reflex to MG   Result Value Ref Range    Glucose 126 (H) 70 - 99 mg/dL    BUN 13 6 - 20 mg/dL    CREATININE 0.88 0.70 - 1.20 mg/dL    Bun/Cre Ratio NOT REPORTED 9 - 20    Calcium 8.7 8.6 - 10.4 mg/dL    Sodium 138 135 - 144 mmol/L    Potassium 3.4 (L) 3.7 - 5.3 mmol/L    Chloride 106 98 - 107 mmol/L    CO2 24 20 - 31 mmol/L    Anion Gap 8 (L) 9 - 17 mmol/L    Alkaline Phosphatase 103 40 - 129 U/L    ALT 16 5 - 41 U/L    AST 21 <40 U/L    Total Bilirubin 0.60 0.3 - 1.2 mg/dL    Total Protein 5.7 (L) 6.4 - 8.3 g/dL    Albumin 3.8 3.5 - 5.2 g/dL    Albumin/Globulin Ratio NOT REPORTED 1.0 - 2.5    GFR Non-African American >60 >60 mL/min    GFR African American >60 >60 mL/min    GFR Comment          GFR Staging NOT REPORTED    Urinalysis Reflex to Culture    Specimen: Urine, clean catch   Result Value Ref Range    Color, UA YELLOW YELLOW    Turbidity UA CLOUDY (A) CLEAR    Glucose, Ur NEGATIVE NEGATIVE    Bilirubin Urine NEGATIVE NEGATIVE    Ketones, Urine NEGATIVE NEGATIVE    Specific [place], UA 1.021 1.000 - 1.030    Urine Hgb NEGATIVE NEGATIVE    pH, UA 5.5 5.0 - 8.0    Protein, UA NEGATIVE NEGATIVE    Urobilinogen, Urine Normal Normal    Nitrite, Urine NEGATIVE NEGATIVE    Leukocyte Esterase, Urine SMALL (A) NEGATIVE    Urinalysis Comments NOT REPORTED    Brain Natriuretic Peptide   Result Value Ref Range    Pro- (H) <300 pg/mL    BNP Interpretation Pro-BNP Reference Range:    Troponin   Result Value Ref Range    Troponin, High Sensitivity 9 0 - 22 ng/L    Troponin T NOT REPORTED <0.03 ng/mL    Troponin Interp NOT REPORTED    Magnesium   Result Value Ref Range    Magnesium 1.9 1.6 - 2.6 mg/dL   Microscopic Urinalysis   Result Value Ref Range    -          WBC, UA 10 TO 20 /HPF    RBC, UA 0 TO 2 /HPF    Casts UA NOT REPORTED /LPF    Crystals, UA CALCIUM OXALATE (A) None /HPF    Crystals, UA MANY (A) None /HPF    Epithelial Cells UA 2 TO 5 /HPF    Renal Epithelial, UA NOT REPORTED 0 /HPF    Bacteria, UA MODERATE (A) None    Mucus, UA 2+ (A) None    Trichomonas, UA NOT REPORTED None    Amorphous, UA NOT REPORTED None    Other Observations UA NOT REPORTED NOT REQ. Yeast, UA NOT REPORTED None   Troponin   Result Value Ref Range    Troponin, High Sensitivity 8 0 - 22 ng/L    Troponin T NOT REPORTED <0.03 ng/mL    Troponin Interp NOT REPORTED    POCT Glucose   Result Value Ref Range    Glucose 114 mg/dL    QC OK? yes          IMPRESSION/MDM/ED COURSE:  64 y.o. male presented with fatigue and tiredness for approximately 7 days. Will obtain broad work-up including EKG, CBC, BMP, urinalysis reflex to culture, chest x-ray, BMP, troponin x2. Patient likely has some form of dehydration we will also give mild fluid bolus. ED Course as of Aug 28 2033   Sat Aug 28, 2021   2030 Patient's lab and disposition appears to be consistent with dehydration. Patient given a fluid bolus, told to drink as many fluids and electrolytes as he can within a safety measure. [ES]   2033 Patient's urine is also consistent with possible urinary tract infection we will give her Keflex for 10 days    [ES]      ED Course User Index  [ES] Alana Rendon MD        Patient/Guardian requesting discharge. Patient/Guardian was given written and verbal instructions prior to discharge. Patient/Guardian understood and agreed. Patient/Guardian had no further questions.        RADIOLOGY:  XR CHEST PORTABLE   Final Result   Large lung volumes, without evidence of acute cardiopulmonary disease               EKG  Atrial fibrillation  Normal axis  Mildly prolonged QTC  No acute ST elevations  No acute T wave inversions  Compared to old EKG unchanged  Abnormal EKG    All EKG's are interpreted by the Emergency Department Physician who either signs or Co-signs this chart in the absence of a cardiologist.      PROCEDURES:  None  CONSULTS:  None        FINAL IMPRESSION      1.  Dehydration          DISPOSITION / PLAN     DISPOSITION        PATIENT REFERREDTO:  Angel Mei, [address]  [facility]  [phone]      As needed    [facility] ED  [place] [id number] [phone]    As needed, If symptoms worsen      DISCHARGE MEDICATIONS:  New Prescriptions    CEPHALEXIN (KEFLEX) 500 MG CAPSULE    Take 1 capsule by mouth 4 times daily for 10 days       Robi Ford MD  PGY 2  Resident Physician Emergency Medicine  08/28/21 8:33 PM        (Please note that portions of this note were completed with a voice recognition program.Efforts were made to edit the dictations but occasionally words are mis-transcribed.)       Robi Ford MD  Resident  08/28/21 3428

## 2021-08-28 NOTE — ED NOTES
Mode of arrival (squad #, walk in, police, etc) : walk in         Chief complaint(s): increased fatigue, periods of LOC. Arrival Note (brief scenario, treatment PTA, etc). : Patient arrived to ED from home with C/O increased fatigue, with periods of LOC. Patient states he has A-fib and has not been taking his Eliquis  Correctly. Patient states for the past 3-5 days, he has been having periods when he is sitting down that he loses consciousness. Patient royal falling or hitting his head, reports slight visual disturbances such as double vision. Patient reports slight dizziness. Pt. Also denies drinking fluids and works outside most of his days. Patient also reports recreational cocaine use and is not sure if he might have received a \"bad batch\". Last use reported approx 2 days ago. And uses up to two- three times a week regularly. Patient in no apparent distress, however nodding off while speaking to patient. BS- 114. VS stable. C= \"Have you ever felt that you should Cut down on your drinking? \"  No  A= \"Have people Annoyed you by criticizing your drinking? \"  No  G= \"Have you ever felt bad or Guilty about your drinking? \"  No  E= \"Have you ever had a drink as an Eye-opener first thing in the morning to steady your nerves or to help a hangover? \"  No      Deferred []      Reason for deferring: N/A    *If yes to two or more: probable alcohol abuse. Hang Troncoso RN  08/28/21 4326

## 2021-08-29 LAB
CULTURE: NORMAL
Lab: NORMAL
SPECIMEN DESCRIPTION: NORMAL

## 2021-08-30 LAB
EKG ATRIAL RATE: 197 BPM
EKG Q-T INTERVAL: 394 MS
EKG QRS DURATION: 90 MS
EKG QTC CALCULATION (BAZETT): 484 MS
EKG R AXIS: 57 DEGREES
EKG T AXIS: 80 DEGREES
EKG VENTRICULAR RATE: 91 BPM

## 2021-08-30 PROCEDURE — 93010 ELECTROCARDIOGRAM REPORT: CPT | Performed by: INTERNAL MEDICINE

## 2021-10-07 ENCOUNTER — OFFICE VISIT (OUTPATIENT)
Dept: INTERNAL MEDICINE CLINIC | Age: 56
End: 2021-10-07
Payer: MEDICAID

## 2021-10-07 ENCOUNTER — HOSPITAL ENCOUNTER (OUTPATIENT)
Age: 56
Setting detail: SPECIMEN
Discharge: HOME | End: 2021-10-07
Payer: MEDICAID

## 2021-10-07 VITALS
SYSTOLIC BLOOD PRESSURE: 120 MMHG | WEIGHT: 143.8 LBS | HEIGHT: 69 IN | DIASTOLIC BLOOD PRESSURE: 70 MMHG | BODY MASS INDEX: 21.3 KG/M2

## 2021-10-07 DIAGNOSIS — E55.9 VITAMIN D DEFICIENCY: ICD-10-CM

## 2021-10-07 DIAGNOSIS — R53.83 OTHER FATIGUE: ICD-10-CM

## 2021-10-07 DIAGNOSIS — R04.2 HEMOPTYSIS: ICD-10-CM

## 2021-10-07 DIAGNOSIS — J44.9 CHRONIC OBSTRUCTIVE PULMONARY DISEASE, UNSPECIFIED COPD TYPE (HCC): ICD-10-CM

## 2021-10-07 DIAGNOSIS — F14.10 COCAINE ABUSE (HCC): ICD-10-CM

## 2021-10-07 DIAGNOSIS — I48.91 ATRIAL FIBRILLATION WITH RAPID VENTRICULAR RESPONSE (HCC): Primary | ICD-10-CM

## 2021-10-07 DIAGNOSIS — Z87.891 FORMER SMOKER: ICD-10-CM

## 2021-10-07 LAB
FOLATE: 12.4 NG/ML
TSH SERPL DL<=0.05 MIU/L-ACNC: 2.39 MIU/L (ref 0.3–5)
VITAMIN B-12: 511 PG/ML (ref 232–1245)
VITAMIN D 25-HYDROXY: 47.1 NG/ML (ref 30–100)

## 2021-10-07 PROCEDURE — 99215 OFFICE O/P EST HI 40 MIN: CPT | Performed by: INTERNAL MEDICINE

## 2021-10-07 PROCEDURE — G8926 SPIRO NO PERF OR DOC: HCPCS | Performed by: INTERNAL MEDICINE

## 2021-10-07 PROCEDURE — 4004F PT TOBACCO SCREEN RCVD TLK: CPT | Performed by: INTERNAL MEDICINE

## 2021-10-07 PROCEDURE — 3023F SPIROM DOC REV: CPT | Performed by: INTERNAL MEDICINE

## 2021-10-07 PROCEDURE — G8484 FLU IMMUNIZE NO ADMIN: HCPCS | Performed by: INTERNAL MEDICINE

## 2021-10-07 PROCEDURE — 3017F COLORECTAL CA SCREEN DOC REV: CPT | Performed by: INTERNAL MEDICINE

## 2021-10-07 PROCEDURE — G8427 DOCREV CUR MEDS BY ELIG CLIN: HCPCS | Performed by: INTERNAL MEDICINE

## 2021-10-07 PROCEDURE — G8420 CALC BMI NORM PARAMETERS: HCPCS | Performed by: INTERNAL MEDICINE

## 2021-10-07 RX ORDER — TIOTROPIUM BROMIDE 18 UG/1
18 CAPSULE ORAL; RESPIRATORY (INHALATION) DAILY
Qty: 90 CAPSULE | Refills: 1 | Status: SHIPPED | OUTPATIENT
Start: 2021-10-07

## 2021-10-07 RX ORDER — ALBUTEROL SULFATE 90 UG/1
2 AEROSOL, METERED RESPIRATORY (INHALATION) EVERY 6 HOURS PRN
Qty: 1 EACH | Refills: 1 | Status: SHIPPED | OUTPATIENT
Start: 2021-10-07 | End: 2022-03-09 | Stop reason: SDUPTHER

## 2021-10-07 SDOH — ECONOMIC STABILITY: FOOD INSECURITY: WITHIN THE PAST 12 MONTHS, YOU WORRIED THAT YOUR FOOD WOULD RUN OUT BEFORE YOU GOT MONEY TO BUY MORE.: NEVER TRUE

## 2021-10-07 SDOH — ECONOMIC STABILITY: FOOD INSECURITY: WITHIN THE PAST 12 MONTHS, THE FOOD YOU BOUGHT JUST DIDN'T LAST AND YOU DIDN'T HAVE MONEY TO GET MORE.: NEVER TRUE

## 2021-10-07 ASSESSMENT — SOCIAL DETERMINANTS OF HEALTH (SDOH): HOW HARD IS IT FOR YOU TO PAY FOR THE VERY BASICS LIKE FOOD, HOUSING, MEDICAL CARE, AND HEATING?: SOMEWHAT HARD

## 2021-10-07 ASSESSMENT — ENCOUNTER SYMPTOMS
FACIAL SWELLING: 0
CONSTIPATION: 0
DIARRHEA: 0
COLOR CHANGE: 0
APNEA: 0
CHEST TIGHTNESS: 0
SHORTNESS OF BREATH: 1
COUGH: 1
BACK PAIN: 0
ABDOMINAL PAIN: 0
WHEEZING: 0
ABDOMINAL DISTENTION: 0

## 2021-10-07 NOTE — PROGRESS NOTES
Subjective:      Patient ID: Maricarmen Shannon is a 64 y.o. male. HPI       HPI   1) Location/Symptom - Tiredness and Fatigue   2) Timing/Onset: 2 weeks   3) Context/Setting: has COPD, AF on AC   4) Quality: lost weight around 10 pounds in 3 months   5) Duration: continuous   6) Modifying Factors: No Black Color stools , Blood in stools ,   7) Severity: moderate   No Chest pain,  Mentioned me that he was Spitting Blood 3 days ( almost 2 weeks ago) , now no more blood in sputum   Was not taking his ALbuterol   Was in ED recently   CXR was ok , ekg and trop was ok   HB was OK   Was treated with antibiotics for UTI, Final Urine C/S was negative   Ex - smoker   Does [place] Occasionally   Had PFT in 2019, stage 1 COPD     Review of Systems   Constitutional: Positive for fatigue and unexpected weight change (lost 10 pounds ). Negative for activity change, appetite change, chills and diaphoresis. HENT: Negative for congestion, dental problem, ear discharge, facial swelling and hearing loss. Respiratory: Positive for cough (Blood with Cough ) and shortness of breath. Negative for apnea, chest tightness and wheezing. Cardiovascular: Negative for chest pain and leg swelling. Gastrointestinal: Negative for abdominal distention, abdominal pain, constipation and diarrhea. Genitourinary: Negative for difficulty urinating, dysuria, enuresis, flank pain and frequency. Musculoskeletal: Negative for arthralgias, back pain, gait problem and joint swelling. Skin: Negative for color change, pallor and rash. Neurological: Negative for dizziness, seizures, facial asymmetry, light-headedness, numbness and headaches. Psychiatric/Behavioral: Negative for agitation, behavioral problems, confusion, decreased concentration and dysphoric mood. Objective:   Physical Exam  Vitals and nursing note reviewed. Constitutional:       General: He is not in acute distress. Appearance: He is well-developed.  He is not smoker  - nicotine (NICODERM CQ) 7 MG/24HR; Place 1 patch onto the skin every 24 hours  Dispense: 30 patch; Refill: 1    5. Hemoptysis  If Negative and symptonms persisits   Will refer to Pulm for FOB   - CT CHEST W CONTRAST; Future    6. Other fatigue    - TSH; Future  - Vitamin B12 & Folate; Future    7. Vitamin D deficiency  - Vitami D 25 Hydroxy; Future    Around 30 minutes spent with Patient   · No follow-ups on file. · Reviewed prior labs and health maintenance. · Discussed use, benefit, and side effects of prescribed medications. Barriers to medication compliance addressed. All patient questions answered. Pt voiced understanding. King Melara MD  [facility]  10/7/2021, 2:51 PM    Please note that this chart was generated using voice recognition Dragon dictation software. Although every effort was made to ensure the accuracy of this automated transcription, some errors in transcription may have occurred. Visit Information    Have you changed or started any medications since your last visit including any over-the-counter medicines, vitamins, or herbal medicines? no   Are you having any side effects from any of your medications? -  no  Have you stopped taking any of your medications? Is so, why? -  no    Have you seen any other physician or provider since your last visit? Yes - Records Requested  Have you had any other diagnostic tests since your last visit? Yes - Records Requested  Have you been seen in the emergency room and/or had an admission to a hospital since we last saw you? Yes - Records Requested  Have you had your routine dental cleaning in the past 6 months? no    Have you activated your [employer] account? If not, what are your barriers?  No: pending     Patient Care Team:  King Melara MD as PCP - General (Internal Medicine)  King Melara MD as PCP - [facility]  John Mock MD as Consulting Physician (Pulmonary Disease)    Medical History Review  Past Medical, Family, and Social History reviewed and does not contribute to the patient presenting condition    Health Maintenance   Topic Date Due    Hepatitis C screen  Never done    COVID-19 Vaccine (1) Never done    Colon cancer screen colonoscopy  Never done    Shingles Vaccine (1 of 2) Never done    Flu vaccine (1) 09/01/2021    Lipid screen  03/28/2023    DTaP/Tdap/Td vaccine (3 - Td or Tdap) 09/18/2025    Pneumococcal 0-64 years Vaccine (2 of 2 - PPSV23) 02/13/2030    HIV screen  Completed    Hepatitis A vaccine  Aged Out    Hepatitis B vaccine  Aged Out    Hib vaccine  Aged Out    Meningococcal (ACWY) vaccine  Aged Out

## 2021-11-11 ENCOUNTER — APPOINTMENT (OUTPATIENT)
Dept: GENERAL RADIOLOGY | Age: 56
DRG: 198 | End: 2021-11-11
Payer: MEDICAID

## 2021-11-11 ENCOUNTER — HOSPITAL ENCOUNTER (INPATIENT)
Age: 56
LOS: 2 days | Discharge: HOME | DRG: 198 | End: 2021-11-13
Attending: EMERGENCY MEDICINE | Admitting: INTERNAL MEDICINE
Payer: MEDICAID

## 2021-11-11 DIAGNOSIS — I20.0 UNSTABLE ANGINA (HCC): Primary | ICD-10-CM

## 2021-11-11 DIAGNOSIS — N40.0 BENIGN PROSTATIC HYPERPLASIA WITHOUT LOWER URINARY TRACT SYMPTOMS: ICD-10-CM

## 2021-11-11 PROBLEM — I24.9 ACUTE CORONARY SYNDROME (HCC): Status: ACTIVE | Noted: 2021-11-11

## 2021-11-11 LAB
ABSOLUTE EOS #: 0.1 K/UL (ref 0–0.4)
ABSOLUTE IMMATURE GRANULOCYTE: ABNORMAL K/UL (ref 0–0.3)
ABSOLUTE LYMPH #: 1.3 K/UL (ref 1–4.8)
ABSOLUTE MONO #: 0.9 K/UL (ref 0.1–1.3)
ANION GAP SERPL CALCULATED.3IONS-SCNC: 6 MMOL/L (ref 9–17)
BASOPHILS # BLD: 0 % (ref 0–2)
BASOPHILS ABSOLUTE: 0 K/UL (ref 0–0.2)
BUN BLDV-MCNC: 26 MG/DL (ref 6–20)
BUN/CREAT BLD: ABNORMAL (ref 9–20)
CALCIUM SERPL-MCNC: 9.1 MG/DL (ref 8.6–10.4)
CHLORIDE BLD-SCNC: 109 MMOL/L (ref 98–107)
CO2: 27 MMOL/L (ref 20–31)
CREAT SERPL-MCNC: 0.82 MG/DL (ref 0.7–1.2)
D-DIMER QUANTITATIVE: 0.39 MG/L FEU (ref 0–0.59)
DIFFERENTIAL TYPE: ABNORMAL
EOSINOPHILS RELATIVE PERCENT: 1 % (ref 0–4)
GFR AFRICAN AMERICAN: >60 ML/MIN
GFR NON-AFRICAN AMERICAN: >60 ML/MIN
GFR SERPL CREATININE-BSD FRML MDRD: ABNORMAL ML/MIN/{1.73_M2}
GFR SERPL CREATININE-BSD FRML MDRD: ABNORMAL ML/MIN/{1.73_M2}
GLUCOSE BLD-MCNC: 112 MG/DL (ref 70–99)
HCT VFR BLD CALC: 43.2 % (ref 41–53)
HEMOGLOBIN: 14.4 G/DL (ref 13.5–17.5)
IMMATURE GRANULOCYTES: ABNORMAL %
INR BLD: 1
LYMPHOCYTES # BLD: 13 % (ref 24–44)
MCH RBC QN AUTO: 30.2 PG (ref 26–34)
MCHC RBC AUTO-ENTMCNC: 33.3 G/DL (ref 31–37)
MCV RBC AUTO: 90.8 FL (ref 80–100)
MONOCYTES # BLD: 9 % (ref 1–7)
NRBC AUTOMATED: ABNORMAL PER 100 WBC
PARTIAL THROMBOPLASTIN TIME: 27.8 SEC (ref 24–36)
PDW BLD-RTO: 14.4 % (ref 11.5–14.9)
PLATELET # BLD: 201 K/UL (ref 150–450)
PLATELET ESTIMATE: ABNORMAL
PMV BLD AUTO: 8.4 FL (ref 6–12)
POTASSIUM SERPL-SCNC: 4.3 MMOL/L (ref 3.7–5.3)
PROCALCITONIN: 0.09 NG/ML
PROTHROMBIN TIME: 13.7 SEC (ref 11.8–14.6)
RBC # BLD: 4.76 M/UL (ref 4.5–5.9)
RBC # BLD: ABNORMAL 10*6/UL
SARS-COV-2, RAPID: NOT DETECTED
SEG NEUTROPHILS: 77 % (ref 36–66)
SEGMENTED NEUTROPHILS ABSOLUTE COUNT: 7.8 K/UL (ref 1.3–9.1)
SODIUM BLD-SCNC: 142 MMOL/L (ref 135–144)
SPECIMEN DESCRIPTION: NORMAL
TROPONIN INTERP: NORMAL
TROPONIN INTERP: NORMAL
TROPONIN T: NORMAL NG/ML
TROPONIN T: NORMAL NG/ML
TROPONIN, HIGH SENSITIVITY: 16 NG/L (ref 0–22)
TROPONIN, HIGH SENSITIVITY: 17 NG/L (ref 0–22)
WBC # BLD: 10.1 K/UL (ref 3.5–11)
WBC # BLD: ABNORMAL 10*3/UL

## 2021-11-11 PROCEDURE — 99283 EMERGENCY DEPT VISIT LOW MDM: CPT

## 2021-11-11 PROCEDURE — 93005 ELECTROCARDIOGRAM TRACING: CPT | Performed by: EMERGENCY MEDICINE

## 2021-11-11 PROCEDURE — 71045 X-RAY EXAM CHEST 1 VIEW: CPT

## 2021-11-11 PROCEDURE — 2500000003 HC RX 250 WO HCPCS: Performed by: EMERGENCY MEDICINE

## 2021-11-11 PROCEDURE — 85730 THROMBOPLASTIN TIME PARTIAL: CPT

## 2021-11-11 PROCEDURE — 2060000000 HC ICU INTERMEDIATE R&B

## 2021-11-11 PROCEDURE — 96374 THER/PROPH/DIAG INJ IV PUSH: CPT

## 2021-11-11 PROCEDURE — G0378 HOSPITAL OBSERVATION PER HR: HCPCS

## 2021-11-11 PROCEDURE — 36415 COLL VENOUS BLD VENIPUNCTURE: CPT

## 2021-11-11 PROCEDURE — 2580000003 HC RX 258

## 2021-11-11 PROCEDURE — 85610 PROTHROMBIN TIME: CPT

## 2021-11-11 PROCEDURE — 6370000000 HC RX 637 (ALT 250 FOR IP): Performed by: EMERGENCY MEDICINE

## 2021-11-11 PROCEDURE — 85025 COMPLETE CBC W/AUTO DIFF WBC: CPT

## 2021-11-11 PROCEDURE — 96375 TX/PRO/DX INJ NEW DRUG ADDON: CPT

## 2021-11-11 PROCEDURE — 6370000000 HC RX 637 (ALT 250 FOR IP)

## 2021-11-11 PROCEDURE — 6360000002 HC RX W HCPCS: Performed by: EMERGENCY MEDICINE

## 2021-11-11 PROCEDURE — 80048 BASIC METABOLIC PNL TOTAL CA: CPT

## 2021-11-11 PROCEDURE — 84145 PROCALCITONIN (PCT): CPT

## 2021-11-11 PROCEDURE — 87635 SARS-COV-2 COVID-19 AMP PRB: CPT

## 2021-11-11 PROCEDURE — 85379 FIBRIN DEGRADATION QUANT: CPT

## 2021-11-11 PROCEDURE — 84484 ASSAY OF TROPONIN QUANT: CPT

## 2021-11-11 RX ORDER — ONDANSETRON 2 MG/ML
4 INJECTION INTRAMUSCULAR; INTRAVENOUS ONCE
Status: COMPLETED | OUTPATIENT
Start: 2021-11-11 | End: 2021-11-11

## 2021-11-11 RX ORDER — POLYETHYLENE GLYCOL 3350 17 G/17G
17 POWDER, FOR SOLUTION ORAL DAILY PRN
Status: DISCONTINUED | OUTPATIENT
Start: 2021-11-11 | End: 2021-11-13 | Stop reason: HOSPADM

## 2021-11-11 RX ORDER — SODIUM CHLORIDE 9 MG/ML
INJECTION, SOLUTION INTRAVENOUS CONTINUOUS
Status: DISCONTINUED | OUTPATIENT
Start: 2021-11-11 | End: 2021-11-11

## 2021-11-11 RX ORDER — ALBUTEROL SULFATE 90 UG/1
2 AEROSOL, METERED RESPIRATORY (INHALATION) EVERY 6 HOURS PRN
Status: DISCONTINUED | OUTPATIENT
Start: 2021-11-11 | End: 2021-11-13 | Stop reason: HOSPADM

## 2021-11-11 RX ORDER — SODIUM CHLORIDE 0.9 % (FLUSH) 0.9 %
5-40 SYRINGE (ML) INJECTION PRN
Status: DISCONTINUED | OUTPATIENT
Start: 2021-11-11 | End: 2021-11-13 | Stop reason: HOSPADM

## 2021-11-11 RX ORDER — METOPROLOL TARTRATE 5 MG/5ML
5 INJECTION INTRAVENOUS ONCE
Status: COMPLETED | OUTPATIENT
Start: 2021-11-11 | End: 2021-11-11

## 2021-11-11 RX ORDER — POTASSIUM CHLORIDE 20 MEQ/1
40 TABLET, EXTENDED RELEASE ORAL PRN
Status: DISCONTINUED | OUTPATIENT
Start: 2021-11-11 | End: 2021-11-13 | Stop reason: HOSPADM

## 2021-11-11 RX ORDER — ACETAMINOPHEN 650 MG/1
650 SUPPOSITORY RECTAL EVERY 6 HOURS PRN
Status: DISCONTINUED | OUTPATIENT
Start: 2021-11-11 | End: 2021-11-13 | Stop reason: HOSPADM

## 2021-11-11 RX ORDER — ASPIRIN 81 MG/1
324 TABLET, CHEWABLE ORAL ONCE
Status: COMPLETED | OUTPATIENT
Start: 2021-11-11 | End: 2021-11-11

## 2021-11-11 RX ORDER — DILTIAZEM HYDROCHLORIDE 120 MG/1
120 CAPSULE, COATED, EXTENDED RELEASE ORAL DAILY
Status: DISCONTINUED | OUTPATIENT
Start: 2021-11-11 | End: 2021-11-13 | Stop reason: HOSPADM

## 2021-11-11 RX ORDER — ATORVASTATIN CALCIUM 10 MG/1
10 TABLET, FILM COATED ORAL NIGHTLY
Status: DISCONTINUED | OUTPATIENT
Start: 2021-11-11 | End: 2021-11-13 | Stop reason: HOSPADM

## 2021-11-11 RX ORDER — SODIUM CHLORIDE 0.9 % (FLUSH) 0.9 %
5-40 SYRINGE (ML) INJECTION EVERY 12 HOURS SCHEDULED
Status: DISCONTINUED | OUTPATIENT
Start: 2021-11-11 | End: 2021-11-13 | Stop reason: HOSPADM

## 2021-11-11 RX ORDER — SODIUM CHLORIDE 9 MG/ML
25 INJECTION, SOLUTION INTRAVENOUS PRN
Status: DISCONTINUED | OUTPATIENT
Start: 2021-11-11 | End: 2021-11-13 | Stop reason: HOSPADM

## 2021-11-11 RX ORDER — ONDANSETRON 2 MG/ML
4 INJECTION INTRAMUSCULAR; INTRAVENOUS EVERY 6 HOURS PRN
Status: DISCONTINUED | OUTPATIENT
Start: 2021-11-11 | End: 2021-11-13 | Stop reason: HOSPADM

## 2021-11-11 RX ORDER — SODIUM CHLORIDE 0.9 % (FLUSH) 0.9 %
10 SYRINGE (ML) INJECTION PRN
Status: ACTIVE | OUTPATIENT
Start: 2021-11-11 | End: 2021-11-11

## 2021-11-11 RX ORDER — ASPIRIN 81 MG/1
81 TABLET, CHEWABLE ORAL DAILY
Status: DISCONTINUED | OUTPATIENT
Start: 2021-11-12 | End: 2021-11-13 | Stop reason: HOSPADM

## 2021-11-11 RX ORDER — ATROPINE SULFATE 0.1 MG/ML
0.5 INJECTION INTRAVENOUS EVERY 5 MIN PRN
Status: ACTIVE | OUTPATIENT
Start: 2021-11-11 | End: 2021-11-11

## 2021-11-11 RX ORDER — POTASSIUM CHLORIDE 7.45 MG/ML
10 INJECTION INTRAVENOUS PRN
Status: DISCONTINUED | OUTPATIENT
Start: 2021-11-11 | End: 2021-11-13 | Stop reason: HOSPADM

## 2021-11-11 RX ORDER — METOPROLOL TARTRATE 5 MG/5ML
5 INJECTION INTRAVENOUS EVERY 5 MIN PRN
Status: ACTIVE | OUTPATIENT
Start: 2021-11-11 | End: 2021-11-11

## 2021-11-11 RX ORDER — ASPIRIN 81 MG/1
81 TABLET, CHEWABLE ORAL DAILY
Status: CANCELLED | OUTPATIENT
Start: 2021-11-11

## 2021-11-11 RX ORDER — NITROGLYCERIN 0.4 MG/1
0.4 TABLET SUBLINGUAL EVERY 5 MIN PRN
Status: ACTIVE | OUTPATIENT
Start: 2021-11-11 | End: 2021-11-11

## 2021-11-11 RX ORDER — ACETAMINOPHEN 325 MG/1
650 TABLET ORAL EVERY 6 HOURS PRN
Status: DISCONTINUED | OUTPATIENT
Start: 2021-11-11 | End: 2021-11-13 | Stop reason: HOSPADM

## 2021-11-11 RX ORDER — ONDANSETRON 4 MG/1
4 TABLET, ORALLY DISINTEGRATING ORAL EVERY 8 HOURS PRN
Status: DISCONTINUED | OUTPATIENT
Start: 2021-11-11 | End: 2021-11-13 | Stop reason: HOSPADM

## 2021-11-11 RX ORDER — KETOROLAC TROMETHAMINE 30 MG/ML
30 INJECTION, SOLUTION INTRAMUSCULAR; INTRAVENOUS ONCE
Status: COMPLETED | OUTPATIENT
Start: 2021-11-11 | End: 2021-11-11

## 2021-11-11 RX ORDER — NITROGLYCERIN 0.4 MG/1
0.4 TABLET SUBLINGUAL EVERY 5 MIN PRN
Status: DISCONTINUED | OUTPATIENT
Start: 2021-11-11 | End: 2021-11-13 | Stop reason: HOSPADM

## 2021-11-11 RX ORDER — ALBUTEROL SULFATE 90 UG/1
2 AEROSOL, METERED RESPIRATORY (INHALATION) PRN
Status: ACTIVE | OUTPATIENT
Start: 2021-11-11 | End: 2021-11-11

## 2021-11-11 RX ORDER — SODIUM CHLORIDE 9 MG/ML
500 INJECTION, SOLUTION INTRAVENOUS CONTINUOUS PRN
Status: ACTIVE | OUTPATIENT
Start: 2021-11-11 | End: 2021-11-11

## 2021-11-11 RX ADMIN — ATORVASTATIN CALCIUM 10 MG: 10 TABLET, FILM COATED ORAL at 20:08

## 2021-11-11 RX ADMIN — SODIUM CHLORIDE, PRESERVATIVE FREE 10 ML: 5 INJECTION INTRAVENOUS at 20:09

## 2021-11-11 RX ADMIN — KETOROLAC TROMETHAMINE 30 MG: 30 INJECTION, SOLUTION INTRAMUSCULAR; INTRAVENOUS at 11:37

## 2021-11-11 RX ADMIN — DILTIAZEM HYDROCHLORIDE 120 MG: 120 CAPSULE, COATED, EXTENDED RELEASE ORAL at 17:55

## 2021-11-11 RX ADMIN — METOPROLOL TARTRATE 5 MG: 1 INJECTION, SOLUTION INTRAVENOUS at 11:36

## 2021-11-11 RX ADMIN — ASPIRIN 324 MG: 81 TABLET, CHEWABLE ORAL at 11:36

## 2021-11-11 RX ADMIN — ONDANSETRON 4 MG: 2 INJECTION INTRAMUSCULAR; INTRAVENOUS at 11:37

## 2021-11-11 RX ADMIN — APIXABAN 5 MG: 5 TABLET, FILM COATED ORAL at 20:08

## 2021-11-11 ASSESSMENT — ENCOUNTER SYMPTOMS
NAUSEA: 1
CONSTIPATION: 0
BACK PAIN: 0
VOMITING: 0
TROUBLE SWALLOWING: 0
RHINORRHEA: 0
BLOOD IN STOOL: 0
COLOR CHANGE: 0
ABDOMINAL PAIN: 0
SHORTNESS OF BREATH: 1
DIARRHEA: 0
COUGH: 1
SORE THROAT: 0

## 2021-11-11 ASSESSMENT — PAIN SCALES - GENERAL
PAINLEVEL_OUTOF10: 5

## 2021-11-11 ASSESSMENT — PAIN DESCRIPTION - LOCATION: LOCATION: CHEST

## 2021-11-11 NOTE — PROGRESS NOTES
Patient arrived to 2084. He is placed on telemetry. VS as charted, assessment as charted. Patient rates pain as a 5 on 0-10 and it hurts to touch and also to take a deep breath in. He reports that nothing has relived the pain .

## 2021-11-11 NOTE — ED NOTES
Report given to Rj Silverman RN from PCU  Report method by phone    The following was reviewed with receiving RN: 18g IV access LAC. No change in chest discomfort 5/10. Tolerated boxed lunch. Troponins 16& 17. Negative chest xray. Negative covid. VSS. Current vital signs:  /74   Pulse 97   Temp 97 °F (36.1 °C) (Temporal)   Resp 18   Ht 5' 8\" (1.727 m)   Wt 146 lb (66.2 kg)   SpO2 96%   BMI 22.20 kg/m²                MEWS Score: 2     Any medication or safety alerts were reviewed. Any pending diagnostics and notifications were also reviewed, as well as any safety concerns or issues, abnormal labs, abnormal imaging, and abnormal assessment findings. Questions were answered.             Matthias Hinkle RN  11/11/21 7460

## 2021-11-11 NOTE — H&P
[address] [place] [address]     HISTORY AND PHYSICAL EXAMINATION            Date:   11/11/2021  Patient name:  Srikanth Dodson  Date of admission:  11/11/2021 11:12 AM  MRN:   [id number]  Account:  [de-identified]  YOB: 1965  PCP:    Sheila Marie MD  Room:   [facility]3813-44  Code Status:    Prior    Chief Complaint:     Chief Complaint   Patient presents with    Chest Pain    Cough    Shortness of Breath       History Obtained From:     patient, electronic medical record    History of Present Illness: The patient is a 64 y.o. 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 Patient mentions having productive cough of greenish sputum. Is recovering from smoking using nicotine patch. Doesn't drink alcohol. Patient had a CT scan done in August 2018 for pulmonary embolism, incidental finding of 3.5 x 2.5 cm left upper lobe cavitary lesion and bilateral upper lobe patchy infiltrates along with COPD changes. Spiculated nodule within the left upper lobe 1.5 x 1 cm along with an enlarged AP window mediastinal lymph node. PET scan December 2018. Did not show any abnormal uptake in the lesions of the lung or mediastinal lymph node.     HR initial 109  Otherwise vitals stable  EKG showed A. fib with RVR  Tropes 16> 17  CXR unremarkable    Patient was given Lopressor 5 mg IV, ketorolac 30 mg IV,  mg     Admitted to PCU for management of acute current syndrome. ASA 81, Lipitor 10 mg  Continue Cardizem, Eliquis, nitroglycerin PRN  Echo, stress test pending, Cardiology were consulted       Past Medical History:     Past Medical History:   Diagnosis Date    Abnormal echocardiogram 02/2018    Arrhythmia     AS (aortic stenosis)     Atrial fibrillation (HCC)     Benign prostatic hyperplasia     Biatrial enlargement     Central perforation of tympanic membrane, right     Chest pain     Chews tobacco     CHF (congestive heart failure), NYHA class I, acute on chronic, combined (HCC)     Chronic back pain     Conductive hearing loss of right ear     COPD (chronic obstructive pulmonary disease) (HCC)     Diverticulitis     Fatigue     History of cocaine abuse ([address] 01/2018    Household contact with history of methicillin resistant Staphylococcus aureus (MRSA) infection     Lung nodule     Mass of upper lobe of left lung     Mild concentric left ventricular hypertrophy     Murmur, cardiac     Near syncope     Nonrheumatic aortic (valve) stenosis     Palpitations     Panlobular emphysema (HCC)     Pneumonia of both upper lobes due to infectious organism     Right-sided tinnitus     Severe aortic stenosis     Shortness of breath     Tobacco abuse         Past SurgicalHistory:     Past Surgical History:   Procedure Laterality Date    AORTIC VALVE REPLACEMENT  12/2018    CYST REMOVAL Left     INGUINAL HERNIA REPAIR Right     AL DRAIN SKIN ABSCESS COMPLIC N/A 7/19/2389    GLUTEAL INCISION AND DRAINAGE performed by Natalee Solano DO at [address]        Medications Prior to Admission:        Prior to Admission medications    Medication Sig Start Date End Date Taking?  Authorizing Provider   nicotine (NICODERM CQ) 7 MG/24HR Place 1 patch onto the skin every 24 hours 10/7/21 12/6/21 Yes Liz Vaughn MD   apixaban (ELIQUIS) 5 MG TABS tablet Take 1 tablet by mouth 2 times daily 10/7/21  Yes Liz Vaughn MD   albuterol sulfate  (90 Base) MCG/ACT inhaler Inhale 2 puffs into the lungs every 6 hours as needed for Shortness of Breath 10/7/21  Yes Liz Vaughn MD   tiotropium (Taylor Divers) 18 MCG inhalation capsule Inhale 1 capsule into the lungs daily 10/7/21  Yes Liz Vaughn MD   fluticasone (FLONASE) 50 MCG/ACT nasal spray 1 spray by Each Nostril route daily 4/12/21  Yes Liz Vaughn MD   acetaminophen (TYLENOL) 500 MG tablet Take 2 tablets by mouth 3 times daily as needed for Pain 4/12/21  Yes Liz Vaughn MD   loratadine (CLARITIN) 10 MG tablet Take 1 tablet by mouth daily 4/20/20  Yes Liz Vaughn MD   aspirin 81 MG chewable tablet Take 1 tablet by mouth daily 1/7/19  Yes Loraine Monaco MD        Allergies:     Patient has no known allergies. Social History:     Tobacco:    reports that he has quit smoking. He has a 10.00 pack-year smoking history. His smokeless tobacco use includes chew. Alcohol:      reports previous alcohol use. Drug Use:  reports previous drug use. Drug: Cocaine. Family History:     Family History   Problem Relation Age of Onset    Depression Mother     COPD Father     COPD Paternal Grandfather        Review of Systems:     Positive and Negative as described in HPI. Review of Systems   Constitutional: Positive for chills and fatigue. Negative for fever. HENT: Negative for rhinorrhea. Eyes: Negative for visual disturbance. Respiratory: Positive for cough and shortness of breath. Cardiovascular: Positive for chest pain and palpitations. Negative for leg swelling. Gastrointestinal: Positive for nausea. Negative for abdominal pain, constipation, diarrhea and vomiting. Endocrine: Negative for polyuria. Genitourinary: Negative for dysuria. Musculoskeletal: Negative for arthralgias and back pain. Skin: Negative for wound. 24 hour(s))   EKG 12 Lead    Collection Time: 11/11/21 11:22 AM   Result Value Ref Range    Ventricular Rate 116 BPM    Atrial Rate 122 BPM    QRS Duration 84 ms    Q-T Interval 338 ms    QTc Calculation (Bazett) 469 ms    R Axis 66 degrees    T Axis 74 degrees   Troponin    Collection Time: 11/11/21 11:33 AM   Result Value Ref Range    Troponin, High Sensitivity 16 0 - 22 ng/L    Troponin T NOT REPORTED <0.03 ng/mL    Troponin Interp NOT REPORTED    CBC Auto Differential    Collection Time: 11/11/21 11:33 AM   Result Value Ref Range    WBC 10.1 3.5 - 11.0 k/uL    RBC 4.76 4.5 - 5.9 m/uL    Hemoglobin 14.4 13.5 - 17.5 g/dL    Hematocrit 43.2 41 - 53 %    MCV 90.8 80 - 100 fL    MCH 30.2 26 - 34 pg    MCHC 33.3 31 - 37 g/dL    RDW 14.4 11.5 - 14.9 %    Platelets 618 516 - 432 k/uL    MPV 8.4 6.0 - 12.0 fL    NRBC Automated NOT REPORTED per 100 WBC    Differential Type NOT REPORTED     Seg Neutrophils 77 (H) 36 - 66 %    Lymphocytes 13 (L) 24 - 44 %    Monocytes 9 (H) 1 - 7 %    Eosinophils % 1 0 - 4 %    Basophils 0 0 - 2 %    Immature Granulocytes NOT REPORTED 0 %    Segs Absolute 7.80 1.3 - 9.1 k/uL    Absolute Lymph # 1.30 1.0 - 4.8 k/uL    Absolute Mono # 0.90 0.1 - 1.3 k/uL    Absolute Eos # 0.10 0.0 - 0.4 k/uL    Basophils Absolute 0.00 0.0 - 0.2 k/uL    Absolute Immature Granulocyte NOT REPORTED 0.00 - 0.30 k/uL    WBC Morphology NOT REPORTED     RBC Morphology NOT REPORTED     Platelet Estimate NOT REPORTED    Basic Metabolic Panel    Collection Time: 11/11/21 11:33 AM   Result Value Ref Range    Glucose 112 (H) 70 - 99 mg/dL    BUN 26 (H) 6 - 20 mg/dL    CREATININE 0.82 0.70 - 1.20 mg/dL    Bun/Cre Ratio NOT REPORTED 9 - 20    Calcium 9.1 8.6 - 10.4 mg/dL    Sodium 142 135 - 144 mmol/L    Potassium 4.3 3.7 - 5.3 mmol/L    Chloride 109 (H) 98 - 107 mmol/L    CO2 27 20 - 31 mmol/L    Anion Gap 6 (L) 9 - 17 mmol/L    GFR Non-African American >60 >60 mL/min    GFR African American >60 >60 mL/min    GFR Comment GFR Staging NOT REPORTED    Protime-INR    Collection Time: 11/11/21 11:33 AM   Result Value Ref Range    Protime 13.7 11.8 - 14.6 sec    INR 1.0    APTT    Collection Time: 11/11/21 11:33 AM   Result Value Ref Range    PTT 27.8 24.0 - 36.0 sec   D-Dimer, Quantitative    Collection Time: 11/11/21 11:33 AM   Result Value Ref Range    D-Dimer, Quant 0.39 0.00 - 0.59 mg/L FEU   COVID-19, Rapid    Collection Time: 11/11/21 11:40 AM    Specimen: Nasopharyngeal Swab   Result Value Ref Range    Specimen Description . NASOPHARYNGEAL SWAB     SARS-CoV-2, Rapid Not Detected Not Detected   Troponin    Collection Time: 11/11/21  2:20 PM   Result Value Ref Range    Troponin, High Sensitivity 17 0 - 22 ng/L    Troponin T NOT REPORTED <0.03 ng/mL    Troponin Interp NOT REPORTED        Imaging/Diagnostics:  XR CHEST PORTABLE    Result Date: 11/11/2021  EXAMINATION: ONE XRAY VIEW OF THE CHEST 11/11/2021 11:32 am COMPARISON: 08/28/2021. HISTORY: ORDERING SYSTEM PROVIDED HISTORY: Cough, SOB TECHNOLOGIST PROVIDED HISTORY: Cough, SOB Reason for Exam: Pt states coughing up sputum Acuity: Unknown Type of Exam: Unknown FINDINGS: The heart size is within normal limits. The pulmonary vasculature is also within normal limits. No acute infiltrates are seen. The costophrenic angles are sharp bilaterally. No pneumothoraces are noted. There is a calcified granuloma in the left upper lobe. 1. No active pulmonary disease.        Assessment :      Primary Problem  <principal problem not specified>    Active Hospital Problems    Diagnosis Date Noted    Unstable angina ([facility] [address] [address] [I20.0] 11/11/2021       Plan:     Patient status Admit as inpatient in the  Progressive Unit/Step down    Acute coronary syndrome  Troponin: 16, 17  EKG:  Atrial fibrillation with rapid ventricular response  ASA 81 mg  Cardizem 120 mg  Atorvastatin 10 mg   Nitroglycerin sublingual as needed  75 mL/H NS   Lipid panel pending  TSH pending (2.39 last month)  Repeat trops am  Repeat EKG am    Echo to be done tomorrow  Stress test tomorrow   N.p.o. at midnight  Urine drug screening pending   Cardiology on board    Atrial Fibrillation with RVR     EKG: Atrial fibrillation with RVR  Cardizem 120 mg   Eliquis    Productive cough -chronic bronchitis  History of COPD  Albuterol inhaler  Pro-Jose pending  CXR No active pulmonary disease. DVT prophylaxis: On Eliquis 5 mg  Diet: Cardiac diet until midnight    PT OT evaluation  SW discharge plan        Consultations:   [address] [place] [address] CONSULT TO CARDIOLOGY  IP CONSULT TO SOCIAL WORK    Patient is admitted as inpatient status because of co-morbiditieslisted above, severity of signs and symptoms as outlined, requirement for current medical therapies and most importantly because of direct risk to patient if care not provided in a hospital setting. Eran Tsai MD  11/11/2021  4:31 PM    Copy sent to Dr. Lord Cheyenne MD  Attending Physician Statement    I have discussed the case of Jessica Vides, including pertinent history and exam findings with the resident. I have seen and examined the patient and the key elements of the encounter have been performed by me. I agree with the assessment, plan, and orders as documented by the resident. He has previous history of TAVR and has presented with atypical chest pain. His previous myocardial perfusion scan has been normal.  There was no evidence of myocardial infarction. He does have a lesion in his left upper lobe since 2018.   He is a candidate for repeat CT chest.  It is to be noted chest x-ray did suggest that this is a granuloma with calcification  Electronically signed by Maite Brady MD on 11/12/2021 at 2:00 PM

## 2021-11-11 NOTE — ED TRIAGE NOTES
Mode of arrival (squad #, walk in, police, etc) : walk in        Chief complaint(s): chest pain SOB        Arrival Note (brief scenario, treatment PTA, etc). : Pt with c hest pain and SOB that started at 4am. Pt with cough for a few days. Pt is A&Ox4, in no acute distress, respirations even and unlabored, ambulatory with steady gait. C= \"Have you ever felt that you should Cut down on your drinking? \"  No  A= \"Have people Annoyed you by criticizing your drinking? \"  No  G= \"Have you ever felt bad or Guilty about your drinking? \"  No  E= \"Have you ever had a drink as an Eye-opener first thing in the morning to steady your nerves or to help a hangover? \"  No      Deferred []      Reason for deferring: N/A    *If yes to two or more: probable alcohol abuse. *

## 2021-11-11 NOTE — PROGRESS NOTES
Medication History completed:    New medications: none    Medications discontinued: dicyclomine, diltiazem ER, ondansetron, tadalafil, tamsulosin    Changes to dosing: none    Stated allergies: NKDA    Other pertinent information: Medications confirmed with patient. Patient reports that he uses [facility] to fill his medications, but per their fill history, the patient has not filled any medications since April.      Thank you,  Gabriele Correa PharmD, BCPS  [phone]

## 2021-11-11 NOTE — ED PROVIDER NOTES
[address] ED  EMERGENCY DEPARTMENT ENCOUNTER    Pt Name: Shawna Storey  MRN: [id number]  YOB: 1965  Date of evaluation:11/11/21  PCP: Eileen Rogers MD    CHIEF COMPLAINT       Chief Complaint   Patient presents with    Chest Pain    Cough    Shortness of Breath       HISTORY OF PRESENT Mali Roy is a 64 y.o. male who presents with a chief complaint of chest pain. Patient states pain in the left side of his chest woke him up from sleep around 4 AM this morning. He was initially very severe but have now has been intermittent and moderate. Rates as 5 out of 10 in severity right now. He states that sharp and on the left side of his chest and is worse when he takes a deep breath. Pain does not radiate. He states he also feels slightly short of breath has been coughing for the past 3 to 4 days with productive green sputum. Reports some sweats and nausea as well. Symptoms are acute. Symptoms are moderate. Nothing else make symptoms better or worse. Patient has no other complaints at this time. REVIEW OF SYSTEMS       Review of Systems   Constitutional: Positive for diaphoresis. Negative for chills, fatigue and fever. HENT: Negative for congestion, ear pain, sore throat and trouble swallowing. Eyes: Negative for visual disturbance. Respiratory: Positive for cough and shortness of breath. Cardiovascular: Positive for chest pain. Negative for palpitations and leg swelling. Gastrointestinal: Positive for nausea. Negative for abdominal pain, blood in stool, constipation, diarrhea and vomiting. Genitourinary: Negative for dysuria and flank pain. Musculoskeletal: Negative for arthralgias, back pain, myalgias and neck pain. Skin: Negative for color change, rash and wound. Neurological: Negative for dizziness, weakness, light-headedness, numbness and headaches. Psychiatric/Behavioral: Negative for confusion.    All other systems reviewed and are negative. Negative in 10 essential Systems except as mentioned above and in the HPI. PAST MEDICAL HISTORY     Past Medical History:   Diagnosis Date    Abnormal echocardiogram 02/2018    Arrhythmia     AS (aortic stenosis)     Atrial fibrillation (HCC)     Benign prostatic hyperplasia     Biatrial enlargement     Central perforation of tympanic membrane, right     Chest pain     Chews tobacco     CHF (congestive heart failure), NYHA class I, acute on chronic, combined (HCC)     Chronic back pain     Conductive hearing loss of right ear     COPD (chronic obstructive pulmonary disease) (HCC)     Diverticulitis     Fatigue     History of cocaine abuse ([facility] [address] 01/2018    Household contact with history of methicillin resistant Staphylococcus aureus (MRSA) infection     Lung nodule     Mass of upper lobe of left lung     Mild concentric left ventricular hypertrophy     Murmur, cardiac     Near syncope     Nonrheumatic aortic (valve) stenosis     Palpitations     Panlobular emphysema (HCC)     Pneumonia of both upper lobes due to infectious organism     Right-sided tinnitus     Severe aortic stenosis     Shortness of breath     Tobacco abuse          SURGICAL HISTORY      has a past surgical history that includes Inguinal hernia repair (Right); cyst removal (Left); pr drain skin abscess complic (N/A, 3/47/6113); and Aortic valve replacement (12/2018).       CURRENT MEDICATIONS       Previous Medications    ACETAMINOPHEN (TYLENOL) 500 MG TABLET    Take 2 tablets by mouth 3 times daily as needed for Pain    ALBUTEROL SULFATE  (90 BASE) MCG/ACT INHALER    Inhale 2 puffs into the lungs every 6 hours as needed for Shortness of Breath    APIXABAN (ELIQUIS) 5 MG TABS TABLET    Take 1 tablet by mouth 2 times daily    ASPIRIN 81 MG CHEWABLE TABLET    Take 1 tablet by mouth daily    DICYCLOMINE (BENTYL) 10 MG CAPSULE    Take 1 capsule by mouth every 6 hours as needed (cramps)    DILTIAZEM (CARDIZEM CD) 120 MG EXTENDED RELEASE CAPSULE    Take 120 mg by mouth daily    FLUTICASONE (FLONASE) 50 MCG/ACT NASAL SPRAY    1 spray by Each Nostril route daily    LORATADINE (CLARITIN) 10 MG TABLET    Take 1 tablet by mouth daily    NICOTINE (NICODERM CQ) 7 MG/24HR    Place 1 patch onto the skin every 24 hours    ONDANSETRON (ZOFRAN) 4 MG TABLET    Take 1 tablet by mouth every 8 hours as needed for Nausea    TADALAFIL (CIALIS) 5 MG TABLET    Take 1 tablet by mouth daily    TAMSULOSIN (FLOMAX) 0.4 MG CAPSULE    Take 1 capsule by mouth daily    TIOTROPIUM (SPIRIVA HANDIHALER) 18 MCG INHALATION CAPSULE    Inhale 1 capsule into the lungs daily       ALLERGIES     has No Known Allergies. FAMILY HISTORY     He indicated that the status of his mother is unknown. He indicated that the status of his father is unknown. He indicated that the status of his paternal grandfather is unknown.     family history includes COPD in his father and paternal grandfather; Depression in his mother. SOCIAL HISTORY      reports that he has quit smoking. He has a 10.00 pack-year smoking history. His smokeless tobacco use includes chew. He reports previous alcohol use. He reports previous drug use. Drug: Cocaine. PHYSICAL EXAM     INITIAL VITALS:  height is 5' 8\" (1.727 m) and weight is 146 lb (66.2 kg). His temporal temperature is 97 °F (36.1 °C). His blood pressure is 110/74 and his pulse is 97. His respiration is 18 and oxygen saturation is 96%. Physical Exam  Vitals and nursing note reviewed. Constitutional:       General: He is not in acute distress. HENT:      Head: Normocephalic and atraumatic. Eyes:      Conjunctiva/sclera: Conjunctivae normal.      Pupils: Pupils are equal, round, and reactive to light. Cardiovascular:      Rate and Rhythm: Regular rhythm. Tachycardia present. Heart sounds: Normal heart sounds. No murmur heard. Pulmonary:      Effort: Pulmonary effort is normal. No respiratory distress. Breath sounds: Decreased breath sounds present. Abdominal:      General: Bowel sounds are normal. There is no distension. Palpations: Abdomen is soft. Tenderness: There is no abdominal tenderness. Musculoskeletal:         General: No tenderness. Cervical back: Neck supple. Lymphadenopathy:      Cervical: No cervical adenopathy. Skin:     General: Skin is warm and dry. Findings: No rash. Neurological:      Mental Status: He is alert and oriented to person, place, and time. Psychiatric:         Judgment: Judgment normal.           DIFFERENTIAL DIAGNOSIS/MDM:   70-year-old male presents with chest pain, cough and shortness of breath. He is afebrile, nontoxic, mildly tachycardic otherwise vital signs normal.  No acute distress. Differential is broad and includes ACS, stable responsible angina, PE, dissection, pneumonia, pneumothorax, costochondritis, viral syndrome. We will get cardiac work-up. We will get D-dimer to screen for PE. We will test for Covid. DIAGNOSTIC RESULTS     EKG: All EKG's are interpreted by the Emergency Department Physician who either signs or Co-signs this chart in the absence of a cardiologist.    EKG Interpretation    Interpreted by me    Rhythm: Atrial fibrillation  Rate: 116  Axis: normal  Ectopy: Single PVC  Conduction: normal  ST Segments: no acute change  T Waves: no acute change  Q Waves: none    Clinical Impression: Atrial fibrillation with rapid ventricular response    RADIOLOGY:   I directly visualized the following  images and reviewed the radiologist interpretations:  XR CHEST PORTABLE   Final Result   1. No active pulmonary disease.                  ED BEDSIDE ULTRASOUND:      LABS:  Labs Reviewed   CBC WITH AUTO DIFFERENTIAL - Abnormal; Notable for the following components:       Result Value    Seg Neutrophils 77 (*)     Lymphocytes 13 (*)     Monocytes 9 (*)     All other components within normal limits   BASIC METABOLIC PANEL - Abnormal; Notable for the following components:    Glucose 112 (*)     BUN 26 (*)     Chloride 109 (*)     Anion Gap 6 (*)     All other components within normal limits   COVID-19, RAPID   TROPONIN   TROPONIN   PROTIME-INR   APTT   D-DIMER, QUANTITATIVE         EMERGENCY DEPARTMENT COURSE:   Vitals:    Vitals:    11/11/21 1300 11/11/21 1315 11/11/21 1330 11/11/21 1415   BP: 108/73 109/79 114/80 110/74   Pulse:       Resp:       Temp:       TempSrc:       SpO2: 97% 97% 93% 96%   Weight:       Height:         2:56 PM EST  Heart rate is normal now after 1 dose of Lopressor. Initial troponin mildly elevated however second troponin is no different. He still having chest pain on reassessment. Will admit for further cardiac work-up. He does follow with Dr. Suyapa Heller. I spoke with Dr. Rupesh Carrington who accepted patient for admission. HEART Risk Score for Chest Pain Patients                       Patient Score  History   Highly suspicious2            Moderately suspicious. ..1     = 1    Slightly or non suspicious. 0      ECG   Significant STD. ...2        Nonspecific repolarization1      = 0    Normal (no change from previous). .0      Age   >642      > 45 - <65. 1     = 1   < 46. ..0      Risk Factors  >2 risk factors.2     I - 2 risk factors. .1     = 2  No risk factors. ...0     Troponin   >3times normal limit. ..2      >1 time - <3 times normal limit. 1   = 1    Normal trop. Romina Rast 0     -----------------------------------------------------------------------------------------      TOTAL RISK SCORE =  5          RISK % =  20.3        Risk Factors: DM, smoker (current or recent < mo), HTN, HLP, FHx CAD, obesity,   dsv add-ons   SLE, CKDz, HIV, cocaine abuse    Score 0 - 3 =  2.5% MACE over next 6 wks = Discharge home  Score 4 - 6 =  20.3% MACE over next 6 wks = Obs admit  Score 7 - 10 = 72.7% MACE

## 2021-11-12 ENCOUNTER — APPOINTMENT (OUTPATIENT)
Dept: CT IMAGING | Age: 56
DRG: 198 | End: 2021-11-12
Payer: MEDICAID

## 2021-11-12 ENCOUNTER — APPOINTMENT (OUTPATIENT)
Dept: NUCLEAR MEDICINE | Age: 56
DRG: 198 | End: 2021-11-12
Payer: MEDICAID

## 2021-11-12 ENCOUNTER — APPOINTMENT (OUTPATIENT)
Dept: NON INVASIVE DIAGNOSTICS | Age: 56
DRG: 198 | End: 2021-11-12
Payer: MEDICAID

## 2021-11-12 PROBLEM — J20.9 BRONCHITIS, ACUTE: Status: ACTIVE | Noted: 2021-11-12

## 2021-11-12 LAB
ALBUMIN SERPL-MCNC: 3.3 G/DL (ref 3.5–5.2)
ALBUMIN/GLOBULIN RATIO: ABNORMAL (ref 1–2.5)
ALP BLD-CCNC: 105 U/L (ref 40–129)
ALT SERPL-CCNC: 18 U/L (ref 5–41)
ANION GAP SERPL CALCULATED.3IONS-SCNC: 8 MMOL/L (ref 9–17)
AST SERPL-CCNC: 18 U/L
BILIRUB SERPL-MCNC: 0.26 MG/DL (ref 0.3–1.2)
BUN BLDV-MCNC: 22 MG/DL (ref 6–20)
BUN/CREAT BLD: ABNORMAL (ref 9–20)
CALCIUM SERPL-MCNC: 8.5 MG/DL (ref 8.6–10.4)
CHLORIDE BLD-SCNC: 108 MMOL/L (ref 98–107)
CHOLESTEROL/HDL RATIO: 2.7
CHOLESTEROL: 128 MG/DL
CO2: 25 MMOL/L (ref 20–31)
CREAT SERPL-MCNC: 0.83 MG/DL (ref 0.7–1.2)
EKG ATRIAL RATE: 122 BPM
EKG Q-T INTERVAL: 338 MS
EKG QRS DURATION: 84 MS
EKG QTC CALCULATION (BAZETT): 469 MS
EKG R AXIS: 66 DEGREES
EKG T AXIS: 74 DEGREES
EKG VENTRICULAR RATE: 116 BPM
GFR AFRICAN AMERICAN: >60 ML/MIN
GFR NON-AFRICAN AMERICAN: >60 ML/MIN
GFR SERPL CREATININE-BSD FRML MDRD: ABNORMAL ML/MIN/{1.73_M2}
GFR SERPL CREATININE-BSD FRML MDRD: ABNORMAL ML/MIN/{1.73_M2}
GLUCOSE BLD-MCNC: 111 MG/DL (ref 70–99)
HCT VFR BLD CALC: 44.2 % (ref 41–53)
HDLC SERPL-MCNC: 48 MG/DL
HEMOGLOBIN: 14.6 G/DL (ref 13.5–17.5)
LDL CHOLESTEROL: 68 MG/DL (ref 0–130)
LV EF: 48 %
LVEF MODALITY: NORMAL
MCH RBC QN AUTO: 30.4 PG (ref 26–34)
MCHC RBC AUTO-ENTMCNC: 33.2 G/DL (ref 31–37)
MCV RBC AUTO: 91.6 FL (ref 80–100)
NRBC AUTOMATED: NORMAL PER 100 WBC
PDW BLD-RTO: 14.5 % (ref 11.5–14.9)
PLATELET # BLD: 186 K/UL (ref 150–450)
PMV BLD AUTO: 8.4 FL (ref 6–12)
POTASSIUM SERPL-SCNC: 4.3 MMOL/L (ref 3.7–5.3)
RBC # BLD: 4.83 M/UL (ref 4.5–5.9)
SODIUM BLD-SCNC: 141 MMOL/L (ref 135–144)
TOTAL PROTEIN: 5.9 G/DL (ref 6.4–8.3)
TRIGL SERPL-MCNC: 62 MG/DL
TROPONIN INTERP: NORMAL
TROPONIN T: NORMAL NG/ML
TROPONIN, HIGH SENSITIVITY: 16 NG/L (ref 0–22)
TSH SERPL DL<=0.05 MIU/L-ACNC: 2.13 MIU/L (ref 0.3–5)
VLDLC SERPL CALC-MCNC: NORMAL MG/DL (ref 1–30)
WBC # BLD: 8.2 K/UL (ref 3.5–11)

## 2021-11-12 PROCEDURE — 93306 TTE W/DOPPLER COMPLETE: CPT

## 2021-11-12 PROCEDURE — 99291 CRITICAL CARE FIRST HOUR: CPT | Performed by: INTERNAL MEDICINE

## 2021-11-12 PROCEDURE — 94664 DEMO&/EVAL PT USE INHALER: CPT

## 2021-11-12 PROCEDURE — 85027 COMPLETE CBC AUTOMATED: CPT

## 2021-11-12 PROCEDURE — 36415 COLL VENOUS BLD VENIPUNCTURE: CPT

## 2021-11-12 PROCEDURE — 6370000000 HC RX 637 (ALT 250 FOR IP)

## 2021-11-12 PROCEDURE — 71250 CT THORAX DX C-: CPT

## 2021-11-12 PROCEDURE — G0378 HOSPITAL OBSERVATION PER HR: HCPCS

## 2021-11-12 PROCEDURE — 94640 AIRWAY INHALATION TREATMENT: CPT

## 2021-11-12 PROCEDURE — 2580000003 HC RX 258

## 2021-11-12 PROCEDURE — 93010 ELECTROCARDIOGRAM REPORT: CPT | Performed by: INTERNAL MEDICINE

## 2021-11-12 PROCEDURE — 80053 COMPREHEN METABOLIC PANEL: CPT

## 2021-11-12 PROCEDURE — 80061 LIPID PANEL: CPT

## 2021-11-12 PROCEDURE — 84443 ASSAY THYROID STIM HORMONE: CPT

## 2021-11-12 PROCEDURE — 2060000000 HC ICU INTERMEDIATE R&B

## 2021-11-12 PROCEDURE — 84484 ASSAY OF TROPONIN QUANT: CPT

## 2021-11-12 RX ORDER — GUAIFENESIN 600 MG/1
600 TABLET, EXTENDED RELEASE ORAL 2 TIMES DAILY
Status: DISCONTINUED | OUTPATIENT
Start: 2021-11-12 | End: 2021-11-13 | Stop reason: HOSPADM

## 2021-11-12 RX ORDER — METOPROLOL TARTRATE 5 MG/5ML
5 INJECTION INTRAVENOUS EVERY 5 MIN PRN
Status: ACTIVE | OUTPATIENT
Start: 2021-11-12 | End: 2021-11-12

## 2021-11-12 RX ORDER — ATROPINE SULFATE 0.1 MG/ML
0.5 INJECTION INTRAVENOUS EVERY 5 MIN PRN
Status: ACTIVE | OUTPATIENT
Start: 2021-11-12 | End: 2021-11-12

## 2021-11-12 RX ORDER — NITROGLYCERIN 0.4 MG/1
0.4 TABLET SUBLINGUAL EVERY 5 MIN PRN
Status: CANCELLED | OUTPATIENT
Start: 2021-11-12 | End: 2021-11-12

## 2021-11-12 RX ORDER — SODIUM CHLORIDE 9 MG/ML
500 INJECTION, SOLUTION INTRAVENOUS CONTINUOUS PRN
Status: ACTIVE | OUTPATIENT
Start: 2021-11-12 | End: 2021-11-12

## 2021-11-12 RX ORDER — SODIUM CHLORIDE 0.9 % (FLUSH) 0.9 %
5-40 SYRINGE (ML) INJECTION PRN
Status: CANCELLED | OUTPATIENT
Start: 2021-11-12 | End: 2021-11-12

## 2021-11-12 RX ORDER — IPRATROPIUM BROMIDE AND ALBUTEROL SULFATE 2.5; .5 MG/3ML; MG/3ML
1 SOLUTION RESPIRATORY (INHALATION)
Status: DISCONTINUED | OUTPATIENT
Start: 2021-11-12 | End: 2021-11-13 | Stop reason: HOSPADM

## 2021-11-12 RX ORDER — SODIUM CHLORIDE 0.9 % (FLUSH) 0.9 %
5-40 SYRINGE (ML) INJECTION PRN
Status: ACTIVE | OUTPATIENT
Start: 2021-11-12 | End: 2021-11-12

## 2021-11-12 RX ORDER — ALBUTEROL SULFATE 90 UG/1
2 AEROSOL, METERED RESPIRATORY (INHALATION) PRN
Status: ACTIVE | OUTPATIENT
Start: 2021-11-12 | End: 2021-11-12

## 2021-11-12 RX ORDER — ALBUTEROL SULFATE 90 UG/1
2 AEROSOL, METERED RESPIRATORY (INHALATION) PRN
Status: CANCELLED | OUTPATIENT
Start: 2021-11-12 | End: 2021-11-12

## 2021-11-12 RX ORDER — ATROPINE SULFATE 0.1 MG/ML
0.5 INJECTION INTRAVENOUS EVERY 5 MIN PRN
Status: CANCELLED | OUTPATIENT
Start: 2021-11-12 | End: 2021-11-12

## 2021-11-12 RX ORDER — METOPROLOL TARTRATE 5 MG/5ML
5 INJECTION INTRAVENOUS EVERY 5 MIN PRN
Status: CANCELLED | OUTPATIENT
Start: 2021-11-12 | End: 2021-11-12

## 2021-11-12 RX ORDER — NITROGLYCERIN 0.4 MG/1
0.4 TABLET SUBLINGUAL EVERY 5 MIN PRN
Status: ACTIVE | OUTPATIENT
Start: 2021-11-12 | End: 2021-11-12

## 2021-11-12 RX ORDER — SODIUM CHLORIDE 0.9 % (FLUSH) 0.9 %
10 SYRINGE (ML) INJECTION PRN
Status: DISCONTINUED | OUTPATIENT
Start: 2021-11-12 | End: 2021-11-13 | Stop reason: HOSPADM

## 2021-11-12 RX ORDER — SODIUM CHLORIDE 9 MG/ML
500 INJECTION, SOLUTION INTRAVENOUS CONTINUOUS PRN
Status: CANCELLED | OUTPATIENT
Start: 2021-11-12 | End: 2021-11-12

## 2021-11-12 RX ADMIN — DILTIAZEM HYDROCHLORIDE 120 MG: 120 CAPSULE, COATED, EXTENDED RELEASE ORAL at 09:46

## 2021-11-12 RX ADMIN — ASPIRIN 81 MG: 81 TABLET, CHEWABLE ORAL at 09:46

## 2021-11-12 RX ADMIN — APIXABAN 5 MG: 5 TABLET, FILM COATED ORAL at 20:04

## 2021-11-12 RX ADMIN — SODIUM CHLORIDE, PRESERVATIVE FREE 10 ML: 5 INJECTION INTRAVENOUS at 10:00

## 2021-11-12 RX ADMIN — ATORVASTATIN CALCIUM 10 MG: 10 TABLET, FILM COATED ORAL at 20:04

## 2021-11-12 RX ADMIN — GUAIFENESIN 600 MG: 600 TABLET, EXTENDED RELEASE ORAL at 20:04

## 2021-11-12 RX ADMIN — Medication 10 ML: at 09:47

## 2021-11-12 RX ADMIN — IPRATROPIUM BROMIDE AND ALBUTEROL SULFATE 1 AMPULE: .5; 3 SOLUTION RESPIRATORY (INHALATION) at 20:21

## 2021-11-12 RX ADMIN — SODIUM CHLORIDE, PRESERVATIVE FREE 10 ML: 5 INJECTION INTRAVENOUS at 20:06

## 2021-11-12 RX ADMIN — APIXABAN 5 MG: 5 TABLET, FILM COATED ORAL at 09:46

## 2021-11-12 ASSESSMENT — ENCOUNTER SYMPTOMS
DIARRHEA: 0
COUGH: 1
ABDOMINAL PAIN: 0
BLOOD IN STOOL: 0
ABDOMINAL DISTENTION: 0
CONSTIPATION: 0
SHORTNESS OF BREATH: 1

## 2021-11-12 ASSESSMENT — PAIN SCALES - GENERAL: PAINLEVEL_OUTOF10: 0

## 2021-11-12 NOTE — PROGRESS NOTES
[address]    PROGRESS NOTE             11/12/2021    6:40 PM    Name:   Shelbie Prieto  MRN:     [id number]     [place]:      [de-identified]   Room:   [id number] Day:  1  Admit Date:  11/11/2021 11:12 AM    PCP:  Chantale Hollingsworth MD  Code Status:  Full Code    Subjective:     C/C:   Chief Complaint   Patient presents with    Chest Pain    Cough    Shortness of Breath     Interval History Status: not changed. Patient states that he is feeling \"lousy. \"  Still having sharp chest pain that is worse with inspiration on the left side of the chest.  Has also been having fever/chills and cough productive of greenish sputum. Echo was done today, pending read. Brief History:     The patient is a 64 y.o. 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 Patient mentions having productive cough of greenish sputum. Is recovering from smoking using nicotine patch. Doesn't drink alcohol.      Patient had a CT scan done in August 2018 for pulmonary embolism, incidental finding of 3.5 x 2.5 cm left upper lobe cavitary lesion and bilateral upper lobe patchy infiltrates along with COPD changes. Spiculated nodule within the left upper lobe 1.5 x 1 cm along with an enlarged AP window mediastinal lymph node. PET scan December 2018.   Did not show any abnormal uptake in the lesions of the lung or mediastinal lymph node.     HR initial 109  Otherwise vitals stable  EKG showed A. fib with RVR  Tropes 16> 17  CXR unremarkable     Patient was given Lopressor 5 mg IV, ketorolac 30 mg IV,  mg      Admitted to PCU for management of acute current syndrome. ASA 81, Lipitor 10 mg  Continue Cardizem, Eliquis, nitroglycerin PRN      Cardiology was consulted, echo done, results pending    11/12: duoneb added for acute bronchitis, mucinex, CT chest done     Review of Systems:     Review of Systems   Constitutional: Positive for chills and fever. Negative for appetite change and unexpected weight change. Respiratory: Positive for cough (productive of green sputum) and shortness of breath (at baseline). Cardiovascular: Positive for chest pain. Negative for leg swelling. Gastrointestinal: Negative for abdominal distention, abdominal pain, blood in stool, constipation and diarrhea. Neurological: Negative for headaches. All other systems reviewed and are negative. Medications:      Allergies:  No Known Allergies    Current Meds:   Scheduled Meds:    ipratropium-albuterol  1 ampule Inhalation Q4H WA    guaiFENesin  600 mg Oral BID    apixaban  5 mg Oral BID    dilTIAZem  120 mg Oral Daily    nicotine  1 patch TransDERmal Q24H    atorvastatin  10 mg Oral Nightly    sodium chloride flush  5-40 mL IntraVENous 2 times per day    aspirin  81 mg Oral Daily    influenza virus vaccine  0.5 mL IntraMUSCular Prior to discharge     Continuous Infusions:    sodium chloride       PRN Meds: sodium chloride flush, albuterol sulfate HFA, sodium chloride flush, sodium chloride, ondansetron **OR** ondansetron, acetaminophen **OR** acetaminophen, polyethylene glycol, potassium chloride **OR** potassium alternative oral replacement **OR** potassium chloride, magnesium sulfate, nitroGLYCERIN    Data:     Past Medical History:   has a past medical history of Abnormal echocardiogram, Arrhythmia, AS (aortic stenosis), Atrial fibrillation ([facility] [address] Benign prostatic hyperplasia, Biatrial enlargement, Central perforation of tympanic membrane, right, Chest pain, Chews tobacco, CHF (congestive heart failure), NYHA class I, acute on chronic, combined ([facility] [address] Chronic back pain, Conductive hearing loss of right ear, COPD (chronic obstructive pulmonary disease) ([facility] [address] Diverticulitis, Fatigue, History of cocaine abuse ([facility] [address] Household contact with history of methicillin resistant Staphylococcus aureus (MRSA) infection, Lung nodule, Mass of upper lobe of left lung, Mild concentric left ventricular hypertrophy, Murmur, cardiac, Near syncope, Nonrheumatic aortic (valve) stenosis, Palpitations, Panlobular emphysema (HCC), Pneumonia of both upper lobes due to infectious organism, Right-sided tinnitus, Severe aortic stenosis, Shortness of breath, and Tobacco abuse. Social History:   reports that he has quit smoking. He has a 10.00 pack-year smoking history. His smokeless tobacco use includes chew. He reports previous alcohol use. He reports previous drug use. Drug: Cocaine. Family History:   Family History   Problem Relation Age of Onset   [facility] Depression Mother     COPD Father     COPD Paternal Grandfather        Vitals:  /69   Pulse 80   Temp 97.3 °F (36.3 °C) (Oral)   Resp 18   Ht 5' 8\" (1.727 m)   Wt 134 lb 14.7 oz (61.2 kg)   SpO2 93%   BMI 20.51 kg/m²   Temp (24hrs), Av.8 °F (36.6 °C), Min:97.3 °F (36.3 °C), Max:98.2 °F (36.8 °C)    No results for input(s): POCGLU in the last 72 hours. I/O(24Hr):     Intake/Output Summary (Last 24 hours) at 2021 1840  Last data filed at 2021 1555  Gross per 24 hour   Intake 600 ml   Output --   Net 600 ml       Labs:    CBC with Differential:    Lab Results   Component Value Date    WBC 8.2 2021    RBC 4.83 2021    HGB 14.6 2021    HCT 44.2 2021     2021    MCV 91.6 2021    MCH 30.4 2021    MCHC 33.2 2021    RDW 14.5 11/12/2021    LYMPHOPCT 13 11/11/2021    MONOPCT 9 11/11/2021    BASOPCT 0 11/11/2021    MONOSABS 0.90 11/11/2021    LYMPHSABS 1.30 11/11/2021    EOSABS 0.10 11/11/2021    BASOSABS 0.00 11/11/2021    DIFFTYPE NOT REPORTED 11/11/2021     CMP:    Lab Results   Component Value Date     11/12/2021    K 4.3 11/12/2021     11/12/2021    CO2 25 11/12/2021    BUN 22 11/12/2021    CREATININE 0.83 11/12/2021    GFRAA >60 11/12/2021    LABGLOM >60 11/12/2021    GLUCOSE 111 11/12/2021    PROT 5.9 11/12/2021    LABALBU 3.3 11/12/2021    CALCIUM 8.5 11/12/2021    BILITOT 0.26 11/12/2021    ALKPHOS 105 11/12/2021    AST 18 11/12/2021    ALT 18 11/12/2021       Lab Results   Component Value Date/Time    SPECIAL NOT REPORTED 08/28/2021 07:01 PM     Lab Results   Component Value Date/Time    CULTURE NO SIGNIFICANT GROWTH 08/28/2021 07:01 PM         Radiology:    CT CHEST WO CONTRAST    Result Date: 11/12/2021  EXAMINATION: CT OF THE CHEST WITHOUT CONTRAST 11/12/2021 12:03 pm TECHNIQUE: CT of the chest was performed without the administration of intravenous contrast. Multiplanar reformatted images are provided for review. Dose modulation, iterative reconstruction, and/or weight based adjustment of the mA/kV was utilized to reduce the radiation dose to as low as reasonably achievable. COMPARISON: None. HISTORY: ORDERING SYSTEM PROVIDED HISTORY: lung mass TECHNOLOGIST PROVIDED HISTORY: lung mass Reason for Exam: chest pain, sob, mass on lt lung per pt Acuity: Unknown Type of Exam: Unknown FINDINGS: Mediastinum: The heart is not enlarged. No pericardial effusion. Normal caliber thoracic aorta. The thyroid gland is unremarkable. No lymphadenopathy identified with the limitations of no IV contrast.  Prior aortic valve replacement. Lungs/pleura: Likely scarring in the lung apices. Moderate to severe emphysema with bullous emphysematous disease these in left upper lobe. No pleural effusion or pneumothorax.   No focal Neurological:      General: No focal deficit present. Mental Status: He is alert and oriented to person, place, and time.          Assessment:        Primary Problem  Bronchitis, acute    Active Hospital Problems    Diagnosis Date Noted    Bronchitis, acute [J20.9] 11/12/2021    Acute coronary syndrome ([facility] [address] [I24.9] 11/11/2021    COPD (chronic obstructive pulmonary disease) ([facility] [address] [J44.9] 07/30/2019    [facility] with [facility] [I48.91]     S/p TAVR (transcatheter aortic valve replacement), bioprosthetic [Z95.3] 12/27/2018    Panlobular emphysema ([facility] [address] [J43.1] 10/15/2018    Mass of upper lobe of left lung [R91.8] 10/15/2018    NICM (nonischemic cardiomyopathy) ([facility] [address] [I42.8] 10/02/2018    History of cocaine abuse ([facility] [address] [F14.11] 02/27/2018    Aortic valve stenosis [I35.0] 01/05/2017    Tobacco abuse [Z72.0] 03/27/2015       Plan:        Acute bronchitis on chronic emphysema  -procal 0.09  -cough productive of greenish sputum  -duonebs and mucinex added    Chest pain, rule out acute coronary syndrome  -troponin 16 --> 17  -cardiology was consulted, low suspicion for cardiac ischemia  -echo done, pending results  -lipid panel wnl  -UDS pending     Atrial fibrillation with RVR, rate controlled   -diltiazem 120mg   -HR 70s-80s   -TSH 2.13    Aortic valve stenosis, s/p TAVR (2018)  -continue home eliquis, aspirin, and lipitor      Alton Cunningham MD  11/12/2021  6:40 PM

## 2021-11-12 NOTE — CARE COORDINATION
CASE MANAGEMENT NOTE:    Admission Date:  11/11/2021 Jef Alvarez is a 64 y.o.  male    Admitted for : Unstable angina ([facility] [address] [I20.0]    Met with:  Patient    PCP:  Dr Linda Larson:  Bobby      Is patient alert and oriented at time of discussion:  Yes    Current Residence/ Living Arrangements:  independently at home             Current Services PTA:  No    Does patient go to outpatient dialysis: No  If yes, location and chair time:     Is patient agreeable to VNS: No    Freedom of choice provided:  NA    List of [address] provided: NA    VNS chosen:  No    DME:  none    Home Oxygen: No    Nebulizer: No    CPAP/BIPAP: No    Supplier: N/A    Potential Assistance Needed: No    SNF needed: No    Freedom of choice and list provided: NA    [facility]:  [facility] [place]       Does Patient want to use MEDS to BEDS? No    Is patient currently receiving oral anticoagulation therapy? No    Is the Patient an [facility] with Readmission Risk Score greater than 14%? No  If yes, pt needs a follow up appointment made within 7 days. Family Members/Caregivers that pt would like involved in their care:    Yes    If yes, list name here:  Ex wife Deann Mas    Transportation Provider:  Ex wife             Discharge Plan:  11/12/21 - [facility] - patient is from [facility] and staying with ex wife. DME: none, VNS - None - Plan is to discharge home today will no needs.   Will follow ,.//pf                 Electronically signed by: Margarito Rowland RN on 11/12/2021 at 1:50 PM

## 2021-11-12 NOTE — PLAN OF CARE
Problem: Pain:  Goal: Pain level will decrease  Description: Pain level will decrease  11/12/2021 0631 by Chris العلي RN  Outcome: Ongoing  11/12/2021 0433 by Chris العلي RN  Outcome: Ongoing  Goal: Control of acute pain  Description: Control of acute pain  11/12/2021 0631 by Chris العلي RN  Outcome: Ongoing  11/12/2021 0433 by Chris العلي RN  Outcome: Ongoing  Goal: Control of chronic pain  Description: Control of chronic pain  11/12/2021 0631 by Chris العلي RN  Outcome: Ongoing  11/12/2021 0433 by Chris العلي RN  Outcome: Ongoing     Problem: Cardiac Output - Decreased:  Goal: Hemodynamic stability will improve  Description: Hemodynamic stability will improve  11/12/2021 0631 by Chris العلي RN  Outcome: Ongoing  11/12/2021 0433 by Chris العلي RN  Outcome: Ongoing

## 2021-11-12 NOTE — CONSULTS
Reason for consultation:  Chest pain  HPI  64yo TAVR 29mmSapien 2018 for symptomatic AS, normal coronary angio at that time, severe primary lung diseaes noted at that time, here with several days SOB which is associated with left sided sharp chest pain, worse with inspiration. He has productive cough with change in sputum color, now green  He has been fatigued  No orthopnea   No pnd    Prior hx cocaine use  He was otherwise in usual state of health    Hs trop negative x 2  ekg without ischemic changes, he is in afib, long term on apixaban  HRs controlled    12 systems reviewed and neg  Social History     Socioeconomic History    Marital status:      Spouse name: Not on file    Number of children: Not on file    Years of education: Not on file    Highest education level: Not on file   Occupational History    Not on file   Tobacco Use    Smoking status: Former Smoker     Packs/day: 0.25     Years: 40.00     Pack years: 10.00    Smokeless tobacco: Current User     Types: Chew    Tobacco comment: pt  previously smoked 1.5 ppd;   Vaping Use    Vaping Use: Never used   Substance and Sexual Activity    Alcohol use: Not Currently    Drug use: Not Currently     Types: Cocaine     Comment: drug abuse includes crack cocaine; last used 5 months ago 11/21    Sexual activity: Yes     Partners: Female   Other Topics Concern    Not on file   Social History Narrative    Not on file     Social Determinants of Health     Financial Resource Strain: Medium Risk    Difficulty of Paying Living Expenses: Somewhat hard   Food Insecurity: No Food Insecurity    Worried About Running Out of Food in the Last Year: Never true    Brittany of Food in the Last Year: Never true   Transportation Needs:     Lack of Transportation (Medical): Not on file    Lack of Transportation (Non-Medical):  Not on file   Physical Activity:     Days of Exercise per Week: Not on file    Minutes of Exercise per Session: Not on file   Stress:  Feeling of Stress : Not on file   Social Connections:     Frequency of Communication with Friends and Family: Not on file    Frequency of Social Gatherings with Friends and Family: Not on file    Attends Yazidism Services: Not on file    Active Member of Clubs or Organizations: Not on file    Attends Club or Organization Meetings: Not on file    Marital Status: Not on file   Intimate Partner Violence:     Fear of Current or Ex-Partner: Not on file    Emotionally Abused: Not on file    Physically Abused: Not on file    Sexually Abused: Not on file   Housing Stability:     Unable to Pay for Housing in the Last Year: Not on file    Number of [place] in the Last Year: Not on file    Unstable Housing in the Last Year: Not on file     Past Medical History:   Diagnosis Date    Abnormal echocardiogram 02/2018    Arrhythmia     AS (aortic stenosis)     Atrial fibrillation (HCC)     Benign prostatic hyperplasia     Biatrial enlargement     Central perforation of tympanic membrane, right     Chest pain     Chews tobacco     CHF (congestive heart failure), NYHA class I, acute on chronic, combined (HCC)     Chronic back pain     Conductive hearing loss of right ear     COPD (chronic obstructive pulmonary disease) ([address]     Diverticulitis     Fatigue     History of cocaine abuse ([address] 01/2018    Household contact with history of methicillin resistant Staphylococcus aureus (MRSA) infection     Lung nodule     Mass of upper lobe of left lung     Mild concentric left ventricular hypertrophy     Murmur, cardiac     Near syncope     Nonrheumatic aortic (valve) stenosis     Palpitations     Panlobular emphysema ([address]     Pneumonia of both upper lobes due to infectious organism     Right-sided tinnitus     Severe aortic stenosis     Shortness of breath     Tobacco abuse      Family History   Problem Relation Age of Onset    Depression Mother     COPD Father     COPD Paternal primary  Consider bronchitis dx  D/w primary service, cancel stress  He is due for echo as outpt so go ahead and let this happen  pls call with questions, will s/o, pls call with questions over weekend and we would be happy to address.

## 2021-11-12 NOTE — PLAN OF CARE
Problem: Pain:  Goal: Pain level will decrease  Description: Pain level will decrease  Outcome: Ongoing  Goal: Control of acute pain  Description: Control of acute pain  Outcome: Ongoing  Goal: Control of chronic pain  Description: Control of chronic pain  Outcome: Ongoing     Problem: Cardiac Output - Decreased:  Goal: Hemodynamic stability will improve  Description: Hemodynamic stability will improve  Outcome: Ongoing

## 2021-11-12 NOTE — PROGRESS NOTES
Spoke with VIRGINIA Wise was informed stress test is cancelled per the resident and cardiologist. Any changes please call [phone].  Electronically signed by Carissa Mills on 11/12/2021 at 8:40 AM

## 2021-11-12 NOTE — PLAN OF CARE
Problem: Pain:  Goal: Pain level will decrease  Description: Pain level will decrease  11/12/2021 1841 by Catie Harris RN  Outcome: Ongoing     Problem: Pain:  Goal: Control of acute pain  Description: Control of acute pain  11/12/2021 1841 by Catie Harris RN  Outcome: Ongoing     Problem: Pain:  Goal: Control of chronic pain  Description: Control of chronic pain  11/12/2021 1841 by Catie Harris RN  Outcome: Ongoing     Problem: Cardiac Output - Decreased:  Goal: Hemodynamic stability will improve  Description: Hemodynamic stability will improve  11/12/2021 1841 by Catie Harris RN  Outcome: Ongoing

## 2021-11-12 NOTE — PROGRESS NOTES
[facility]    OCCUPATIONAL THERAPY MISSED TREATMENT NOTE   INPATIENT   Date: 21  Patient Name: Katie Landeros       Room: 0437/5595-24  MRN: [id number]   Account #: [de-identified]    : 1965  (64 y.o.)  Gender: male                 REASON FOR MISSED TREATMENT:  Patient reports and demonstrates IND with all self-care and functional mobility. Patient denies any concerns regarding self-care for discharge home. No need for skilled OT services at this time. Discharge OT.   -   OT being discontinued at this time.  Patient functioning at Premorbid Level  No further needs      Michelle Edwards OT

## 2021-11-12 NOTE — PROGRESS NOTES
Physical Therapy        Physical Therapy Cancel Note      DATE: 2021    NAME: Lilian Farah  MRN: [id number]   : 1965      Patient not seen this date for Physical Therapy due to:    2021 at 1257- pt out of room at CT scan per nurse Baron. Therapist returned at 1320 to evaluate the patient. Pt demonstrates independent bed mobility, transfers and gait to the doorway and back. Pt denies need for skilled PT.  D/C PT.        Electronically signed by Jac Lofton PT on 2021 at 2:55 PM

## 2021-11-12 NOTE — PROGRESS NOTES
Writer spoke with RN Silvia Brown re cardiac orders clarification. RN to enter order for nuclear imaging with exercise stress, cancel stress echo order. Any questions, please call [phone].     Electronically signed by Ana Jack on 11/12/2021 at 6:57 AM

## 2021-11-13 VITALS
BODY MASS INDEX: 21.35 KG/M2 | HEART RATE: 92 BPM | TEMPERATURE: 97.6 F | RESPIRATION RATE: 18 BRPM | WEIGHT: 140.87 LBS | HEIGHT: 68 IN | SYSTOLIC BLOOD PRESSURE: 110 MMHG | DIASTOLIC BLOOD PRESSURE: 73 MMHG | OXYGEN SATURATION: 97 %

## 2021-11-13 PROBLEM — R33.9 URINARY RETENTION: Status: ACTIVE | Noted: 2021-11-13

## 2021-11-13 LAB
-: ABNORMAL
ALBUMIN SERPL-MCNC: 3.8 G/DL (ref 3.5–5.2)
ALBUMIN/GLOBULIN RATIO: ABNORMAL (ref 1–2.5)
ALP BLD-CCNC: 112 U/L (ref 40–129)
ALT SERPL-CCNC: 18 U/L (ref 5–41)
AMORPHOUS: ABNORMAL
AMPHETAMINE SCREEN URINE: NEGATIVE
ANION GAP SERPL CALCULATED.3IONS-SCNC: 8 MMOL/L (ref 9–17)
AST SERPL-CCNC: 18 U/L
BACTERIA: ABNORMAL
BARBITURATE SCREEN URINE: NEGATIVE
BENZODIAZEPINE SCREEN, URINE: NEGATIVE
BILIRUB SERPL-MCNC: 0.21 MG/DL (ref 0.3–1.2)
BILIRUBIN URINE: NEGATIVE
BILIRUBIN URINE: NEGATIVE
BUN BLDV-MCNC: 14 MG/DL (ref 6–20)
BUN/CREAT BLD: ABNORMAL (ref 9–20)
BUPRENORPHINE URINE: ABNORMAL
CALCIUM SERPL-MCNC: 8.7 MG/DL (ref 8.6–10.4)
CANNABINOID SCREEN URINE: NEGATIVE
CASTS UA: ABNORMAL /LPF
CHLORIDE BLD-SCNC: 107 MMOL/L (ref 98–107)
CO2: 24 MMOL/L (ref 20–31)
COCAINE METABOLITE, URINE: POSITIVE
COLOR: YELLOW
COLOR: YELLOW
COMMENT UA: ABNORMAL
COMMENT UA: NORMAL
CREAT SERPL-MCNC: 0.69 MG/DL (ref 0.7–1.2)
CRYSTALS, UA: ABNORMAL /HPF
EPITHELIAL CELLS UA: ABNORMAL /HPF
GFR AFRICAN AMERICAN: >60 ML/MIN
GFR NON-AFRICAN AMERICAN: >60 ML/MIN
GFR SERPL CREATININE-BSD FRML MDRD: ABNORMAL ML/MIN/{1.73_M2}
GFR SERPL CREATININE-BSD FRML MDRD: ABNORMAL ML/MIN/{1.73_M2}
GLUCOSE BLD-MCNC: 109 MG/DL (ref 70–99)
GLUCOSE URINE: NEGATIVE
GLUCOSE URINE: NEGATIVE
HCT VFR BLD CALC: 46.6 % (ref 41–53)
HEMOGLOBIN: 15.4 G/DL (ref 13.5–17.5)
KETONES, URINE: NEGATIVE
KETONES, URINE: NEGATIVE
LEUKOCYTE ESTERASE, URINE: NEGATIVE
LEUKOCYTE ESTERASE, URINE: NEGATIVE
MCH RBC QN AUTO: 30.2 PG (ref 26–34)
MCHC RBC AUTO-ENTMCNC: 33.1 G/DL (ref 31–37)
MCV RBC AUTO: 91.4 FL (ref 80–100)
MDMA URINE: ABNORMAL
METHADONE SCREEN, URINE: NEGATIVE
METHAMPHETAMINE, URINE: ABNORMAL
MUCUS: ABNORMAL
NITRITE, URINE: NEGATIVE
NITRITE, URINE: NEGATIVE
NRBC AUTOMATED: NORMAL PER 100 WBC
OPIATES, URINE: NEGATIVE
OTHER OBSERVATIONS UA: ABNORMAL
OXYCODONE SCREEN URINE: NEGATIVE
PDW BLD-RTO: 14.2 % (ref 11.5–14.9)
PH UA: 7 (ref 5–8)
PH UA: 8.5 (ref 5–8)
PHENCYCLIDINE, URINE: NEGATIVE
PLATELET # BLD: 190 K/UL (ref 150–450)
PMV BLD AUTO: 8.6 FL (ref 6–12)
POTASSIUM SERPL-SCNC: 4.1 MMOL/L (ref 3.7–5.3)
PROPOXYPHENE, URINE: ABNORMAL
PROTEIN UA: NEGATIVE
PROTEIN UA: NEGATIVE
RBC # BLD: 5.1 M/UL (ref 4.5–5.9)
RBC UA: ABNORMAL /HPF
RENAL EPITHELIAL, UA: ABNORMAL /HPF
SODIUM BLD-SCNC: 139 MMOL/L (ref 135–144)
SPECIFIC GRAVITY UA: 1.02 (ref 1–1.03)
SPECIFIC GRAVITY UA: 1.02 (ref 1–1.03)
TEST INFORMATION: ABNORMAL
TOTAL PROTEIN: 6.5 G/DL (ref 6.4–8.3)
TRICHOMONAS: ABNORMAL
TRICYCLIC ANTIDEPRESSANTS, UR: ABNORMAL
TURBIDITY: ABNORMAL
TURBIDITY: CLEAR
URINE HGB: NEGATIVE
URINE HGB: NEGATIVE
UROBILINOGEN, URINE: NORMAL
UROBILINOGEN, URINE: NORMAL
WBC # BLD: 10 K/UL (ref 3.5–11)
WBC UA: ABNORMAL /HPF
YEAST: ABNORMAL

## 2021-11-13 PROCEDURE — 51702 INSERT TEMP BLADDER CATH: CPT

## 2021-11-13 PROCEDURE — 81001 URINALYSIS AUTO W/SCOPE: CPT

## 2021-11-13 PROCEDURE — 6370000000 HC RX 637 (ALT 250 FOR IP)

## 2021-11-13 PROCEDURE — 51798 US URINE CAPACITY MEASURE: CPT

## 2021-11-13 PROCEDURE — 81003 URINALYSIS AUTO W/O SCOPE: CPT

## 2021-11-13 PROCEDURE — 2500000003 HC RX 250 WO HCPCS: Performed by: STUDENT IN AN ORGANIZED HEALTH CARE EDUCATION/TRAINING PROGRAM

## 2021-11-13 PROCEDURE — 94640 AIRWAY INHALATION TREATMENT: CPT

## 2021-11-13 PROCEDURE — 80053 COMPREHEN METABOLIC PANEL: CPT

## 2021-11-13 PROCEDURE — 36415 COLL VENOUS BLD VENIPUNCTURE: CPT

## 2021-11-13 PROCEDURE — 85027 COMPLETE CBC AUTOMATED: CPT

## 2021-11-13 PROCEDURE — 99239 HOSP IP/OBS DSCHRG MGMT >30: CPT | Performed by: INTERNAL MEDICINE

## 2021-11-13 PROCEDURE — 2580000003 HC RX 258

## 2021-11-13 PROCEDURE — 93005 ELECTROCARDIOGRAM TRACING: CPT

## 2021-11-13 PROCEDURE — G0378 HOSPITAL OBSERVATION PER HR: HCPCS

## 2021-11-13 PROCEDURE — 94761 N-INVAS EAR/PLS OXIMETRY MLT: CPT

## 2021-11-13 PROCEDURE — 96376 TX/PRO/DX INJ SAME DRUG ADON: CPT

## 2021-11-13 PROCEDURE — 80307 DRUG TEST PRSMV CHEM ANLYZR: CPT

## 2021-11-13 RX ORDER — METOPROLOL TARTRATE 5 MG/5ML
5 INJECTION INTRAVENOUS ONCE
Status: COMPLETED | OUTPATIENT
Start: 2021-11-13 | End: 2021-11-13

## 2021-11-13 RX ORDER — ATORVASTATIN CALCIUM 10 MG/1
10 TABLET, FILM COATED ORAL NIGHTLY
Qty: 30 TABLET | Refills: 3 | Status: SHIPPED | OUTPATIENT
Start: 2021-11-13 | End: 2021-11-13

## 2021-11-13 RX ORDER — PREDNISONE 20 MG/1
20 TABLET ORAL 2 TIMES DAILY
Qty: 5 TABLET | Refills: 0 | Status: SHIPPED | OUTPATIENT
Start: 2021-11-13 | End: 2021-11-18

## 2021-11-13 RX ORDER — DILTIAZEM HYDROCHLORIDE 120 MG/1
120 CAPSULE, COATED, EXTENDED RELEASE ORAL DAILY
Qty: 30 CAPSULE | Refills: 3 | Status: SHIPPED | OUTPATIENT
Start: 2021-11-14 | End: 2021-11-13

## 2021-11-13 RX ORDER — TAMSULOSIN HYDROCHLORIDE 0.4 MG/1
0.4 CAPSULE ORAL DAILY
Status: DISCONTINUED | OUTPATIENT
Start: 2021-11-13 | End: 2021-11-13 | Stop reason: HOSPADM

## 2021-11-13 RX ORDER — ATORVASTATIN CALCIUM 10 MG/1
10 TABLET, FILM COATED ORAL NIGHTLY
Qty: 30 TABLET | Refills: 3 | Status: SHIPPED | OUTPATIENT
Start: 2021-11-13

## 2021-11-13 RX ORDER — DILTIAZEM HYDROCHLORIDE 120 MG/1
120 CAPSULE, COATED, EXTENDED RELEASE ORAL DAILY
Qty: 30 CAPSULE | Refills: 3 | Status: SHIPPED | OUTPATIENT
Start: 2021-11-14

## 2021-11-13 RX ORDER — GUAIFENESIN 600 MG/1
600 TABLET, EXTENDED RELEASE ORAL 2 TIMES DAILY
Qty: 20 TABLET | Refills: 0 | Status: SHIPPED | OUTPATIENT
Start: 2021-11-13

## 2021-11-13 RX ORDER — TAMSULOSIN HYDROCHLORIDE 0.4 MG/1
0.4 CAPSULE ORAL DAILY
Qty: 30 CAPSULE | Refills: 0 | Status: SHIPPED | OUTPATIENT
Start: 2021-11-13

## 2021-11-13 RX ORDER — GUAIFENESIN 600 MG/1
600 TABLET, EXTENDED RELEASE ORAL 2 TIMES DAILY
Qty: 20 TABLET | Refills: 0 | Status: SHIPPED | OUTPATIENT
Start: 2021-11-13 | End: 2021-11-13

## 2021-11-13 RX ORDER — PREDNISONE 20 MG/1
20 TABLET ORAL 2 TIMES DAILY
Qty: 5 TABLET | Refills: 0 | Status: SHIPPED | OUTPATIENT
Start: 2021-11-13 | End: 2021-11-13 | Stop reason: SDUPTHER

## 2021-11-13 RX ADMIN — IPRATROPIUM BROMIDE AND ALBUTEROL SULFATE 1 AMPULE: .5; 3 SOLUTION RESPIRATORY (INHALATION) at 08:43

## 2021-11-13 RX ADMIN — APIXABAN 5 MG: 5 TABLET, FILM COATED ORAL at 08:05

## 2021-11-13 RX ADMIN — METOPROLOL TARTRATE 5 MG: 1 INJECTION, SOLUTION INTRAVENOUS at 10:21

## 2021-11-13 RX ADMIN — DILTIAZEM HYDROCHLORIDE 120 MG: 120 CAPSULE, COATED, EXTENDED RELEASE ORAL at 08:05

## 2021-11-13 RX ADMIN — SODIUM CHLORIDE, PRESERVATIVE FREE 10 ML: 5 INJECTION INTRAVENOUS at 08:07

## 2021-11-13 RX ADMIN — GUAIFENESIN 600 MG: 600 TABLET, EXTENDED RELEASE ORAL at 08:04

## 2021-11-13 RX ADMIN — ASPIRIN 81 MG: 81 TABLET, CHEWABLE ORAL at 08:04

## 2021-11-13 ASSESSMENT — ENCOUNTER SYMPTOMS
CONSTIPATION: 0
COUGH: 1
DIARRHEA: 0
VOMITING: 0
ABDOMINAL DISTENTION: 0
NAUSEA: 0
BACK PAIN: 0
ABDOMINAL PAIN: 0
SHORTNESS OF BREATH: 1

## 2021-11-13 NOTE — PROGRESS NOTES
Patient discharged home via wheelchair with staff. Transported home per his mother. No distress noted. Belongings packed per patient.

## 2021-11-13 NOTE — DISCHARGE SUMMARY
[address]    Discharge Summary     Patient ID: Jose Angel Voss  :  1965   MRN: [id number]     ACCOUNT:  [de-identified]   Patient's PCP: Dorothy Jensen MD  Admit Date: 2021   Discharge Date: 2021     Length of Stay: 2  Code Status:  Full Code  Admitting Physician: Nury Moseley MD  Discharge Physician: Tree Caal MD     Active Discharge Diagnoses:       Primary Problem  Bronchitis, acute      Hospital Problems  Active Hospital Problems    Diagnosis Date Noted    Urinary retention [R33.9] 2021    Bronchitis, acute [J20.9] 2021    Acute coronary syndrome ([address] [I24.9] 2021    COPD (chronic obstructive pulmonary disease) ([address] [J44.9] 2019    Atrial fibrillation with rapid ventricular response ([address] [I48.91]     S/p TAVR (transcatheter aortic valve replacement), bioprosthetic [Z95.3] 2018    Panlobular emphysema ([address] [J43.1] 10/15/2018    Mass of upper lobe of left lung [R91.8] 10/15/2018    NICM (nonischemic cardiomyopathy) ([address] [I42.8] 10/02/2018    History of cocaine abuse ([facility] [F14.11] 2018    Aortic valve stenosis [I35.0] 2017    Tobacco abuse [Z72.0] 2015       Admission Condition:  fair     Discharged Condition: good    Hospital Stay:       Hospital Course:  Jose Angel Voss is a 64 y.o. male who was admitted for the management of   Bronchitis, acute , presented to ER with Chest Pain, Cough, and Shortness of Breath    The patient is a 56 y.o.  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 Patient mentions having productive cough of greenish sputum. Is recovering from smoking using nicotine patch. Doesn't drink alcohol.      Patient had a CT scan done in August 2018 for pulmonary embolism, incidental finding of 3.5 x 2.5 cm left upper lobe cavitary lesion and bilateral upper lobe patchy infiltrates along with COPD changes.  Spiculated nodule within the left upper lobe 1.5 x 1 cm along with an enlarged AP window mediastinal lymph node.   PET scan December 2018.  Did not show any abnormal uptake in the lesions of the lung or mediastinal lymph node.     HR initial 109  Otherwise vitals stable  EKG showed A. fib with RVR  Tropes 16> 17  CXR unremarkable     Patient was given Lopressor 5 mg IV, ketorolac 30 mg IV,  mg      Admitted to PCU for management of acute current syndrome.   ASA 81, Lipitor 10 mg  Continue Cardizem, Eliquis, nitroglycerin PRN     Cardiology was consulted, echo done, results pending     11/12: duoneb added for acute bronchitis, mucinex, CT chest done      Echo: Left ventricular systolic function is mildly reduced. Estimated LV EF 45-50 %. Patient was complaining of decreased urine output, bladder scan showed residual urine of 650 mL, urine screen was positive for cocaine   Discharging patient on prednisone and continue home dose of Cardizem.        Significant therapeutic interventions: None     Significant Diagnostic Studies:   Labs / Micro:  CBC:   Lab Results   Component Value Date    WBC 10.0 11/13/2021    RBC 5.10 11/13/2021    HGB 15.4 11/13/2021    HCT 46.6 11/13/2021    MCV 91.4 11/13/2021    MCH 30.2 11/13/2021    MCHC 33.1 11/13/2021    RDW 14.2 11/13/2021     11/13/2021     BMP:    Lab Results   Component Value Date    GLUCOSE 109 11/13/2021     11/13/2021 K 4.1 11/13/2021     11/13/2021    CO2 24 11/13/2021    ANIONGAP 8 11/13/2021    BUN 14 11/13/2021    CREATININE 0.69 11/13/2021    BUNCRER NOT REPORTED 11/13/2021    CALCIUM 8.7 11/13/2021    LABGLOM >60 11/13/2021    GFRAA >60 11/13/2021    GFR      11/13/2021    GFR NOT REPORTED 11/13/2021     TSH:    Lab Results   Component Value Date    TSH 2.13 11/12/2021          Radiology:    ECHO Complete 2D W Doppler W Color    Result Date: 11/13/2021  [address] Transthoracic Echocardiography Report (TTE)  [facility] [address]      Date of Study               11/12/2021               [facility] E   Date of      1965  Gender                      Male  Birth   Age          64 year(s)  Race                           Room Number  2084        Height:                     68 inch, 172.72 cm   Corporate ID [id number]    Weight:                     148 pounds, 67.1 kg  #   Patient Acct [de-identified]   BSA:          1.8 m^2       BMI:      22.5 kg/m^2  #   MR #         [id number]      Sonographer                 Arvil Hutton   Accession #  [id number]  Interpreting Physician      [address]   Fellow                   Referring Nurse                           Practitioner   Interpreting             Referring Physician         Srikanth Turpin  Fellow  Type of Study   TTE procedure:2D Echocardiogram, M-Mode, Doppler, Color Doppler. Procedure Date Date: 11/12/2021 Start: 12:23 PM Study Location: [address] [place] [address] Technical Quality: Fair visualization Indications:Chest pain. History / Tech. Comments: Bioprosthetic aortic valve Patient Status: Inpatient Height: 68 inches Weight: 148 pounds BSA: 1.8 m^2 BMI: 22.5 kg/m^2 Rhythm: Atrial fibrillation HR: 78 bpm CONCLUSIONS Summary Left ventricle is normal in size and wall thickness. Left ventricular systolic function is mildly reduced. Estimated LV EF 45-50 %. Left atrium is normal in size. Normal right ventricular size and function.  Bioprosthetic replacement valve in aortic position. Severe aortic insufficiency. Mild tricuspid regurgitation. No pulmonary hypertension. Estimated right ventricular systolic pressure is 96GARK. Signature ----------------------------------------------------------------------------  Electronically signed by Avril Hutton(Sonographer) on 2021 01:07  PM ---------------------------------------------------------------------------- ----------------------------------------------------------------------------  Electronically signed by Naeem Syed(Interpreting physician) on 2021  09:39 AM ---------------------------------------------------------------------------- FINDINGS Left Atrium Left atrium is normal in size. Left Ventricle Left ventricle is normal in size and wall thickness. Left ventricular systolic function is mildly reduced. Estimated LV EF 45-50 %. Right Atrium Right atrium is normal in size. Right Ventricle Normal right ventricular size and function. Mitral Valve No obvious valvular abnormality seen. Trivial mitral regurgitation. Aortic Valve Bioprosthetic replacement valve in aortic position. Severe aortic insufficiency. Tricuspid Valve No obvious valvular abnormality. Mild tricuspid regurgitation. No pulmonary hypertension. Estimated right ventricular systolic pressure is 05XPKH. Pulmonic Valve Pulmonic valve was not well visualized. Pericardial Effusion No significant pericardial effusion is seen. Pleural Effusion No pleural effusion seen. Miscellaneous Normal aortic root dimension. E/E' average = 8.7.  M-mode / 2D Measurements & Calculations:   LVIDd:5.03 cm(3.7 - 5.6 cm)      Diastolic NXFAEU:095 ml  TNOPL:6.07 cm(2.2 - 4.0 cm)      Systolic QVYHEP:26.9 ml  XTSR:4.69 cm(0.6 - 1.1 cm)       Aortic Root:3.1 cm(2.0 - 3.7 cm)  LVPWd:0.88 cm(0.6 - 1.1 cm)      LA Dimension: 3.1 cm(1.9 - 4.0 cm)  Fractional Shortenin.36 %    LA volume/Index: 35.7 ml /20m^2  Calculated LVEF (%): 77.64 %     LVOT:1.8 cm   Mitral: Selina Vaughn MD for the opportunity to be involved in this patient's care.

## 2021-11-13 NOTE — PLAN OF CARE
Problem: Pain:  Goal: Pain level will decrease  Description: Pain level will decrease  11/13/2021 0435 by Shereen Reyes RN  Outcome: Ongoing  11/12/2021 1841 by Steven Salgado RN  Outcome: Ongoing  Goal: Control of acute pain  Description: Control of acute pain  11/13/2021 0435 by Shereen Reyes RN  Outcome: Ongoing  11/12/2021 1841 by Steven Salgado RN  Outcome: Ongoing  Goal: Control of chronic pain  Description: Control of chronic pain  11/13/2021 0435 by Shereen Reyes RN  Outcome: Ongoing  11/12/2021 1841 by Steven Salgado RN  Outcome: Ongoing     Problem: Cardiac Output - Decreased:  Goal: Hemodynamic stability will improve  Description: Hemodynamic stability will improve  11/13/2021 0435 by Shereen Reyes RN  Outcome: Ongoing  11/12/2021 1841 by Steven Salgado RN  Outcome: Ongoing

## 2021-11-13 NOTE — PROGRESS NOTES
nodule within the left upper lobe 1.5 x 1 cm along with an enlarged AP window mediastinal lymph node.   PET scan December 2018.  Did not show any abnormal uptake in the lesions of the lung or mediastinal lymph node.     HR initial 109  Otherwise vitals stable  EKG showed A. fib with RVR  Tropes 16> 17  CXR unremarkable     Patient was given Lopressor 5 mg IV, ketorolac 30 mg IV,  mg      Admitted to PCU for management of acute current syndrome.   ASA 81, Lipitor 10 mg  Continue Cardizem, Eliquis, nitroglycerin PRN        Cardiology was consulted, echo done, results pending     11/12: duoneb added for acute bronchitis, mucinex, CT chest done     Echo: Left ventricular systolic function is mildly reduced. Estimated LV EF 45-50 %. Review of Systems:     Review of Systems   Constitutional: Positive for chills. Negative for fever. Eyes: Negative for visual disturbance. Respiratory: Positive for cough and shortness of breath. Cardiovascular: Positive for palpitations. Negative for chest pain and leg swelling. Gastrointestinal: Negative for abdominal distention, abdominal pain, constipation, diarrhea, nausea and vomiting. Genitourinary: Positive for difficulty urinating. Musculoskeletal: Negative. Negative for back pain. Neurological: Positive for light-headedness. Negative for weakness and headaches. Psychiatric/Behavioral: Negative for agitation, behavioral problems and confusion. Medications:      Allergies:  No Known Allergies    Current Meds:   Scheduled Meds:    ipratropium-albuterol  1 ampule Inhalation Q4H WA    guaiFENesin  600 mg Oral BID    apixaban  5 mg Oral BID    dilTIAZem  120 mg Oral Daily    nicotine  1 patch TransDERmal Q24H    atorvastatin  10 mg Oral Nightly    sodium chloride flush  5-40 mL IntraVENous 2 times per day    aspirin  81 mg Oral Daily    influenza virus vaccine  0.5 mL IntraMUSCular Prior to discharge     Continuous Infusions:    sodium chloride       PRN Meds: sodium chloride flush, albuterol sulfate HFA, sodium chloride flush, sodium chloride, ondansetron **OR** ondansetron, acetaminophen **OR** acetaminophen, polyethylene glycol, potassium chloride **OR** potassium alternative oral replacement **OR** potassium chloride, magnesium sulfate, nitroGLYCERIN    Data:     Past Medical History:   has a past medical history of Abnormal echocardiogram, Arrhythmia, AS (aortic stenosis), Atrial fibrillation ([address] Benign prostatic hyperplasia, Biatrial enlargement, Central perforation of tympanic membrane, right, Chest pain, Chews tobacco, CHF (congestive heart failure), NYHA class I, acute on chronic, combined ([address] Chronic back pain, Conductive hearing loss of right ear, COPD (chronic obstructive pulmonary disease) ([facility] Diverticulitis, Fatigue, History of cocaine abuse ([facility] Household contact with history of methicillin resistant Staphylococcus aureus (MRSA) infection, Lung nodule, Mass of upper lobe of left lung, Mild concentric left ventricular hypertrophy, Murmur, cardiac, Near syncope, Nonrheumatic aortic (valve) stenosis, Palpitations, Panlobular emphysema ([address] Pneumonia of both upper lobes due to infectious organism, Right-sided tinnitus, Severe aortic stenosis, Shortness of breath, and Tobacco abuse. Social History:   reports that he has quit smoking. He has a 10.00 pack-year smoking history. His smokeless tobacco use includes chew. He reports previous alcohol use. He reports previous drug use. Drug: Cocaine.      Family History:   Family History   Problem Relation Age of Onset   Leno Petties Depression Mother     COPD Father     COPD Paternal Grandfather        Vitals:  /74   Pulse 124   Temp 98.6 °F (37 °C) (Oral)   Resp 16   Ht 5' 8\" (1.727 m)   Wt 140 lb 14 oz (63.9 kg)   SpO2 99%   BMI 21.42 kg/m²   Temp (24hrs), Av.8 °F (36.6 °C), Min:97.3 °F (36.3 °C), Max:98.6 °F (37 °C)    No results for input(s): POCGLU in the last 72 hours.    I/O(24Hr):     Intake/Output Summary (Last 24 hours) at 11/13/2021 1102  Last data filed at 11/13/2021 0818  Gross per 24 hour   Intake 1070 ml   Output 300 ml   Net 770 ml       Labs:    CBC with Differential:    Lab Results   Component Value Date    WBC 10.0 11/13/2021    RBC 5.10 11/13/2021    HGB 15.4 11/13/2021    HCT 46.6 11/13/2021     11/13/2021    MCV 91.4 11/13/2021    MCH 30.2 11/13/2021    MCHC 33.1 11/13/2021    RDW 14.2 11/13/2021    LYMPHOPCT 13 11/11/2021    MONOPCT 9 11/11/2021    BASOPCT 0 11/11/2021    MONOSABS 0.90 11/11/2021    LYMPHSABS 1.30 11/11/2021    EOSABS 0.10 11/11/2021    BASOSABS 0.00 11/11/2021    DIFFTYPE NOT REPORTED 11/11/2021     BMP:    Lab Results   Component Value Date     11/13/2021    K 4.1 11/13/2021     11/13/2021    CO2 24 11/13/2021    BUN 14 11/13/2021    LABALBU 3.8 11/13/2021    CREATININE 0.69 11/13/2021    CALCIUM 8.7 11/13/2021    GFRAA >60 11/13/2021    LABGLOM >60 11/13/2021    GLUCOSE 109 11/13/2021       Lab Results   Component Value Date/Time    SPECIAL NOT REPORTED 08/28/2021 07:01 PM     Lab Results   Component Value Date/Time    CULTURE NO SIGNIFICANT GROWTH 08/28/2021 07:01 PM         Radiology:    ECHO Complete 2D W Doppler W Color    Result Date: 11/13/2021  [address] Transthoracic Echocardiography Report (TTE)  Patient Name [address] [place], [place] [address]e      Date of Study               11/12/2021               [facility] E   Date of      1965  Gender                      Male  Birth   Age          64 year(s)  Race                           Room Number  2704        Height:                     68 inch, 172.72 cm   Corporate ID [id number]    Weight:                     148 pounds, 67.1 kg  #   Patient Acct [de-identified]   BSA:          1.8 m^2       BMI:      22.5 kg/m^2  #   MR #         [id number]      Sonographer                 Avril Hutton   Accession #  [id number]  Interpreting Physician      [address]   Fellow Referring Nurse                           Practitioner   Interpreting             Referring Physician         Trisha Goodson  Type of Study   TTE procedure:2D Echocardiogram, M-Mode, Doppler, Color Doppler. Procedure Date Date: 11/12/2021 Start: 12:23 PM Study Location: [address] [place] [address] Technical Quality: Fair visualization Indications:Chest pain. History / Tech. Comments: Bioprosthetic aortic valve Patient Status: Inpatient Height: 68 inches Weight: 148 pounds BSA: 1.8 m^2 BMI: 22.5 kg/m^2 Rhythm: Atrial fibrillation HR: 78 bpm CONCLUSIONS Summary Left ventricle is normal in size and wall thickness. Left ventricular systolic function is mildly reduced. Estimated LV EF 45-50 %. Left atrium is normal in size. Normal right ventricular size and function. Bioprosthetic replacement valve in aortic position. Severe aortic insufficiency. Mild tricuspid regurgitation. No pulmonary hypertension. Estimated right ventricular systolic pressure is 04NUHX. Signature ----------------------------------------------------------------------------  Electronically signed by Avril Hutton(Sonographer) on 11/12/2021 01:07  PM ---------------------------------------------------------------------------- ----------------------------------------------------------------------------  Electronically signed by Naeem Syed(Interpreting physician) on 11/13/2021  09:39 AM ---------------------------------------------------------------------------- FINDINGS Left Atrium Left atrium is normal in size. Left Ventricle Left ventricle is normal in size and wall thickness. Left ventricular systolic function is mildly reduced. Estimated LV EF 45-50 %. Right Atrium Right atrium is normal in size. Right Ventricle Normal right ventricular size and function. Mitral Valve No obvious valvular abnormality seen. Trivial mitral regurgitation. Aortic Valve Bioprosthetic replacement valve in aortic position. Severe aortic insufficiency. heart sounds. No murmur heard. No friction rub. No gallop. Pulmonary:      Effort: Pulmonary effort is normal. No respiratory distress. Breath sounds: Normal breath sounds. No wheezing, rhonchi or rales. Abdominal:      General: Abdomen is flat. Bowel sounds are normal. There is no distension. Palpations: Abdomen is soft. There is no mass. Tenderness: There is no abdominal tenderness. Musculoskeletal:      Right lower leg: No edema. Left lower leg: No edema. Skin:     Capillary Refill: Capillary refill takes less than 2 seconds. Neurological:      General: No focal deficit present. Mental Status: He is alert and oriented to person, place, and time. Motor: No weakness. Deep Tendon Reflexes: Reflexes normal.   Psychiatric:         Mood and Affect: Mood normal.         Behavior: Behavior normal.         Thought Content:  Thought content normal.         Judgment: Judgment normal.           Assessment:        Primary Problem  Bronchitis, acute    Active Hospital Problems    Diagnosis Date Noted    Bronchitis, acute [J20.9] 11/12/2021    Acute coronary syndrome ([facility] [I24.9] 11/11/2021    COPD (chronic obstructive pulmonary disease) ([facility] [J44.9] 07/30/2019    Atrial fibrillation with rapid ventricular response ([address] [I48.91]     S/p TAVR (transcatheter aortic valve replacement), bioprosthetic [Z95.3] 12/27/2018    Panlobular emphysema ([facility] [J43.1] 10/15/2018    Mass of upper lobe of left lung [R91.8] 10/15/2018    NICM (nonischemic cardiomyopathy) ([facility] [I42.8] 10/02/2018    History of cocaine abuse ([facility] [F14.11] 02/27/2018    Aortic valve stenosis [I35.0] 01/05/2017    Tobacco abuse [Z72.0] 03/27/2015       Plan:        Acute bronchitis and chronic emphysema  -procal 0.09  -cough productive of greenish sputum  -duonebs and mucinex added     Chest pain, rule out acute coronary syndrome  -troponin 16 --> 17  -cardiology was consulted, low suspicion for cardiac

## 2021-11-13 NOTE — DISCHARGE INSTR - COC
both upper lobes due to infectious organism J18.9    Crack cocaine use F14.90    Atrial fibrillation with rapid ventricular response ([facility]) I48.91    Chest pain R07.9    COPD (chronic obstructive pulmonary disease) ([facility]) J44.9    Cocaine abuse ([facility]) F14.10    Abnormal echocardiogram R93.1    Biatrial enlargement I51.7    Central perforation of tympanic membrane, right H72.01    Chews tobacco Z72.0    Conductive hearing loss of right ear H90.11    History of cocaine abuse ([facility]) F14.11    Mass of upper lobe of left lung R91.8    Mild concentric left ventricular hypertrophy (LVH) I51.7    MRSA (methicillin resistant Staphylococcus aureus) infection A49.02    Near syncope R55    NICM (nonischemic cardiomyopathy) ([facility]) I42.8    Normal coronary arteries [id number]    Panlobular emphysema ([facility]) J43.1    Right-sided tinnitus H93.11    S/p TAVR (transcatheter aortic valve replacement), bioprosthetic Z95.3    Numbness and tingling of left upper extremity R20.0, R20.2    Unstable angina ([facility]) I20.0    Acute coronary syndrome ([facility]) I24.9    Bronchitis, acute J20.9    Urinary retention R33.9       Isolation/Infection:   Isolation            Contact          Patient Infection Status       Infection Onset Added Last Indicated Last Indicated By Review Planned Expiration Resolved Resolved By    MRSA  02/12/18 02/12/18 Cleo Weber RN        2/2018 nares and buttock    Resolved    COVID-19 Rule Out 11/11/21 11/11/21 11/11/21 COVID-19, Rapid (Ordered)   11/11/21 Rule-Out Test Resulted    COVID-19 Rule Out 04/13/21 04/13/21 04/13/21 COVID-19 (Ordered)   04/14/21 Rule-Out Test Resulted            Nurse Assessment:  Last Vital Signs: /73   Pulse 92   Temp 97.6 °F (36.4 °C) (Oral)   Resp 18   Ht 5' 8\" (1.727 m)   Wt 140 lb 14 oz (63.9 kg)   SpO2 97%   BMI 21.42 kg/m²     Last documented pain score (0-10 scale): Pain Level: 0  Last Weight:   Wt Readings from Last 1 Encounters:   11/12/21 140 lb 14 oz (63.9 kg)     Mental Status:  {IP PT MENTAL STATUS:}    IV Access:  { ALICIA IV ACCESS:881790598}    Nursing Mobility/ADLs:  Walking   {CHP DME IBHD:743349913}  Transfer  {CHP DME OQYW:951373355}  Bathing  {CHP DME KZXS:550088778}  Dressing  {CHP DME TRKM:453735309}  Toileting  {CHP DME UCUL:090947551}  Feeding  {[facility] DME PDVK:247873285}  Med Admin  {CHP DME YXSO:405974853}  Med Delivery   { ALICIA MED Delivery:735227754}    Wound Care Documentation and Therapy:        Elimination:  Continence: Bowel: {YES / TQ:46032}  Bladder: {YES / XM:26317}  Urinary Catheter: {Urinary Catheter:193195209}   Colostomy/Ileostomy/Ileal Conduit: {YES / AM:46454}       Date of Last BM: ***    Intake/Output Summary (Last 24 hours) at 2021 1547  Last data filed at 2021 1539  Gross per 24 hour   Intake 1370 ml   Output 700 ml   Net 670 ml     I/O last 3 completed shifts:   In: 1370 [P.O.:1370]  Out: 400 [Urine:400]    Safety Concerns:     [address] [employer] Safety Concerns:685009871}    Impairments/Disabilities:      [address] [employer] Impairments/Disabilities:785916759}    Nutrition Therapy:  Current Nutrition Therapy:   [address] [employer] Diet List:100236204}    Routes of Feeding: {[facility] DME Other Feedings:417676913}  Liquids: {Slp liquid thickness:28732}  Daily Fluid Restriction: {CHP DME Yes amt example:945078227}  Last Modified Barium Swallow with Video (Video Swallowing Test): {Done Not Done WBNY:220478779}    Treatments at the Time of Hospital Discharge:   Respiratory Treatments: ***  Oxygen Therapy:  {Therapy; copd oxygen:92497}  Ventilator:    { CC Vent XLJN:251844187}    Rehab Therapies: {THERAPEUTIC INTERVENTION:4172288719}  Weight Bearing Status/Restrictions: [address] [employer] Weight Bearin}  Other Medical Equipment (for information only, NOT a DME order):  {EQUIPMENT:873655110}  Other Treatments: ***    Patient's personal belongings (please select all that are sent with patient):  {ABDULKADIR DME Belongings:035501515}    [place] SIGNATURE:  {Esignature:664006275}    CASE MANAGEMENT/SOCIAL WORK SECTION    Inpatient Status Date: ***    Readmission Risk Assessment Score:  Readmission Risk              Risk of Unplanned Readmission:  16           Discharging to Facility/ Agency   Name:   Address:  Phone:  Fax:    Dialysis Facility (if applicable)   Name:  Address:  Dialysis Schedule:  Phone:  Fax:    / signature: {Esignature:858180470}    PHYSICIAN SECTION    Prognosis: {Prognosis:5019806439}    Condition at Discharge: [address] [place] [address] Patient Condition:523211437}    Rehab Potential (if transferring to Rehab): {Prognosis:2280344166}    Recommended Labs or Other Treatments After Discharge: ***    Physician Certification: I certify the above information and transfer of Maricarmen Shannon  is necessary for the continuing treatment of the diagnosis listed and that he requires {Admit to Appropriate Level of Care:52510} for {GREATER/LESS:614450440} 30 days.      Update Admission H&P: {CHP DME Changes in EQFXH:908338449}    PHYSICIAN SIGNATURE:  {Esignature:655421533}

## 2021-11-13 NOTE — PROGRESS NOTES
RN notified medicine resident on floor rounding of [facility]. MD states cardio signed off yesterday.  MD currently rounding with medicine team.

## 2021-11-13 NOTE — DISCHARGE INSTR - DIET

## 2021-11-13 NOTE — PROGRESS NOTES
RN notified medicine residents via telephone of patient's -150. Patient asymptomatic at this time. MD [facility] will place orders.

## 2021-11-16 LAB
EKG ATRIAL RATE: 144 BPM
EKG Q-T INTERVAL: 286 MS
EKG QRS DURATION: 84 MS
EKG QTC CALCULATION (BAZETT): 397 MS
EKG R AXIS: 69 DEGREES
EKG T AXIS: 79 DEGREES
EKG VENTRICULAR RATE: 116 BPM

## 2021-11-16 PROCEDURE — 93010 ELECTROCARDIOGRAM REPORT: CPT | Performed by: INTERNAL MEDICINE

## 2021-12-02 ENCOUNTER — HOSPITAL ENCOUNTER (EMERGENCY)
Age: 56
Discharge: HOME | End: 2021-12-02
Attending: EMERGENCY MEDICINE
Payer: MEDICAID

## 2021-12-02 ENCOUNTER — APPOINTMENT (OUTPATIENT)
Dept: GENERAL RADIOLOGY | Age: 56
End: 2021-12-02
Payer: MEDICAID

## 2021-12-02 VITALS
TEMPERATURE: 98.2 F | RESPIRATION RATE: 17 BRPM | WEIGHT: 147 LBS | HEIGHT: 68 IN | HEART RATE: 87 BPM | BODY MASS INDEX: 22.28 KG/M2 | SYSTOLIC BLOOD PRESSURE: 135 MMHG | DIASTOLIC BLOOD PRESSURE: 68 MMHG | OXYGEN SATURATION: 98 %

## 2021-12-02 DIAGNOSIS — J40 BRONCHITIS: Primary | ICD-10-CM

## 2021-12-02 LAB
DIRECT EXAM: NORMAL
INFLUENZA A: NOT DETECTED
INFLUENZA B: NOT DETECTED
Lab: NORMAL
SARS-COV-2 RNA, RT PCR: NOT DETECTED
SOURCE: NORMAL
SPECIMEN DESCRIPTION: NORMAL
SPECIMEN DESCRIPTION: NORMAL

## 2021-12-02 PROCEDURE — 87636 SARSCOV2 & INF A&B AMP PRB: CPT

## 2021-12-02 PROCEDURE — 71045 X-RAY EXAM CHEST 1 VIEW: CPT

## 2021-12-02 PROCEDURE — 99283 EMERGENCY DEPT VISIT LOW MDM: CPT

## 2021-12-02 PROCEDURE — 87880 STREP A ASSAY W/OPTIC: CPT

## 2021-12-02 RX ORDER — PREDNISONE 20 MG/1
40 TABLET ORAL DAILY
Qty: 10 TABLET | Refills: 0 | Status: SHIPPED | OUTPATIENT
Start: 2021-12-02 | End: 2021-12-07

## 2021-12-02 ASSESSMENT — PAIN SCALES - GENERAL: PAINLEVEL_OUTOF10: 5

## 2021-12-02 ASSESSMENT — PAIN DESCRIPTION - LOCATION: LOCATION: THROAT

## 2021-12-02 ASSESSMENT — PAIN DESCRIPTION - PAIN TYPE: TYPE: ACUTE PAIN

## 2021-12-02 NOTE — ED TRIAGE NOTES
Mode of arrival (squad #, walk in, police, etc) : [employer]        Chief complaint(s): Sore throat, cough        Arrival Note (brief scenario, treatment PTA, etc). : Pt arrives to ED c/o sore throat ongoing for the last three days. Patient reports a cough as well. Patient denies any known exposure to covid. C= \"Have you ever felt that you should Cut down on your drinking? \"  No  A= \"Have people Annoyed you by criticizing your drinking? \"  No  G= \"Have you ever felt bad or Guilty about your drinking? \"  No  E= \"Have you ever had a drink as an Eye-opener first thing in the morning to steady your nerves or to help a hangover? \"  No      Deferred []      Reason for deferring: N/A    *If yes to two or more: probable alcohol abuse. *

## 2021-12-02 NOTE — ED PROVIDER NOTES
[address] ED  eMERGENCY dEPARTMENT eNCOUnter      Pt Name: Radha Martinez  MRN: [id number]  [place] 1965  Date of evaluation: 12/2/2021  Provider: Rodolfo Harrington PA-C    CHIEF COMPLAINT       Chief Complaint   Patient presents with    Pharyngitis    Cough           HISTORY OF PRESENT ILLNESS  (Location/Symptom, Timing/Onset, Context/Setting, Quality, Duration, Modifying Factors, Severity.)   Radha Martinez is a 64 y.o. male who presents to the emergency department for evaluation of cough, sore throat, congestion, chills x 3 days. Reports coughing up green phlegm. Denies chest pain, sob, nausea, emesis, abd pain, fevers, headache, loss of smell/ taste. Pt does have COPD. Denies sick contacts. Not vaccinated for COVID. No other complaints. Nursing Notes were reviewed. REVIEW OF SYSTEMS    (2-9 systems for level 4, 10 or more for level 5)     Review of Systems   Cough  Sore throat  Congestion  Chills       Except as noted above the remainder of the review of systems was reviewed and negative.        PAST MEDICAL HISTORY     Past Medical History:   Diagnosis Date    Abnormal echocardiogram 02/2018    Arrhythmia     AS (aortic stenosis)     Atrial fibrillation (HCC)     Benign prostatic hyperplasia     Biatrial enlargement     Central perforation of tympanic membrane, right     Chest pain     Chews tobacco     CHF (congestive heart failure), NYHA class I, acute on chronic, combined (HCC)     Chronic back pain     Conductive hearing loss of right ear     COPD (chronic obstructive pulmonary disease) (HCC)     Diverticulitis     Fatigue     History of cocaine abuse ([facility] [facility] 01/2018    Household contact with history of methicillin resistant Staphylococcus aureus (MRSA) infection     Lung nodule     Mass of upper lobe of left lung     Mild concentric left ventricular hypertrophy     Murmur, cardiac     Near syncope     Nonrheumatic aortic (valve) stenosis     Palpitations     Panlobular emphysema (HCC)     Pneumonia of both upper lobes due to infectious organism     Right-sided tinnitus     Severe aortic stenosis     Shortness of breath     Tobacco abuse      None otherwise stated in nurses notes    SURGICAL HISTORY       Past Surgical History:   Procedure Laterality Date    AORTIC VALVE REPLACEMENT  12/2018    CYST REMOVAL Left     INGUINAL HERNIA REPAIR Right     MO DRAIN SKIN ABSCESS COMPLIC N/A 8/80/6993    GLUTEAL INCISION AND DRAINAGE performed by Arabella Worrell DO at [address]     None otherwise stated in nurses notes    CURRENT MEDICATIONS       Discharge Medication List as of 12/2/2021  7:04 PM      CONTINUE these medications which have NOT CHANGED    Details   tamsulosin (FLOMAX) 0.4 MG capsule Take 1 capsule by mouth daily, Disp-30 capsule, R-0Normal      dilTIAZem (CARDIZEM CD) 120 MG extended release capsule Take 1 capsule by mouth daily, Disp-30 capsule, R-3Normal      guaiFENesin (MUCINEX) 600 MG extended release tablet Take 1 tablet by mouth 2 times daily, Disp-20 tablet, R-0Normal      atorvastatin (LIPITOR) 10 MG tablet Take 1 tablet by mouth nightly, Disp-30 tablet, R-3Normal      apixaban (ELIQUIS) 5 MG TABS tablet Take 1 tablet by mouth 2 times daily, Disp-60 tablet, R-3Normal      albuterol sulfate  (90 Base) MCG/ACT inhaler Inhale 2 puffs into the lungs every 6 hours as needed for Shortness of Breath, Disp-1 each, R-1Normal      tiotropium (SPIRIVA HANDIHALER) 18 MCG inhalation capsule Inhale 1 capsule into the lungs daily, Disp-90 capsule, R-1Normal      fluticasone (FLONASE) 50 MCG/ACT nasal spray 1 spray by Each Nostril route daily, Disp-2 Bottle, R-1Normal      acetaminophen (TYLENOL) 500 MG tablet Take 2 tablets by mouth 3 times daily as needed for Pain, Disp-200 tablet, R-1Normal      loratadine (CLARITIN) 10 MG tablet Take 1 tablet by mouth daily, Disp-10 tablet, R-0Normal      aspirin 81 MG chewable tablet Take 1 tablet by mouth daily, Disp-30 tablet, R-3Print             ALLERGIES     Patient has no known allergies. FAMILY HISTORY           Problem Relation Age of Onset    Depression Mother     COPD Father     COPD Paternal Grandfather      Family Status   Relation Name Status    Mother  (Not Specified)    Father  (Not Specified)    PGF  (Not Specified)      None otherwise stated in nurses notes    SOCIAL HISTORY      reports that he has quit smoking. He has a 10.00 pack-year smoking history. His smokeless tobacco use includes chew. He reports previous alcohol use. He reports previous drug use. Drug: Cocaine. lives at home with others     PHYSICAL EXAM    (up to 7 for level 4, 8 or more for level 5)     ED Triage Vitals [12/02/21 1759]   BP Temp Temp Source Pulse Resp SpO2 Height Weight   135/68 98.2 °F (36.8 °C) Oral 87 17 98 % 5' 8\" (1.727 m) 147 lb (66.7 kg)       Physical Exam   Nursing note and vitals reviewed. Constitutional: well-developed, well-nourished, nontoxic, well appearing, not distressed  HEENT:  normocephalic atraumatic, external ears normal appearance, no nasal deformity, no neck masses or edema, patient protecting airway, no stridor, phonating well. No peritonsillar abscess.    Eyes: pupils equal, sclera non-icteric, no discharge  Cardiovascular: no JVD  Respiratory: non-labored breathing, effort normal, no accessory muscle use visulized, no audible wheezing  Gastrointestinal: Abdomen not distended  Musculoskeletal: moves extremities without impaired range of motion, no deformity, no edema  Skin: no pallor, no rashes visible  Neuro: alert and oriented times 3, GCS 15, normal coordination, no dysarthria or aphasia  Psych: normal mood and affect, cooperative, normal thought content              DIAGNOSTIC RESULTS     EKG: All EKG's are interpreted by the Emergency Department Physician who either signs or Co-signs this chart in the absence of a cardiologist.        RADIOLOGY:   All plain film, CT, MRI, and formal ultrasound images (except ED bedside ultrasound) are read by the radiologist, see reports below, unless otherwise noted in MDM or here. XR CHEST PORTABLE   Final Result   No acute cardiopulmonary findings. ECHO Complete 2D W Doppler W Color    Result Date: 11/12/2021  [facility] [place] [place] (TTE)  [facility]      Date of Study               11/12/2021               [facility]   Date of      1965  Gender                      Male  Birth   Age          64 year(s)  Race                           Room Number  2084        Height:                     68 inch, 172.72 cm   Corporate ID [id number]    Weight:                     148 pounds, 67.1 kg  #   Patient Acct [de-identified]   BSA:          1.8 m^2       BMI:      22.5 kg/m^2  #   MR #         [id number]      Sonographer                 Avril Hutton   Accession #  [id number]  Interpreting Physician      [address]   Fellow                   Referring Nurse                           Practitioner   Interpreting             Referring Physician         Naomy Rudd  Fellow  Type of Study   TTE procedure:2D Echocardiogram, M-Mode, Doppler, Color Doppler. Procedure Date Date: 11/12/2021 Start: 12:23 PM Study Location: [address] [place] [address] Technical Quality: Fair visualization Indications:Chest pain. History / Tech. Comments: Bioprosthetic aortic valve Patient Status: Inpatient Height: 68 inches Weight: 148 pounds BSA: 1.8 m^2 BMI: 22.5 kg/m^2 Rhythm: Atrial fibrillation HR: 78 bpm CONCLUSIONS Summary Left ventricle is normal in size and wall thickness. Left ventricular systolic function is mildly reduced. Estimated LV EF 45-50 %. Left atrium is normal in size. Normal right ventricular size and function. Bioprosthetic replacement valve in aortic position. Severe aortic insufficiency. Mild tricuspid regurgitation. No pulmonary hypertension.  Estimated right ventricular systolic pressure is 14mmHg. Signature ----------------------------------------------------------------------------  Electronically signed by Avril Hutton(Sonographer) on 2021 01:07  PM ---------------------------------------------------------------------------- ----------------------------------------------------------------------------  Electronically signed by Naeem Syed(Interpreting physician) on 2021  09:39 AM ---------------------------------------------------------------------------- FINDINGS Left Atrium Left atrium is normal in size. Left Ventricle Left ventricle is normal in size and wall thickness. Left ventricular systolic function is mildly reduced. Estimated LV EF 45-50 %. Right Atrium Right atrium is normal in size. Right Ventricle Normal right ventricular size and function. Mitral Valve No obvious valvular abnormality seen. Trivial mitral regurgitation. Aortic Valve Bioprosthetic replacement valve in aortic position. Severe aortic insufficiency. Tricuspid Valve No obvious valvular abnormality. Mild tricuspid regurgitation. No pulmonary hypertension. Estimated right ventricular systolic pressure is 66WCEC. Pulmonic Valve Pulmonic valve was not well visualized. Pericardial Effusion No significant pericardial effusion is seen. Pleural Effusion No pleural effusion seen. Miscellaneous Normal aortic root dimension. E/E' average = 8.7.  M-mode / 2D Measurements & Calculations:   LVIDd:5.03 cm(3.7 - 5.6 cm)      Diastolic ZMHAMP:342 ml  VQHKZ:9.85 cm(2.2 - 4.0 cm)      Systolic NLGPVZ:43.9 ml  AGAU:7.98 cm(0.6 - 1.1 cm)       Aortic Root:3.1 cm(2.0 - 3.7 cm)  LVPWd:0.88 cm(0.6 - 1.1 cm)      LA Dimension: 3.1 cm(1.9 - 4.0 cm)  Fractional Shortenin.36 %    LA volume/Index: 35.7 ml /20m^2  Calculated LVEF (%): 77.64 %     LVOT:1.8 cm   Mitral:                                 Aortic   Peak E-Wave: 0.99 m/s                   Peak Velocity: 1.92 m/s                                          Peak Gradient: 14.75 mmHg 11/11/2021  EXAMINATION: ONE XRAY VIEW OF THE CHEST 11/11/2021 11:32 am COMPARISON: 08/28/2021. HISTORY: ORDERING SYSTEM PROVIDED HISTORY: Cough, SOB TECHNOLOGIST PROVIDED HISTORY: Cough, SOB Reason for Exam: Pt states coughing up sputum Acuity: Unknown Type of Exam: Unknown FINDINGS: The heart size is within normal limits. The pulmonary vasculature is also within normal limits. No acute infiltrates are seen. The costophrenic angles are sharp bilaterally. No pneumothoraces are noted. There is a calcified granuloma in the left upper lobe. 1. No active pulmonary disease. LABS:  Labs Reviewed   COVID-19 & INFLUENZA COMBO   STREP SCREEN GROUP A THROAT       All other labs were within normal range or not returned as of this dictation. EMERGENCY DEPARTMENT COURSE and DIFFERENTIAL DIAGNOSIS/MDM:   Vitals:    Vitals:    12/02/21 1759   BP: 135/68   Pulse: 87   Resp: 17   Temp: 98.2 °F (36.8 °C)   TempSrc: Oral   SpO2: 98%   Weight: 147 lb (66.7 kg)   Height: 5' 8\" (1.727 m)         Patient instructed to return to the emergency room if symptoms worsen, return, or any other concern right away which is agreed by the patient    ED MEDS:  Orders Placed This Encounter   Medications    predniSONE (DELTASONE) 20 MG tablet     Sig: Take 2 tablets by mouth daily for 5 days     Dispense:  10 tablet     Refill:  0         CONSULTS:  None    PROCEDURES:  None      FINAL IMPRESSION      1.  Bronchitis          DISPOSITION/PLAN   DISPOSITION Decision To Discharge    PATIENT REFERRED TO:  Lila Logan MD  [address] [address] [place]  ED  [place] [address]  [phone]          DISCHARGE MEDICATIONS:  Discharge Medication List as of 12/2/2021  7:04 PM      START taking these medications    Details   predniSONE (DELTASONE) 20 MG tablet Take 2 tablets by mouth daily for 5 days, Disp-10 tablet, R-0Print               Summation      Patient Course:    Cough, sore throat. Strep, covid, flu negative. cxr is unremarkable. Hx of COPD. Will start on steroids. Recommend OTC cough medication. Follow up with PCP. Strict return precautions discussed. Pt is agreeable. Discussed results and plan with the pt. They expressed appropriate understanding. Pt given close follow up, supportive care instructions and strict return instructions at the bedside. The care is provided during an unprecedented national emergency due to the novel coronavirus, COVID-19. ED Medications administered this visit:  Medications - No data to display    New Prescriptions from this visit:    Discharge Medication List as of 12/2/2021  7:04 PM      START taking these medications    Details   predniSONE (DELTASONE) 20 MG tablet Take 2 tablets by mouth daily for 5 days, Disp-10 tablet, R-0Print             Follow-up:  Art Zapien MD  [address] [id number]  [facility]  [facility] [id number] [phone]            Final Impression:   1.  Bronchitis               (Please note that portions of this note were completed with a voice recognition program )        Mandi Vidal 82, PA-C  12/03/21 5508

## 2021-12-03 ENCOUNTER — TELEPHONE (OUTPATIENT)
Dept: OTHER | Facility: CLINIC | Age: 56
End: 2021-12-03

## 2021-12-03 NOTE — TELEPHONE ENCOUNTER
Nurse Access attempted to contact pt on home phone, pt unavailable. Attempted to contact pt on cell phone, unable to get through.

## 2022-02-14 ENCOUNTER — TELEPHONE (OUTPATIENT)
Dept: INTERNAL MEDICINE CLINIC | Age: 57
End: 2022-02-14

## 2022-02-14 DIAGNOSIS — L02.92 BOIL: Primary | ICD-10-CM

## 2022-02-14 RX ORDER — CEPHALEXIN 500 MG/1
500 CAPSULE ORAL 3 TIMES DAILY
Qty: 21 CAPSULE | Refills: 0 | Status: SHIPPED | OUTPATIENT
Start: 2022-02-14 | End: 2022-02-21

## 2022-02-14 NOTE — TELEPHONE ENCOUNTER
Patients friend Rahel Skelton, HIPAA verified, called to request antibiotics be sent to [facility] on [address] for [place]. He has a boil on his buttocks that has been there for 4-5 days, it is hard to touch and coming to a head.      Please advise

## 2022-02-14 NOTE — TELEPHONE ENCOUNTER
Ordered Keflex. Patient should be informed that if symptoms worsen, or his infection does not get better.   He need to come to clinic to get evaluated

## 2022-02-16 NOTE — TELEPHONE ENCOUNTER
Spoke to [facility], Ramin Colon, stated medication was picked up at [address] [place] [address] in [place].

## 2022-03-09 DIAGNOSIS — I48.91 ATRIAL FIBRILLATION WITH RAPID VENTRICULAR RESPONSE (HCC): ICD-10-CM

## 2022-03-09 DIAGNOSIS — J44.9 CHRONIC OBSTRUCTIVE PULMONARY DISEASE, UNSPECIFIED COPD TYPE (HCC): ICD-10-CM

## 2022-03-09 RX ORDER — ALBUTEROL SULFATE 90 UG/1
2 AEROSOL, METERED RESPIRATORY (INHALATION) EVERY 6 HOURS PRN
Qty: 1 EACH | Refills: 1 | Status: SHIPPED | OUTPATIENT
Start: 2022-03-09 | End: 2022-07-08

## 2022-03-09 NOTE — TELEPHONE ENCOUNTER
Medication: Apixaban and Albuterol inhaler  Last visit: 10/7/21  Next visit: 3/23/2022  Last refill:   Pharmacy: [facility] / [address]

## 2022-03-28 ENCOUNTER — HOSPITAL ENCOUNTER (EMERGENCY)
Age: 57
Discharge: HOME | End: 2022-03-28
Attending: EMERGENCY MEDICINE
Payer: MEDICAID

## 2022-03-28 ENCOUNTER — APPOINTMENT (OUTPATIENT)
Dept: GENERAL RADIOLOGY | Age: 57
End: 2022-03-28
Payer: MEDICAID

## 2022-03-28 VITALS
WEIGHT: 148 LBS | HEIGHT: 67 IN | HEART RATE: 96 BPM | SYSTOLIC BLOOD PRESSURE: 126 MMHG | RESPIRATION RATE: 18 BRPM | BODY MASS INDEX: 23.23 KG/M2 | DIASTOLIC BLOOD PRESSURE: 82 MMHG | TEMPERATURE: 98.1 F | OXYGEN SATURATION: 99 %

## 2022-03-28 DIAGNOSIS — R06.02 SHORTNESS OF BREATH: ICD-10-CM

## 2022-03-28 DIAGNOSIS — R07.9 CHEST PAIN, UNSPECIFIED TYPE: Primary | ICD-10-CM

## 2022-03-28 LAB
ABSOLUTE EOS #: 0.1 K/UL (ref 0–0.4)
ABSOLUTE LYMPH #: 1.7 K/UL (ref 1–4.8)
ABSOLUTE MONO #: 0.6 K/UL (ref 0.1–1.3)
ANION GAP SERPL CALCULATED.3IONS-SCNC: 10 MMOL/L (ref 9–17)
BASOPHILS # BLD: 1 % (ref 0–2)
BASOPHILS ABSOLUTE: 0 K/UL (ref 0–0.2)
BUN BLDV-MCNC: 14 MG/DL (ref 6–20)
CALCIUM SERPL-MCNC: 8.5 MG/DL (ref 8.6–10.4)
CHLORIDE BLD-SCNC: 108 MMOL/L (ref 98–107)
CO2: 22 MMOL/L (ref 20–31)
CREAT SERPL-MCNC: 0.97 MG/DL (ref 0.7–1.2)
D-DIMER QUANTITATIVE: <0.27 MG/L FEU (ref 0–0.59)
EOSINOPHILS RELATIVE PERCENT: 1 % (ref 0–4)
GFR AFRICAN AMERICAN: >60 ML/MIN
GFR NON-AFRICAN AMERICAN: >60 ML/MIN
GFR SERPL CREATININE-BSD FRML MDRD: ABNORMAL ML/MIN/{1.73_M2}
GLUCOSE BLD-MCNC: 105 MG/DL (ref 70–99)
HCT VFR BLD CALC: 44.1 % (ref 41–53)
HEMOGLOBIN: 14.8 G/DL (ref 13.5–17.5)
LYMPHOCYTES # BLD: 26 % (ref 24–44)
MCH RBC QN AUTO: 30.1 PG (ref 26–34)
MCHC RBC AUTO-ENTMCNC: 33.4 G/DL (ref 31–37)
MCV RBC AUTO: 89.9 FL (ref 80–100)
MONOCYTES # BLD: 9 % (ref 1–7)
PDW BLD-RTO: 14.2 % (ref 11.5–14.9)
PLATELET # BLD: 160 K/UL (ref 150–450)
PMV BLD AUTO: 8.5 FL (ref 6–12)
POTASSIUM SERPL-SCNC: 4.1 MMOL/L (ref 3.7–5.3)
PRO-BNP: 374 PG/ML
RBC # BLD: 4.91 M/UL (ref 4.5–5.9)
SEG NEUTROPHILS: 63 % (ref 36–66)
SEGMENTED NEUTROPHILS ABSOLUTE COUNT: 4.2 K/UL (ref 1.3–9.1)
SODIUM BLD-SCNC: 140 MMOL/L (ref 135–144)
TROPONIN, HIGH SENSITIVITY: 7 NG/L (ref 0–22)
TROPONIN, HIGH SENSITIVITY: 8 NG/L (ref 0–22)
WBC # BLD: 6.6 K/UL (ref 3.5–11)

## 2022-03-28 PROCEDURE — 93005 ELECTROCARDIOGRAM TRACING: CPT | Performed by: STUDENT IN AN ORGANIZED HEALTH CARE EDUCATION/TRAINING PROGRAM

## 2022-03-28 PROCEDURE — 84484 ASSAY OF TROPONIN QUANT: CPT

## 2022-03-28 PROCEDURE — 6370000000 HC RX 637 (ALT 250 FOR IP): Performed by: STUDENT IN AN ORGANIZED HEALTH CARE EDUCATION/TRAINING PROGRAM

## 2022-03-28 PROCEDURE — 85025 COMPLETE CBC W/AUTO DIFF WBC: CPT

## 2022-03-28 PROCEDURE — 71045 X-RAY EXAM CHEST 1 VIEW: CPT

## 2022-03-28 PROCEDURE — 36415 COLL VENOUS BLD VENIPUNCTURE: CPT

## 2022-03-28 PROCEDURE — 83880 ASSAY OF NATRIURETIC PEPTIDE: CPT

## 2022-03-28 PROCEDURE — 80048 BASIC METABOLIC PNL TOTAL CA: CPT

## 2022-03-28 PROCEDURE — 99285 EMERGENCY DEPT VISIT HI MDM: CPT

## 2022-03-28 PROCEDURE — 85379 FIBRIN DEGRADATION QUANT: CPT

## 2022-03-28 RX ORDER — IBUPROFEN 600 MG/1
600 TABLET ORAL EVERY 6 HOURS PRN
Qty: 28 TABLET | Refills: 0 | Status: SHIPPED | OUTPATIENT
Start: 2022-03-28 | End: 2022-10-07

## 2022-03-28 RX ORDER — IBUPROFEN 800 MG/1
800 TABLET ORAL ONCE
Status: COMPLETED | OUTPATIENT
Start: 2022-03-28 | End: 2022-03-28

## 2022-03-28 RX ADMIN — IBUPROFEN 800 MG: 800 TABLET, FILM COATED ORAL at 17:25

## 2022-03-28 ASSESSMENT — PAIN SCALES - GENERAL: PAINLEVEL_OUTOF10: 9

## 2022-03-28 ASSESSMENT — ENCOUNTER SYMPTOMS
SHORTNESS OF BREATH: 1
SORE THROAT: 0
VOMITING: 0
COUGH: 0
ABDOMINAL PAIN: 0
BACK PAIN: 0

## 2022-03-28 NOTE — ED PROVIDER NOTES
[address] ED  eMERGENCY dEPARTMENT eNCOUnter   Attending Attestation     Pt Name: Alex Olivarez  MRN: [id number]  [place] 1965  Date of evaluation: 3/28/22    History, EXAM, MDM:    Alex Olivarez is a 62 y.o. male who presents with Chest Pain (left, nodule on left lung) and Shortness of Breath    Cough a week ago, now left sided cp radiating to back. On exam VSS, except for mild tachycardia. Some crackles in the left lower lobe. No leg edema. No calf ttp.  RRR. Laboratory studies show  Wbc count of 6.6. No troponin elevation. CXR shows no pneumothorax. D.dimer is not elevated. .  EKG shows atrial fibrillation HR is 98, QRS 88, , no PRIMITIVO, No STD, No TWI, the axis is leftwards. Recommended close follow up with his pcp, return to ED if symptoms worsen, and warning precautions provided. Vitals:   Vitals:    03/28/22 1603 03/28/22 1634   BP: 120/69 126/82   Pulse: 101 96   Resp: 15 18   Temp: 98.1 °F (36.7 °C)    SpO2: 98% 99%   Weight: 148 lb (67.1 kg) 148 lb (67.1 kg)   Height: 5' 7\" (1.702 m) 5' 7\" (1.702 m)     I performed a history and physical examination of the patient and discussed management with the resident. I reviewed the residents note and agree with the documented findings and plan of care. Any areas of disagreement are noted on the chart. I was personally present for the key portions of any procedures. I have documented in the chart those procedures where I was not present during the key portions. I have personally reviewed all images and agree with the resident's interpretation. I have reviewed the emergency nurses triage note. I agree with the chief complaint, past medical history, past surgical history, allergies, medications, social and family history as documented unless otherwise noted below. Documentation of the HPI, Physical Exam and Medical Decision Making performed by medical students or scribes is based on my personal performance of the HPI, PE and MDM. For Phys Assistant/ Nurse Practitioner cases/documentation I have had a face to face evaluation of this patient and have completed at least one if not all key elements of the E/M (history, physical exam, and MDM). Additional findings are as noted.     Lindsey Mercer MD  Attending Emergency  Physician                Lindsey Mercer MD  03/29/22 9981

## 2022-03-28 NOTE — ED TRIAGE NOTES
Pt presents to Emergency Room after having left sided lung /chest pain that radiates thru to the back. Pt states he is recovering from a head cold and this stayed on. Pt does have productive cough (clear sputum). . Pt complaining of left shoulder pain and back pain.

## 2022-03-28 NOTE — ED PROVIDER NOTES
[address]  Emergency Department Encounter  EmergencyMedicine Resident     Pt Name:Alexandro Wagner  MRN: [id number]  [place] 1965  Date of evaluation: 3/28/22  PCP:  Cris Kwon MD    This patient was evaluated in the Emergency Department for symptoms described in the history of present illness. The patient was evaluated in the context of the global COVID-19 pandemic, which necessitated consideration that the patient might be at risk for infection with the SARS-CoV-2 virus that causes COVID-19. Institutional protocols and algorithms that pertain to the evaluation of patients at risk for COVID-19 are in a state of rapid change based on information released by regulatory bodies including the CDC and federal and state organizations. These policies and algorithms were followed during the patient's care in the ED. CHIEF COMPLAINT       Chief Complaint   Patient presents with    Chest Pain     left, nodule on left lung    Shortness of Breath       HISTORY OF PRESENT ILLNESS  (Location/Symptom, Timing/Onset, Context/Setting, Quality, Duration, Modifying Factors, Severity.)      Maryjane Gregg is a 62 y.o. male who presents with 5-day history of left anterior chest pain that is moderate and shortness of breath that has been persistent. Chest pain radiates to left shoulder. Patient states that originally his symptoms started along with sore throat, cough, nasal congestion, however all that has gone away over the last multiple days, with chest pain and shortness of breath being persistent. Patient otherwise denying fevers, chills, abdominal pain, nausea, vomiting, diarrhea, dysuria or hematuria. Patient does have a history of A. fib, currently on Eliquis. History of COPD and CHF. Patient states that he smokes occasionally.     PAST MEDICAL / SURGICAL / SOCIAL / FAMILY HISTORY      has a past medical history of Abnormal echocardiogram, Arrhythmia, AS (aortic stenosis), Atrial fibrillation ([facility] [address] Benign prostatic hyperplasia, Biatrial enlargement, Central perforation of tympanic membrane, right, Chest pain, Chews tobacco, CHF (congestive heart failure), NYHA class I, acute on chronic, combined ([address] Chronic back pain, Conductive hearing loss of right ear, COPD (chronic obstructive pulmonary disease) ([address] Diverticulitis, Fatigue, History of cocaine abuse ([facility] [address] Household contact with history of methicillin resistant Staphylococcus aureus (MRSA) infection, Lung nodule, Mass of upper lobe of left lung, Mild concentric left ventricular hypertrophy, Murmur, cardiac, Near syncope, Nonrheumatic aortic (valve) stenosis, Palpitations, Panlobular emphysema ([address] Pneumonia of both upper lobes due to infectious organism, Right-sided tinnitus, Severe aortic stenosis, Shortness of breath, and Tobacco abuse.     has a past surgical history that includes Inguinal hernia repair (Right); cyst removal (Left); pr drain skin abscess complic (N/A, 3/44/3711); and Aortic valve replacement (12/2018).     Social History     Socioeconomic History    Marital status:      Spouse name: Not on file    Number of children: Not on file    Years of education: Not on file    Highest education level: Not on file   Occupational History    Not on file   Tobacco Use    Smoking status: Former Smoker     Packs/day: 0.25     Years: 40.00     Pack years: 10.00    Smokeless tobacco: Current User     Types: Chew    Tobacco comment: pt  previously smoked 1.5 ppd;   Vaping Use    Vaping Use: Never used   Substance and Sexual Activity    Alcohol use: Not Currently    Drug use: Not Currently     Types: Cocaine     Comment: drug abuse includes crack cocaine; last used 5 months ago 11/21    Sexual activity: Yes     Partners: Female   Other Topics Concern    Not on file   Social History Narrative    Not on file     Social Determinants of Health     Financial Resource Strain: Medium Risk    Difficulty of Paying Living Expenses: Somewhat hard   Food Insecurity: No Food Insecurity    Worried About Running Out of Food in the Last Year: Never true    Ran Out of Food in the Last Year: Never true   Transportation Needs:     Lack of Transportation (Medical): Not on file    Lack of Transportation (Non-Medical): Not on file   Physical Activity:     Days of Exercise per Week: Not on file    Minutes of Exercise per Session: Not on file   Stress:     Feeling of Stress : Not on file   Social Connections:     Frequency of Communication with Friends and Family: Not on file    Frequency of Social Gatherings with Friends and Family: Not on file    Attends Muslim Services: Not on file    Active Member of [address] [place] [address] [employer] or Organizations: Not on file    Attends Club or Organization Meetings: Not on file    Marital Status: Not on file   Intimate Partner Violence:     Fear of Current or Ex-Partner: Not on file    Emotionally Abused: Not on file    Physically Abused: Not on file    Sexually Abused: Not on file   Housing Stability:     Unable to Pay for Housing in the Last Year: Not on file    Number of [place] in the Last Year: Not on file    Unstable Housing in the Last Year: Not on file       Family History   Problem Relation Age of Onset    Depression Mother     COPD Father     COPD Paternal Grandfather        Allergies:  Patient has no known allergies. Home Medications:  Prior to Admission medications    Medication Sig Start Date End Date Taking?  Authorizing Provider   ibuprofen (IBU) 600 MG tablet Take 1 tablet by mouth every 6 hours as needed for Pain 3/28/22 4/4/22 Yes E Darrian Darling MD   apixaban (ELIQUIS) 5 MG TABS tablet Take 1 tablet by mouth 2 times daily 3/9/22   Anna Mcdermott MD   albuterol sulfate  (90 Base) MCG/ACT inhaler Inhale 2 puffs into the lungs every 6 hours as needed for Shortness of Breath 3/9/22   Anna Mcdermott MD   tamsulosin (FLOMAX) 0.4 MG capsule Take 1 capsule by mouth daily 11/13/21   Sloane Melvin Sea Penaloza MD   dilTIAZem (CARDIZEM CD) 120 MG extended release capsule Take 1 capsule by mouth daily 11/14/21   Jody Jones MD   guaiFENesin (MUCINEX) 600 MG extended release tablet Take 1 tablet by mouth 2 times daily 11/13/21   Jody Jones MD   atorvastatin (LIPITOR) 10 MG tablet Take 1 tablet by mouth nightly 11/13/21   Jody Jones MD   tiotropium (Vergie Carte) 18 MCG inhalation capsule Inhale 1 capsule into the lungs daily 10/7/21   Geena Peñaloza MD   fluticasone (FLONASE) 50 MCG/ACT nasal spray 1 spray by Each Nostril route daily 4/12/21   Geena Peñaloza MD   acetaminophen (TYLENOL) 500 MG tablet Take 2 tablets by mouth 3 times daily as needed for Pain 4/12/21   Geena Peñaloza MD   loratadine (CLARITIN) 10 MG tablet Take 1 tablet by mouth daily 4/20/20   Geena Peñaloza MD   aspirin 81 MG chewable tablet Take 1 tablet by mouth daily 1/7/19   Ct Mullins MD       REVIEW OF SYSTEMS    (2-9 systems for level 4, 10 or more for level 5)      Review of Systems   Constitutional: Negative for chills and fever. HENT: Negative for sore throat. Eyes: Negative for visual disturbance. Respiratory: Positive for shortness of breath. Negative for cough. Cardiovascular: Positive for chest pain. Negative for palpitations. Gastrointestinal: Negative for abdominal pain and vomiting. Endocrine: Negative for polyuria. Genitourinary: Negative for dysuria and hematuria. Musculoskeletal: Negative for back pain. Skin: Negative for rash. Neurological: Negative for light-headedness and headaches. Psychiatric/Behavioral: Negative for confusion. PHYSICAL EXAM   (up to 7 for level 4, 8 or more for level 5)      INITIAL VITALS:   /82   Pulse 96   Temp 98.1 °F (36.7 °C)   Resp 18   Ht 5' 7\" (1.702 m)   Wt 148 lb (67.1 kg)   SpO2 99%   BMI 23.18 kg/m²     Physical Exam  Constitutional:       Appearance: He is well-developed. HENT:      Head: Normocephalic and atraumatic. Mouth/Throat:      Mouth: Mucous membranes are moist.      Pharynx: Oropharynx is clear. Eyes:      Extraocular Movements: Extraocular movements intact. Pupils: Pupils are equal, round, and reactive to light. Cardiovascular:      Rate and Rhythm: Normal rate and regular rhythm. Pulses: Normal pulses. Heart sounds: Normal heart sounds. Pulmonary:      Effort: Pulmonary effort is normal.      Breath sounds: Normal breath sounds. No decreased breath sounds, wheezing or rales. Chest:      Chest wall: No tenderness. Abdominal:      Palpations: Abdomen is soft. Tenderness: There is no abdominal tenderness. Musculoskeletal:      Cervical back: Neck supple. Right lower leg: No tenderness. No edema. Left lower leg: No tenderness. No edema. Skin:     General: Skin is warm. Capillary Refill: Capillary refill takes less than 2 seconds. Neurological:      General: No focal deficit present. Mental Status: He is alert and oriented to person, place, and time.    Psychiatric:         Mood and Affect: Mood normal.       DIFFERENTIAL  DIAGNOSIS     PLAN (LABS / IMAGING / EKG):  Orders Placed This Encounter   Procedures    XR CHEST PORTABLE    CBC with Auto Differential    Basic Metabolic Panel    Troponin    Brain Natriuretic Peptide    D-Dimer, Quantitative    EKG 12 Lead       MEDICATIONS ORDERED:  Orders Placed This Encounter   Medications    ibuprofen (ADVIL;MOTRIN) tablet 800 mg    ibuprofen (IBU) 600 MG tablet     Sig: Take 1 tablet by mouth every 6 hours as needed for Pain     Dispense:  28 tablet     Refill:  0       DDX: Pneumonia, superimposed bacterial pneumonia, influenza, Covid, ACS, costochondritis, pericarditis    DIAGNOSTIC RESULTS / EMERGENCY DEPARTMENT COURSE / MDM   LAB RESULTS:  Results for orders placed or performed during the hospital encounter of 03/28/22   CBC with Auto Differential   Result Value Ref Range    WBC 6.6 3.5 - 11.0 k/uL    RBC 4.91 4.5 - 5.9 m/uL    Hemoglobin 14.8 13.5 - 17.5 g/dL    Hematocrit 44.1 41 - 53 %    MCV 89.9 80 - 100 fL    MCH 30.1 26 - 34 pg    MCHC 33.4 31 - 37 g/dL    RDW 14.2 11.5 - 14.9 %    Platelets 390 004 - 368 k/uL    MPV 8.5 6.0 - 12.0 fL    Seg Neutrophils 63 36 - 66 %    Lymphocytes 26 24 - 44 %    Monocytes 9 (H) 1 - 7 %    Eosinophils % 1 0 - 4 %    Basophils 1 0 - 2 %    Segs Absolute 4.20 1.3 - 9.1 k/uL    Absolute Lymph # 1.70 1.0 - 4.8 k/uL    Absolute Mono # 0.60 0.1 - 1.3 k/uL    Absolute Eos # 0.10 0.0 - 0.4 k/uL    Basophils Absolute 0.00 0.0 - 0.2 k/uL   Basic Metabolic Panel   Result Value Ref Range    Glucose 105 (H) 70 - 99 mg/dL    BUN 14 6 - 20 mg/dL    CREATININE 0.97 0.70 - 1.20 mg/dL    Calcium 8.5 (L) 8.6 - 10.4 mg/dL    Sodium 140 135 - 144 mmol/L    Potassium 4.1 3.7 - 5.3 mmol/L    Chloride 108 (H) 98 - 107 mmol/L    CO2 22 20 - 31 mmol/L    Anion Gap 10 9 - 17 mmol/L    GFR Non-African American >60 >60 mL/min    GFR African American >60 >60 mL/min    GFR Comment         Troponin   Result Value Ref Range    Troponin, High Sensitivity 8 0 - 22 ng/L   Troponin   Result Value Ref Range    Troponin, High Sensitivity 7 0 - 22 ng/L   Brain Natriuretic Peptide   Result Value Ref Range    Pro- (H) <300 pg/mL   D-Dimer, Quantitative   Result Value Ref Range    D-Dimer, Quant <0.27 0.00 - 0.59 mg/L FEU       IMPRESSION: 77-year-old gentleman presents to the emergency department with 5-day history of left anterior chest pain and shortness of breath, after recovering from a flulike illness. Patient states that his symptoms started with his flulike illness, however has persisted while the other symptoms subsided. Vital signs stable, afebrile. Physical exam grossly unremarkable, breath sounds clear bilaterally, no pericardial rub appreciated. Motrin given. Labs stay unremarkable. Discussed with patient with regards to follow-up with primary care doctor and for return precautions.   Patient verbalized agreement and understanding. Stable for discharge. RADIOLOGY:  See radiology report    EMERGENCY DEPARTMENT COURSE:  ED Course as of 03/28/22 1922   Mon Mar 28, 2022   1705 CXR neg [EM]      ED Course User Index  [EM] Alissa Russell MD        PROCEDURES:  None    CONSULTS:  None    FINAL IMPRESSION      1. Chest pain, unspecified type    2.  Shortness of breath          DISPOSITION / PLAN     DISPOSITION        PATIENT REFERRED TO:  Ruthie Ruggiero, [address]  [facility] [address] [id number] [phone]    Schedule an appointment as soon as possible for a visit   For follow up    [facility] ED  [facility] [address] [phone]  Go to   As needed      DISCHARGE MEDICATIONS:  New Prescriptions    IBUPROFEN (IBU) 600 MG TABLET    Take 1 tablet by mouth every 6 hours as needed for Pain       Alissa Russell MD  Emergency Medicine Resident    (Please note that portions of thisnote were completed with a voice recognition program.  Efforts were made to edit the dictations but occasionally words are mis-transcribed.)        Alissa Russell MD  Resident  03/28/22 4395

## 2022-03-29 LAB
EKG ATRIAL RATE: 288 BPM
EKG Q-T INTERVAL: 354 MS
EKG QRS DURATION: 88 MS
EKG QTC CALCULATION (BAZETT): 451 MS
EKG R AXIS: 61 DEGREES
EKG T AXIS: 75 DEGREES
EKG VENTRICULAR RATE: 98 BPM

## 2022-03-29 PROCEDURE — 93010 ELECTROCARDIOGRAM REPORT: CPT | Performed by: INTERNAL MEDICINE

## 2022-04-05 ENCOUNTER — TELEPHONE (OUTPATIENT)
Dept: INTERNAL MEDICINE CLINIC | Age: 57
End: 2022-04-05

## 2022-04-05 NOTE — TELEPHONE ENCOUNTER
Patients significant other calling to inform that has boil has reoccurred and would like to know if Dr Radha Robbins can prescribe and antibiotic, please advise.     [facility] / [place]

## 2022-04-06 ENCOUNTER — HOSPITAL ENCOUNTER (EMERGENCY)
Age: 57
Discharge: HOME | End: 2022-04-06
Attending: EMERGENCY MEDICINE
Payer: MEDICAID

## 2022-04-06 VITALS
SYSTOLIC BLOOD PRESSURE: 112 MMHG | HEART RATE: 99 BPM | RESPIRATION RATE: 18 BRPM | TEMPERATURE: 97.7 F | WEIGHT: 146 LBS | DIASTOLIC BLOOD PRESSURE: 59 MMHG | BODY MASS INDEX: 22.87 KG/M2 | OXYGEN SATURATION: 97 %

## 2022-04-06 DIAGNOSIS — L02.91 ABSCESS: Primary | ICD-10-CM

## 2022-04-06 PROCEDURE — 99283 EMERGENCY DEPT VISIT LOW MDM: CPT

## 2022-04-06 RX ORDER — CEPHALEXIN 500 MG/1
500 CAPSULE ORAL 4 TIMES DAILY
Qty: 28 CAPSULE | Refills: 0 | Status: SHIPPED | OUTPATIENT
Start: 2022-04-06 | End: 2022-04-13

## 2022-04-06 RX ORDER — SULFAMETHOXAZOLE AND TRIMETHOPRIM 800; 160 MG/1; MG/1
1 TABLET ORAL 2 TIMES DAILY
Qty: 14 TABLET | Refills: 0 | Status: SHIPPED | OUTPATIENT
Start: 2022-04-06 | End: 2022-04-13

## 2022-04-06 ASSESSMENT — PAIN DESCRIPTION - FREQUENCY: FREQUENCY: CONTINUOUS

## 2022-04-06 ASSESSMENT — PAIN DESCRIPTION - LOCATION: LOCATION: BUTTOCKS

## 2022-04-06 ASSESSMENT — PAIN - FUNCTIONAL ASSESSMENT: PAIN_FUNCTIONAL_ASSESSMENT: 0-10

## 2022-04-06 ASSESSMENT — PAIN SCALES - GENERAL: PAINLEVEL_OUTOF10: 5

## 2022-04-06 ASSESSMENT — PAIN DESCRIPTION - DESCRIPTORS: DESCRIPTORS: DISCOMFORT

## 2022-04-06 ASSESSMENT — PAIN DESCRIPTION - ORIENTATION: ORIENTATION: RIGHT

## 2022-04-06 NOTE — ED PROVIDER NOTES
EMERGENCY DEPARTMENT ENCOUNTER    Pt Name: Lemus Jemma  MRN: [id number]  [place] 1965  Date of evaluation: 4/6/22  CHIEF COMPLAINT       Chief Complaint   Patient presents with    Abscess     HISTORY OF PRESENT ILLNESS     Abscess  Abscess location: right buttock. Abscess quality: induration, painful and redness    Abscess quality: not draining (small) and no fluctuance    Red streaking: no    Duration:  3 days  Progression:  Worsening  Pain details:     Quality:  Dull    Severity:  Moderate    Duration:  3 days    Timing:  Constant    Progression:  Worsening  Chronicity:  Recurrent (gets boils, go away with abx)  Context comment:  Drives machinery for work, bouncing a lot in seat  Relieved by:  Nothing  Worsened by:  Nothing  Associated symptoms: no fever      Took antibiotics for one a month ago, went away, now came back again  On eliquis for afib and aortic valve, took it this morning      REVIEW OF SYSTEMS     Review of Systems   Constitutional: Negative for fever. All other systems reviewed and are negative.     PASTMEDICAL HISTORY     Past Medical History:   Diagnosis Date    Abnormal echocardiogram 02/2018    Arrhythmia     AS (aortic stenosis)     Atrial fibrillation (HCC)     Benign prostatic hyperplasia     Biatrial enlargement     Central perforation of tympanic membrane, right     Chest pain     Chews tobacco     CHF (congestive heart failure), NYHA class I, acute on chronic, combined (HCC)     Chronic back pain     Conductive hearing loss of right ear     COPD (chronic obstructive pulmonary disease) (HCC)     Diverticulitis     Fatigue     History of cocaine abuse ([address] 01/2018    Household contact with history of methicillin resistant Staphylococcus aureus (MRSA) infection     Lung nodule     Mass of upper lobe of left lung     Mild concentric left ventricular hypertrophy     Murmur, cardiac     Near syncope     Nonrheumatic aortic (valve) stenosis     Palpitations  Panlobular emphysema ([facility])     Pneumonia of both upper lobes due to infectious organism     Right-sided tinnitus     Severe aortic stenosis     Shortness of breath     Tobacco abuse      Past Problem List  Patient Active Problem List   Diagnosis Code    Back pain M54.9    Tobacco abuse Z72.0    Thrush, oral B37.0    Fatigue R53.83    Left foot burn T25.022A    Cellulitis L03.90    Cellulitis of buttock L03.317    Gluteal abscess L02.31    Aortic valve stenosis I35.0    Shortness of breath R06.02    CHF (congestive heart failure), NYHA class I, acute on chronic, combined ([facility]) I50.43    Pneumonia of both upper lobes due to infectious organism J18.9    Crack cocaine use F14.90    Atrial fibrillation with rapid ventricular response ([facility]) I48.91    Chest pain R07.9    COPD (chronic obstructive pulmonary disease) ([facility]) J44.9    Cocaine abuse ([facility] [address] F14.10    Abnormal echocardiogram R93.1    Biatrial enlargement I51.7    Central perforation of tympanic membrane, right H72.01    Chews tobacco Z72.0    Conductive hearing loss of right ear H90.11    History of cocaine abuse ([facility]) F14.11    Mass of upper lobe of left lung R91.8    Mild concentric left ventricular hypertrophy (LVH) I51.7    MRSA (methicillin resistant Staphylococcus aureus) infection A49.02    Near syncope R55    NICM (nonischemic cardiomyopathy) ([facility]) I42.8    Normal coronary arteries [id number]    Panlobular emphysema ([facility]) J43.1    Right-sided tinnitus H93.11    S/p TAVR (transcatheter aortic valve replacement), bioprosthetic Z95.3    Numbness and tingling of left upper extremity R20.0, R20.2    Unstable angina ([facility]) I20.0    Acute coronary syndrome ([facility]) I24.9    Bronchitis, acute J20.9    Urinary retention R33.9     SURGICAL HISTORY       Past Surgical History:   Procedure Laterality Date    AORTIC VALVE REPLACEMENT  12/2018    CYST REMOVAL Left     INGUINAL HERNIA REPAIR Right     KS DRAIN SKIN ABSCESS COMPLIC N/A 7/99/4388    GLUTEAL INCISION AND DRAINAGE performed by Macarena Ross DO at [address] [place] [address]       Previous Medications    ACETAMINOPHEN (TYLENOL) 500 MG TABLET    Take 2 tablets by mouth 3 times daily as needed for Pain    ALBUTEROL SULFATE  (90 BASE) MCG/ACT INHALER    Inhale 2 puffs into the lungs every 6 hours as needed for Shortness of Breath    APIXABAN (ELIQUIS) 5 MG TABS TABLET    Take 1 tablet by mouth 2 times daily    ASPIRIN 81 MG CHEWABLE TABLET    Take 1 tablet by mouth daily    ATORVASTATIN (LIPITOR) 10 MG TABLET    Take 1 tablet by mouth nightly    DILTIAZEM (CARDIZEM CD) 120 MG EXTENDED RELEASE CAPSULE    Take 1 capsule by mouth daily    FLUTICASONE (FLONASE) 50 MCG/ACT NASAL SPRAY    1 spray by Each Nostril route daily    GUAIFENESIN (MUCINEX) 600 MG EXTENDED RELEASE TABLET    Take 1 tablet by mouth 2 times daily    IBUPROFEN (IBU) 600 MG TABLET    Take 1 tablet by mouth every 6 hours as needed for Pain    LORATADINE (CLARITIN) 10 MG TABLET    Take 1 tablet by mouth daily    TAMSULOSIN (FLOMAX) 0.4 MG CAPSULE    Take 1 capsule by mouth daily    TIOTROPIUM (SPIRIVA HANDIHALER) 18 MCG INHALATION CAPSULE    Inhale 1 capsule into the lungs daily     ALLERGIES     has No Known Allergies. FAMILY HISTORY     He indicated that the status of his mother is unknown. He indicated that the status of his father is unknown. He indicated that the status of his paternal grandfather is unknown.      SOCIAL HISTORY       Social History     Tobacco Use    Smoking status: Former Smoker     Packs/day: 0.25     Years: 40.00     Pack years: 10.00    Smokeless tobacco: Current User     Types: Chew    Tobacco comment: pt  previously smoked 1.5 ppd;   Vaping Use    Vaping Use: Never used   Substance Use Topics    Alcohol use: Not Currently    Drug use: Not Currently     Types: Cocaine     Comment: drug abuse includes crack cocaine; last used 5 months ago 11/21     PHYSICAL EXAM INITIAL VITALS: BP (!) 112/59   Pulse 99   Resp 18   Wt 146 lb (66.2 kg)   SpO2 97%   BMI 22.87 kg/m²    Physical Exam  Constitutional:       General: He is not in acute distress. Appearance: Normal appearance. He is well-developed. He is not diaphoretic. HENT:      Head: Normocephalic and atraumatic. Right Ear: External ear normal.      Left Ear: External ear normal.      Nose: Nose normal. No congestion. Mouth/Throat:      Mouth: Mucous membranes are moist.      Pharynx: Oropharynx is clear. Eyes:      General:         Right eye: No discharge. Left eye: No discharge. Conjunctiva/sclera: Conjunctivae normal.      Pupils: Pupils are equal, round, and reactive to light. Neck:      Trachea: No tracheal deviation. Cardiovascular:      Rate and Rhythm: Normal rate and regular rhythm. Pulses: Normal pulses. Heart sounds: Normal heart sounds. Pulmonary:      Effort: Pulmonary effort is normal. No respiratory distress. Breath sounds: Normal breath sounds. No stridor. No wheezing or rales. Abdominal:      Palpations: Abdomen is soft. Tenderness: There is no abdominal tenderness. There is no guarding or rebound. Musculoskeletal:         General: No tenderness or deformity. Normal range of motion. Cervical back: Normal range of motion and neck supple. Skin:     General: Skin is warm and dry. Capillary Refill: Capillary refill takes less than 2 seconds. Findings: No erythema or rash. Comments: Small 2 cm indurated area right gluteus, not fluctuant, mild tenderness and erythema, no perirectal involvement   Neurological:      General: No focal deficit present. Mental Status: He is alert and oriented to person, place, and time. Coordination: Coordination normal.   Psychiatric:         Mood and Affect: Mood normal.         Behavior: Behavior normal.         Thought Content:  Thought content normal.         Judgment: Judgment normal. MEDICAL DECISION MAKING:   dw pt RBAT, risk of bleeding with I and D since he is on eliquis, this is a small area of induration, similar to boils in past that improved with abx, shared decision making, he chooses antibiotics and surgical follow up Dr Lige Cogan in 3 days for wound check, return for worsening of symptoms  Discussed with patient anticipatory guidance, discharge instructions, follow up Dr Lige Cogan in 3 days           Procedures    DIAGNOSTIC RESULTS       EMERGENCY DEPARTMENTCOURSE:         Vitals:    Vitals:    04/06/22 1124   BP: (!) 112/59   Pulse: 99   Resp: 18   TempSrc: Temporal   SpO2: 97%   Weight: 146 lb (66.2 kg)       The patient was given the following medications while in the emergency department:  Orders Placed This Encounter   Medications    sulfamethoxazole-trimethoprim (BACTRIM DS) 800-160 MG per tablet     Sig: Take 1 tablet by mouth 2 times daily for 7 days     Dispense:  14 tablet     Refill:  0    cephALEXin (KEFLEX) 500 MG capsule     Sig: Take 1 capsule by mouth 4 times daily for 7 days     Dispense:  28 capsule     Refill:  0       FINAL IMPRESSION      1. Abscess          DISPOSITION/PLAN   DISPOSITION Decision To Discharge 04/06/2022 11:54:53 AM      PATIENT REFERRED TO:  Marysol Palafox MD  [address] [place] [address]  [phone]    Schedule an appointment as soon as possible for a visit in 3 days      DISCHARGE MEDICATIONS:  New Prescriptions    CEPHALEXIN (KEFLEX) 500 MG CAPSULE    Take 1 capsule by mouth 4 times daily for 7 days    SULFAMETHOXAZOLE-TRIMETHOPRIM (BACTRIM DS) 800-160 MG PER TABLET    Take 1 tablet by mouth 2 times daily for 7 days     The care is provided during an unprecedented national emergency due to the novel coronavirus, COVID 19.   MD Tal Cherry MD  04/06/22 1200

## 2022-04-08 ENCOUNTER — OFFICE VISIT (OUTPATIENT)
Dept: INTERNAL MEDICINE CLINIC | Age: 57
End: 2022-04-08
Payer: MEDICAID

## 2022-04-08 VITALS
HEART RATE: 98 BPM | WEIGHT: 151 LBS | DIASTOLIC BLOOD PRESSURE: 64 MMHG | OXYGEN SATURATION: 98 % | BODY MASS INDEX: 23.7 KG/M2 | SYSTOLIC BLOOD PRESSURE: 98 MMHG | HEIGHT: 67 IN

## 2022-04-08 DIAGNOSIS — I42.8 NICM (NONISCHEMIC CARDIOMYOPATHY) (HCC): ICD-10-CM

## 2022-04-08 DIAGNOSIS — I24.9 ACUTE CORONARY SYNDROME (HCC): ICD-10-CM

## 2022-04-08 DIAGNOSIS — I50.43 CHF (CONGESTIVE HEART FAILURE), NYHA CLASS I, ACUTE ON CHRONIC, COMBINED (HCC): Primary | ICD-10-CM

## 2022-04-08 DIAGNOSIS — I48.91 ATRIAL FIBRILLATION WITH RAPID VENTRICULAR RESPONSE (HCC): ICD-10-CM

## 2022-04-08 DIAGNOSIS — J44.9 CHRONIC OBSTRUCTIVE PULMONARY DISEASE, UNSPECIFIED COPD TYPE (HCC): ICD-10-CM

## 2022-04-08 DIAGNOSIS — I20.0 UNSTABLE ANGINA (HCC): ICD-10-CM

## 2022-04-08 PROCEDURE — 3023F SPIROM DOC REV: CPT | Performed by: INTERNAL MEDICINE

## 2022-04-08 PROCEDURE — 3017F COLORECTAL CA SCREEN DOC REV: CPT | Performed by: INTERNAL MEDICINE

## 2022-04-08 PROCEDURE — 4004F PT TOBACCO SCREEN RCVD TLK: CPT | Performed by: INTERNAL MEDICINE

## 2022-04-08 PROCEDURE — G8427 DOCREV CUR MEDS BY ELIG CLIN: HCPCS | Performed by: INTERNAL MEDICINE

## 2022-04-08 PROCEDURE — G8420 CALC BMI NORM PARAMETERS: HCPCS | Performed by: INTERNAL MEDICINE

## 2022-04-08 PROCEDURE — 99213 OFFICE O/P EST LOW 20 MIN: CPT | Performed by: INTERNAL MEDICINE

## 2022-04-08 NOTE — PROGRESS NOTES
Subjective:      Patient ID: Zenaida Scott is a 62 y.o. male. HPI       HPI   1) Location/Symptom - has Sores in Right buttock area   2) Timing/Onset: 1 week   3) Context/Setting: has H/o Multiple buttock sores   4) Quality:   5) Duration: continuous   6) Modifying Factors: Started on Bactrim and Keflex , has appointment with Dr Rhea Stahl on 4/20   7) Severity: moderate   Has AF , on AC   Has COPD, which is improved   Has H/o Bioprosthetic Aortic Valve , with EF of 45% , Severe AR   Cutting down smoking on Nicotine patch     Review of Systems   Constitutional: Negative for activity change, appetite change, chills and diaphoresis. HENT: Negative for congestion, dental problem, ear discharge, facial swelling and hearing loss. Respiratory: Negative for apnea, cough, chest tightness, shortness of breath and wheezing. Cardiovascular: Negative for chest pain and leg swelling. Gastrointestinal: Negative for abdominal distention, abdominal pain, constipation and diarrhea. Genitourinary: Negative for difficulty urinating, dysuria, enuresis, flank pain and frequency. Musculoskeletal: Negative for arthralgias, back pain, gait problem and joint swelling. Pain, boil, of right buttock area   Skin: Negative for color change, pallor and rash. Neurological: Negative for dizziness, seizures, facial asymmetry, light-headedness, numbness and headaches. Psychiatric/Behavioral: Negative for agitation, behavioral problems, confusion, decreased concentration and dysphoric mood. Objective:   Physical Exam  Vitals and nursing note reviewed. Constitutional:       General: He is not in acute distress. Appearance: He is well-developed. He is not diaphoretic. HENT:      Head: Normocephalic and atraumatic. Mouth/Throat:      Pharynx: No oropharyngeal exudate. Eyes:      General: No scleral icterus. Right eye: No discharge. Left eye: No discharge.       Conjunctiva/sclera: Conjunctivae normal.      Pupils: Pupils are equal, round, and reactive to light. Neck:      Thyroid: No thyromegaly. Vascular: No JVD. Trachea: No tracheal deviation. Cardiovascular:      Rate and Rhythm: Normal rate. Heart sounds: Normal heart sounds. No murmur heard. No gallop. Pulmonary:      Effort: Pulmonary effort is normal. No respiratory distress. Breath sounds: Normal breath sounds. No stridor. No wheezing or rales. Abdominal:      General: Bowel sounds are normal. There is no distension. Palpations: Abdomen is soft. Tenderness: There is no abdominal tenderness. There is no guarding or rebound. Musculoskeletal:         General: No tenderness. Normal range of motion. Cervical back: Normal range of motion and neck supple. Comments: 4x4 Cm Boil in Right Buttock, Tender    Skin:     General: Skin is warm and dry. Findings: No erythema or rash. Neurological:      Mental Status: He is alert and oriented to person, place, and time. Assessment / Plan:   1. CHF (congestive heart failure), NYHA class I, acute on chronic, combined ([address]  Compensated     2. Atrial fibrillation with rapid ventricular response (HCC)  Rate Controlled on AC     3. NICM (nonischemic cardiomyopathy) ([address]    4. Unstable angina ([address]      5. Acute coronary syndrome (HCC)  Stable     6. Chronic obstructive pulmonary disease, unspecified COPD type ([address]  Compensated    Patient has boil in the buttock, advised to continue with the antibiotics  Advised to please, keep appointment with Surgery   Patient is advised, he will need cardiology clearance before surgery  Return in about 3 months (around 7/8/2022). · Reviewed prior labs and health maintenance. · Discussed use, benefit, and side effects of prescribed medications. Barriers to medication compliance addressed. All patient questions answered. Pt voiced understanding.          MD YUNIOR [facility]  4/10/2022, 5:41 PM    Please note that this chart was generated using voice recognition Dragon dictation software. Although every effort was made to ensure the accuracy of this automated transcription, some errors in transcription may have occurred. Visit Information    Have you changed or started any medications since your last visit including any over-the-counter medicines, vitamins, or herbal medicines? no   Are you having any side effects from any of your medications? -  no  Have you stopped taking any of your medications? Is so, why? -  no    Have you seen any other physician or provider since your last visit? No  Have you had any other diagnostic tests since your last visit? No  Have you been seen in the emergency room and/or had an admission to a hospital since we last saw you? No  Have you had your routine dental cleaning in the past 6 months? no    Have you activated your [employer] account? If not, what are your barriers?  No:      Patient Care Team:  Jos Ferrera MD as PCP - General (Internal Medicine)  Jos Ferrera MD as PCP - [facility]  Salma Cash MD as Consulting Physician (Pulmonary Disease)    Medical History Review  Past Medical, Family, and Social History reviewed and does contribute to the patient presenting condition    Health Maintenance   Topic Date Due    Hepatitis C screen  Never done    COVID-19 Vaccine (1) Never done    Colorectal Cancer Screen  Never done    Shingles Vaccine (1 of 2) Never done    Depression Screen  04/12/2022    Flu vaccine (Season Ended) 09/01/2022    Lipid screen  11/12/2022    DTaP/Tdap/Td vaccine (3 - Td or Tdap) 09/18/2025    Pneumococcal 0-64 years Vaccine (2 of 2 - PPSV23) 02/13/2030    HIV screen  Completed    Hepatitis A vaccine  Aged Out    Hepatitis B vaccine  Aged Out    Hib vaccine  Aged Out    Meningococcal (ACWY) vaccine  Aged Out

## 2022-04-10 ASSESSMENT — ENCOUNTER SYMPTOMS
WHEEZING: 0
SHORTNESS OF BREATH: 0
BACK PAIN: 0
ABDOMINAL PAIN: 0
CONSTIPATION: 0
APNEA: 0
COUGH: 0
ABDOMINAL DISTENTION: 0
CHEST TIGHTNESS: 0
COLOR CHANGE: 0
DIARRHEA: 0
FACIAL SWELLING: 0

## 2022-04-26 ENCOUNTER — HOSPITAL ENCOUNTER (EMERGENCY)
Age: 57
Discharge: HOME | End: 2022-04-26
Attending: EMERGENCY MEDICINE
Payer: MEDICAID

## 2022-04-26 VITALS
TEMPERATURE: 97.6 F | DIASTOLIC BLOOD PRESSURE: 101 MMHG | OXYGEN SATURATION: 99 % | HEART RATE: 106 BPM | SYSTOLIC BLOOD PRESSURE: 137 MMHG | RESPIRATION RATE: 16 BRPM

## 2022-04-26 DIAGNOSIS — K04.7 DENTAL INFECTION: Primary | ICD-10-CM

## 2022-04-26 PROCEDURE — 99283 EMERGENCY DEPT VISIT LOW MDM: CPT

## 2022-04-26 PROCEDURE — 6370000000 HC RX 637 (ALT 250 FOR IP): Performed by: PHYSICIAN ASSISTANT

## 2022-04-26 RX ORDER — PENICILLIN V POTASSIUM 250 MG/1
500 TABLET ORAL ONCE
Status: COMPLETED | OUTPATIENT
Start: 2022-04-26 | End: 2022-04-26

## 2022-04-26 RX ORDER — PENICILLIN V POTASSIUM 500 MG/1
500 TABLET ORAL 4 TIMES DAILY
Qty: 40 TABLET | Refills: 0 | Status: SHIPPED | OUTPATIENT
Start: 2022-04-26 | End: 2022-05-06

## 2022-04-26 RX ADMIN — PENICILLIN V POTASSIUM 500 MG: 250 TABLET, FILM COATED ORAL at 14:19

## 2022-04-26 ASSESSMENT — ENCOUNTER SYMPTOMS
VOICE CHANGE: 0
TROUBLE SWALLOWING: 0
SHORTNESS OF BREATH: 0
FACIAL SWELLING: 0
STRIDOR: 0

## 2022-04-26 ASSESSMENT — PAIN SCALES - GENERAL: PAINLEVEL_OUTOF10: 8

## 2022-04-26 ASSESSMENT — PAIN - FUNCTIONAL ASSESSMENT: PAIN_FUNCTIONAL_ASSESSMENT: 0-10

## 2022-04-26 NOTE — ED PROVIDER NOTES
EMERGENCY DEPARTMENT ENCOUNTER    Pt Name: Jon Jarvis  MRN: [id number]  [place] 1965  Date of evaluation: 4/26/22  CHIEF COMPLAINT       Chief Complaint   Patient presents with    Dental Problem     HISTORY OF PRESENT ILLNESS   The history is provided by the patient. He is accompanied by his mother today. Right upper wisdom tooth pain x 3 days. No fever that he knows of but he did wake up sweating 2 nights ago. Taking ibuprofen for pain, but the pain is not getting better. Called his dentist, they offered him an appointment in 2 weeks. He states that he has an artificial aortic valve and is concerned about a tooth infection spreading to his heart. Denies any difficulty swallowing. No drooling. REVIEW OF SYSTEMS     Review of Systems   Constitutional: Positive for diaphoresis. Negative for activity change, appetite change, chills, fatigue and fever. HENT: Positive for dental problem. Negative for drooling, facial swelling, trouble swallowing and voice change. Respiratory: Negative for shortness of breath and stridor. Cardiovascular: Negative for chest pain. Neurological: Negative for dizziness, speech difficulty, light-headedness and headaches. Psychiatric/Behavioral: Negative for agitation and confusion.      PASTMEDICAL HISTORY     Past Medical History:   Diagnosis Date    Abnormal echocardiogram 02/2018    Arrhythmia     AS (aortic stenosis)     Atrial fibrillation (HCC)     Benign prostatic hyperplasia     Biatrial enlargement     Central perforation of tympanic membrane, right     Chest pain     Chews tobacco     CHF (congestive heart failure), NYHA class I, acute on chronic, combined (HCC)     Chronic back pain     Conductive hearing loss of right ear     COPD (chronic obstructive pulmonary disease) (HCC)     Diverticulitis     Fatigue     History of cocaine abuse ([address] 01/2018    Household contact with history of methicillin resistant Staphylococcus aureus (MRSA) infection     Lung nodule     Mass of upper lobe of left lung     Mild concentric left ventricular hypertrophy     Murmur, cardiac     Near syncope     Nonrheumatic aortic (valve) stenosis     Palpitations     Panlobular emphysema ([facility])     Pneumonia of both upper lobes due to infectious organism     Right-sided tinnitus     Severe aortic stenosis     Shortness of breath     Tobacco abuse      Past Problem List  Patient Active Problem List   Diagnosis Code    Back pain M54.9    Tobacco abuse Z72.0    Thrush, oral B37.0    Fatigue R53.83    Left foot burn T25.022A    Cellulitis L03.90    Cellulitis of buttock L03.317    Gluteal abscess L02.31    Aortic valve stenosis I35.0    Shortness of breath R06.02    CHF (congestive heart failure), NYHA class I, acute on chronic, combined ([facility]) I50.43    Pneumonia of both upper lobes due to infectious organism J18.9    Crack cocaine use F14.90    Atrial fibrillation with rapid ventricular response ([facility]) I48.91    Chest pain R07.9    COPD (chronic obstructive pulmonary disease) ([facility]) J44.9    Cocaine abuse ([facility]) F14.10    Abnormal echocardiogram R93.1    Biatrial enlargement I51.7    Central perforation of tympanic membrane, right H72.01    Chews tobacco Z72.0    Conductive hearing loss of right ear H90.11    History of cocaine abuse ([facility]) F14.11    Mass of upper lobe of left lung R91.8    Mild concentric left ventricular hypertrophy (LVH) I51.7    MRSA (methicillin resistant Staphylococcus aureus) infection A49.02    Near syncope R55    NICM (nonischemic cardiomyopathy) ([facility]) I42.8    Normal coronary arteries [id number]    Panlobular emphysema ([facility]) J43.1    Right-sided tinnitus H93.11    S/p TAVR (transcatheter aortic valve replacement), bioprosthetic Z95.3    Numbness and tingling of left upper extremity R20.0, R20.2    Unstable angina ([facility]) I20.0    Acute coronary syndrome ([facility]) I24.9    Bronchitis, acute J20.9    Urinary retention R33.9     SURGICAL HISTORY       Past Surgical History:   Procedure Laterality Date    AORTIC VALVE REPLACEMENT  12/2018    CYST REMOVAL Left     INGUINAL HERNIA REPAIR Right     NV DRAIN SKIN ABSCESS COMPLIC N/A 1/04/9514    GLUTEAL INCISION AND DRAINAGE performed by Dawna Teresa DO at [address] [place] [address]       Discharge Medication List as of 4/26/2022  2:44 PM      CONTINUE these medications which have NOT CHANGED    Details   ibuprofen (IBU) 600 MG tablet Take 1 tablet by mouth every 6 hours as needed for Pain, Disp-28 tablet, R-0Print      apixaban (ELIQUIS) 5 MG TABS tablet Take 1 tablet by mouth 2 times daily, Disp-60 tablet, R-3Normal      albuterol sulfate  (90 Base) MCG/ACT inhaler Inhale 2 puffs into the lungs every 6 hours as needed for Shortness of Breath, Disp-1 each, R-1Normal      tamsulosin (FLOMAX) 0.4 MG capsule Take 1 capsule by mouth daily, Disp-30 capsule, R-0Normal      dilTIAZem (CARDIZEM CD) 120 MG extended release capsule Take 1 capsule by mouth daily, Disp-30 capsule, R-3Normal      guaiFENesin (MUCINEX) 600 MG extended release tablet Take 1 tablet by mouth 2 times daily, Disp-20 tablet, R-0Normal      atorvastatin (LIPITOR) 10 MG tablet Take 1 tablet by mouth nightly, Disp-30 tablet, R-3Normal      tiotropium (SPIRIVA HANDIHALER) 18 MCG inhalation capsule Inhale 1 capsule into the lungs daily, Disp-90 capsule, R-1Normal      fluticasone (FLONASE) 50 MCG/ACT nasal spray 1 spray by Each Nostril route daily, Disp-2 Bottle, R-1Normal      acetaminophen (TYLENOL) 500 MG tablet Take 2 tablets by mouth 3 times daily as needed for Pain, Disp-200 tablet, R-1Normal      loratadine (CLARITIN) 10 MG tablet Take 1 tablet by mouth daily, Disp-10 tablet, R-0Normal      aspirin 81 MG chewable tablet Take 1 tablet by mouth daily, Disp-30 tablet, R-3Print           ALLERGIES     has No Known Allergies.   FAMILY HISTORY     He indicated that the status of his mother is unknown. He indicated that the status of his father is unknown. He indicated that the status of his paternal grandfather is unknown. SOCIAL HISTORY       Social History     Tobacco Use    Smoking status: Former Smoker     Packs/day: 0.25     Years: 40.00     Pack years: 10.00     Quit date: 2022     Years since quittin.3    Smokeless tobacco: Current User     Types: Chew    Tobacco comment: pt  previously smoked 1.5 ppd;   Vaping Use    Vaping Use: Never used   Substance Use Topics    Alcohol use: Not Currently    Drug use: Not Currently     Types: Cocaine     Comment: drug abuse includes crack cocaine; last used 5 months ago      PHYSICAL EXAM     INITIAL VITALS: BP (!) 137/101   Pulse 106   Temp 97.6 °F (36.4 °C)   Resp 16   SpO2 99%    Physical Exam  Constitutional:       General: He is not in acute distress. Appearance: Normal appearance. He is not ill-appearing or toxic-appearing. HENT:      Head: Normocephalic and atraumatic. Right Ear: External ear normal.      Left Ear: External ear normal.      Nose: Nose normal. No congestion. Mouth/Throat:      Mouth: Mucous membranes are moist.      Dentition: Dental caries present. No gingival swelling. Tongue: Tongue does not deviate from midline. Pharynx: Oropharynx is clear. Uvula midline. Tonsils: No tonsillar abscesses. Eyes:      Conjunctiva/sclera: Conjunctivae normal.   Cardiovascular:      Rate and Rhythm: Normal rate. Pulmonary:      Effort: Pulmonary effort is normal.      Breath sounds: Normal breath sounds. No wheezing, rhonchi or rales. Musculoskeletal:      Cervical back: Normal range of motion and neck supple. No tenderness. Skin:     General: Skin is warm and dry. Capillary Refill: Capillary refill takes less than 2 seconds. Neurological:      General: No focal deficit present. Mental Status: He is alert and oriented to person, place, and time.    Psychiatric:         Mood and Affect: Mood normal.         Behavior: Behavior normal.         Thought Content: Thought content normal.         Judgment: Judgment normal.         MEDICAL DECISION MAKING:   Patient presents with infected cracked right upper wisdom tooth. No evidence of airway compromise or abscess. Will start abx and refer to dentist ASAP for definitive management. Discussed with patient anticipatory guidance, discharge instructions, follow up dentist.       CRITICAL CARE:       PROCEDURES:    Procedures    DIAGNOSTIC RESULTS   EKG:All EKG's are interpreted by the Emergency Department Physician who either signs or Co-signs this chart in the absence of a cardiologist.        RADIOLOGY:All plain film, CT, MRI, and formal ultrasound images (except ED bedside ultrasound) are read by the radiologist, see reports below, unless otherwisenoted in MDM or here. No orders to display     LABS: All lab results were reviewed by myself, and all abnormals are listed below. Labs Reviewed - No data to display    EMERGENCY DEPARTMENTCOURSE:         Vitals:    Vitals:    04/26/22 1300   BP: (!) 137/101   Pulse: 106   Resp: 16   Temp: 97.6 °F (36.4 °C)   SpO2: 99%       The patient was given the following medications while in the emergency department:  Orders Placed This Encounter   Medications    penicillin v potassium (VEETID) tablet 500 mg     Order Specific Question:   Antimicrobial Indications     Answer:   Head and Neck Infection    penicillin v potassium (VEETID) 500 MG tablet     Sig: Take 1 tablet by mouth 4 times daily for 10 days     Dispense:  40 tablet     Refill:  0     CONSULTS:  None    FINAL IMPRESSION      1.  Dental infection          DISPOSITION/PLAN   DISPOSITION Decision To Discharge 04/26/2022 02:43:19 PM      PATIENT REFERRED TO:  Dentist    Schedule an appointment as soon as possible for a visit       DISCHARGE MEDICATIONS:  Discharge Medication List as of 4/26/2022  2:44 PM      START taking these medications Details   penicillin v potassium (VEETID) 500 MG tablet Take 1 tablet by mouth 4 times daily for 10 days, Disp-40 tablet, R-0Normal           The care is provided during an unprecedented national emergency due to the novel coronavirus, COVID 19.   Brianna Barrientos PA-C, [address], [address]  04/26/22 1958

## 2022-04-26 NOTE — ED TRIAGE NOTES
Patient to emergency department with complaints of dental infection. Pt stets 2 days ago he chipped his right upper wisdom tooth and believes it is infected.  Pt was advised to come to the ED for antibiotics from his dentist since he is unable to get into the office until next Friday

## 2022-04-26 NOTE — ED PROVIDER NOTES
eMERGENCY dEPARTMENT eNCOUnter   Independent Attestation     Pt Name: Sherif Cleary  MRN: [id number]  [place] 1965  Date of evaluation: 4/26/22     Sherif Cleary is a 62 y.o. male with CC: Dental Problem      Based on the medical record the care appears appropriate. I was personally available for consultation in the Emergency Department.   The care is provided during an unprecedented national emergency due to the novel coronavirus, COVID [address], DO  Attending Emergency Physician                    Toro Marcos DO  04/26/22 2923

## 2022-05-30 ENCOUNTER — HOSPITAL ENCOUNTER (EMERGENCY)
Age: 57
Discharge: LEFT BEFORE DISCHARGE | End: 2022-05-30

## 2022-07-08 DIAGNOSIS — J44.9 CHRONIC OBSTRUCTIVE PULMONARY DISEASE, UNSPECIFIED COPD TYPE (HCC): ICD-10-CM

## 2022-08-24 NOTE — PROGRESS NOTES
Please let him know the 24 hour average BP is 133/89  Diastolic BP seems to be higher and above goal     Would like him to increase felodipine again from 5 mg to 10 mg daily  includes Chew. He reports that he does not drink alcohol or use drugs. Family History:   Family History   Problem Relation Age of Onset    Depression Mother        Vitals:  /84   Pulse 94   Temp 97 °F (36.1 °C) (Oral)   Resp 16   Ht 5' 7\" (1.702 m)   Wt 143 lb 4.8 oz (65 kg)   SpO2 100%   BMI 22.44 kg/m²   Temp (24hrs), Av.5 °F (36.4 °C), Min:97 °F (36.1 °C), Max:97.9 °F (36.6 °C)    No results for input(s): POCGLU in the last 72 hours. I/O (24Hr): Intake/Output Summary (Last 24 hours) at 18 1506  Last data filed at 18 0539   Gross per 24 hour   Intake              900 ml   Output                0 ml   Net              900 ml       Labs:    [unfilled]    Lab Results   Component Value Date/Time    SPECIAL NOT REPORTED 02/10/2018 08:45 PM     Lab Results   Component Value Date/Time    CULTURE NO GROWTH 1 DAY 02/10/2018 08:45 PM    CULTURE  02/10/2018 08:45 PM     Performed at [facility], [address] [place] [address] [phone]       [facility]    Radiology:    No results found. Physical Examination:        Physical Exam   Constitutional: He is well-developed, well-nourished, and in no distress. No distress. HENT:   Head: Normocephalic and atraumatic. Cardiovascular: Normal rate. Irregularly irregular rhythm   Pulmonary/Chest: Effort normal and breath sounds normal.   Abdominal: Soft. Bowel sounds are normal.   Skin: He is not diaphoretic. Positive for erythematous lesion on his R buttock region with swelling and getting more indurated and fluctuant than yesterday. Redness and pain improving   Nursing note and vitals reviewed. Assessment:        Primary Problem  Cellulitis    Active Hospital Problems    Diagnosis Date Noted    Cellulitis of buttock [L01.309]     Cellulitis [L03.90] 02/10/2018    Atrial fibrillation (HCC) [I48.91] 2015       Plan:          Right buttock cellulitis with abscess:   On IV vanco, following ID recs  Surgery consulted for I&D of abscess    Chronic a fib without RVR:  Cardio consulted, [id number] of 0, anticoagulation is discussed vs aspirin. Patient needs to follow with his cardiologist for further recommendation regarding AC. Currently, on ASA and DVT ppx    Jovany Pradhan MD  2/12/2018  3:06 PM     I have discussed the care of Rubén Boyer , including pertinent history and exam findings,    today with the resident. I have seen and examined the patient and the key elements of all parts of the encounter have been performed by me . I agree with the assessment, plan and orders as documented by the resident. Principal Problem:    Cellulitis  Active Problems:    Atrial fibrillation (HCC)    Cellulitis of buttock  Resolved Problems:    * No resolved hospital problems.  *       Electronically signed by Wali Phillips MD

## 2022-10-07 ENCOUNTER — HOSPITAL ENCOUNTER (EMERGENCY)
Age: 57
Discharge: HOME | End: 2022-10-07
Attending: EMERGENCY MEDICINE
Payer: MEDICAID

## 2022-10-07 VITALS
DIASTOLIC BLOOD PRESSURE: 70 MMHG | OXYGEN SATURATION: 100 % | RESPIRATION RATE: 16 BRPM | SYSTOLIC BLOOD PRESSURE: 134 MMHG | HEART RATE: 89 BPM | TEMPERATURE: 97.4 F

## 2022-10-07 DIAGNOSIS — S01.512A LACERATION OF ORAL CAVITY, INITIAL ENCOUNTER: Primary | ICD-10-CM

## 2022-10-07 PROCEDURE — 99283 EMERGENCY DEPT VISIT LOW MDM: CPT

## 2022-10-07 RX ORDER — ACETAMINOPHEN 500 MG
1000 TABLET ORAL EVERY 6 HOURS PRN
Qty: 60 TABLET | Refills: 0 | Status: SHIPPED | OUTPATIENT
Start: 2022-10-07

## 2022-10-07 RX ORDER — PENICILLIN V POTASSIUM 500 MG/1
500 TABLET ORAL 4 TIMES DAILY
Qty: 40 TABLET | Refills: 0 | Status: SHIPPED | OUTPATIENT
Start: 2022-10-07 | End: 2022-10-17

## 2022-10-07 NOTE — ED PROVIDER NOTES
EMERGENCY DEPARTMENT ENCOUNTER    Pt Name: Luis Goldberg  MRN: [id number]  [place] 1965  Date of evaluation: 10/7/22  CHIEF COMPLAINT       Chief Complaint   Patient presents with    Laceration     Laceration inside the mouth, 10/1/22     HISTORY OF PRESENT ILLNESS     Mouth Lesions  Location:  Buccal mucosa  Buccal mucosa location:  L buccal mucosa  Description of oral lesions: cut it on tooth last week when using a breaker bar on a nut. Onset quality:  Sudden  Duration:  1 week  Progression:  Partially resolved  Chronicity:  New  Relieved by:  Nothing  Worsened by:  Nothing  Ineffective treatments:  None tried  Cut inside cheeck  1 week  Has artifical valve, wants the cut checked out, healing slowly      REVIEW OF SYSTEMS     Review of Systems   HENT:  Positive for mouth sores. All other systems reviewed and are negative.   PASTMEDICAL HISTORY     Past Medical History:   Diagnosis Date    Abnormal echocardiogram 02/2018    Arrhythmia     AS (aortic stenosis)     Atrial fibrillation (HCC)     Benign prostatic hyperplasia     Biatrial enlargement     Central perforation of tympanic membrane, right     Chest pain     Chews tobacco     CHF (congestive heart failure), NYHA class I, acute on chronic, combined (HCC)     Chronic back pain     Conductive hearing loss of right ear     COPD (chronic obstructive pulmonary disease) ([address]     Diverticulitis     Fatigue     History of cocaine abuse ([facility] 01/2018    Household contact with history of methicillin resistant Staphylococcus aureus (MRSA) infection     Lung nodule     Mass of upper lobe of left lung     Mild concentric left ventricular hypertrophy     Murmur, cardiac     Near syncope     Nonrheumatic aortic (valve) stenosis     Palpitations     Panlobular emphysema (HCC)     Pneumonia of both upper lobes due to infectious organism     Right-sided tinnitus     Severe aortic stenosis     Shortness of breath     Tobacco abuse      Past Problem List  Patient Active Problem List   Diagnosis Code    Back pain M54.9    Tobacco abuse Z72.0    Thrush, oral B37.0    Fatigue R53.83    Left foot burn T25.022A    Cellulitis L03.90    Cellulitis of buttock L03.317    Gluteal abscess L02.31    Aortic valve stenosis I35.0    Shortness of breath R06.02    CHF (congestive heart failure), NYHA class I, acute on chronic, combined ([facility]) I50.43    Pneumonia of both upper lobes due to infectious organism J18.9    Crack cocaine use F14.90    Atrial fibrillation with rapid ventricular response ([facility]) I48.91    Chest pain R07.9    COPD (chronic obstructive pulmonary disease) ([facility]) J44.9    Cocaine abuse ([facility] [address] F14.10    Abnormal echocardiogram R93.1    Biatrial enlargement I51.7    Central perforation of tympanic membrane, right H72.01    Chews tobacco Z72.0    Conductive hearing loss of right ear H90.11    History of cocaine abuse ([facility] [address] F14.11    Mass of upper lobe of left lung R91.8    Mild concentric left ventricular hypertrophy (LVH) I51.7    MRSA (methicillin resistant Staphylococcus aureus) infection A49.02    Near syncope R55    NICM (nonischemic cardiomyopathy) ([facility]) I42.8    Normal coronary arteries [id number]    Panlobular emphysema ([facility]) J43.1    Right-sided tinnitus H93.11    S/p TAVR (transcatheter aortic valve replacement), bioprosthetic Z95.3    Numbness and tingling of left upper extremity R20.0, R20.2    Unstable angina ([facility]) I20.0    Acute coronary syndrome ([facility] [address] I24.9    Bronchitis, acute J20.9    Urinary retention R33.9     SURGICAL HISTORY       Past Surgical History:   Procedure Laterality Date    AORTIC VALVE REPLACEMENT  12/2018    CYST REMOVAL Left     INGUINAL HERNIA REPAIR Right     GA DRAIN SKIN ABSCESS COMPLIC N/A 6/87/8492    GLUTEAL INCISION AND DRAINAGE performed by Zenia Pillai DO at [facility]       Discharge Medication List as of 10/7/2022 10:52 AM        CONTINUE these medications which have NOT CHANGED    Details   PROAIR  (90 Base) MCG/ACT inhaler inhale 2 puffs by mouth and INTO THE LUNGS every 6 hours if needed for shortness of breath, Disp-8.5 g, R-3Normal      apixaban (ELIQUIS) 5 MG TABS tablet Take 1 tablet by mouth 2 times daily, Disp-60 tablet, R-3Normal      tamsulosin (FLOMAX) 0.4 MG capsule Take 1 capsule by mouth daily, Disp-30 capsule, R-0Normal      dilTIAZem (CARDIZEM CD) 120 MG extended release capsule Take 1 capsule by mouth daily, Disp-30 capsule, R-3Normal      guaiFENesin (MUCINEX) 600 MG extended release tablet Take 1 tablet by mouth 2 times daily, Disp-20 tablet, R-0Normal      atorvastatin (LIPITOR) 10 MG tablet Take 1 tablet by mouth nightly, Disp-30 tablet, R-3Normal      tiotropium (SPIRIVA HANDIHALER) 18 MCG inhalation capsule Inhale 1 capsule into the lungs daily, Disp-90 capsule, R-1Normal      fluticasone (FLONASE) 50 MCG/ACT nasal spray 1 spray by Each Nostril route daily, Disp-2 Bottle, R-1Normal      loratadine (CLARITIN) 10 MG tablet Take 1 tablet by mouth daily, Disp-10 tablet, R-0Normal      aspirin 81 MG chewable tablet Take 1 tablet by mouth daily, Disp-30 tablet, R-3Print           ALLERGIES     has No Known Allergies. FAMILY HISTORY     He indicated that the status of his mother is unknown. He indicated that the status of his father is unknown. He indicated that the status of his paternal grandfather is unknown.      SOCIAL HISTORY       Social History     Tobacco Use    Smoking status: Former     Packs/day: 0.25     Years: 40.00     Pack years: 10.00     Types: Cigarettes     Quit date: 2022     Years since quittin.7    Smokeless tobacco: Current     Types: Chew    Tobacco comments:     pt  previously smoked 1.5 ppd;   Vaping Use    Vaping Use: Never used   Substance Use Topics    Alcohol use: Not Currently    Drug use: Not Currently     Types: Cocaine     Comment: drug abuse includes crack cocaine; last used 5 months ago      PHYSICAL EXAM     INITIAL VITALS: /70   Pulse 89   Temp 97.4 °F (36.3 °C) (Temporal)   Resp 16   SpO2 100%    Physical Exam  Constitutional:       General: He is not in acute distress. Appearance: Normal appearance. He is well-developed. He is not diaphoretic. HENT:      Head: Normocephalic and atraumatic. Right Ear: External ear normal.      Left Ear: External ear normal.      Nose: Nose normal. No congestion. Mouth/Throat:      Mouth: Mucous membranes are moist.      Pharynx: Oropharynx is clear. Comments: Left buccal mucosa healing granulating laceration just inside lip line, doesn't involve lip, no dental trauma, no purulence or erythema or edema  Eyes:      General:         Right eye: No discharge. Left eye: No discharge. Conjunctiva/sclera: Conjunctivae normal.      Pupils: Pupils are equal, round, and reactive to light. Neck:      Trachea: No tracheal deviation. Cardiovascular:      Rate and Rhythm: Normal rate and regular rhythm. Pulses: Normal pulses. Heart sounds: Normal heart sounds. Pulmonary:      Effort: Pulmonary effort is normal. No respiratory distress. Breath sounds: Normal breath sounds. No stridor. No wheezing or rales. Abdominal:      Palpations: Abdomen is soft. Tenderness: There is no abdominal tenderness. There is no guarding or rebound. Musculoskeletal:         General: No tenderness or deformity. Normal range of motion. Cervical back: Normal range of motion and neck supple. Skin:     General: Skin is warm and dry. Capillary Refill: Capillary refill takes less than 2 seconds. Findings: No erythema or rash. Neurological:      General: No focal deficit present. Mental Status: He is alert and oriented to person, place, and time. Coordination: Coordination normal.   Psychiatric:         Mood and Affect: Mood normal.         Behavior: Behavior normal.         Thought Content:  Thought content normal.         Judgment: Judgment normal.       MEDICAL DECISION MAKING:   Lac healing well, granulating well  Rx penicillin, tylenol  Discussed with patient anticipatory guidance, discharge instructions, follow up PCP 72 hours  Do not suspect endocarditis           Procedures    DIAGNOSTIC RESULTS     EMERGENCY DEPARTMENTCOURSE:         Vitals:    Vitals:    10/07/22 1012   BP: 134/70   Pulse: 89   Resp: 16   Temp: 97.4 °F (36.3 °C)   TempSrc: Temporal   SpO2: 100%       The patient was given the following medications while in the emergency department:  Orders Placed This Encounter   Medications    penicillin v potassium (VEETID) 500 MG tablet     Sig: Take 1 tablet by mouth 4 times daily for 10 days     Dispense:  40 tablet     Refill:  0    acetaminophen (TYLENOL) 500 MG tablet     Sig: Take 2 tablets by mouth every 6 hours as needed for Pain     Dispense:  60 tablet     Refill:  0     FINAL IMPRESSION      1. Laceration of oral cavity, initial encounter          DISPOSITION/PLAN   DISPOSITION Decision To Discharge 10/07/2022 10:37:34 AM      PATIENT REFERRED TO:  Efren Avendano MD  [address]  [phone]    Schedule an appointment as soon as possible for a visit in 3 days      DISCHARGE MEDICATIONS:  Discharge Medication List as of 10/7/2022 10:52 AM        START taking these medications    Details   penicillin v potassium (VEETID) 500 MG tablet Take 1 tablet by mouth 4 times daily for 10 days, Disp-40 tablet, R-0Normal           The care is provided during an unprecedented national emergency due to the novel coronavirus, COVID 19.   MD Leslye Varghese MD  10/07/22 0704

## 2022-10-07 NOTE — ED NOTES
Pt states he hit himself in the mouth with a wrench last Friday and he has a laceration to the inside of his mouth. Unsure of tetanus status.      Daniel Hayes RN  10/07/22 1295

## 2022-11-10 ENCOUNTER — TELEPHONE (OUTPATIENT)
Dept: INTERNAL MEDICINE CLINIC | Age: 57
End: 2022-11-10

## 2022-11-14 ENCOUNTER — HOSPITAL ENCOUNTER (EMERGENCY)
Age: 57
Discharge: HOME | End: 2022-11-14
Attending: EMERGENCY MEDICINE
Payer: MEDICAID

## 2022-11-14 ENCOUNTER — APPOINTMENT (OUTPATIENT)
Dept: GENERAL RADIOLOGY | Age: 57
End: 2022-11-14
Payer: MEDICAID

## 2022-11-14 VITALS
RESPIRATION RATE: 14 BRPM | HEART RATE: 89 BPM | DIASTOLIC BLOOD PRESSURE: 89 MMHG | WEIGHT: 150 LBS | SYSTOLIC BLOOD PRESSURE: 139 MMHG | BODY MASS INDEX: 23.54 KG/M2 | TEMPERATURE: 98.2 F | OXYGEN SATURATION: 96 % | HEIGHT: 67 IN

## 2022-11-14 DIAGNOSIS — R07.9 CHEST PAIN, UNSPECIFIED TYPE: Primary | ICD-10-CM

## 2022-11-14 LAB
ABSOLUTE EOS #: 0.2 K/UL (ref 0–0.4)
ABSOLUTE LYMPH #: 2.2 K/UL (ref 1–4.8)
ABSOLUTE MONO #: 0.6 K/UL (ref 0.1–1.3)
ANION GAP SERPL CALCULATED.3IONS-SCNC: 8 MMOL/L (ref 9–17)
BASOPHILS # BLD: 0 % (ref 0–2)
BASOPHILS ABSOLUTE: 0 K/UL (ref 0–0.2)
BUN BLDV-MCNC: 22 MG/DL (ref 6–20)
CALCIUM SERPL-MCNC: 8.8 MG/DL (ref 8.6–10.4)
CHLORIDE BLD-SCNC: 105 MMOL/L (ref 98–107)
CO2: 25 MMOL/L (ref 20–31)
CREAT SERPL-MCNC: 0.88 MG/DL (ref 0.7–1.2)
EOSINOPHILS RELATIVE PERCENT: 2 % (ref 0–4)
GFR SERPL CREATININE-BSD FRML MDRD: >60 ML/MIN/1.73M2
GLUCOSE BLD-MCNC: 121 MG/DL (ref 70–99)
HCT VFR BLD CALC: 44.2 % (ref 41–53)
HEMOGLOBIN: 14.9 G/DL (ref 13.5–17.5)
LYMPHOCYTES # BLD: 30 % (ref 24–44)
MCH RBC QN AUTO: 30.7 PG (ref 26–34)
MCHC RBC AUTO-ENTMCNC: 33.7 G/DL (ref 31–37)
MCV RBC AUTO: 91 FL (ref 80–100)
MONOCYTES # BLD: 8 % (ref 1–7)
PDW BLD-RTO: 14.4 % (ref 11.5–14.9)
PLATELET # BLD: 158 K/UL (ref 150–450)
PMV BLD AUTO: 8.9 FL (ref 6–12)
POTASSIUM SERPL-SCNC: 3.9 MMOL/L (ref 3.7–5.3)
PRO-BNP: 584 PG/ML
RBC # BLD: 4.85 M/UL (ref 4.5–5.9)
SEG NEUTROPHILS: 60 % (ref 36–66)
SEGMENTED NEUTROPHILS ABSOLUTE COUNT: 4.3 K/UL (ref 1.3–9.1)
SODIUM BLD-SCNC: 138 MMOL/L (ref 135–144)
TROPONIN, HIGH SENSITIVITY: 10 NG/L (ref 0–22)
TROPONIN, HIGH SENSITIVITY: 9 NG/L (ref 0–22)
WBC # BLD: 7.3 K/UL (ref 3.5–11)

## 2022-11-14 PROCEDURE — 99285 EMERGENCY DEPT VISIT HI MDM: CPT

## 2022-11-14 PROCEDURE — 36415 COLL VENOUS BLD VENIPUNCTURE: CPT

## 2022-11-14 PROCEDURE — 93005 ELECTROCARDIOGRAM TRACING: CPT | Performed by: STUDENT IN AN ORGANIZED HEALTH CARE EDUCATION/TRAINING PROGRAM

## 2022-11-14 PROCEDURE — 71045 X-RAY EXAM CHEST 1 VIEW: CPT

## 2022-11-14 PROCEDURE — 83880 ASSAY OF NATRIURETIC PEPTIDE: CPT

## 2022-11-14 PROCEDURE — 85025 COMPLETE CBC W/AUTO DIFF WBC: CPT

## 2022-11-14 PROCEDURE — 84484 ASSAY OF TROPONIN QUANT: CPT

## 2022-11-14 PROCEDURE — 80048 BASIC METABOLIC PNL TOTAL CA: CPT

## 2022-11-14 ASSESSMENT — PAIN - FUNCTIONAL ASSESSMENT
PAIN_FUNCTIONAL_ASSESSMENT: NONE - DENIES PAIN
PAIN_FUNCTIONAL_ASSESSMENT: 0-10
PAIN_FUNCTIONAL_ASSESSMENT: NONE - DENIES PAIN
PAIN_FUNCTIONAL_ASSESSMENT: PREVENTS OR INTERFERES SOME ACTIVE ACTIVITIES AND ADLS

## 2022-11-14 ASSESSMENT — PAIN DESCRIPTION - ONSET: ONSET: ON-GOING

## 2022-11-14 ASSESSMENT — PAIN DESCRIPTION - LOCATION: LOCATION: CHEST

## 2022-11-14 ASSESSMENT — LIFESTYLE VARIABLES
HOW MANY STANDARD DRINKS CONTAINING ALCOHOL DO YOU HAVE ON A TYPICAL DAY: 1 OR 2
HOW OFTEN DO YOU HAVE A DRINK CONTAINING ALCOHOL: MONTHLY OR LESS

## 2022-11-14 ASSESSMENT — PAIN DESCRIPTION - FREQUENCY: FREQUENCY: INTERMITTENT

## 2022-11-14 ASSESSMENT — PAIN DESCRIPTION - PAIN TYPE: TYPE: ACUTE PAIN

## 2022-11-14 ASSESSMENT — PAIN DESCRIPTION - DESCRIPTORS: DESCRIPTORS: PRESSURE

## 2022-11-14 ASSESSMENT — PAIN DESCRIPTION - ORIENTATION: ORIENTATION: LEFT;MID

## 2022-11-14 NOTE — ED PROVIDER NOTES
[address] ED  Emergency Department Encounter  EmergencyMedicine Resident     Pt Name:Mickey Merry Moritz  MRN: [id number]  [place] 1965  Date of evaluation: 11/14/22  PCP:  Adriana Grajeda MD    [address] [place] [address]       Chief Complaint   Patient presents with    Insomnia    Chest Pain       HISTORY OF PRESENT ILLNESS  (Location/Symptom, Timing/Onset, Context/Setting, Quality, Duration, Modifying Factors, Severity.)      Claudio Solis is a 62 y.o. male who presents with chest pain on the left side. Radiates into his arm, describes it as a sharp jolt. Denies any difficulty breathing, nausea or vomiting. Patient is on Eliquis for his A. fib. Does have a history of aortic stenosis. Also states that he has fallen asleep several times today and is unsure why, patient states that he slept a full night last night. He states he is concerned because he his so tired. Denies any trauma, alcohol or drug use. Is able to wake up and answer questions, denies any other symptoms.     PAST MEDICAL / SURGICAL / SOCIAL / FAMILY HISTORY      has a past medical history of Abnormal echocardiogram, Arrhythmia, AS (aortic stenosis), Atrial fibrillation (HCC), Benign prostatic hyperplasia, Biatrial enlargement, Central perforation of tympanic membrane, right, Chest pain, Chews tobacco, CHF (congestive heart failure), NYHA class I, acute on chronic, combined ([address] Chronic back pain, Conductive hearing loss of right ear, COPD (chronic obstructive pulmonary disease) ([address] Diverticulitis, Fatigue, History of cocaine abuse ([address] Household contact with history of methicillin resistant Staphylococcus aureus (MRSA) infection, Lung nodule, Mass of upper lobe of left lung, Mild concentric left ventricular hypertrophy, Murmur, cardiac, Near syncope, Nonrheumatic aortic (valve) stenosis, Palpitations, Panlobular emphysema (HCC), Pneumonia of both upper lobes due to infectious organism, Right-sided tinnitus, Severe aortic stenosis, Shortness of breath, and Tobacco abuse.       has a past surgical history that includes Inguinal hernia repair (Right); cyst removal (Left); pr drain skin abscess complic (N/A, ); and Aortic valve replacement (2018). Social History     Socioeconomic History    Marital status:      Spouse name: Not on file    Number of children: Not on file    Years of education: Not on file    Highest education level: Not on file   Occupational History    Not on file   Tobacco Use    Smoking status: Former     Packs/day: 0.25     Years: 40.00     Pack years: 10.00     Types: Cigarettes     Quit date: 2022     Years since quittin.8    Smokeless tobacco: Current     Types: Chew    Tobacco comments:     pt  previously smoked 1.5 ppd;   Vaping Use    Vaping Use: Never used   Substance and Sexual Activity    Alcohol use: Not Currently    Drug use: Not Currently     Types: Cocaine     Comment: drug abuse includes crack cocaine; last used 5 months ago     Sexual activity: Yes     Partners: Female   Other Topics Concern    Not on file   Social History Narrative    Not on file     Social Determinants of Health     Financial Resource Strain: Not on file   Food Insecurity: Not on file   Transportation Needs: Not on file   Physical Activity: Not on file   Stress: Not on file   Social Connections: Not on file   Intimate Partner Violence: Not on file   Housing Stability: Not on file       Family History   Problem Relation Age of Onset    Depression Mother     COPD Father     COPD Paternal Grandfather        Allergies:  Patient has no known allergies. Home Medications:  Prior to Admission medications    Medication Sig Start Date End Date Taking?  Authorizing Provider   acetaminophen (TYLENOL) 500 MG tablet Take 2 tablets by mouth every 6 hours as needed for Pain 10/7/22   Jacob Gabriel MD   PROAIR  (20 Base) MCG/ACT inhaler inhale 2 puffs by mouth and INTO THE LUNGS every 6 hours if needed for shortness of breath 7/8/22   Efren Avendano MD   apixaban (ELIQUIS) 5 MG TABS tablet Take 1 tablet by mouth 2 times daily 3/9/22   Maira Rosenthal MD   tamsulosin [facility]) 0.4 MG capsule Take 1 capsule by mouth daily 11/13/21   Chris Mccain MD   dilTIAZem (CARDIZEM CD) 120 MG extended release capsule Take 1 capsule by mouth daily 11/14/21   Burgess Ching MD   guaiFENesin (MUCINEX) 600 MG extended release tablet Take 1 tablet by mouth 2 times daily 11/13/21   Burgess Ching MD   atorvastatin (LIPITOR) 10 MG tablet Take 1 tablet by mouth nightly 11/13/21   Burgess Ching MD   tiotropium (Vijaya Juniper) 18 MCG inhalation capsule Inhale 1 capsule into the lungs daily 10/7/21   Efren Avendano MD   fluticasone (FLONASE) 50 MCG/ACT nasal spray 1 spray by Each Nostril route daily 4/12/21   Efren Avnedano MD   loratadine (CLARITIN) 10 MG tablet Take 1 tablet by mouth daily 4/20/20   Efren Avendano MD   aspirin 81 MG chewable tablet Take 1 tablet by mouth daily 1/7/19   Vance Vega MD       REVIEW OF SYSTEMS    (2-9 systems for level 4, 10 or more for level 5)      Review of Systems   Constitutional:  Positive for activity change and fatigue. Negative for appetite change, chills and fever. HENT:  Negative for congestion and trouble swallowing. Respiratory:  Negative for cough and shortness of breath. Cardiovascular:  Positive for chest pain. Negative for leg swelling. Gastrointestinal:  Negative for abdominal distention, abdominal pain, nausea and vomiting. Genitourinary:  Negative for decreased urine volume, difficulty urinating and hematuria. Musculoskeletal:  Negative for back pain and neck pain. Skin:  Negative for color change, pallor, rash and wound. Neurological:  Negative for dizziness, tremors, seizures, facial asymmetry, speech difficulty, weakness, light-headedness, numbness and headaches.      PHYSICAL EXAM   (up to 7 for level 4, 8 or more for level 5)      INITIAL VITALS:   BP 139/89   Pulse 89   Temp 98.2 °F (36.8 °C) (Oral)   Resp 14   Ht 5' 7\" (1.702 m)   Wt 150 lb (68 kg)   SpO2 96%   BMI 23.49 kg/m²     Physical Exam  Constitutional:       General: He is awake. Appearance: Normal appearance. Comments: Resting but arousable   HENT:      Head: Normocephalic and atraumatic. Right Ear: External ear normal.      Left Ear: External ear normal.      Nose: Nose normal.   Eyes:      General: Lids are normal.      Extraocular Movements: Extraocular movements intact. Cardiovascular:      Rate and Rhythm: Normal rate. Pulses: Normal pulses. Heart sounds: Normal heart sounds. Pulmonary:      Effort: Pulmonary effort is normal.      Breath sounds: Normal breath sounds. Musculoskeletal:         General: Normal range of motion. Cervical back: Normal range of motion. Comments: Normal range of motion, strength and sensation intact in all extremities. Neurological:      Mental Status: He is oriented to person, place, and time. Sensory: Sensation is intact. Motor: Motor function is intact. Psychiatric:         Mood and Affect: Mood normal.         Behavior: Behavior is cooperative. DIFFERENTIAL  DIAGNOSIS     PLAN (LABS / IMAGING / EKG):  Orders Placed This Encounter   Procedures    XR CHEST PORTABLE    Troponin    SPECIMEN REJECTION    Basic Metabolic Panel w/ Reflex to MG    Brain Natriuretic Peptide    CBC with Auto Differential    EKG 12 Lead       MEDICATIONS ORDERED:  No orders of the defined types were placed in this encounter. DDX: ACS, arrhythmia, electrolyte imbalance, pneumonia, CHF exacerbation. ICH    MDM: 62 y.o. male presents today with increased sleepiness and intermittent chest pain. Cardiac work-up ordered to rule out ACS, arrhythmia, electrolyte imbalance, pneumonia and CHF exacerbation. No concern for ICH at this time as patient has not had any type of trauma, and is easily arousable.     EMERGENCY DEPARTMENT COURSE:  ED Course as of 11/15/22 0059   Mon Nov 14, 2022   2030 CXR unremarkable [SS]   2103 Will obtain second troponin and reevaluate patient. [SS]      ED Course User Index  [SS] Alexandra Power MD            [place] Coma Scale  Eye Opening: Spontaneous  Best Verbal Response: Oriented  Best Motor Response: Obeys commands  Kamran Coma Scale Score: 15  DIAGNOSTIC RESULTS / EMERGENCY DEPARTMENT COURSE / MDM   LAB RESULTS:  Results for orders placed or performed during the hospital encounter of 11/14/22   Troponin   Result Value Ref Range    Troponin, High Sensitivity 9 0 - 22 ng/L   Troponin   Result Value Ref Range    Troponin, High Sensitivity 10 0 - 22 ng/L   Basic Metabolic Panel w/ Reflex to MG   Result Value Ref Range    Glucose 121 (H) 70 - 99 mg/dL    BUN 22 (H) 6 - 20 mg/dL    Creatinine 0.88 0.70 - 1.20 mg/dL    Est, Glom Filt Rate >60 >60 mL/min/1.73m2    Calcium 8.8 8.6 - 10.4 mg/dL    Sodium 138 135 - 144 mmol/L    Potassium 3.9 3.7 - 5.3 mmol/L    Chloride 105 98 - 107 mmol/L    CO2 25 20 - 31 mmol/L    Anion Gap 8 (L) 9 - 17 mmol/L   Brain Natriuretic Peptide   Result Value Ref Range    Pro- (H) <300 pg/mL   CBC with Auto Differential   Result Value Ref Range    WBC 7.3 3.5 - 11.0 k/uL    RBC 4.85 4.5 - 5.9 m/uL    Hemoglobin 14.9 13.5 - 17.5 g/dL    Hematocrit 44.2 41 - 53 %    MCV 91.0 80 - 100 fL    MCH 30.7 26 - 34 pg    MCHC 33.7 31 - 37 g/dL    RDW 14.4 11.5 - 14.9 %    Platelets 284 810 - 149 k/uL    MPV 8.9 6.0 - 12.0 fL    Seg Neutrophils 60 36 - 66 %    Lymphocytes 30 24 - 44 %    Monocytes 8 (H) 1 - 7 %    Eosinophils % 2 0 - 4 %    Basophils 0 0 - 2 %    Segs Absolute 4.30 1.3 - 9.1 k/uL    Absolute Lymph # 2.20 1.0 - 4.8 k/uL    Absolute Mono # 0.60 0.1 - 1.3 k/uL    Absolute Eos # 0.20 0.0 - 0.4 k/uL    Basophils Absolute 0.00 0.0 - 0.2 k/uL   EKG 12 Lead   Result Value Ref Range    Ventricular Rate 88 BPM    Atrial Rate 90 BPM    QRS Duration 88 ms    Q-T Interval 374 ms    QTc Calculation (Bazett) 452 ms    R Axis 58 degrees    T Axis 71 degrees           RADIOLOGY:  XR CHEST PORTABLE   Final Result   No acute cardiopulmonary abnormality. EKG  EKG Interpretation    Atrial fibrillation, rate controlled, normal axis, , no significant change from previous. Nonspecific EKG. Roberto Gerardo MD     All EKG's are interpreted by the Emergency Department Physician who either signs or Co-signs this chart in the absence of a cardiologist.        PROCEDURES:  None    CONSULTS:  None    CRITICAL CARE:  None    FINAL IMPRESSION      1.  Chest pain, unspecified type          DISPOSITION / PLAN     DISPOSITION Decision To Discharge 11/14/2022 09:33:16 PM      PATIENT REFERRED TO:  Jamey Fothergill, MD  [address]  [phone]          DISCHARGE MEDICATIONS:  Discharge Medication List as of 11/14/2022  9:33 PM          Roberto Gerardo MD  Emergency Medicine Resident    (Please note that portions of thisnote were completed with a voice recognition program.  Efforts were made to edit the dictations but occasionally words are mis-transcribed.)        Roberto Gerardo MD  Resident  11/15/22 0100

## 2022-11-14 NOTE — PROGRESS NOTES
Admitted to room 2068 from ER per W/C. Oriented to room and call light. Vitals and assessment completed. No distress noted. [address]  DVT Prophylaxis and Vaccine Status  Work List  Mandatory for all patients      Patient must be on both Chemical prophylaxis and Mechanical prophylaxis.  If chemical/mechanical prophylaxis is not ordered, the physician must document a reason for not using prophylaxis     Chemical Prophylaxis  Is patient on chemical prophylaxis: No  If no chemical prophylaxis Is a order in for No Chemical VTE prophylaxisYes  If no was the physician notified yes      Mechanical Prophylaxis  Is patient on mechanical prophylaxis, intermittent pneumatic compression device: Yes  If no was the physician notified not applicable        Pneumonia Vaccine  Vaccine indicated:  Not indicated  If indicated was the vaccine given: not applicable    Influenza Vaccine (applicable from October through March):  Vaccine indicated: Vaccination was ordered  If indicated was the vaccine given: not applicable    Patient Education  Education completed on DVT prophylaxis: yes [2nd] : 2nd trigger finger [Repeat series performed] : repeat series performed [Tendon Sheath] : tendon sheath [Right] : of the right [Dorsal radial compartment] : dorsal radial compartment [Pain] : pain [Inflammation] : inflammation [Alcohol] : alcohol [Betadine] : betadine [Ethyl Chloride sprayed topically] : ethyl chloride sprayed topically [Sterile technique used] : sterile technique used [___ cc    1%] : Lidocaine ~Vcc of 1%  [___ cc    10mg] : Triamcinolone (Kenalog) ~Vcc of 10 mg  [] : Patient tolerated procedure well [Call if redness, pain or fever occur] : call if redness, pain or fever occur [Apply ice for 15min out of every hour for the next 12-24 hours as tolerated] : apply ice for 15 minutes out of every hour for the next 12-24 hours as tolerated [Previous OTC use and PT nontherapeutic] : patient has tried OTC's including aspirin, Ibuprofen, Aleve, etc or prescription NSAIDS, and/or exercises at home and/or physical therapy without satisfactory response [Patient had decreased mobility in the joint] : patient had decreased mobility in the joint [Risks, benefits, alternatives discussed / Verbal consent obtained] : the risks benefits, and alternatives have been discussed, and verbal consent was obtained

## 2022-11-15 LAB
EKG ATRIAL RATE: 90 BPM
EKG Q-T INTERVAL: 374 MS
EKG QRS DURATION: 88 MS
EKG QTC CALCULATION (BAZETT): 452 MS
EKG R AXIS: 58 DEGREES
EKG T AXIS: 71 DEGREES
EKG VENTRICULAR RATE: 88 BPM

## 2022-11-15 PROCEDURE — 93010 ELECTROCARDIOGRAM REPORT: CPT | Performed by: INTERNAL MEDICINE

## 2022-11-15 ASSESSMENT — ENCOUNTER SYMPTOMS
TROUBLE SWALLOWING: 0
ABDOMINAL PAIN: 0
SHORTNESS OF BREATH: 0
ABDOMINAL DISTENTION: 0
VOMITING: 0
NAUSEA: 0
COLOR CHANGE: 0
COUGH: 0
BACK PAIN: 0

## 2022-11-15 NOTE — ED NOTES
Mode of arrival (squad #, walk in, police, etc) : Walk-in        Chief complaint(s): Chest pain, insomnia         Arrival Note (brief scenario, treatment PTA, etc). : Pt states that today he has been noticing that he has been dozing off randomly throughout the day. Pt states he had enough sleep last night. Pt states that he is experiencing chest pain that radiates to the left arm.         C= \"Have you ever felt that you should Cut down on your drinking? \"  No  A= \"Have people Annoyed you by criticizing your drinking? \"  No  G= \"Have you ever felt bad or Guilty about your drinking? \"  No  E= \"Have you ever had a drink as an Eye-opener first thing in the morning to steady your nerves or to help a hangover? \"  No      Deferred []      Reason for deferring: N/A    *If yes to two or more: probable alcohol abuse. Robert Parry RN  11/14/22 1912

## 2022-11-15 NOTE — ED NOTES
The following labs labeled with pt sticker and tubed to lab:     [x] Blue     [x] Lavender   [] on ice  [x] Green/yellow  [] Green/black [] on ice  [] Yellow  [] Red  [] Pink      [] COVID-19 swab    [] Rapid  [] PCR  [] Flu swab  [] Peds Viral Panel     [] Urine Sample  [] Pelvic Cultures  [] Blood Cultures          Liat Devlin RN  11/14/22 0531

## 2022-11-15 NOTE — ED PROVIDER NOTES
[address] ED  eMERGENCY dEPARTMENT eNCOUnter   Attending Attestation     Pt Name: Micki Pineda  MRN: [id number]  [place] 1965  Date of evaluation: 11/14/22       Micki Pineda is a 62 y.o. male who presents with Insomnia and Chest Pain      History:   Patient states that for the last 2 days he has been having intermittent chest pain on the left side of his chest.  Patient states along with this if he is not up walking around he will just all of a sudden just doze off. Patient states he does not feel it coming on he does not feel dizzy or lightheaded he just all of a sudden realizes he has been asleep. Patient is even done this while on the mower. Exam: Vitals:   Vitals:    11/14/22 1757 11/14/22 1854 11/14/22 1857   BP: 122/67 122/75    Pulse: 97 94 96   Resp: 16 16 19   Temp: 97.9 °F (36.6 °C) 98.2 °F (36.8 °C)    TempSrc:  Oral    SpO2: 97% 95% 97%   Weight: 150 lb (68 kg)     Height: 5' 7\" (1.702 m)       Heart regular rate rhythm no murmurs. Lungs clear to auscultation. Neurologically intact. I performed a history and physical examination of the patient and discussed management with the resident. I reviewed the residents note and agree with the documented findings and plan of care. Any areas of disagreement are noted on the chart. I was personally present for the key portions of any procedures. I have documented in the chart those procedures where I was not present during the key portions. I have personally reviewed all images and agree with the resident's interpretation. I have reviewed the emergency nurses triage note. I agree with the chief complaint, past medical history, past surgical history, allergies, medications, social and family history as documented unless otherwise noted below. Documentation of the HPI, Physical Exam and Medical Decision Making performed by medical students or scribes is based on my personal performance of the HPI, PE and MDM.  I personally evaluated and examined the patient in conjunction with the APC and agree with the assessment, treatment plan, and disposition of the patient as recorded by the APC. Additional findings are as noted.     Magda Nguyen MD  Attending Emergency  Physician              Vanessa Lopez MD  11/14/22 5019

## 2022-12-19 ENCOUNTER — TELEPHONE (OUTPATIENT)
Dept: INTERNAL MEDICINE CLINIC | Age: 57
End: 2022-12-19

## 2022-12-19 NOTE — TELEPHONE ENCOUNTER
----- Message from Alex Lockhart sent at 12/16/2022 10:30 AM EST -----  Subject: Message to Provider    QUESTIONS  Information for Provider? The patient would like a call back from the   office to go over what normal thyroid levels should be.   ---------------------------------------------------------------------------  --------------  Wallace RODRIGUEZ  [phone]; Do not leave any message, patient will call back for answer  ---------------------------------------------------------------------------  --------------  SCRIPT ANSWERS  Relationship to Patient?  Self

## 2023-01-01 DIAGNOSIS — I48.91 ATRIAL FIBRILLATION WITH RAPID VENTRICULAR RESPONSE (HCC): ICD-10-CM

## 2023-01-04 RX ORDER — APIXABAN 5 MG/1
TABLET, FILM COATED ORAL
Qty: 60 TABLET | Refills: 3 | Status: SHIPPED | OUTPATIENT
Start: 2023-01-04

## 2023-01-18 ENCOUNTER — TELEPHONE (OUTPATIENT)
Dept: INTERNAL MEDICINE CLINIC | Age: 58
End: 2023-01-18

## 2023-02-17 ENCOUNTER — APPOINTMENT (OUTPATIENT)
Dept: GENERAL RADIOLOGY | Age: 58
End: 2023-02-17
Payer: MEDICAID

## 2023-02-17 ENCOUNTER — APPOINTMENT (OUTPATIENT)
Dept: CT IMAGING | Age: 58
End: 2023-02-17
Payer: MEDICAID

## 2023-02-17 ENCOUNTER — HOSPITAL ENCOUNTER (INPATIENT)
Age: 58
LOS: 6 days | Discharge: HOME | End: 2023-02-23
Attending: EMERGENCY MEDICINE | Admitting: INTERNAL MEDICINE
Payer: MEDICAID

## 2023-02-17 DIAGNOSIS — I48.91 ATRIAL FIBRILLATION WITH RVR (HCC): ICD-10-CM

## 2023-02-17 DIAGNOSIS — R42 DIZZINESS: Primary | ICD-10-CM

## 2023-02-17 LAB
ABSOLUTE EOS #: 0.1 K/UL (ref 0–0.4)
ABSOLUTE LYMPH #: 1.3 K/UL (ref 1–4.8)
ABSOLUTE MONO #: 0.6 K/UL (ref 0.1–1.3)
ANION GAP SERPL CALCULATED.3IONS-SCNC: 9 MMOL/L (ref 9–17)
BASOPHILS # BLD: 0 % (ref 0–2)
BASOPHILS ABSOLUTE: 0 K/UL (ref 0–0.2)
BUN SERPL-MCNC: 25 MG/DL (ref 6–20)
CALCIUM SERPL-MCNC: 9.2 MG/DL (ref 8.6–10.4)
CHLORIDE SERPL-SCNC: 105 MMOL/L (ref 98–107)
CO2 SERPL-SCNC: 27 MMOL/L (ref 20–31)
CREAT SERPL-MCNC: 0.75 MG/DL (ref 0.7–1.2)
EOSINOPHILS RELATIVE PERCENT: 1 % (ref 0–4)
FLUAV RNA RESP QL NAA+PROBE: NOT DETECTED
FLUBV RNA RESP QL NAA+PROBE: NOT DETECTED
GFR SERPL CREATININE-BSD FRML MDRD: >60 ML/MIN/1.73M2
GLUCOSE SERPL-MCNC: 131 MG/DL (ref 70–99)
HCT VFR BLD AUTO: 46.7 % (ref 41–53)
HGB BLD-MCNC: 15.8 G/DL (ref 13.5–17.5)
INR PPP: 1
LYMPHOCYTES # BLD: 17 % (ref 24–44)
MAGNESIUM SERPL-MCNC: 2 MG/DL (ref 1.6–2.6)
MCH RBC QN AUTO: 30.6 PG (ref 26–34)
MCHC RBC AUTO-ENTMCNC: 33.8 G/DL (ref 31–37)
MCV RBC AUTO: 90.7 FL (ref 80–100)
MONOCYTES # BLD: 8 % (ref 1–7)
PARTIAL THROMBOPLASTIN TIME: 26.3 SEC (ref 24–36)
PDW BLD-RTO: 14.3 % (ref 11.5–14.9)
PHOSPHATE SERPL-MCNC: 3.6 MG/DL (ref 2.5–4.5)
PLATELET # BLD AUTO: 181 K/UL (ref 150–450)
PMV BLD AUTO: 8.9 FL (ref 6–12)
POTASSIUM SERPL-SCNC: 4.3 MMOL/L (ref 3.7–5.3)
PROTHROMBIN TIME: 13.5 SEC (ref 11.8–14.6)
RBC # BLD: 5.15 M/UL (ref 4.5–5.9)
SARS-COV-2 RNA RESP QL NAA+PROBE: NOT DETECTED
SEG NEUTROPHILS: 74 % (ref 36–66)
SEGMENTED NEUTROPHILS ABSOLUTE COUNT: 5.5 K/UL (ref 1.3–9.1)
SODIUM SERPL-SCNC: 141 MMOL/L (ref 135–144)
SOURCE: NORMAL
SPECIMEN DESCRIPTION: NORMAL
TROPONIN I SERPL DL<=0.01 NG/ML-MCNC: 10 NG/L (ref 0–22)
TROPONIN I SERPL DL<=0.01 NG/ML-MCNC: 9 NG/L (ref 0–22)
TSH SERPL-ACNC: 1.29 UIU/ML (ref 0.3–5)
WBC # BLD AUTO: 7.4 K/UL (ref 3.5–11)

## 2023-02-17 PROCEDURE — 99223 1ST HOSP IP/OBS HIGH 75: CPT | Performed by: INTERNAL MEDICINE

## 2023-02-17 PROCEDURE — 85025 COMPLETE CBC W/AUTO DIFF WBC: CPT

## 2023-02-17 PROCEDURE — 84100 ASSAY OF PHOSPHORUS: CPT

## 2023-02-17 PROCEDURE — 87636 SARSCOV2 & INF A&B AMP PRB: CPT

## 2023-02-17 PROCEDURE — 85610 PROTHROMBIN TIME: CPT

## 2023-02-17 PROCEDURE — 99285 EMERGENCY DEPT VISIT HI MDM: CPT

## 2023-02-17 PROCEDURE — 36415 COLL VENOUS BLD VENIPUNCTURE: CPT

## 2023-02-17 PROCEDURE — 6370000000 HC RX 637 (ALT 250 FOR IP): Performed by: INTERNAL MEDICINE

## 2023-02-17 PROCEDURE — 85730 THROMBOPLASTIN TIME PARTIAL: CPT

## 2023-02-17 PROCEDURE — 80048 BASIC METABOLIC PNL TOTAL CA: CPT

## 2023-02-17 PROCEDURE — 2060000000 HC ICU INTERMEDIATE R&B

## 2023-02-17 PROCEDURE — 84484 ASSAY OF TROPONIN QUANT: CPT

## 2023-02-17 PROCEDURE — 84443 ASSAY THYROID STIM HORMONE: CPT

## 2023-02-17 PROCEDURE — 93005 ELECTROCARDIOGRAM TRACING: CPT | Performed by: EMERGENCY MEDICINE

## 2023-02-17 PROCEDURE — 83735 ASSAY OF MAGNESIUM: CPT

## 2023-02-17 PROCEDURE — 70450 CT HEAD/BRAIN W/O DYE: CPT

## 2023-02-17 PROCEDURE — 72040 X-RAY EXAM NECK SPINE 2-3 VW: CPT

## 2023-02-17 PROCEDURE — 71045 X-RAY EXAM CHEST 1 VIEW: CPT

## 2023-02-17 RX ORDER — ASPIRIN 81 MG/1
81 TABLET, CHEWABLE ORAL DAILY
Status: DISCONTINUED | OUTPATIENT
Start: 2023-02-17 | End: 2023-02-22 | Stop reason: HOSPADM

## 2023-02-17 RX ORDER — CETIRIZINE HYDROCHLORIDE 10 MG/1
10 TABLET ORAL DAILY
Status: DISCONTINUED | OUTPATIENT
Start: 2023-02-17 | End: 2023-02-22 | Stop reason: HOSPADM

## 2023-02-17 RX ORDER — ALBUTEROL SULFATE 90 UG/1
2 AEROSOL, METERED RESPIRATORY (INHALATION) EVERY 4 HOURS PRN
Status: DISCONTINUED | OUTPATIENT
Start: 2023-02-17 | End: 2023-02-22 | Stop reason: HOSPADM

## 2023-02-17 RX ORDER — ACETAMINOPHEN 500 MG
1000 TABLET ORAL EVERY 6 HOURS PRN
Status: DISCONTINUED | OUTPATIENT
Start: 2023-02-17 | End: 2023-02-22 | Stop reason: HOSPADM

## 2023-02-17 RX ORDER — TAMSULOSIN HYDROCHLORIDE 0.4 MG/1
0.4 CAPSULE ORAL DAILY
Status: DISCONTINUED | OUTPATIENT
Start: 2023-02-17 | End: 2023-02-22 | Stop reason: HOSPADM

## 2023-02-17 RX ORDER — DILTIAZEM HYDROCHLORIDE 120 MG/1
120 CAPSULE, COATED, EXTENDED RELEASE ORAL DAILY
Status: DISCONTINUED | OUTPATIENT
Start: 2023-02-17 | End: 2023-02-22 | Stop reason: HOSPADM

## 2023-02-17 RX ORDER — METOPROLOL TARTRATE 5 MG/5ML
5 INJECTION INTRAVENOUS EVERY 8 HOURS PRN
Status: DISCONTINUED | OUTPATIENT
Start: 2023-02-17 | End: 2023-02-22 | Stop reason: HOSPADM

## 2023-02-17 RX ORDER — GUAIFENESIN 600 MG/1
600 TABLET, EXTENDED RELEASE ORAL 2 TIMES DAILY
Status: DISCONTINUED | OUTPATIENT
Start: 2023-02-17 | End: 2023-02-22 | Stop reason: HOSPADM

## 2023-02-17 RX ORDER — ATORVASTATIN CALCIUM 10 MG/1
10 TABLET, FILM COATED ORAL NIGHTLY
Status: DISCONTINUED | OUTPATIENT
Start: 2023-02-17 | End: 2023-02-22 | Stop reason: HOSPADM

## 2023-02-17 RX ORDER — FLUTICASONE PROPIONATE 50 MCG
1 SPRAY, SUSPENSION (ML) NASAL DAILY
Status: DISCONTINUED | OUTPATIENT
Start: 2023-02-17 | End: 2023-02-22 | Stop reason: HOSPADM

## 2023-02-17 RX ADMIN — FLUTICASONE PROPIONATE 1 SPRAY: 50 SPRAY, METERED NASAL at 18:20

## 2023-02-17 RX ADMIN — TAMSULOSIN HYDROCHLORIDE 0.4 MG: 0.4 CAPSULE ORAL at 18:19

## 2023-02-17 RX ADMIN — APIXABAN 5 MG: 5 TABLET, FILM COATED ORAL at 22:17

## 2023-02-17 RX ADMIN — GUAIFENESIN 600 MG: 600 TABLET, EXTENDED RELEASE ORAL at 22:17

## 2023-02-17 RX ADMIN — DILTIAZEM HYDROCHLORIDE 120 MG: 120 CAPSULE, COATED, EXTENDED RELEASE ORAL at 18:20

## 2023-02-17 RX ADMIN — ATORVASTATIN CALCIUM 10 MG: 10 TABLET, FILM COATED ORAL at 22:17

## 2023-02-17 ASSESSMENT — ENCOUNTER SYMPTOMS
NAUSEA: 0
PHOTOPHOBIA: 0
COUGH: 0
TROUBLE SWALLOWING: 0
ABDOMINAL PAIN: 0
DIARRHEA: 0
VOMITING: 0
COLOR CHANGE: 0
SHORTNESS OF BREATH: 1

## 2023-02-17 ASSESSMENT — PAIN - FUNCTIONAL ASSESSMENT
PAIN_FUNCTIONAL_ASSESSMENT: 0-10
PAIN_FUNCTIONAL_ASSESSMENT: ACTIVITIES ARE NOT PREVENTED

## 2023-02-17 ASSESSMENT — PAIN DESCRIPTION - DESCRIPTORS: DESCRIPTORS: CRAMPING

## 2023-02-17 ASSESSMENT — PAIN SCALES - GENERAL: PAINLEVEL_OUTOF10: 4

## 2023-02-17 ASSESSMENT — PAIN DESCRIPTION - FREQUENCY: FREQUENCY: INTERMITTENT

## 2023-02-17 ASSESSMENT — PAIN DESCRIPTION - ORIENTATION: ORIENTATION: LEFT

## 2023-02-17 ASSESSMENT — PAIN DESCRIPTION - LOCATION: LOCATION: CHEST;LEG

## 2023-02-17 NOTE — PROGRESS NOTES
Pt arrived to floor from ED to room 2123. Vitals taken and telemetry applied. No distress noted. See doc flowsheet for details. Call light within reach, and pt educated on its use. Bed in lowest position, and locked. Side rails up x 2. Denied further questions or needs at this time. Will continue to monitor.

## 2023-02-17 NOTE — ED NOTES
Report given to Tova Reid RN from [facility]. Report method by phone   The following was reviewed with receiving RN:   Current vital signs:  /65   Pulse 90   Temp 97.4 °F (36.3 °C) (Oral)   Resp 22   Ht 5' 8\" (1.727 m)   Wt 146 lb (66.2 kg)   SpO2 96%   BMI 22.20 kg/m²                MEWS Score: 2     Any medication or safety alerts were reviewed. Any pending diagnostics and notifications were also reviewed, as well as any safety concerns or issues, abnormal labs, abnormal imaging, and abnormal assessment findings. Questions were answered.           J Luis Ferraro RN  02/17/23 7012

## 2023-02-17 NOTE — H&P
[facility]    HISTORY AND PHYSICAL EXAMINATION            Date:   2/17/2023  Patient name:  Sachin Sommers  Date of admission:  2/17/2023  1:05 PM  MRN:   [id number]  Account:  [de-identified]  YOB: 1965  PCP:    Rebeca Hernandez MD  Room:   10/10  Code Status:    Prior    Chief Complaint:     Chief Complaint   Patient presents with    Palpitations    Numbness    Dizziness       History Obtained From:     patient, electronic medical record    History of Present Illness: The patient is a 62 y.o.   Non- / non  male who presents with Palpitations, Numbness, and Dizziness   and he is admitted to the hospital for the management of dizziness, palpitation  Patient has past medical history of multiple medical problem, which include COPD, current smoker, history of atrial fibrillation, bioprosthetic aortic valve, aortic regurgitation, patient has past medical history of substance abuse  Patient is a poor historian  Patient, presented with complaints of dizziness, palpitations, symptoms are going on for last few days, no aggravating or relieving factors  Patient also experiencing chest pressure along with the symptoms    He told me that he is compliant with his diet and medication, he did not use any drugs  Patient, also noticing tingling numbness, affecting his left arm, neck area, left leg, has chronic neck pain, the symptoms are intermittently going for on for last 3 months    In the emergency room, patient underwent EKG suggestive of A-fib with RVR, patient underwent 2 sets of troponins, which was flat        Past Medical History:     Past Medical History:   Diagnosis Date    Abnormal echocardiogram 02/2018    Arrhythmia     AS (aortic stenosis)     Atrial fibrillation (HCC)     Benign prostatic hyperplasia     Biatrial enlargement     Central perforation of tympanic membrane, right     Chest pain     Chews tobacco     CHF (congestive heart failure), NYHA class I, acute on chronic, combined (HCC)     Chronic back pain     Conductive hearing loss of right ear     COPD (chronic obstructive pulmonary disease) (HCC)     Diverticulitis     Fatigue     History of cocaine abuse ([address] 01/2018    Household contact with history of methicillin resistant Staphylococcus aureus (MRSA) infection     Lung nodule     Mass of upper lobe of left lung     Mild concentric left ventricular hypertrophy     Murmur, cardiac     Near syncope     Nonrheumatic aortic (valve) stenosis     Palpitations     Panlobular emphysema (HCC)     Pneumonia of both upper lobes due to infectious organism     Right-sided tinnitus     Severe aortic stenosis     Shortness of breath     Tobacco abuse         Past Surgical History:     Past Surgical History:   Procedure Laterality Date    AORTIC VALVE REPLACEMENT  12/2018    CYST REMOVAL Left     INGUINAL HERNIA REPAIR Right     IA INCISION & DRAINAGE ABSCESS COMPLICATED/MULTIPLE N/A 2/12/2018    GLUTEAL INCISION AND DRAINAGE performed by Alden Marsh DO at [address]        Medications Prior to Admission:     Prior to Admission medications    Medication Sig Start Date End Date Taking?  Authorizing Provider   ELIQUIS 5 MG TABS tablet take 1 tablet by mouth twice a day 1/4/23   Ceci Kohli MD   acetaminophen (TYLENOL) 500 MG tablet Take 2 tablets by mouth every 6 hours as needed for Pain 10/7/22   Lakeisha Bai MD   PROAIR  (40 Base) MCG/ACT inhaler inhale 2 puffs by mouth and INTO THE LUNGS every 6 hours if needed for shortness of breath 7/8/22   Ceci Kohli MD   tamsulosin (FLOMAX) 0.4 MG capsule Take 1 capsule by mouth daily 11/13/21   Dora Calzada MD   dilTIAZem (CARDIZEM CD) 120 MG extended release capsule Take 1 capsule by mouth daily 11/14/21   Yanci Herman MD   guaiFENesin (MUCINEX) 600 MG extended release tablet Take 1 tablet by mouth 2 times daily 11/13/21   Yanci Herman MD   atorvastatin (LIPITOR) 10 MG tablet Take 1 tablet by mouth nightly 11/13/21   Cher Mathews MD   tiotropium (Alvie Diamond) 18 MCG inhalation capsule Inhale 1 capsule into the lungs daily 10/7/21   Jamey Fothergill, MD   fluticasone (FLONASE) 50 MCG/ACT nasal spray 1 spray by Each Nostril route daily 4/12/21   Jamey Fothergill, MD   loratadine (CLARITIN) 10 MG tablet Take 1 tablet by mouth daily 4/20/20   Jamey Fothergill, MD   aspirin 81 MG chewable tablet Take 1 tablet by mouth daily 1/7/19   Kaiser Lockhart MD        Allergies:     Patient has no known allergies. Social History:     Tobacco:    reports that he has been smoking cigarettes. He has a 10.00 pack-year smoking history. His smokeless tobacco use includes chew. Alcohol:      reports that he does not currently use alcohol. Drug Use:  reports that he does not currently use drugs after having used the following drugs: Cocaine. Family History:     Family History   Problem Relation Age of Onset    Depression Mother     COPD Father     COPD Paternal Grandfather        Review of Systems:     Positive and Negative as described in HPI. CONSTITUTIONAL: Positive for dizziness  HEENT:  negative for vision, hearing changes, runny nose, throat pain  RESPIRATORY:  negative for shortness of breath, cough, congestion, wheezing.   CARDIOVASCULAR: Palpitation, chest pressure  GASTROINTESTINAL:  negative for nausea, vomiting, diarrhea, constipation, change in bowel habits, abdominal pain   GENITOURINARY:  negative for difficulty of urination, burning with urination, frequency   INTEGUMENT:  negative for rash, skin lesions, easy bruising   HEMATOLOGIC/LYMPHATIC:  negative for swelling/edema   ALLERGIC/IMMUNOLOGIC:  negative for urticaria , itching  ENDOCRINE:  negative increase in drinking, increase in urination, hot or cold intolerance  MUSCULOSKELETAL:  negative joint pains, muscle aches, swelling of joints  NEUROLOGICAL: Positive for dizziness, tingling numbness, affecting left arm, left leg  BEHAVIOR/PSYCH:  negative for depression, anxiety    Physical Exam:   /73   Pulse 96   Temp 97.4 °F (36.3 °C) (Oral)   Resp 17   Ht 5' 8\" (1.727 m)   Wt 146 lb (66.2 kg)   SpO2 96%   BMI 22.20 kg/m²   Temp (24hrs), Av.4 °F (36.3 °C), Min:97.4 °F (36.3 °C), Max:97.4 °F (36.3 °C)    No results for input(s): POCGLU in the last 72 hours. No intake or output data in the 24 hours ending 23 1652    General Appearance:  alert, well appearing, and in no acute distress, thin but person   Mental status: oriented to person, place, and time with normal affect  Head:  normocephalic, atraumatic. Eye: no icterus, redness, pupils equal and reactive, extraocular eye movements intact, conjunctiva clear  Ear: normal external ear, no discharge, hearing intact  Nose:  no drainage noted  Mouth: mucous membranes moist  Neck: supple, no carotid bruits, thyroid not palpable  Lungs: Bilateral equal air entry, clear to ausculation, no wheezing, rales or rhonchi, normal effort  Cardiovascular: Irregular irregular heart rate.     Abdomen: Soft, nontender, nondistended, normal bowel sounds, no hepatomegaly or splenomegaly  Neurologic: There are no new focal motor or sensory deficits, normal muscle tone and bulk, no abnormal sensation, normal speech, cranial nerves II through XII grossly intact  Skin: No gross lesions, rashes, bruising or bleeding on exposed skin area  Extremities:  peripheral pulses palpable, no pedal edema or calf pain with palpation  Psych: normal affect     Investigations:      Laboratory Testing:  Recent Results (from the past 24 hour(s))   Troponin    Collection Time: 23  1:20 PM   Result Value Ref Range    Troponin, High Sensitivity 9 0 - 22 ng/L   APTT    Collection Time: 23  1:20 PM   Result Value Ref Range    PTT 26.3 24.0 - 36.0 sec   Protime-INR    Collection Time: 23  1:20 PM   Result Value Ref Range    Protime 13.5 11.8 - 14.6 sec    INR 1.0    Phosphorus    Collection Time: 02/17/23  1:20 PM   Result Value Ref Range    Phosphorus 3.6 2.5 - 4.5 mg/dL   Magnesium    Collection Time: 02/17/23  1:20 PM   Result Value Ref Range    Magnesium 2.0 1.6 - 2.6 mg/dL   Basic Metabolic Panel    Collection Time: 02/17/23  1:20 PM   Result Value Ref Range    Glucose 131 (H) 70 - 99 mg/dL    BUN 25 (H) 6 - 20 mg/dL    Creatinine 0.75 0.70 - 1.20 mg/dL    Est, Glom Filt Rate >60 >60 mL/min/1.73m2    Calcium 9.2 8.6 - 10.4 mg/dL    Sodium 141 135 - 144 mmol/L    Potassium 4.3 3.7 - 5.3 mmol/L    Chloride 105 98 - 107 mmol/L    CO2 27 20 - 31 mmol/L    Anion Gap 9 9 - 17 mmol/L   CBC with Auto Differential    Collection Time: 02/17/23  1:20 PM   Result Value Ref Range    WBC 7.4 3.5 - 11.0 k/uL    RBC 5.15 4.5 - 5.9 m/uL    Hemoglobin 15.8 13.5 - 17.5 g/dL    Hematocrit 46.7 41 - 53 %    MCV 90.7 80 - 100 fL    MCH 30.6 26 - 34 pg    MCHC 33.8 31 - 37 g/dL    RDW 14.3 11.5 - 14.9 %    Platelets 792 776 - 764 k/uL    MPV 8.9 6.0 - 12.0 fL    Seg Neutrophils 74 (H) 36 - 66 %    Lymphocytes 17 (L) 24 - 44 %    Monocytes 8 (H) 1 - 7 %    Eosinophils % 1 0 - 4 %    Basophils 0 0 - 2 %    Segs Absolute 5.50 1.3 - 9.1 k/uL    Absolute Lymph # 1.30 1.0 - 4.8 k/uL    Absolute Mono # 0.60 0.1 - 1.3 k/uL    Absolute Eos # 0.10 0.0 - 0.4 k/uL    Basophils Absolute 0.00 0.0 - 0.2 k/uL   Troponin    Collection Time: 02/17/23  3:23 PM   Result Value Ref Range    Troponin, High Sensitivity 10 0 - 22 ng/L   COVID-19 & Influenza Combo    Collection Time: 02/17/23  3:43 PM    Specimen: Nasopharyngeal Swab   Result Value Ref Range    Specimen Description . NASOPHARYNGEAL SWAB     Source . NASOPHARYNGEAL SWAB     SARS-CoV-2 RNA, RT PCR Not Detected Not Detected    INFLUENZA A Not Detected Not Detected    INFLUENZA B Not Detected Not Detected       Imaging/Diagnostics:      Assessment :      Primary Problem  Atrial fibrillation [facility])    Active Hospital Problems    Diagnosis Date Noted    Atrial fibrillation ([facility] [I48.91] 02/17/2023     Priority: Medium   Principal Problem:    Atrial fibrillation ([facility]  Active Problems: Aortic valve stenosis    CHF (congestive heart failure), NYHA class I, acute on chronic, combined (HCC)    Atrial fibrillation with rapid ventricular response (HCC)    COPD (chronic obstructive pulmonary disease) (HCC)    History of cocaine abuse (HCC)    NICM (nonischemic cardiomyopathy) ([facility]    S/p TAVR (transcatheter aortic valve replacement), bioprosthetic  Resolved Problems:    * No resolved hospital problems. *       Plan:     Patient status Admit as inpatient in the  Progressive Unit/Step down    Patient admitted with nonspecific complaints, found to have A-fib with RVR on EKG, will continue with home dose of Cardizem, Eliquis, Lopressor as needed, with heart rate more than 110, will order TSH, urine drug screen, cardiology consult, patient follows with Dr. Venus Rawls  History of bioprosthetic arctic valve, aortic regurgitation, patient follows with cardiologist, will repeat echocardiogram  Heart failure with reduced ejection fraction, compensated  COPD, compensated, cutting down his smoking  On Eliquis for DVT prophylaxis    Consultations:   IP CONSULT TO INTERNAL MEDICINE  IP CONSULT TO CARDIOLOGY     Patient is admitted as inpatient status because of co-morbidities listed above, severity of signs and symptoms as outlined, requirement for current medical therapies and most importantly because of direct risk to patient if care not provided in a hospital setting. Diamante Morales MD  2/17/2023  4:52 PM    Copy sent to Dr. Diamante Morales MD    Please note that this chart was generated using voice recognition Dragon dictation software. Although every effort was made to ensure the accuracy of this automated transcription, some errors in transcription may have occurred.

## 2023-02-17 NOTE — ED TRIAGE NOTES
Mode of arrival (squad #, walk in, police, etc) : walk in        Chief complaint(s): palpitation, dizziness, numbness        Arrival Note (brief scenario, treatment PTA, etc). : pt states he developed chest pain today and he can feel the beats in his chest, pt states he has had his vision going out in his eyes and he is very light headed. Pt also   Has left arm tingling sensation and states he needs a valve replaced that he hasn't had done/ pt also c/o neck pain but denies SOB      C= \"Have you ever felt that you should Cut down on your drinking? \"  No  A= \"Have people Annoyed you by criticizing your drinking? \"  No  G= \"Have you ever felt bad or Guilty about your drinking? \"  No  E= \"Have you ever had a drink as an Eye-opener first thing in the morning to steady your nerves or to help a hangover? \"  No      Deferred []      Reason for deferring: N/A    *If yes to two or more: probable alcohol abuse. *

## 2023-02-17 NOTE — ED PROVIDER NOTES
EMERGENCY DEPARTMENT ENCOUNTER    Pt Name: Sachin Sommers  MRN: [id number]  [place] 1965  Date of evaluation: 2/17/23  CHIEF COMPLAINT       Chief Complaint   Patient presents with    Palpitations    Numbness    Dizziness     HISTORY OF PRESENT ILLNESS   55-year-old male presenting [facility] complaining of shortness of breath as well as palpitations. Patient admits to an underlying history of atrial fibrillation and states he is on Eliquis therapy. Patient denies being on any rate control medication. Patient states the only medication he does utilize his Eliquis. Patient does not admit to chest pain but does admit to left upper extremity pain and tingling. The history is provided by the patient. Palpitations  Palpitations quality:  Fast  Onset quality:  Gradual  Duration:  1 day  Timing:  Constant  Progression:  Unchanged  Chronicity:  New  Associated symptoms: shortness of breath    Associated symptoms: no chest pain, no cough, no dizziness, no nausea and no vomiting          REVIEW OF SYSTEMS     Review of Systems   Constitutional:  Negative for activity change, fatigue and fever. HENT:  Negative for congestion, ear pain and trouble swallowing. Eyes:  Negative for photophobia and visual disturbance. Respiratory:  Positive for shortness of breath. Negative for cough. Cardiovascular:  Positive for palpitations. Negative for chest pain. Gastrointestinal:  Negative for abdominal pain, diarrhea, nausea and vomiting. Genitourinary:  Negative for dysuria, flank pain and urgency. Musculoskeletal:  Positive for arthralgias (LUE pain). Negative for myalgias. Skin:  Negative for color change and rash. Neurological:  Negative for dizziness and facial asymmetry. Psychiatric/Behavioral:  Negative for agitation and behavioral problems.     PASTMEDICAL HISTORY     Past Medical History:   Diagnosis Date    Abnormal echocardiogram 02/2018    Arrhythmia     AS (aortic stenosis)     Atrial fibrillation ([facility])     Benign prostatic hyperplasia     Biatrial enlargement     Central perforation of tympanic membrane, right     Chest pain     Chews tobacco     CHF (congestive heart failure), NYHA class I, acute on chronic, combined ([facility])     Chronic back pain     Conductive hearing loss of right ear     COPD (chronic obstructive pulmonary disease) ([facility])     Diverticulitis     Fatigue     History of cocaine abuse ([facility]) 01/2018    Household contact with history of methicillin resistant Staphylococcus aureus (MRSA) infection     Lung nodule     Mass of upper lobe of left lung     Mild concentric left ventricular hypertrophy     Murmur, cardiac     Near syncope     Nonrheumatic aortic (valve) stenosis     Palpitations     Panlobular emphysema ([facility])     Pneumonia of both upper lobes due to infectious organism     Right-sided tinnitus     Severe aortic stenosis     Shortness of breath     Tobacco abuse      Past Problem List  Patient Active Problem List   Diagnosis Code    Back pain M54.9    Tobacco abuse Z72.0    Thrush, oral B37.0    Fatigue R53.83    Left foot burn T25.022A    Cellulitis L03.90    Cellulitis of buttock L03.317    Gluteal abscess L02.31    Aortic valve stenosis I35.0    Shortness of breath R06.02    CHF (congestive heart failure), NYHA class I, acute on chronic, combined ([facility]) I50.43    Pneumonia of both upper lobes due to infectious organism J18.9    Crack cocaine use F14.90    Atrial fibrillation with rapid ventricular response ([facility]) I48.91    Chest pain R07.9    COPD (chronic obstructive pulmonary disease) ([facility]) J44.9    Cocaine abuse ([facility]) F14.10    Abnormal echocardiogram R93.1    Biatrial enlargement I51.7    Central perforation of tympanic membrane, right H72.01    Chews tobacco Z72.0    Conductive hearing loss of right ear H90.11    History of cocaine abuse ([facility]) F14.11    Mass of upper lobe of left lung R91.8    Mild concentric left ventricular hypertrophy (LVH) I51.7    MRSA  (methicillin resistant Staphylococcus aureus) infection A49.02    Near syncope R55    NICM (nonischemic cardiomyopathy) ([facility]) I42.8    Normal coronary arteries [id number]    Panlobular emphysema ([facility]) J43.1    Right-sided tinnitus H93.11    S/p TAVR (transcatheter aortic valve replacement), bioprosthetic Z95.3    Numbness and tingling of left upper extremity R20.0, R20.2    Unstable angina ([facility]) I20.0    Acute coronary syndrome ([facility]) I24.9    Bronchitis, acute J20.9    Urinary retention R33.9    Atrial fibrillation ([facility]) I48.91     SURGICAL HISTORY       Past Surgical History:   Procedure Laterality Date    AORTIC VALVE REPLACEMENT  12/2018    CYST REMOVAL Left     INGUINAL HERNIA REPAIR Right     AL INCISION & DRAINAGE ABSCESS COMPLICATED/MULTIPLE N/A 2/12/2018    GLUTEAL INCISION AND DRAINAGE performed by Zander Rincon DO at [address] [place] [address]       Current Discharge Medication List        CONTINUE these medications which have NOT CHANGED    Details   ELIQUIS 5 MG TABS tablet take 1 tablet by mouth twice a day  Qty: 60 tablet, Refills: 3    Associated Diagnoses: Atrial fibrillation with rapid ventricular response ([facility])      acetaminophen (TYLENOL) 500 MG tablet Take 2 tablets by mouth every 6 hours as needed for Pain  Qty: 60 tablet, Refills: 0      PROAIR  (90 Base) MCG/ACT inhaler inhale 2 puffs by mouth and INTO THE LUNGS every 6 hours if needed for shortness of breath  Qty: 8.5 g, Refills: 3    Associated Diagnoses: Chronic obstructive pulmonary disease, unspecified COPD type ([facility])      tamsulosin (FLOMAX) 0.4 MG capsule Take 1 capsule by mouth daily  Qty: 30 capsule, Refills: 0    Associated Diagnoses: Benign prostatic hyperplasia without lower urinary tract symptoms      dilTIAZem (CARDIZEM CD) 120 MG extended release capsule Take 1 capsule by mouth daily  Qty: 30 capsule, Refills: 3      guaiFENesin (MUCINEX) 600 MG extended release tablet Take 1 tablet by mouth 2 times daily  Qty: 20 tablet, Refills: 0      atorvastatin (LIPITOR) 10 MG tablet Take 1 tablet by mouth nightly  Qty: 30 tablet, Refills: 3      tiotropium (SPIRIVA HANDIHALER) 18 MCG inhalation capsule Inhale 1 capsule into the lungs daily  Qty: 90 capsule, Refills: 1    Associated Diagnoses: Chronic obstructive pulmonary disease, unspecified COPD type ([facility])      fluticasone (FLONASE) 50 MCG/ACT nasal spray 1 spray by Each Nostril route daily  Qty: 2 Bottle, Refills: 1    Associated Diagnoses: Allergic rhinitis due to pollen, unspecified seasonality      loratadine (CLARITIN) 10 MG tablet Take 1 tablet by mouth daily  Qty: 10 tablet, Refills: 0    Associated Diagnoses: Allergic rhinitis due to pollen, unspecified seasonality      aspirin 81 MG chewable tablet Take 1 tablet by mouth daily  Qty: 30 tablet, Refills: 3    Associated Diagnoses: Aortic valve stenosis, etiology of cardiac valve disease unspecified           ALLERGIES     has No Known Allergies. FAMILY HISTORY     He indicated that the status of his mother is unknown. He indicated that the status of his father is unknown. He indicated that the status of his paternal grandfather is unknown.      SOCIAL HISTORY       Social History     Tobacco Use    Smoking status: Every Day     Packs/day: 0.25     Years: 40.00     Pack years: 10.00     Types: Cigarettes     Last attempt to quit: 2022     Years since quittin.1    Smokeless tobacco: Current     Types: Chew    Tobacco comments:     pt  previously smoked 1.5 ppd;   Vaping Use    Vaping Use: Never used   Substance Use Topics    Alcohol use: Not Currently    Drug use: Not Currently     Types: Cocaine     Comment: drug abuse includes crack cocaine; last used 5 months ago      PHYSICAL EXAM     INITIAL VITALS: /85   Pulse 96   Temp 97.4 °F (36.3 °C) (Oral)   Resp 15   Ht 5' 8\" (1.727 m)   Wt 146 lb (66.2 kg)   SpO2 100%   BMI 22.20 kg/m²    Physical Exam  Constitutional:       General: He is not in acute distress. Appearance: Normal appearance. HENT:      Head: Normocephalic and atraumatic. Right Ear: External ear normal.      Left Ear: External ear normal.      Nose: Nose normal. No congestion or rhinorrhea. Eyes:      Extraocular Movements: Extraocular movements intact. Pupils: Pupils are equal, round, and reactive to light. Cardiovascular:      Rate and Rhythm: Tachycardia present. Rhythm irregularly irregular. Pulses: Normal pulses. Heart sounds: Normal heart sounds. Pulmonary:      Effort: Pulmonary effort is normal. No respiratory distress. Breath sounds: Normal breath sounds. Abdominal:      General: Bowel sounds are normal.      Palpations: Abdomen is soft. Musculoskeletal:         General: No deformity. Normal range of motion. Cervical back: Normal range of motion. No rigidity. Skin:     General: Skin is warm and dry. Neurological:      General: No focal deficit present. Mental Status: He is alert and oriented to person, place, and time. Mental status is at baseline. Psychiatric:         Mood and Affect: Mood normal.         Behavior: Behavior normal.       MEDICAL DECISION MAKING / ED COURSE:         1)  Number and Complexity of Problems Addressed at this Encounter  Problem List This Visit: Shortness of breath, lightheadedness, dizziness, palpitations    Differential Diagnosis: Atrial fibrillation with rapid ventricular response, acute coronary syndrome, intracranial abnormality        Pertinent Comorbid Conditions: Atrial fibrillation    2)  Data Reviewed and Analyzed  (Lab and radiology tests/orders below in next section)        My EKG interpretation: Did not display signs of atrial fibrillation with rapid ventricular response        Imaging that is independently reviewed and interpreted by me as: Chest x-ray did not display signs of acute normality. CT of the head did not display signs of acute pathology.     3)  Treatment and Disposition Patient with atrial fibrillation with rapid ventricular response. Patient is already anticoagulated on an outpatient basis. Patient is not on any form of rate control. The patient's heart rate was between the 90s and 100 teens. Rate control was not started in the ER. Cardizem is on national shortage. Cardiac work-up in the ER did not display positive signs of acute cardiac ischemia. EKG was reviewed and interpreted by myself, ED physician. The patient was admitted after speaking the admitting team in order to ensure proper rate control. Patient understands and agrees with plan. CRITICAL CARE:       PROCEDURES:    Procedures      DATA FOR LAB AND RADIOLOGY TESTS ORDERED BELOW ARE REVIEWED BY THE ED CLINICIAN:    RADIOLOGY: All x-rays, CT, MRI, and formal ultrasound images (except ED bedside ultrasound) are read by the radiologist, see reports below, unless otherwise noted in MDM or here. Reports below are reviewed by myself. CT HEAD WO CONTRAST   Preliminary Result   No acute intracranial abnormality. XR CHEST PORTABLE   Final Result   No radiographic evidence of acute pulmonary disease. XR CERVICAL SPINE (2-3 VIEWS)    (Results Pending)       LABS: Lab orders shown below, the results are reviewed by myself, and all abnormals are listed below.   Labs Reviewed   BASIC METABOLIC PANEL - Abnormal; Notable for the following components:       Result Value    Glucose 131 (*)     BUN 25 (*)     All other components within normal limits   CBC WITH AUTO DIFFERENTIAL - Abnormal; Notable for the following components:    Seg Neutrophils 74 (*)     Lymphocytes 17 (*)     Monocytes 8 (*)     All other components within normal limits   COVID-19 & INFLUENZA COMBO   TROPONIN   TROPONIN   APTT   PROTIME-INR   PHOSPHORUS   MAGNESIUM   TSH WITH REFLEX   URINE DRUG SCREEN   BASIC METABOLIC PANEL   CBC WITH AUTO DIFFERENTIAL       Vitals Reviewed:    Vitals:    02/17/23 1729 02/17/23 1753 02/17/23 1808 02/17/23 1825   BP: 97/69 112/87 112/87 119/85   Pulse: 98 92 92 96   Resp: 14 15  15   Temp:  97.5 °F (36.4 °C)  97.4 °F (36.3 °C)   TempSrc:  Oral  Oral   SpO2: 96% 100%  100%   Weight:       Height:         MEDICATIONS GIVEN TO PATIENT THIS ENCOUNTER:  Orders Placed This Encounter   Medications    acetaminophen (TYLENOL) tablet 1,000 mg    aspirin chewable tablet 81 mg    atorvastatin (LIPITOR) tablet 10 mg    dilTIAZem (CARDIZEM CD) extended release capsule 120 mg    fluticasone (FLONASE) 50 MCG/ACT nasal spray 1 spray    guaiFENesin (MUCINEX) extended release tablet 600 mg    cetirizine (ZYRTEC) tablet 10 mg    albuterol sulfate HFA (PROVENTIL;VENTOLIN;PROAIR) 108 (90 Base) MCG/ACT inhaler 2 puff     Order Specific Question:   Initiate RT Bronchodilator Protocol     Answer:   Yes - Inpatient Protocol    tamsulosin (FLOMAX) capsule 0.4 mg    tiotropium (SPIRIVA RESPIMAT) 2.5 MCG/ACT inhaler 2 puff    apixaban (ELIQUIS) tablet 5 mg     Order Specific Question:   Indication of Use     Answer:   A Fib/A Flutter    metoprolol (LOPRESSOR) injection 5 mg       DISCHARGE PRESCRIPTIONS:  Current Discharge Medication List        PHYSICIAN CONSULTS ORDERED THIS ENCOUNTER:  IP CONSULT TO INTERNAL MEDICINE  IP CONSULT TO CARDIOLOGY  FINAL IMPRESSION      1. Dizziness    2. Atrial fibrillation with RVR ([facility] [address]          DISPOSITION/PLAN   DISPOSITION Admitted 02/17/2023 04:46:28 PM      OUTPATIENT FOLLOW UP THE PATIENT:  No follow-up provider specified.     DO Abdiel Thomas DO  02/17/23 1951

## 2023-02-17 NOTE — PROGRESS NOTES
Medication History completed:    New medications: none    Medications discontinued: none    Medications flagged for review:   Acetaminophen  Aspirin  Atorvastatin  Diltiazem ER  Fluticasone nasal spray  Guaifenesin ER  Loratadine  ProAir  Tamsulosin  Spiriva    Changes to dosing: none    Stated allergies: NKDA    Other pertinent information: Medications confirmed with patient and [facility]. The patient reports the only medication he takes currently is his Eliquis. He is no longer taking other prescribed medications.      Thank you,  Rosamaria Sanchez, PharmD, BCPS  [phone]

## 2023-02-18 LAB
ABSOLUTE EOS #: 0.2 K/UL (ref 0–0.4)
ABSOLUTE LYMPH #: 2.6 K/UL (ref 1–4.8)
ABSOLUTE MONO #: 0.7 K/UL (ref 0.1–1.3)
AMPHETAMINE SCREEN URINE: NEGATIVE
ANION GAP SERPL CALCULATED.3IONS-SCNC: 9 MMOL/L (ref 9–17)
BARBITURATE SCREEN URINE: NEGATIVE
BASOPHILS # BLD: 1 % (ref 0–2)
BASOPHILS ABSOLUTE: 0 K/UL (ref 0–0.2)
BENZODIAZEPINE SCREEN, URINE: NEGATIVE
BUN SERPL-MCNC: 21 MG/DL (ref 6–20)
CALCIUM SERPL-MCNC: 8.8 MG/DL (ref 8.6–10.4)
CANNABINOID SCREEN URINE: NEGATIVE
CHLORIDE SERPL-SCNC: 107 MMOL/L (ref 98–107)
CO2 SERPL-SCNC: 26 MMOL/L (ref 20–31)
COCAINE METABOLITE, URINE: POSITIVE
CREAT SERPL-MCNC: 0.8 MG/DL (ref 0.7–1.2)
EOSINOPHILS RELATIVE PERCENT: 2 % (ref 0–4)
FENTANYL URINE: NEGATIVE
GFR SERPL CREATININE-BSD FRML MDRD: >60 ML/MIN/1.73M2
GLUCOSE SERPL-MCNC: 105 MG/DL (ref 70–99)
HCT VFR BLD AUTO: 45.1 % (ref 41–53)
HGB BLD-MCNC: 15.2 G/DL (ref 13.5–17.5)
LYMPHOCYTES # BLD: 38 % (ref 24–44)
MCH RBC QN AUTO: 30.1 PG (ref 26–34)
MCHC RBC AUTO-ENTMCNC: 33.6 G/DL (ref 31–37)
MCV RBC AUTO: 89.7 FL (ref 80–100)
METHADONE SCREEN, URINE: NEGATIVE
MONOCYTES # BLD: 10 % (ref 1–7)
OPIATES, URINE: NEGATIVE
OXYCODONE SCREEN URINE: NEGATIVE
PDW BLD-RTO: 14.6 % (ref 11.5–14.9)
PHENCYCLIDINE, URINE: NEGATIVE
PLATELET # BLD AUTO: 159 K/UL (ref 150–450)
PMV BLD AUTO: 9.1 FL (ref 6–12)
POTASSIUM SERPL-SCNC: 4.1 MMOL/L (ref 3.7–5.3)
RBC # BLD: 5.03 M/UL (ref 4.5–5.9)
SEG NEUTROPHILS: 49 % (ref 36–66)
SEGMENTED NEUTROPHILS ABSOLUTE COUNT: 3.4 K/UL (ref 1.3–9.1)
SODIUM SERPL-SCNC: 142 MMOL/L (ref 135–144)
TEST INFORMATION: ABNORMAL
WBC # BLD AUTO: 7 K/UL (ref 3.5–11)

## 2023-02-18 PROCEDURE — 6370000000 HC RX 637 (ALT 250 FOR IP): Performed by: INTERNAL MEDICINE

## 2023-02-18 PROCEDURE — 94760 N-INVAS EAR/PLS OXIMETRY 1: CPT

## 2023-02-18 PROCEDURE — 2060000000 HC ICU INTERMEDIATE R&B

## 2023-02-18 PROCEDURE — 85025 COMPLETE CBC W/AUTO DIFF WBC: CPT

## 2023-02-18 PROCEDURE — 94640 AIRWAY INHALATION TREATMENT: CPT

## 2023-02-18 PROCEDURE — 99232 SBSQ HOSP IP/OBS MODERATE 35: CPT | Performed by: INTERNAL MEDICINE

## 2023-02-18 PROCEDURE — 36415 COLL VENOUS BLD VENIPUNCTURE: CPT

## 2023-02-18 PROCEDURE — 80048 BASIC METABOLIC PNL TOTAL CA: CPT

## 2023-02-18 PROCEDURE — 80307 DRUG TEST PRSMV CHEM ANLYZR: CPT

## 2023-02-18 RX ORDER — CARVEDILOL 3.12 MG/1
3.12 TABLET ORAL 2 TIMES DAILY WITH MEALS
Status: DISCONTINUED | OUTPATIENT
Start: 2023-02-18 | End: 2023-02-22 | Stop reason: HOSPADM

## 2023-02-18 RX ORDER — METOPROLOL SUCCINATE 25 MG/1
25 TABLET, EXTENDED RELEASE ORAL DAILY
Status: DISCONTINUED | OUTPATIENT
Start: 2023-02-18 | End: 2023-02-18

## 2023-02-18 RX ADMIN — TIOTROPIUM BROMIDE INHALATION SPRAY 2 PUFF: 3.12 SPRAY, METERED RESPIRATORY (INHALATION) at 09:54

## 2023-02-18 RX ADMIN — METOPROLOL SUCCINATE 25 MG: 25 TABLET, EXTENDED RELEASE ORAL at 13:35

## 2023-02-18 RX ADMIN — ATORVASTATIN CALCIUM 10 MG: 10 TABLET, FILM COATED ORAL at 20:08

## 2023-02-18 RX ADMIN — DILTIAZEM HYDROCHLORIDE 120 MG: 120 CAPSULE, COATED, EXTENDED RELEASE ORAL at 09:30

## 2023-02-18 RX ADMIN — FLUTICASONE PROPIONATE 1 SPRAY: 50 SPRAY, METERED NASAL at 09:31

## 2023-02-18 RX ADMIN — GUAIFENESIN 600 MG: 600 TABLET, EXTENDED RELEASE ORAL at 09:29

## 2023-02-18 RX ADMIN — TAMSULOSIN HYDROCHLORIDE 0.4 MG: 0.4 CAPSULE ORAL at 09:29

## 2023-02-18 RX ADMIN — ASPIRIN 81 MG: 81 TABLET, CHEWABLE ORAL at 09:29

## 2023-02-18 RX ADMIN — GUAIFENESIN 600 MG: 600 TABLET, EXTENDED RELEASE ORAL at 20:08

## 2023-02-18 RX ADMIN — APIXABAN 5 MG: 5 TABLET, FILM COATED ORAL at 09:29

## 2023-02-18 RX ADMIN — APIXABAN 5 MG: 5 TABLET, FILM COATED ORAL at 20:08

## 2023-02-18 NOTE — PROGRESS NOTES
IBETH Mayes 53    HISTORY AND PHYSICAL EXAMINATION            Date:   2/18/2023  Patient name:  Nia Balbuena  Date of admission:  2/17/2023  1:05 PM  MRN:   [id number]  Account:  [de-identified]  YOB: 1965  PCP:    Leah Clark MD  Room:   2123/2123-01  Code Status:    Full Code    Chief Complaint:     Chief Complaint   Patient presents with    Palpitations    Numbness    Dizziness       History Obtained From:     patient, electronic medical record    History of Present Illness: The patient is a 62 y.o.   Non- / non  male who presents with Palpitations, Numbness, and Dizziness   and he is admitted to the hospital for the management of dizziness, palpitation  Patient has past medical history of multiple medical problem, which include COPD, current smoker, history of atrial fibrillation, bioprosthetic aortic valve, aortic regurgitation, patient has past medical history of substance abuse  Patient is a poor historian  Patient, presented with complaints of dizziness, palpitations, symptoms are going on for last few days, no aggravating or relieving factors  Patient also experiencing chest pressure along with the symptoms    He told me that he is compliant with his diet and medication, he did not use any drugs  Patient, also noticing tingling numbness, affecting his left arm, neck area, left leg, has chronic neck pain, the symptoms are intermittently going for on for last 3 months    In the emergency room, patient underwent EKG suggestive of A-fib with RVR, patient underwent 2 sets of troponins, which was flat    2/18   Patient, clinically doing better  Heart rate better controlled  No chest pain, shortness of breath  Past Medical History:     Past Medical History:   Diagnosis Date    Abnormal echocardiogram 02/2018    Arrhythmia     AS (aortic stenosis)     Atrial fibrillation ([address]     Benign prostatic hyperplasia     Biatrial enlargement     Central perforation of tympanic membrane, right     Chest pain     Chews tobacco     CHF (congestive heart failure), NYHA class I, acute on chronic, combined (HCC)     Chronic back pain     Conductive hearing loss of right ear     COPD (chronic obstructive pulmonary disease) [facility]     Diverticulitis     Fatigue     History of cocaine abuse ([facility] 01/2018    Household contact with history of methicillin resistant Staphylococcus aureus (MRSA) infection     Lung nodule     Mass of upper lobe of left lung     Mild concentric left ventricular hypertrophy     Murmur, cardiac     Near syncope     Nonrheumatic aortic (valve) stenosis     Palpitations     Panlobular emphysema ([facility]     Pneumonia of both upper lobes due to infectious organism     Right-sided tinnitus     Severe aortic stenosis     Shortness of breath     Tobacco abuse         Past Surgical History:     Past Surgical History:   Procedure Laterality Date    AORTIC VALVE REPLACEMENT  12/2018    CYST REMOVAL Left     INGUINAL HERNIA REPAIR Right     TX INCISION & DRAINAGE ABSCESS COMPLICATED/MULTIPLE N/A 2/12/2018    GLUTEAL INCISION AND DRAINAGE performed by Julianne Goins DO at [address]        Medications Prior to Admission:     Prior to Admission medications    Medication Sig Start Date End Date Taking?  Authorizing Provider   ELIQUIS 5 MG TABS tablet take 1 tablet by mouth twice a day 1/4/23   Jamey Fothergill, MD   acetaminophen (TYLENOL) 500 MG tablet Take 2 tablets by mouth every 6 hours as needed for Pain  Patient not taking: Reported on 2/17/2023 10/7/22   Jessica Shea MD   PROAIR  (44 Base) MCG/ACT inhaler inhale 2 puffs by mouth and INTO THE LUNGS every 6 hours if needed for shortness of breath  Patient not taking: Reported on 2/17/2023 7/8/22   Jamey Fothergill, MD   tamsulosin (FLOMAX) 0.4 MG capsule Take 1 capsule by mouth daily  Patient not taking: Reported on 2/17/2023 11/13/21   Tito Murdock MD   dilTIAZem (CARDIZEM CD) 120 MG extended release capsule Take 1 capsule by mouth daily  Patient not taking: Reported on 2/17/2023 11/14/21   Marga Rosas MD   guaiFENesin (MUCINEX) 600 MG extended release tablet Take 1 tablet by mouth 2 times daily  Patient not taking: Reported on 2/17/2023 11/13/21   Marga Rosas MD   atorvastatin (LIPITOR) 10 MG tablet Take 1 tablet by mouth nightly  Patient not taking: Reported on 2/17/2023 11/13/21   Marga Rosas MD   tiotropium (Bonnie Hylan) 18 MCG inhalation capsule Inhale 1 capsule into the lungs daily  Patient not taking: Reported on 2/17/2023 10/7/21   Mirza Lundberg MD   fluticasone (FLONASE) 50 MCG/ACT nasal spray 1 spray by Each Nostril route daily  Patient not taking: Reported on 2/17/2023 4/12/21   Mirza Lundberg MD   loratadine (CLARITIN) 10 MG tablet Take 1 tablet by mouth daily  Patient not taking: Reported on 2/17/2023 4/20/20   Mirza Lundberg MD   aspirin 81 MG chewable tablet Take 1 tablet by mouth daily  Patient not taking: Reported on 2/17/2023 1/7/19   Perez Marcum MD        Allergies:     Patient has no known allergies. Social History:     Tobacco:    reports that he has been smoking cigarettes. He has a 10.00 pack-year smoking history. His smokeless tobacco use includes chew. Alcohol:      reports that he does not currently use alcohol. Drug Use:  reports that he does not currently use drugs after having used the following drugs: Cocaine. Family History:     Family History   Problem Relation Age of Onset    Depression Mother     COPD Father     COPD Paternal Grandfather        Review of Systems:     Positive and Negative as described in HPI. CONSTITUTIONAL: Positive for dizziness  HEENT:  negative for vision, hearing changes, runny nose, throat pain  RESPIRATORY:  negative for shortness of breath, cough, congestion, wheezing.   CARDIOVASCULAR: Palpitation, chest pressure  GASTROINTESTINAL:  negative for nausea, vomiting, diarrhea, constipation, change in bowel habits, abdominal pain   GENITOURINARY:  negative for difficulty of urination, burning with urination, frequency   INTEGUMENT:  negative for rash, skin lesions, easy bruising   HEMATOLOGIC/LYMPHATIC:  negative for swelling/edema   ALLERGIC/IMMUNOLOGIC:  negative for urticaria , itching  ENDOCRINE:  negative increase in drinking, increase in urination, hot or cold intolerance  MUSCULOSKELETAL:  negative joint pains, muscle aches, swelling of joints  NEUROLOGICAL: Positive for dizziness, tingling numbness, affecting left arm, left leg  BEHAVIOR/PSYCH:  negative for depression, anxiety    Physical Exam:   /85   Pulse 97   Temp 97.5 °F (36.4 °C) (Oral)   Resp 16   Ht 5' 8\" (1.727 m)   Wt 146 lb (66.2 kg)   SpO2 97%   BMI 22.20 kg/m²   Temp (24hrs), Av.5 °F (36.4 °C), Min:97.4 °F (36.3 °C), Max:97.7 °F (36.5 °C)    No results for input(s): POCGLU in the last 72 hours. No intake or output data in the 24 hours ending 23 1509    General Appearance:  alert, well appearing, and in no acute distress, thin but person   Mental status: oriented to person, place, and time with normal affect  Head:  normocephalic, atraumatic. Eye: no icterus, redness, pupils equal and reactive, extraocular eye movements intact, conjunctiva clear  Ear: normal external ear, no discharge, hearing intact  Nose:  no drainage noted  Mouth: mucous membranes moist  Neck: supple, no carotid bruits, thyroid not palpable  Lungs: Bilateral equal air entry, clear to ausculation, no wheezing, rales or rhonchi, normal effort  Cardiovascular: Irregular irregular heart rate.     Abdomen: Soft, nontender, nondistended, normal bowel sounds, no hepatomegaly or splenomegaly  Neurologic: There are no new focal motor or sensory deficits, normal muscle tone and bulk, no abnormal sensation, normal speech, cranial nerves II through XII grossly intact  Skin: No gross lesions, rashes, bruising or bleeding on exposed skin area  Extremities:  peripheral pulses palpable, no pedal edema or calf pain with palpation  Psych: normal affect     Investigations:      Laboratory Testing:  Recent Results (from the past 24 hour(s))   Troponin    Collection Time: 02/17/23  3:23 PM   Result Value Ref Range    Troponin, High Sensitivity 10 0 - 22 ng/L   TSH with Reflex    Collection Time: 02/17/23  3:23 PM   Result Value Ref Range    TSH 1.29 0.30 - 5.00 uIU/mL   COVID-19 & Influenza Combo    Collection Time: 02/17/23  3:43 PM    Specimen: Nasopharyngeal Swab   Result Value Ref Range    Specimen Description .NASOPHARYNGEAL SWAB     Source .NASOPHARYNGEAL SWAB     SARS-CoV-2 RNA, RT PCR Not Detected Not Detected    INFLUENZA A Not Detected Not Detected    INFLUENZA B Not Detected Not Detected   Basic Metabolic Panel    Collection Time: 02/18/23  4:14 AM   Result Value Ref Range    Glucose 105 (H) 70 - 99 mg/dL    BUN 21 (H) 6 - 20 mg/dL    Creatinine 0.80 0.70 - 1.20 mg/dL    Est, Glom Filt Rate >60 >60 mL/min/1.73m2    Calcium 8.8 8.6 - 10.4 mg/dL    Sodium 142 135 - 144 mmol/L    Potassium 4.1 3.7 - 5.3 mmol/L    Chloride 107 98 - 107 mmol/L    CO2 26 20 - 31 mmol/L    Anion Gap 9 9 - 17 mmol/L   CBC with Auto Differential    Collection Time: 02/18/23  4:14 AM   Result Value Ref Range    WBC 7.0 3.5 - 11.0 k/uL    RBC 5.03 4.5 - 5.9 m/uL    Hemoglobin 15.2 13.5 - 17.5 g/dL    Hematocrit 45.1 41 - 53 %    MCV 89.7 80 - 100 fL    MCH 30.1 26 - 34 pg    MCHC 33.6 31 - 37 g/dL    RDW 14.6 11.5 - 14.9 %    Platelets 159 150 - 450 k/uL    MPV 9.1 6.0 - 12.0 fL    Seg Neutrophils 49 36 - 66 %    Lymphocytes 38 24 - 44 %    Monocytes 10 (H) 1 - 7 %    Eosinophils % 2 0 - 4 %    Basophils 1 0 - 2 %    Segs Absolute 3.40 1.3 - 9.1 k/uL    Absolute Lymph # 2.60 1.0 - 4.8 k/uL    Absolute Mono # 0.70 0.1 - 1.3 k/uL    Absolute Eos # 0.20 0.0 - 0.4 k/uL    Basophils Absolute 0.00 0.0 - 0.2 k/uL   Drug Scr, Abuse, Ur    Collection Time: 02/18/23 10:57 AM   Result Value Ref Range     Amphetamine Screen, Ur NEGATIVE NEGATIVE    Barbiturate Screen, Ur NEGATIVE NEGATIVE    Benzodiazepine Screen, Urine NEGATIVE NEGATIVE    Cocaine Metabolite, Urine POSITIVE (A) NEGATIVE    Methadone Screen, Urine NEGATIVE NEGATIVE    Opiates, Urine NEGATIVE NEGATIVE    Phencyclidine, Urine NEGATIVE NEGATIVE    Cannabinoid Scrn, Ur NEGATIVE NEGATIVE    Oxycodone Screen, Ur NEGATIVE NEGATIVE    Fentanyl, Ur NEGATIVE NEGATIVE    Test Information       Assay provides medical screening only. The absence of expected drug(s) and/or metabolite(s) may indicate diluted or adulterated urine, limitations of testing or timing of collection. Imaging/Diagnostics:      Assessment :      Primary Problem  Atrial fibrillation [facility])    Active Hospital Problems    Diagnosis Date Noted    Atrial fibrillation [facility]) [I48.91] 02/17/2023     Priority: Medium    COPD (chronic obstructive pulmonary disease) ([facility] [J44.9] 07/30/2019    Atrial fibrillation with rapid ventricular response (HCC) [I48.91]     S/p TAVR (transcatheter aortic valve replacement), bioprosthetic [Z95.3] 12/27/2018    NICM (nonischemic cardiomyopathy) ([facility] [facility] [I42.8] 10/02/2018    CHF (congestive heart failure), NYHA class I, acute on chronic, combined ([facility] [I50.43] 08/25/2018    History of cocaine abuse ([facility] [F14.11] 02/27/2018    Aortic valve stenosis [I35.0] 01/05/2017   Principal Problem:    Atrial fibrillation ([facility]  Active Problems: Aortic valve stenosis    CHF (congestive heart failure), NYHA class I, acute on chronic, combined (HCC)    Atrial fibrillation with rapid ventricular response (HCC)    COPD (chronic obstructive pulmonary disease) (HCC)    History of cocaine abuse (HCC)    NICM (nonischemic cardiomyopathy) ([facility]    S/p TAVR (transcatheter aortic valve replacement), bioprosthetic  Resolved Problems:    * No resolved hospital problems.  *       Plan:     Patient status Admit as inpatient in the  Progressive Unit/Step down    Patient admitted with nonspecific complaints, found to have A-fib with RVR on EKG, will continue with home dose of Cardizem, Eliquis, Lopressor as needed, with heart rate more than 110, will order TSH, urine drug screen, cardiology consult, patient follows with Dr. Reji Capone  History of bioprosthetic arctic valve, aortic regurgitation, patient follows with cardiologist, will repeat echocardiogram  Heart failure with reduced ejection fraction, compensated  COPD, compensated, cutting down his smoking  On Eliquis for DVT prophylaxis  2.18   UDS positive for cocaine  Lopressor added per cardiologist, given positive cocaine in urine, changing Lopressor to Coreg  May need stress test  Echo pending    Consultations:   IP CONSULT TO INTERNAL MEDICINE  IP CONSULT TO CARDIOLOGY     Patient is admitted as inpatient status because of co-morbidities listed above, severity of signs and symptoms as outlined, requirement for current medical therapies and most importantly because of direct risk to patient if care not provided in a hospital setting. Bg Feng MD  2/18/2023  3:09 PM    Copy sent to Dr. Bg Feng MD    Please note that this chart was generated using voice recognition Dragon dictation software. Although every effort was made to ensure the accuracy of this automated transcription, some errors in transcription may have occurred.

## 2023-02-18 NOTE — PROGRESS NOTES
Rn notified on-call cardiology of pt 9 beat run of V-TACH, no new orders at this time, pt asymptomatic.

## 2023-02-18 NOTE — PROGRESS NOTES
Physical Therapy        Physical Therapy Cancel Note      DATE: 2023    NAME: Claudio Solis  MRN: [id number]   : 1965      Patient not seen this date for Physical Therapy due to:    Patient independent with functional mobility. Will defer PT evaluation at this time. Please reorder PT if future needs arise.        Electronically signed by Kelsea Henriquez PT on 2023 at 2:30 PM

## 2023-02-18 NOTE — CONSULTS
[address]  [phone]               Cardiology Consult           Date of Admission:  2/17/2023  Date of Consultation:  2/18/2023      PCP:  Garfield Pineda MD      Chief Complaint: dizzy, tachycardia    History of Present Illness:  Karthik Amaya is a 62 y.o. male who presents with increased HR and feeling dizzy. Also hasnt been feeling well over the past month. Some SOB. Presents with AF with RVR. PMH:   has a past medical history of Abnormal echocardiogram, Arrhythmia, AS (aortic stenosis), Atrial fibrillation ([address] Benign prostatic hyperplasia, Biatrial enlargement, Central perforation of tympanic membrane, right, Chest pain, Chews tobacco, CHF (congestive heart failure), NYHA class I, acute on chronic, combined ([address] Chronic back pain, Conductive hearing loss of right ear, COPD (chronic obstructive pulmonary disease) ([address] Diverticulitis, Fatigue, History of cocaine abuse ([address] Household contact with history of methicillin resistant Staphylococcus aureus (MRSA) infection, Lung nodule, Mass of upper lobe of left lung, Mild concentric left ventricular hypertrophy, Murmur, cardiac, Near syncope, Nonrheumatic aortic (valve) stenosis, Palpitations, Panlobular emphysema (HCC), Pneumonia of both upper lobes due to infectious organism, Right-sided tinnitus, Severe aortic stenosis, Shortness of breath, and Tobacco abuse. PSH:   has a past surgical history that includes Inguinal hernia repair (Right); cyst removal (Left); pr incision & drainage abscess complicated/multiple (N/A, 2/12/2018); and Aortic valve replacement (12/2018). Allergies:  No Known Allergies     Home Meds:    Prior to Admission medications    Medication Sig Start Date End Date Taking?  Authorizing Provider   ELIQUIS 5 MG TABS tablet take 1 tablet by mouth twice a day 1/4/23   Garfield Pineda MD   acetaminophen (TYLENOL) 500 MG tablet Take 2 tablets by mouth every 6 hours as needed for Pain  Patient not taking: Reported on 2/17/2023 10/7/22   Delfin Verma MD   PROAIR  (37 Base) MCG/ACT inhaler inhale 2 puffs by mouth and INTO THE LUNGS every 6 hours if needed for shortness of breath  Patient not taking: Reported on 2/17/2023 7/8/22   Praneeth Fatima MD   tamsulosin (FLOMAX) 0.4 MG capsule Take 1 capsule by mouth daily  Patient not taking: Reported on 2/17/2023 11/13/21   Lesia Bashir MD   dilTIAZem (CARDIZEM CD) 120 MG extended release capsule Take 1 capsule by mouth daily  Patient not taking: Reported on 2/17/2023 11/14/21   Eric Larios MD   guaiFENesin (MUCINEX) 600 MG extended release tablet Take 1 tablet by mouth 2 times daily  Patient not taking: Reported on 2/17/2023 11/13/21   Eric Larios MD   atorvastatin (LIPITOR) 10 MG tablet Take 1 tablet by mouth nightly  Patient not taking: Reported on 2/17/2023 11/13/21   Eric Larios MD   tiotropium (Jeffrey Brunt) 18 MCG inhalation capsule Inhale 1 capsule into the lungs daily  Patient not taking: Reported on 2/17/2023 10/7/21   Praneeth Fatima MD   fluticasone (FLONASE) 50 MCG/ACT nasal spray 1 spray by Each Nostril route daily  Patient not taking: Reported on 2/17/2023 4/12/21   Praneeth Fatima MD   loratadine (CLARITIN) 10 MG tablet Take 1 tablet by mouth daily  Patient not taking: Reported on 2/17/2023 4/20/20   Praneeth Fatima MD   aspirin 81 MG chewable tablet Take 1 tablet by mouth daily  Patient not taking: Reported on 2/17/2023 1/7/19   Michael Sandoval MD        [facility] Meds:    Current Facility-Administered Medications   Medication Dose Route Frequency Provider Last Rate Last Admin    acetaminophen (TYLENOL) tablet 1,000 mg  1,000 mg Oral Q6H PRN Praneeth Fatima MD        aspirin chewable tablet 81 mg  81 mg Oral Daily Praneeth Fatima MD   81 mg at 02/18/23 0929    atorvastatin (LIPITOR) tablet 10 mg  10 mg Oral Nightly Praneeth Fatima MD   10 mg at 02/17/23 2218    dilTIAZem (CARDIZEM CD) extended release capsule 120 mg  120 mg Oral Daily Hiram Weinberg MD   120 mg at 02/18/23 0930    fluticasone (FLONASE) 50 MCG/ACT nasal spray 1 spray  1 spray Each Nostril Daily Hiram Weinberg MD   1 spray at 02/18/23 0931    guaiFENesin (MUCINEX) extended release tablet 600 mg  600 mg Oral BID Hiram Weinberg MD   600 mg at 02/18/23 0929    cetirizine (ZYRTEC) tablet 10 mg  10 mg Oral Daily Hiram Weinberg MD        albuterol sulfate HFA (PROVENTIL;VENTOLIN;PROAIR) 108 (90 Base) MCG/ACT inhaler 2 puff  2 puff Inhalation Q4H PRN Hiram Weinberg MD        tamsulosin (FLOMAX) capsule 0.4 mg  0.4 mg Oral Daily Hiram Weinberg MD   0.4 mg at 02/18/23 0929    tiotropium (SPIRIVA RESPIMAT) 2.5 MCG/ACT inhaler 2 puff  2 puff Inhalation Daily Hiram Weinberg MD   2 puff at 02/18/23 0954    apixaban (ELIQUIS) tablet 5 mg  5 mg Oral BID Hiram Weinberg MD   5 mg at 02/18/23 0929    metoprolol (LOPRESSOR) injection 5 mg  5 mg IntraVENous Q8H PRN Hiram Weinberg MD           Social History:       TOBACCO:   reports that he has been smoking cigarettes. He has a 10.00 pack-year smoking history. His smokeless tobacco use includes chew. ETOH:   reports that he does not currently use alcohol. DRUGS:  reports that he does not currently use drugs after having used the following drugs: Cocaine. OCCUPATION:          Family Histroy:         Problem Relation Age of Onset    Depression Mother     COPD Father     COPD Paternal Grandfather            Review of Systems:   See H and P       Physical Exam    Vital Signs: /85   Pulse 97   Temp 97.5 °F (36.4 °C) (Oral)   Resp 16   Ht 5' 8\" (1.727 m)   Wt 146 lb (66.2 kg)   SpO2 97%   BMI 22.20 kg/m²        Admission Weight: 146 lb (66.2 kg)     General appearance: Awake, Alert Cooperative    Head: Normocephalic, without obvious abnormality, atraumatic    Eyes: Conjunctivae/corneas clear. PERRL, EOM's intact.  Fundi benign    Neck: no adenopathy, no carotid bruit, no JVD, supple, symmetrical, trachea midline and thyroid: not enlarged, symmetric, no tenderness/mass/nodules    Lungs: clear to auscultation bilaterally    Heart: IRR murmur LMSB diastole    Abdomen: Soft, non-tender. Bowel sounds normal. No masses,  no organomegaly    Extremities: extremities normal, atraumatic, no cyanosis or edema    Skin: Skin color, texture, turgor normal. No rashes or lesions    Neurologic: Grossly normal            Labs:      CBC:   Recent Labs     02/17/23  1320 02/18/23  0414   WBC 7.4 7.0   HGB 15.8 15.2   HCT 46.7 45.1   MCV 90.7 89.7    159     BMP:   Recent Labs     02/17/23  1320 02/18/23  0414    142   K 4.3 4.1    107   CO2 27 26   PHOS 3.6  --    BUN 25* 21*   CREATININE 0.75 0.80     PT/INR:   Recent Labs     02/17/23  1320   PROTIME 13.5   INR 1.0     APTT:   Recent Labs     02/17/23  1320   APTT 26.3     MAG:   Recent Labs     02/17/23  1320   MG 2.0     D Dimer: No results for input(s): DDIMER in the last 72 hours. Troponin T   Recent Labs     02/17/23  1320 02/17/23  1523   TROPHS 9 10     ProBNP Invalid input(s): PRO-BNP    XR CERVICAL SPINE (2-3 VIEWS)    Result Date: 2/17/2023  No acute abnormality of the cervical spine. CT HEAD WO CONTRAST    Result Date: 2/18/2023  No acute intracranial abnormality. XR CHEST PORTABLE    Result Date: 2/17/2023  No radiographic evidence of acute pulmonary disease. Diagnosis:  Principal Problem:    Atrial fibrillation ([address]  Active Problems: Aortic valve stenosis    CHF (congestive heart failure), NYHA class I, acute on chronic, combined (HCC)    Atrial fibrillation with rapid ventricular response (HCC)    COPD (chronic obstructive pulmonary disease) ([facility])    History of cocaine abuse ([facility])    NICM (nonischemic cardiomyopathy) ([facility]    S/p TAVR (transcatheter aortic valve replacement), bioprosthetic  Resolved Problems:    * No resolved hospital problems.  *          AF with RVR  Hx of TAVR with leaking AVR  COPD  Hx of AF    Now better but still doesn't feel right    Await repeat echo  Consider stress on Mon  Add PO BB  Already on [facility]              Electronically signed by Sal Pa MD on 2/18/2023 at 1:18 PM Secondary Intention Text (Leave Blank If You Do Not Want): Due to the superficial nature of the defect and/or patient preference, the wound was allowed to heal by secondary intention.

## 2023-02-18 NOTE — PLAN OF CARE
Problem: Discharge Planning  Goal: Discharge to home or other facility with appropriate resources  2/18/2023 1534 by Jj Starr RN  Outcome: Progressing     Problem: Pain  Goal: Verbalizes/displays adequate comfort level or baseline comfort level  2/18/2023 1534 by Jj Starr RN  Outcome: Progressing     Problem: ABCDS Injury Assessment  Goal: Absence of physical injury  2/18/2023 1534 by Jj Starr RN  Outcome: Progressing     Problem: Safety - Adult  Goal: Free from fall injury  2/18/2023 1534 by Jj Starr RN  Outcome: Progressing

## 2023-02-18 NOTE — CARE COORDINATION
Case Management Assessment  Initial Evaluation    Date/Time of Evaluation: 2/18/2023 11:15 AM  Assessment Completed by: Alvarado Angleo RN    If patient is discharged prior to next notation, then this note serves as note for discharge by case management. Patient Name: Benjamín Harrell                   YOB: 1965  Diagnosis: Atrial fibrillation ([facility] [I48.91]  Dizziness [R42]  Atrial fibrillation with RVR ([facility] [facility] [I48.91]                   Date / Time: 2/17/2023  1:05 PM    Patient Admission Status: Inpatient   Readmission Risk (Low < 19, Mod (19-27), High > 27): Readmission Risk Score: 7.8    Current PCP: Jamey Fothergill, MD  PCP verified by CM? Yes    Chart Reviewed: Yes      History Provided by: Patient  Patient Orientation: Alert and Oriented    Patient Cognition: Alert    Hospitalization in the last 30 days (Readmission):  No    If yes, Readmission Assessment in  Navigator will be completed. Advance Directives:      Code Status: Prior   Patient's Primary Decision Maker is: Legal Next of Kin      Discharge Planning:    Patient lives with: Spouse/Significant Other Type of Home: House  Primary Care Giver: Self  Patient Support Systems include: Spouse/Significant Other   Current Financial resources: Medicaid  Current community resources: None  Current services prior to admission: None            Current DME:              Type of Home Care services:  None    ADLS  Prior functional level: Independent in ADLs/IADLs  Current functional level: Independent in ADLs/IADLs    PT AM-PAC:   /24  OT AM-PAC:   /24    Family can provide assistance at DC: Yes  Would you like Case Management to discuss the discharge plan with any other family members/significant others, and if so, who? Yes  Plans to Return to Present Housing: Yes  Other Identified Issues/Barriers to RETURNING to current housing: None.   Potential Assistance needed at discharge: N/A  Potential DME:  NA  Patient expects to discharge to: [address] [place] [address] for transportation at discharge: Self    Financial    Payor: Dc Gregorio / Plan: Dc Gregorio / Product Type: *No Product type* /     Does insurance require precert for SNF: Yes    Potential assistance Purchasing Medications: No  Meds-to-Beds request:  No      [facility] [id number] Bryon Mckeon, [address] [facility] [address]  Phone: [phone] Fax: [phone] [facility], [address] [phone] - F [phone]  [facility] [place] [address]  Phone: [phone] Fax: [address] [facility] [address], [address] [address]  Phone: [phone] Fax: [address] [facility], [address] [place] [address] [phone] Marciano Rowans [phone]  [address] [place] [address]  [address] [place] [address]  Phone: [phone] Fax: [phone]      Notes:    Factors facilitating achievement of predicted outcomes: Friend support    Barriers to discharge: No personal barriers. Additional Case Management Notes: The patient is a two story home (apartment, lives on the second floor), with significant other. Independent, drives. Eliquis PTA. VNS: Denies. Patient reports no needs at this time.      The Plan for Transition of Care is related to the following treatment goals of Atrial fibrillation ([address] [I48.91]  Dizziness [R42]  Atrial fibrillation with RVR ([address] [I48.91]    Darren Yepez RN  Case Management Department  Ph: [phone] Fax: [phone]

## 2023-02-18 NOTE — FLOWSHEET NOTE
02/18/23 0001   Treatment Team Notification   Reason for Communication Review case   Team Member Name Dr. Andrea Sorto Provider   Method of Communication Secure Message   Response Waiting for response   Notification Time 0002   Nadyne Gains returned page and notified of new consult.  No new orders received at this time

## 2023-02-19 LAB
ANION GAP SERPL CALCULATED.3IONS-SCNC: 8 MMOL/L (ref 9–17)
BUN SERPL-MCNC: 22 MG/DL (ref 6–20)
CALCIUM SERPL-MCNC: 8.6 MG/DL (ref 8.6–10.4)
CHLORIDE SERPL-SCNC: 104 MMOL/L (ref 98–107)
CO2 SERPL-SCNC: 27 MMOL/L (ref 20–31)
CREAT SERPL-MCNC: 0.73 MG/DL (ref 0.7–1.2)
GFR SERPL CREATININE-BSD FRML MDRD: >60 ML/MIN/1.73M2
GLUCOSE SERPL-MCNC: 114 MG/DL (ref 70–99)
POTASSIUM SERPL-SCNC: 4.2 MMOL/L (ref 3.7–5.3)
SODIUM SERPL-SCNC: 139 MMOL/L (ref 135–144)

## 2023-02-19 PROCEDURE — 36415 COLL VENOUS BLD VENIPUNCTURE: CPT

## 2023-02-19 PROCEDURE — 6370000000 HC RX 637 (ALT 250 FOR IP): Performed by: INTERNAL MEDICINE

## 2023-02-19 PROCEDURE — 94640 AIRWAY INHALATION TREATMENT: CPT

## 2023-02-19 PROCEDURE — 99232 SBSQ HOSP IP/OBS MODERATE 35: CPT | Performed by: INTERNAL MEDICINE

## 2023-02-19 PROCEDURE — 2060000000 HC ICU INTERMEDIATE R&B

## 2023-02-19 PROCEDURE — 80048 BASIC METABOLIC PNL TOTAL CA: CPT

## 2023-02-19 PROCEDURE — 94760 N-INVAS EAR/PLS OXIMETRY 1: CPT

## 2023-02-19 RX ADMIN — TAMSULOSIN HYDROCHLORIDE 0.4 MG: 0.4 CAPSULE ORAL at 09:02

## 2023-02-19 RX ADMIN — DILTIAZEM HYDROCHLORIDE 120 MG: 120 CAPSULE, COATED, EXTENDED RELEASE ORAL at 09:02

## 2023-02-19 RX ADMIN — APIXABAN 5 MG: 5 TABLET, FILM COATED ORAL at 09:02

## 2023-02-19 RX ADMIN — CARVEDILOL 3.12 MG: 3.12 TABLET, FILM COATED ORAL at 17:26

## 2023-02-19 RX ADMIN — FLUTICASONE PROPIONATE 1 SPRAY: 50 SPRAY, METERED NASAL at 09:04

## 2023-02-19 RX ADMIN — GUAIFENESIN 600 MG: 600 TABLET, EXTENDED RELEASE ORAL at 09:02

## 2023-02-19 RX ADMIN — ASPIRIN 81 MG: 81 TABLET, CHEWABLE ORAL at 09:02

## 2023-02-19 RX ADMIN — CARVEDILOL 3.12 MG: 3.12 TABLET, FILM COATED ORAL at 09:02

## 2023-02-19 RX ADMIN — ATORVASTATIN CALCIUM 10 MG: 10 TABLET, FILM COATED ORAL at 19:52

## 2023-02-19 RX ADMIN — GUAIFENESIN 600 MG: 600 TABLET, EXTENDED RELEASE ORAL at 19:52

## 2023-02-19 RX ADMIN — APIXABAN 5 MG: 5 TABLET, FILM COATED ORAL at 19:52

## 2023-02-19 RX ADMIN — TIOTROPIUM BROMIDE INHALATION SPRAY 2 PUFF: 3.12 SPRAY, METERED RESPIRATORY (INHALATION) at 07:49

## 2023-02-19 NOTE — PLAN OF CARE
Problem: Discharge Planning  Goal: Discharge to home or other facility with appropriate resources  2/19/2023 1600 by Kelya Hutchison RN  Outcome: Progressing     Problem: Pain  Goal: Verbalizes/displays adequate comfort level or baseline comfort level  2/19/2023 1600 by Keyla Hutchison RN  Outcome: Progressing     Problem: ABCDS Injury Assessment  Goal: Absence of physical injury  2/19/2023 1600 by Keyla Hutchison RN  Outcome: Progressing     Problem: Safety - Adult  Goal: Free from fall injury  2/19/2023 1600 by Keyla Hutchison RN  Outcome: Progressing

## 2023-02-19 NOTE — PROGRESS NOTES
[facility]    HISTORY AND PHYSICAL EXAMINATION            Date:   2/19/2023  Patient name:  Katie Mcgee  Date of admission:  2/17/2023  1:05 PM  MRN:   [id number]  Account:  [de-identified]  YOB: 1965  PCP:    Haider Oro MD  Room:   2123/2123-01  Code Status:    Full Code    Chief Complaint:     Chief Complaint   Patient presents with    Palpitations    Numbness    Dizziness       History Obtained From:     patient, electronic medical record    History of Present Illness: The patient is a 62 y.o.   Non- / non  male who presents with Palpitations, Numbness, and Dizziness   and he is admitted to the hospital for the management of dizziness, palpitation  Patient has past medical history of multiple medical problem, which include COPD, current smoker, history of atrial fibrillation, bioprosthetic aortic valve, aortic regurgitation, patient has past medical history of substance abuse  Patient is a poor historian  Patient, presented with complaints of dizziness, palpitations, symptoms are going on for last few days, no aggravating or relieving factors  Patient also experiencing chest pressure along with the symptoms    He told me that he is compliant with his diet and medication, he did not use any drugs  Patient, also noticing tingling numbness, affecting his left arm, neck area, left leg, has chronic neck pain, the symptoms are intermittently going for on for last 3 months    In the emergency room, patient underwent EKG suggestive of A-fib with RVR, patient underwent 2 sets of troponins, which was flat    2/18   Patient, clinically doing better  Heart rate better controlled  No chest pain, shortness of breath  Past Medical History:     Past Medical History:   Diagnosis Date    Abnormal echocardiogram 02/2018    Arrhythmia     AS (aortic stenosis)     Atrial fibrillation ([address]     Benign prostatic hyperplasia     Biatrial enlargement     Central perforation of tympanic membrane, right     Chest pain     Chews tobacco     CHF (congestive heart failure), NYHA class I, acute on chronic, combined (HCC)     Chronic back pain     Conductive hearing loss of right ear     COPD (chronic obstructive pulmonary disease) ([facility]     Diverticulitis     Fatigue     History of cocaine abuse ([facility] [facility] 01/2018    Household contact with history of methicillin resistant Staphylococcus aureus (MRSA) infection     Lung nodule     Mass of upper lobe of left lung     Mild concentric left ventricular hypertrophy     Murmur, cardiac     Near syncope     Nonrheumatic aortic (valve) stenosis     Palpitations     Panlobular emphysema ([facility] [address]     Pneumonia of both upper lobes due to infectious organism     Right-sided tinnitus     Severe aortic stenosis     Shortness of breath     Tobacco abuse         Past Surgical History:     Past Surgical History:   Procedure Laterality Date    AORTIC VALVE REPLACEMENT  12/2018    CYST REMOVAL Left     INGUINAL HERNIA REPAIR Right     MT INCISION & DRAINAGE ABSCESS COMPLICATED/MULTIPLE N/A 2/12/2018    GLUTEAL INCISION AND DRAINAGE performed by Sallie Soto DO at [address]        Medications Prior to Admission:     Prior to Admission medications    Medication Sig Start Date End Date Taking?  Authorizing Provider   ELIQUIS 5 MG TABS tablet take 1 tablet by mouth twice a day 1/4/23   José David MD   acetaminophen (TYLENOL) 500 MG tablet Take 2 tablets by mouth every 6 hours as needed for Pain  Patient not taking: Reported on 2/17/2023 10/7/22   Nate Nolen MD   PROAIR  (54 [address]) MCG/ACT inhaler inhale 2 puffs by mouth and INTO THE LUNGS every 6 hours if needed for shortness of breath  Patient not taking: Reported on 2/17/2023 7/8/22   José David MD   tamsulosin (FLOMAX) 0.4 MG capsule Take 1 capsule by mouth daily  Patient not taking: Reported on 2/17/2023 11/13/21   Kash Rodriguez MD   dilTIAZem (CARDIZEM CD) 120 MG extended release capsule Take 1 capsule by mouth daily  Patient not taking: Reported on 2/17/2023 11/14/21   Sonam Dillon MD   guaiFENesin (MUCINEX) 600 MG extended release tablet Take 1 tablet by mouth 2 times daily  Patient not taking: Reported on 2/17/2023 11/13/21   Sonam Dillon MD   atorvastatin (LIPITOR) 10 MG tablet Take 1 tablet by mouth nightly  Patient not taking: Reported on 2/17/2023 11/13/21   Sonam Dillon MD   tiotropium (Benjamin Devin) 18 MCG inhalation capsule Inhale 1 capsule into the lungs daily  Patient not taking: Reported on 2/17/2023 10/7/21   Felicita Ni MD   fluticasone (FLONASE) 50 MCG/ACT nasal spray 1 spray by Each Nostril route daily  Patient not taking: Reported on 2/17/2023 4/12/21   Felicita Ni MD   loratadine (CLARITIN) 10 MG tablet Take 1 tablet by mouth daily  Patient not taking: Reported on 2/17/2023 4/20/20   Felicita Ni MD   aspirin 81 MG chewable tablet Take 1 tablet by mouth daily  Patient not taking: Reported on 2/17/2023 1/7/19   Kathleen Herrera MD        Allergies:     Patient has no known allergies. Social History:     Tobacco:    reports that he has been smoking cigarettes. He has a 10.00 pack-year smoking history. His smokeless tobacco use includes chew. Alcohol:      reports that he does not currently use alcohol. Drug Use:  reports that he does not currently use drugs after having used the following drugs: Cocaine. Family History:     Family History   Problem Relation Age of Onset    Depression Mother     COPD Father     COPD Paternal Grandfather        Review of Systems:     Positive and Negative as described in HPI. CONSTITUTIONAL: Positive for dizziness  HEENT:  negative for vision, hearing changes, runny nose, throat pain  RESPIRATORY:  negative for shortness of breath, cough, congestion, wheezing.   CARDIOVASCULAR: Palpitation, chest pressure  GASTROINTESTINAL:  negative for nausea, vomiting, diarrhea, constipation, change in bowel habits, abdominal pain   GENITOURINARY:  negative for difficulty of urination, burning with urination, frequency   INTEGUMENT:  negative for rash, skin lesions, easy bruising   HEMATOLOGIC/LYMPHATIC:  negative for swelling/edema   ALLERGIC/IMMUNOLOGIC:  negative for urticaria , itching  ENDOCRINE:  negative increase in drinking, increase in urination, hot or cold intolerance  MUSCULOSKELETAL:  negative joint pains, muscle aches, swelling of joints  NEUROLOGICAL: Positive for dizziness, tingling numbness, affecting left arm, left leg  BEHAVIOR/PSYCH:  negative for depression, anxiety    Physical Exam:   /70   Pulse 77   Temp 97.3 °F (36.3 °C) (Oral)   Resp 17   Ht 5' 8\" (1.727 m)   Wt 146 lb (66.2 kg)   SpO2 97%   BMI 22.20 kg/m²   Temp (24hrs), Av.6 °F (36.4 °C), Min:97.3 °F (36.3 °C), Max:97.7 °F (36.5 °C)    No results for input(s): POCGLU in the last 72 hours. No intake or output data in the 24 hours ending 23 1411    General Appearance:  alert, well appearing, and in no acute distress, thin but person   Mental status: oriented to person, place, and time with normal affect  Head:  normocephalic, atraumatic. Eye: no icterus, redness, pupils equal and reactive, extraocular eye movements intact, conjunctiva clear  Ear: normal external ear, no discharge, hearing intact  Nose:  no drainage noted  Mouth: mucous membranes moist  Neck: supple, no carotid bruits, thyroid not palpable  Lungs: Bilateral equal air entry, clear to ausculation, no wheezing, rales or rhonchi, normal effort  Cardiovascular: Irregular irregular heart rate.     Abdomen: Soft, nontender, nondistended, normal bowel sounds, no hepatomegaly or splenomegaly  Neurologic: There are no new focal motor or sensory deficits, normal muscle tone and bulk, no abnormal sensation, normal speech, cranial nerves II through XII grossly intact  Skin: No gross lesions, rashes, bruising or bleeding on exposed skin area  Extremities:  peripheral pulses palpable, no pedal edema or calf pain with palpation  Psych: normal affect     Investigations:      Laboratory Testing:  Recent Results (from the past 24 hour(s))   Basic Metabolic Panel    Collection Time: 02/19/23  4:45 AM   Result Value Ref Range    Glucose 114 (H) 70 - 99 mg/dL    BUN 22 (H) 6 - 20 mg/dL    Creatinine 0.73 0.70 - 1.20 mg/dL    Est, Glom Filt Rate >60 >60 mL/min/1.73m2    Calcium 8.6 8.6 - 10.4 mg/dL    Sodium 139 135 - 144 mmol/L    Potassium 4.2 3.7 - 5.3 mmol/L    Chloride 104 98 - 107 mmol/L    CO2 27 20 - 31 mmol/L    Anion Gap 8 (L) 9 - 17 mmol/L       Imaging/Diagnostics:      Assessment :      Primary Problem  Atrial fibrillation ([facility])    Active Hospital Problems    Diagnosis Date Noted    Atrial fibrillation ([facility]) [I48.91] 02/17/2023     Priority: Medium    COPD (chronic obstructive pulmonary disease) ([facility]) [J44.9] 07/30/2019    Atrial fibrillation with rapid ventricular response ([facility]) [I48.91]     S/p TAVR (transcatheter aortic valve replacement), bioprosthetic [Z95.3] 12/27/2018    NICM (nonischemic cardiomyopathy) ([facility]) [I42.8] 10/02/2018    CHF (congestive heart failure), NYHA class I, acute on chronic, combined ([facility]) [I50.43] 08/25/2018    History of cocaine abuse ([facility]) [F14.11] 02/27/2018    Aortic valve stenosis [I35.0] 01/05/2017   Principal Problem:    Atrial fibrillation ([facility])  Active Problems:    Aortic valve stenosis    CHF (congestive heart failure), NYHA class I, acute on chronic, combined ([facility])    Atrial fibrillation with rapid ventricular response ([facility])    COPD (chronic obstructive pulmonary disease) ([facility])    History of cocaine abuse ([facility])    NICM (nonischemic cardiomyopathy) ([facility])    S/p TAVR (transcatheter aortic valve replacement), bioprosthetic  Resolved Problems:    * No resolved hospital problems. *       Plan:     Patient status Admit as inpatient in the  Progressive Unit/Step down    Patient admitted with nonspecific complaints, found to have A-fib with RVR  on EKG, will continue with home dose of Cardizem, Eliquis, Lopressor as needed, with heart rate more than 110, will order TSH, urine drug screen, cardiology consult, patient follows with Dr. Wallace Guerra  History of bioprosthetic arctic valve, aortic regurgitation, patient follows with cardiologist, will repeat echocardiogram  Heart failure with reduced ejection fraction, compensated  COPD, compensated, cutting down his smoking  On Eliquis for DVT prophylaxis  2.18   UDS positive for cocaine  Lopressor added per cardiologist, given positive cocaine in urine, changing Lopressor to Coreg  May need stress test  Echo pending  2/19   Patient, had episode of nonsustained V. tach, 9 beats  Plan for echo and stress test tomorrow  Patient remained asymptomatic during that episode  Blood pressure, heart rate controlled  [facility]     Patient is admitted as inpatient status because of co-morbidities listed above, severity of signs and symptoms as outlined, requirement for current medical therapies and most importantly because of direct risk to patient if care not provided in a hospital setting. Jamey Fothergill, MD  2/19/2023  2:11 PM    Copy sent to Dr. Jamey Fothergill, MD    Please note that this chart was generated using voice recognition Dragon dictation software. Although every effort was made to ensure the accuracy of this automated transcription, some errors in transcription may have occurred.

## 2023-02-19 NOTE — PLAN OF CARE
Problem: Discharge Planning  Goal: Discharge to home or other facility with appropriate resources  2/19/2023 0427 by Warren Rowland RN  Outcome: Progressing     Problem: Pain  Goal: Verbalizes/displays adequate comfort level or baseline comfort level  2/19/2023 0427 by Warren Rowland RN  Outcome: Progressing

## 2023-02-19 NOTE — PROGRESS NOTES
[address], [address] [facility]  [phone]          Progress Note    Patient Name:  Wallace Puri    :  1965 12:04 PM      SUBJECTIVE       Mr. Venu Munoz  has persistent pressure just there all the time left chest neg work up so far. No SOB. We discussed his drug addiction and need for total abstinence        OBJECTIVE     Vital signs:    /66   Pulse 76   Temp 97.7 °F (36.5 °C) (Oral)   Resp 20   Ht 5' 8\" (1.727 m)   Wt 146 lb (66.2 kg)   SpO2 98%   BMI 22.20 kg/m²     . tro    Admit Weight:  146 lb (66.2 kg)    Last 3 weights: Wt Readings from Last 3 Encounters:   23 146 lb (66.2 kg)   22 150 lb (68 kg)   22 151 lb (68.5 kg)       BMI: Body mass index is 22.2 kg/m². Input/Output:     No intake or output data in the 24 hours ending 23 1204      Exam:     General appearance: awake and alert moves all ext   Lungs: no rhonchi, no wheezes, no rales  Heart: S1 and S2 no murmur  Abdomen: positive bowel sounds, no bruits, no masses  Extremities: warm and dry, no cyanosis, no clubbing        Laboratory Studies:     CBC:   Recent Labs     23  1320 23  0414   WBC 7.4 7.0   HGB 15.8 15.2   HCT 46.7 45.1   MCV 90.7 89.7    159     BMP:   Recent Labs     23  1320 23  0414 23  0445    142 139   K 4.3 4.1 4.2    107 104   CO2 27 26 27   PHOS 3.6  --   --    BUN 25* 21* 22*   CREATININE 0.75 0.80 0.73     PT/INR:   Recent Labs     23  1320   PROTIME 13.5   INR 1.0     APTT:   Recent Labs     23  1320   APTT 26.3     MAG:   Recent Labs     23  1320   MG 2.0     D Dimer: No results for input(s): DDIMER in the last 72 hours. Troponin    Recent Labs     23  1320 23  1523   TROPHS 9 10                BNP No results for input(s): BNP in the last 72 hours. No results for input(s): PROBNP in the last 72 hours. Pulse Ox:  SpO2  Av.8 % Min: 97 %  Max: 99 %  Supplemental O2:       Current Meds:    carvedilol  3.125 mg Oral BID WC    aspirin  81 mg Oral Daily    atorvastatin  10 mg Oral Nightly    dilTIAZem  120 mg Oral Daily    fluticasone  1 spray Each Nostril Daily    guaiFENesin  600 mg Oral BID    cetirizine  10 mg Oral Daily    tamsulosin  0.4 mg Oral Daily    tiotropium  2 puff Inhalation Daily    apixaban  5 mg Oral BID     Continuous Infusions:       XR CERVICAL SPINE (2-3 VIEWS)    Result Date: 2/17/2023  No acute abnormality of the cervical spine. CT HEAD WO CONTRAST    Result Date: 2/18/2023  No acute intracranial abnormality. XR CHEST PORTABLE    Result Date: 2/17/2023  No radiographic evidence of acute pulmonary disease. Echo:      ASSESSMENT     Principal Problem:    Atrial fibrillation ([address]  Active Problems: Aortic valve stenosis    CHF (congestive heart failure), NYHA class I, acute on chronic, combined (HCC)    Atrial fibrillation with rapid ventricular response (HCC)    COPD (chronic obstructive pulmonary disease) ([facility])    History of cocaine abuse ([facility])    NICM (nonischemic cardiomyopathy) ([address]    S/p TAVR (transcatheter aortic valve replacement), bioprosthetic  Resolved Problems:    * No resolved hospital problems.  *       Chest pain  VT  Positive for cocaine  PAF  Post TAVR with AR  smoker    PLAN     Lexiscan tomorrow  Echo tomorrow    Due for fix of TAVR leak early March, but may need coronary arteriography sooner    Electronically signed by Emre Mcintyre MD on 2/19/2023 at 12:04 PM

## 2023-02-19 NOTE — CARE COORDINATION
ONGOING DISCHARGE PLAN:    Patient is alert and oriented x4. Spoke with patient regarding discharge plan and patient confirms that plan is still to return home, no needs. Will continue to follow for additional discharge needs.     Eliquis PTA    Electronically signed by Any Hernandes RN on 2/19/2023 at 3:31 PM

## 2023-02-20 ENCOUNTER — APPOINTMENT (OUTPATIENT)
Dept: NON INVASIVE DIAGNOSTICS | Age: 58
End: 2023-02-20
Payer: MEDICAID

## 2023-02-20 ENCOUNTER — APPOINTMENT (OUTPATIENT)
Dept: NUCLEAR MEDICINE | Age: 58
End: 2023-02-20
Payer: MEDICAID

## 2023-02-20 LAB
ANION GAP SERPL CALCULATED.3IONS-SCNC: 5 MMOL/L (ref 9–17)
BUN SERPL-MCNC: 19 MG/DL (ref 6–20)
CALCIUM SERPL-MCNC: 8.8 MG/DL (ref 8.6–10.4)
CHLORIDE SERPL-SCNC: 106 MMOL/L (ref 98–107)
CO2 SERPL-SCNC: 30 MMOL/L (ref 20–31)
CREAT SERPL-MCNC: 0.89 MG/DL (ref 0.7–1.2)
EKG ATRIAL RATE: 72 BPM
EKG Q-T INTERVAL: 348 MS
EKG QRS DURATION: 82 MS
EKG QTC CALCULATION (BAZETT): 451 MS
EKG R AXIS: 76 DEGREES
EKG T AXIS: 91 DEGREES
EKG VENTRICULAR RATE: 101 BPM
GFR SERPL CREATININE-BSD FRML MDRD: >60 ML/MIN/1.73M2
GLUCOSE SERPL-MCNC: 99 MG/DL (ref 70–99)
LV EF: 53 %
LV EF: 53 %
LVEF MODALITY: NORMAL
LVEF MODALITY: NORMAL
POTASSIUM SERPL-SCNC: 4.2 MMOL/L (ref 3.7–5.3)
SODIUM SERPL-SCNC: 141 MMOL/L (ref 135–144)

## 2023-02-20 PROCEDURE — 2580000003 HC RX 258: Performed by: INTERNAL MEDICINE

## 2023-02-20 PROCEDURE — 6360000002 HC RX W HCPCS: Performed by: INTERNAL MEDICINE

## 2023-02-20 PROCEDURE — 93306 TTE W/DOPPLER COMPLETE: CPT

## 2023-02-20 PROCEDURE — 3430000000 HC RX DIAGNOSTIC RADIOPHARMACEUTICAL: Performed by: INTERNAL MEDICINE

## 2023-02-20 PROCEDURE — 93010 ELECTROCARDIOGRAM REPORT: CPT | Performed by: INTERNAL MEDICINE

## 2023-02-20 PROCEDURE — 99239 HOSP IP/OBS DSCHRG MGMT >30: CPT | Performed by: INTERNAL MEDICINE

## 2023-02-20 PROCEDURE — 78452 HT MUSCLE IMAGE SPECT MULT: CPT

## 2023-02-20 PROCEDURE — 93017 CV STRESS TEST TRACING ONLY: CPT

## 2023-02-20 PROCEDURE — 80048 BASIC METABOLIC PNL TOTAL CA: CPT

## 2023-02-20 PROCEDURE — A9500 TC99M SESTAMIBI: HCPCS | Performed by: INTERNAL MEDICINE

## 2023-02-20 PROCEDURE — 36415 COLL VENOUS BLD VENIPUNCTURE: CPT

## 2023-02-20 PROCEDURE — 94760 N-INVAS EAR/PLS OXIMETRY 1: CPT

## 2023-02-20 PROCEDURE — 6370000000 HC RX 637 (ALT 250 FOR IP): Performed by: INTERNAL MEDICINE

## 2023-02-20 PROCEDURE — 94640 AIRWAY INHALATION TREATMENT: CPT

## 2023-02-20 PROCEDURE — 2060000000 HC ICU INTERMEDIATE R&B

## 2023-02-20 RX ORDER — SODIUM CHLORIDE 0.9 % (FLUSH) 0.9 %
5-40 SYRINGE (ML) INJECTION PRN
Status: DISCONTINUED | OUTPATIENT
Start: 2023-02-20 | End: 2023-02-20 | Stop reason: SDUPTHER

## 2023-02-20 RX ORDER — ALBUTEROL SULFATE 90 UG/1
2 AEROSOL, METERED RESPIRATORY (INHALATION) PRN
Status: ACTIVE | OUTPATIENT
Start: 2023-02-20 | End: 2023-02-20

## 2023-02-20 RX ORDER — AMINOPHYLLINE DIHYDRATE 25 MG/ML
50 INJECTION, SOLUTION INTRAVENOUS PRN
Status: ACTIVE | OUTPATIENT
Start: 2023-02-20 | End: 2023-02-20

## 2023-02-20 RX ORDER — AMINOPHYLLINE DIHYDRATE 25 MG/ML
50 INJECTION, SOLUTION INTRAVENOUS PRN
Status: DISCONTINUED | OUTPATIENT
Start: 2023-02-20 | End: 2023-02-20 | Stop reason: SDUPTHER

## 2023-02-20 RX ORDER — ALBUTEROL SULFATE 90 UG/1
2 AEROSOL, METERED RESPIRATORY (INHALATION) PRN
Status: DISCONTINUED | OUTPATIENT
Start: 2023-02-20 | End: 2023-02-20 | Stop reason: SDUPTHER

## 2023-02-20 RX ORDER — NITROGLYCERIN 0.4 MG/1
0.4 TABLET SUBLINGUAL EVERY 5 MIN PRN
Status: DISCONTINUED | OUTPATIENT
Start: 2023-02-20 | End: 2023-02-20 | Stop reason: SDUPTHER

## 2023-02-20 RX ORDER — CARVEDILOL 3.12 MG/1
3.12 TABLET ORAL 2 TIMES DAILY WITH MEALS
Qty: 60 TABLET | Refills: 3 | Status: SHIPPED | OUTPATIENT
Start: 2023-02-20

## 2023-02-20 RX ORDER — METOPROLOL TARTRATE 5 MG/5ML
5 INJECTION INTRAVENOUS EVERY 5 MIN PRN
Status: DISCONTINUED | OUTPATIENT
Start: 2023-02-20 | End: 2023-02-20 | Stop reason: SDUPTHER

## 2023-02-20 RX ORDER — SODIUM CHLORIDE 9 MG/ML
500 INJECTION, SOLUTION INTRAVENOUS CONTINUOUS PRN
Status: ACTIVE | OUTPATIENT
Start: 2023-02-20 | End: 2023-02-20

## 2023-02-20 RX ORDER — ATROPINE SULFATE 0.1 MG/ML
0.5 INJECTION INTRAVENOUS EVERY 5 MIN PRN
Status: DISCONTINUED | OUTPATIENT
Start: 2023-02-20 | End: 2023-02-20 | Stop reason: SDUPTHER

## 2023-02-20 RX ORDER — TETRAKIS(2-METHOXYISOBUTYLISOCYANIDE)COPPER(I) TETRAFLUOROBORATE 1 MG/ML
15.2 INJECTION, POWDER, LYOPHILIZED, FOR SOLUTION INTRAVENOUS
Status: COMPLETED | OUTPATIENT
Start: 2023-02-20 | End: 2023-02-20

## 2023-02-20 RX ORDER — NITROGLYCERIN 0.4 MG/1
0.4 TABLET SUBLINGUAL EVERY 5 MIN PRN
Status: ACTIVE | OUTPATIENT
Start: 2023-02-20 | End: 2023-02-20

## 2023-02-20 RX ORDER — TETRAKIS(2-METHOXYISOBUTYLISOCYANIDE)COPPER(I) TETRAFLUOROBORATE 1 MG/ML
35 INJECTION, POWDER, LYOPHILIZED, FOR SOLUTION INTRAVENOUS
Status: COMPLETED | OUTPATIENT
Start: 2023-02-20 | End: 2023-02-20

## 2023-02-20 RX ORDER — ATROPINE SULFATE 0.1 MG/ML
0.5 INJECTION INTRAVENOUS EVERY 5 MIN PRN
Status: ACTIVE | OUTPATIENT
Start: 2023-02-20 | End: 2023-02-20

## 2023-02-20 RX ORDER — SODIUM CHLORIDE 0.9 % (FLUSH) 0.9 %
5-40 SYRINGE (ML) INJECTION PRN
Status: ACTIVE | OUTPATIENT
Start: 2023-02-20 | End: 2023-02-20

## 2023-02-20 RX ORDER — SODIUM CHLORIDE 0.9 % (FLUSH) 0.9 %
10 SYRINGE (ML) INJECTION PRN
Status: DISCONTINUED | OUTPATIENT
Start: 2023-02-20 | End: 2023-02-22 | Stop reason: HOSPADM

## 2023-02-20 RX ORDER — METOPROLOL TARTRATE 5 MG/5ML
5 INJECTION INTRAVENOUS EVERY 5 MIN PRN
Status: ACTIVE | OUTPATIENT
Start: 2023-02-20 | End: 2023-02-20

## 2023-02-20 RX ADMIN — Medication 15.2 MILLICURIE: at 11:23

## 2023-02-20 RX ADMIN — Medication 36.6 MILLICURIE: at 14:31

## 2023-02-20 RX ADMIN — ATORVASTATIN CALCIUM 10 MG: 10 TABLET, FILM COATED ORAL at 20:32

## 2023-02-20 RX ADMIN — APIXABAN 5 MG: 5 TABLET, FILM COATED ORAL at 20:32

## 2023-02-20 RX ADMIN — DILTIAZEM HYDROCHLORIDE 120 MG: 120 CAPSULE, COATED, EXTENDED RELEASE ORAL at 13:29

## 2023-02-20 RX ADMIN — FLUTICASONE PROPIONATE 1 SPRAY: 50 SPRAY, METERED NASAL at 13:31

## 2023-02-20 RX ADMIN — REGADENOSON 0.4 MG: 0.08 INJECTION, SOLUTION INTRAVENOUS at 11:23

## 2023-02-20 RX ADMIN — SODIUM CHLORIDE, PRESERVATIVE FREE 20 ML: 5 INJECTION INTRAVENOUS at 11:24

## 2023-02-20 RX ADMIN — SODIUM CHLORIDE, PRESERVATIVE FREE 10 ML: 5 INJECTION INTRAVENOUS at 14:31

## 2023-02-20 RX ADMIN — CETIRIZINE HYDROCHLORIDE 10 MG: 10 TABLET, FILM COATED ORAL at 13:29

## 2023-02-20 RX ADMIN — TAMSULOSIN HYDROCHLORIDE 0.4 MG: 0.4 CAPSULE ORAL at 13:29

## 2023-02-20 RX ADMIN — APIXABAN 5 MG: 5 TABLET, FILM COATED ORAL at 13:29

## 2023-02-20 RX ADMIN — TIOTROPIUM BROMIDE INHALATION SPRAY 2 PUFF: 3.12 SPRAY, METERED RESPIRATORY (INHALATION) at 08:16

## 2023-02-20 RX ADMIN — ASPIRIN 81 MG: 81 TABLET, CHEWABLE ORAL at 13:29

## 2023-02-20 RX ADMIN — GUAIFENESIN 600 MG: 600 TABLET, EXTENDED RELEASE ORAL at 20:32

## 2023-02-20 RX ADMIN — GUAIFENESIN 600 MG: 600 TABLET, EXTENDED RELEASE ORAL at 13:29

## 2023-02-20 RX ADMIN — CARVEDILOL 3.12 MG: 3.12 TABLET, FILM COATED ORAL at 13:29

## 2023-02-20 NOTE — PROGRESS NOTES
Cardiology paged, spoke with Dr. Autumn Mcdonald, updated regarding stress results. Keep NPO at midnight.

## 2023-02-20 NOTE — PLAN OF CARE
Problem: Discharge Planning  Goal: Discharge to home or other facility with appropriate resources  2/20/2023 0526 by Warren Rowland RN  Outcome: Progressing     Problem: Pain  Goal: Verbalizes/displays adequate comfort level or baseline comfort level  2/20/2023 0526 by Warren Rowland RN  Outcome: Progressing     Problem: ABCDS Injury Assessment  Goal: Absence of physical injury  2/20/2023 0526 by Warren Rowland RN  Outcome: Progressing     Problem: Safety - Adult  Goal: Free from fall injury  2/20/2023 0526 by Warren Rowland RN  Outcome: Progressing

## 2023-02-20 NOTE — PROGRESS NOTES
[address], [address]  [phone]    PROGRESS NOTE     Micki Pineda  was seen examined at bedside this morning. Stated that he still has on and off chest pressure, mainly when he gets up and walks. Sometimes at rest.  Denies shortness of breath. No orthopnea or leg edema. Review of telemetry showing patient atrial fibrillation, rate controlled. No significant events noted. SUBJECTIVE     Allergies:  No Known Allergies     CURRENT MEDICATIONS   carvedilol  3.125 mg Oral BID WC    aspirin  81 mg Oral Daily    atorvastatin  10 mg Oral Nightly    dilTIAZem  120 mg Oral Daily    fluticasone  1 spray Each Nostril Daily    guaiFENesin  600 mg Oral BID    cetirizine  10 mg Oral Daily    tamsulosin  0.4 mg Oral Daily    tiotropium  2 puff Inhalation Daily    apixaban  5 mg Oral BID     CONTINUOUS INFUSIONS   sodium chloride             OBJECTIVE     CBC: @LABRCNTIP(WBC:3,HGB:3,HCT:3,MCV:3,PLT:3)@      JumpChat@XDx  PT/INR: @LABRCNTIP(PROTIME:3,INR:3)@  APTT: @LABRCNTIP(aPTT:3)@  MAG: @LABRCNTIP(MG:3)@  D Dimer: @LABRCNTIP(DDIMER:3)@  Troponin I @LABRCNTIP(TROPONINI:3)@  ProBNP @LABRCNTIP(BNP:3)@  Lipid Panel:    Lab Results   Component Value Date/Time    CHOL 128 11/12/2021 07:08 AM    TRIG 62 11/12/2021 07:08 AM    HDL 48 11/12/2021 07:08 AM     Liver Panel: No results found for: ALB  HgA1C:  No components found for: HGBA1C    ABG: No components found for: ABGSAMPLETYP, ABGBODYTEMP, ABGPHCORRFOR, JHPCMG6MPKTQT, ABGPHCORRFOOR, ABGPH, ABGPCO2, ABGPO2, ABGBASEEXCES, ABGBASEDEFIC, ABGHCO3, FAXL3YLL, ABGENDTIDALC, ABGALLENSTES, ABGSPO2, ABGSAMEPLESIT, [id number], ABGOXYGENSOUE      LAST ECHO (Within 2 Years)   No results found for this or any previous visit.         RADIOLOGY:  [unfilled]    PHYSICAL EXAM  Admission Weight: Weight: 146 lb (66.2 kg)  No intake/output data recorded. Weight change: Wt Readings from Last 3 Encounters:   02/17/23 146 lb (66.2 kg)   11/14/22 150 lb (68 kg)   04/08/22 151 lb (68.5 kg)       Vitals:  Vitals:    02/19/23 1725 02/19/23 1846 02/20/23 0000 02/20/23 0630   BP: 105/68 102/62 (!) 104/53 (!) 102/59   Pulse: 73 99 78 83   Resp:  18 19 16   Temp:  97.7 °F (36.5 °C) 97.6 °F (36.4 °C) 97.9 °F (36.6 °C)   TempSrc:  Oral     SpO2:  97% 97% 97%   Weight:       Height:           Admit Weight  Weight: 146 lb (66.2 kg)  Last 3 Weights  [unfilled]  Body mass index is 22.2 kg/m². INTAKE/OUTPUT  No intake/output data recorded. No intake or output data in the 24 hours ending 02/20/23 0746    General appearance: Alert oriented and cooperative, In no acute distress  Lungs: Clear to ausculation bilaterally, no use of accessory muscles  Heart[de-identified]   Irregular regular rhythm, normal rate, with normal S1 and S2, no murmurs and no gallops. Abdomen: Soft, non-tender, bowel sounds normal.  Extremities: No edema    ASSESSMENT      Atypical chest pain /pressure   Permanent atrial fibrillation with RVR on Eliquis -  rate now controlled   Normal EF TTE 01/2020   COPD   Tobacco abuse    PLAN     - echocardiogram   And nuclear stress pending. Will follow-up on results. -  Continue Eliquis for prevention of thromboembolic events. -  Aspirin, Lipitor, Coreg 3.125 b.I.d. and Cardizem .  -  Will follow-up. Manasa Nieves MD      This note was completed using a voice transcription system. Every effort was made to ensure accuracy. However, inadvertent computerized transcription errors may be present.

## 2023-02-20 NOTE — PROGRESS NOTES
[address]   OCCUPATIONAL THERAPY MISSED TREATMENT NOTE   INPATIENT   Date: 23  Patient Name: Yolanda Basilio       Room: -  MRN: [id number]   Account #: [de-identified]    : 1965  (62 y.o.)  Gender: male                 REASON FOR MISSED TREATMENT:  Pt independent in room with self care and mobility. Pt denies concerns at this time. No further OT needed. Please re-consult if there is a change in status.     -   OT being discontinued at this time.  Patient functioning at Premorbid Level  No further needs  Edwar [address], OT

## 2023-02-20 NOTE — PROGRESS NOTES
Evan Good. Stress Tech performs patient preparation of physical comfort, review test procedures, pre-stress EKG. Lung Sounds clear in all fields. Consent verified. Educated patient on test procedure and possible side effects of Lexiscan. Cardiologist reviewed pre-test EKG and is present for test. Patient tolerated test well with minor SOB and CP which resolved to baseline after test with caffeine. EKG portion of test complete, Nuc Med portion pending.    Pretest VS: /71 HR  Post test VS: /73 HR 96

## 2023-02-20 NOTE — PLAN OF CARE
Problem: Discharge Planning  Goal: Discharge to home or other facility with appropriate resources  2/20/2023 1826 by Eladio Murphy RN  Outcome: Progressing  2/20/2023 0526 by Magdalena Smiley RN  Outcome: Progressing     Problem: Pain  Goal: Verbalizes/displays adequate comfort level or baseline comfort level  2/20/2023 1826 by Eladio Murphy RN  Outcome: Progressing  2/20/2023 0526 by Magdalena Smiley RN  Outcome: Progressing     Problem: ABCDS Injury Assessment  Goal: Absence of physical injury  2/20/2023 1826 by Eladio Murphy RN  Outcome: Progressing  2/20/2023 0526 by Magdalena Smiley RN  Outcome: Progressing     Problem: Safety - Adult  Goal: Free from fall injury  2/20/2023 1826 by Eladio Murphy RN  Outcome: Progressing  2/20/2023 0526 by Magdalena Smiley RN  Outcome: Progressing     Problem: Chronic Conditions and Co-morbidities  Goal: Patient's chronic conditions and co-morbidity symptoms are monitored and maintained or improved  2/20/2023 1826 by Eladio Murphy RN  Outcome: Progressing  2/20/2023 0526 by Magdalena Smiley RN  Outcome: Progressing

## 2023-02-20 NOTE — PROGRESS NOTES
[facility] [address] [address]    HISTORY AND PHYSICAL EXAMINATION            Date:   2/20/2023  Patient name:  Zhen Miller  Date of admission:  2/17/2023  1:05 PM  MRN:   [id number]  Account:  [de-identified]  YOB: 1965  PCP:    Praneeth Fatima MD  Room:   2123/2123-01  Code Status:    Full Code    Chief Complaint:     Chief Complaint   Patient presents with    Palpitations    Numbness    Dizziness       History Obtained From:     patient, electronic medical record    History of Present Illness: The patient is a 62 y.o.   Non- / non  male who presents with Palpitations, Numbness, and Dizziness   and he is admitted to the hospital for the management of dizziness, palpitation  Patient has past medical history of multiple medical problem, which include COPD, current smoker, history of atrial fibrillation, bioprosthetic aortic valve, aortic regurgitation, patient has past medical history of substance abuse  Patient is a poor historian  Patient, presented with complaints of dizziness, palpitations, symptoms are going on for last few days, no aggravating or relieving factors  Patient also experiencing chest pressure along with the symptoms    He told me that he is compliant with his diet and medication, he did not use any drugs  Patient, also noticing tingling numbness, affecting his left arm, neck area, left leg, has chronic neck pain, the symptoms are intermittently going for on for last 3 months    In the emergency room, patient underwent EKG suggestive of A-fib with RVR, patient underwent 2 sets of troponins, which was flat    2/18   Patient, clinically doing better  Heart rate better controlled  No chest pain, shortness of breath  Past Medical History:     Past Medical History:   Diagnosis Date    Abnormal echocardiogram 02/2018    Arrhythmia     AS (aortic stenosis)     Atrial fibrillation ([address]     Benign prostatic hyperplasia     Biatrial enlargement     Central perforation of tympanic membrane, right     Chest pain     Chews tobacco     CHF (congestive heart failure), NYHA class I, acute on chronic, combined (HCC)     Chronic back pain     Conductive hearing loss of right ear     COPD (chronic obstructive pulmonary disease) ([facility]     Diverticulitis     Fatigue     History of cocaine abuse ([facility] 01/2018    Household contact with history of methicillin resistant Staphylococcus aureus (MRSA) infection     Lung nodule     Mass of upper lobe of left lung     Mild concentric left ventricular hypertrophy     Murmur, cardiac     Near syncope     Nonrheumatic aortic (valve) stenosis     Palpitations     Panlobular emphysema ([facility]     Pneumonia of both upper lobes due to infectious organism     Right-sided tinnitus     Severe aortic stenosis     Shortness of breath     Tobacco abuse         Past Surgical History:     Past Surgical History:   Procedure Laterality Date    AORTIC VALVE REPLACEMENT  12/2018    CYST REMOVAL Left     INGUINAL HERNIA REPAIR Right     CT INCISION & DRAINAGE ABSCESS COMPLICATED/MULTIPLE N/A 2/12/2018    GLUTEAL INCISION AND DRAINAGE performed by Ashley Valencia DO at [address]        Medications Prior to Admission:     Prior to Admission medications    Medication Sig Start Date End Date Taking?  Authorizing Provider   ELIQUIS 5 MG TABS tablet take 1 tablet by mouth twice a day 1/4/23   Cristel Garcia MD   acetaminophen (TYLENOL) 500 MG tablet Take 2 tablets by mouth every 6 hours as needed for Pain  Patient not taking: Reported on 2/17/2023 10/7/22   Jethro Mancini MD   PROAIR  (92 [address]) MCG/ACT inhaler inhale 2 puffs by mouth and INTO THE LUNGS every 6 hours if needed for shortness of breath  Patient not taking: Reported on 2/17/2023 7/8/22   Cristel Garcia MD   tamsulosin (FLOMAX) 0.4 MG capsule Take 1 capsule by mouth daily  Patient not taking: Reported on 2/17/2023 11/13/21   Brianda Potter MD   dilTIAZem (CARDIZEM CD) 120 MG extended release capsule Take 1 capsule by mouth daily  Patient not taking: Reported on 2/17/2023 11/14/21   Leona Pittman MD   guaiFENesin (MUCINEX) 600 MG extended release tablet Take 1 tablet by mouth 2 times daily  Patient not taking: Reported on 2/17/2023 11/13/21   Leona Pittman MD   atorvastatin (LIPITOR) 10 MG tablet Take 1 tablet by mouth nightly  Patient not taking: Reported on 2/17/2023 11/13/21   Leona Pittman MD   tiotropium ([place]) 18 MCG inhalation capsule Inhale 1 capsule into the lungs daily  Patient not taking: Reported on 2/17/2023 10/7/21   Valentine Rendon MD   fluticasone (FLONASE) 50 MCG/ACT nasal spray 1 spray by Each Nostril route daily  Patient not taking: Reported on 2/17/2023 4/12/21   Valentine Rendon MD   loratadine (CLARITIN) 10 MG tablet Take 1 tablet by mouth daily  Patient not taking: Reported on 2/17/2023 4/20/20   Valentine Rendon MD   aspirin 81 MG chewable tablet Take 1 tablet by mouth daily  Patient not taking: Reported on 2/17/2023 1/7/19   Salvador Mcintyre MD        Allergies:     Patient has no known allergies. Social History:     Tobacco:    reports that he has been smoking cigarettes. He has a 10.00 pack-year smoking history. His smokeless tobacco use includes chew. Alcohol:      reports that he does not currently use alcohol. Drug Use:  reports that he does not currently use drugs after having used the following drugs: Cocaine. Family History:     Family History   Problem Relation Age of Onset    Depression Mother     COPD Father     COPD Paternal Grandfather        Review of Systems:     Positive and Negative as described in HPI. CONSTITUTIONAL: Positive for dizziness  HEENT:  negative for vision, hearing changes, runny nose, throat pain  RESPIRATORY:  negative for shortness of breath, cough, congestion, wheezing.   CARDIOVASCULAR: Palpitation, chest pressure  GASTROINTESTINAL:  negative for nausea, vomiting, diarrhea, constipation, change in bowel habits, abdominal pain   GENITOURINARY:  negative for difficulty of urination, burning with urination, frequency   INTEGUMENT:  negative for rash, skin lesions, easy bruising   HEMATOLOGIC/LYMPHATIC:  negative for swelling/edema   ALLERGIC/IMMUNOLOGIC:  negative for urticaria , itching  ENDOCRINE:  negative increase in drinking, increase in urination, hot or cold intolerance  MUSCULOSKELETAL:  negative joint pains, muscle aches, swelling of joints  NEUROLOGICAL: Positive for dizziness, tingling numbness, affecting left arm, left leg  BEHAVIOR/PSYCH:  negative for depression, anxiety    Physical Exam:   BP (!) 129/56   Pulse 94   Temp 97.9 °F (36.6 °C)   Resp 18   Ht 5' 8\" (1.727 m)   Wt 146 lb (66.2 kg)   SpO2 100%   BMI 22.20 kg/m²   Temp (24hrs), Av.7 °F (36.5 °C), Min:97.6 °F (36.4 °C), Max:97.9 °F (36.6 °C)    No results for input(s): POCGLU in the last 72 hours. No intake or output data in the 24 hours ending 23 1416    General Appearance:  alert, well appearing, and in no acute distress, thin but person   Mental status: oriented to person, place, and time with normal affect  Head:  normocephalic, atraumatic. Eye: no icterus, redness, pupils equal and reactive, extraocular eye movements intact, conjunctiva clear  Ear: normal external ear, no discharge, hearing intact  Nose:  no drainage noted  Mouth: mucous membranes moist  Neck: supple, no carotid bruits, thyroid not palpable  Lungs: Bilateral equal air entry, clear to ausculation, no wheezing, rales or rhonchi, normal effort  Cardiovascular: Irregular irregular heart rate.     Abdomen: Soft, nontender, nondistended, normal bowel sounds, no hepatomegaly or splenomegaly  Neurologic: There are no new focal motor or sensory deficits, normal muscle tone and bulk, no abnormal sensation, normal speech, cranial nerves II through XII grossly intact  Skin: No gross lesions, rashes, bruising or bleeding on exposed skin area  Extremities:  peripheral pulses palpable, no pedal edema or calf pain with palpation  Psych: normal affect     Investigations:      Laboratory Testing:  Recent Results (from the past 24 hour(s))   Basic Metabolic Panel    Collection Time: 02/20/23  4:48 AM   Result Value Ref Range    Glucose 99 70 - 99 mg/dL    BUN 19 6 - 20 mg/dL    Creatinine 0.89 0.70 - 1.20 mg/dL    Est, Glom Filt Rate >60 >60 mL/min/1.73m2    Calcium 8.8 8.6 - 10.4 mg/dL    Sodium 141 135 - 144 mmol/L    Potassium 4.2 3.7 - 5.3 mmol/L    Chloride 106 98 - 107 mmol/L    CO2 30 20 - 31 mmol/L    Anion Gap 5 (L) 9 - 17 mmol/L       Imaging/Diagnostics:      Assessment :      Primary Problem  Atrial fibrillation [facility])    Active Hospital Problems    Diagnosis Date Noted    Atrial fibrillation ([facility] [I48.91] 02/17/2023     Priority: Medium    COPD [facility]chronic obstructive pulmonary disease) [facility] [J44.9] 07/30/2019    Atrial fibrillation with rapid ventricular response (HCC) [I48.91]     S/p TAVR (transcatheter aortic valve replacement), bioprosthetic [Z95.3] 12/27/2018    NICM (nonischemic cardiomyopathy) ([facility] [I42.8] 10/02/2018    CHF (congestive heart failure), NYHA class I, acute on chronic, combined ([facility] [I50.43] 08/25/2018    History of cocaine abuse ([facility] [F14.11] 02/27/2018    Aortic valve stenosis [I35.0] 01/05/2017   Principal Problem:    Atrial fibrillation ([facility]  Active Problems: Aortic valve stenosis    CHF (congestive heart failure), NYHA class I, acute on chronic, combined (HCC)    Atrial fibrillation with rapid ventricular response (HCC)    COPD (chronic obstructive pulmonary disease) (HCC)    History of cocaine abuse (HCC)    NICM (nonischemic cardiomyopathy) [facility]    S/p TAVR (transcatheter aortic valve replacement), bioprosthetic  Resolved Problems:    * No resolved hospital problems.  *       Plan:     Patient status Admit as inpatient in the  Progressive Unit/Step down    Patient admitted with nonspecific complaints, found to have A-fib with RVR on EKG, will continue with home dose of Cardizem, Eliquis, Lopressor as needed, with heart rate more than 110, will order TSH, urine drug screen, cardiology consult, patient follows with Dr. Reilly Lr  History of bioprosthetic arctic valve, aortic regurgitation, patient follows with cardiologist, will repeat echocardiogram  Heart failure with reduced ejection fraction, compensated  COPD, compensated, cutting down his smoking  On Eliquis for DVT prophylaxis  2.18   UDS positive for cocaine  Lopressor added per cardiologist, given positive cocaine in urine, changing Lopressor to Coreg  May need stress test  Echo pending  2/19   Patient, had episode of nonsustained V. tach, 9 beats  Plan for echo and stress test tomorrow  Patient remained asymptomatic during that episode  Blood pressure, heart rate controlled    2/20   Patient, clinically doing better  Underwent echo results awaited  Getting stress test  If negative, will work on discharge planning  Cons[facility]ations:   [facility]     Patient is admitted as inpatient status because of co-morbidities listed above, severity of signs and symptoms as outlined, requirement for current medical therapies and most importantly because of direct risk to patient if care not provided in a hospital setting. Helen Valentino MD  2/20/2023  2:16 PM    Copy sent to Dr. Helen Valentino MD    Please note that this chart was generated using voice recognition Dragon dictation software. Although every effort was made to ensure the accuracy of this automated transcription, some errors in transcription may have occurred.

## 2023-02-20 NOTE — PROGRESS NOTES
[address]   OCCUPATIONAL THERAPY MISSED TREATMENT NOTE   INPATIENT   Date: 23  Patient Name: Janet Arroyo       Room:   MRN: [id number]   Account #: [de-identified]    : 1965  (59 y.o.)  Gender: male                 REASON FOR MISSED TREATMENT:  Patient at testing and/or off the floor   -    Pt out of room for stress test. Occupational therapy will attempt again if time allows.   [address] [place] [address], OT

## 2023-02-20 NOTE — CARE COORDINATION
ONGOING DISCHARGE PLAN:    Patient is alert and oriented x4. Spoke with patient regarding discharge plan and patient confirms that plan is still home with no needs. Stress and echo pending  Cardiology admitting physician  On [facility]    Will continue to follow for additional discharge needs.     Electronically signed by Juanpablo Aldana RN on 2/20/2023 at 9:53 AM

## 2023-02-21 PROCEDURE — 94760 N-INVAS EAR/PLS OXIMETRY 1: CPT

## 2023-02-21 PROCEDURE — 6370000000 HC RX 637 (ALT 250 FOR IP): Performed by: INTERNAL MEDICINE

## 2023-02-21 PROCEDURE — 94640 AIRWAY INHALATION TREATMENT: CPT

## 2023-02-21 PROCEDURE — 99232 SBSQ HOSP IP/OBS MODERATE 35: CPT | Performed by: INTERNAL MEDICINE

## 2023-02-21 PROCEDURE — 2580000003 HC RX 258: Performed by: INTERNAL MEDICINE

## 2023-02-21 PROCEDURE — 2060000000 HC ICU INTERMEDIATE R&B

## 2023-02-21 RX ADMIN — ASPIRIN 81 MG: 81 TABLET, CHEWABLE ORAL at 09:34

## 2023-02-21 RX ADMIN — CETIRIZINE HYDROCHLORIDE 10 MG: 10 TABLET, FILM COATED ORAL at 09:34

## 2023-02-21 RX ADMIN — TIOTROPIUM BROMIDE INHALATION SPRAY 2 PUFF: 3.12 SPRAY, METERED RESPIRATORY (INHALATION) at 07:39

## 2023-02-21 RX ADMIN — CARVEDILOL 3.12 MG: 3.12 TABLET, FILM COATED ORAL at 08:00

## 2023-02-21 RX ADMIN — TAMSULOSIN HYDROCHLORIDE 0.4 MG: 0.4 CAPSULE ORAL at 09:34

## 2023-02-21 RX ADMIN — GUAIFENESIN 600 MG: 600 TABLET, EXTENDED RELEASE ORAL at 09:34

## 2023-02-21 RX ADMIN — ATORVASTATIN CALCIUM 10 MG: 10 TABLET, FILM COATED ORAL at 20:45

## 2023-02-21 RX ADMIN — CARVEDILOL 3.12 MG: 3.12 TABLET, FILM COATED ORAL at 18:17

## 2023-02-21 RX ADMIN — FLUTICASONE PROPIONATE 1 SPRAY: 50 SPRAY, METERED NASAL at 09:34

## 2023-02-21 RX ADMIN — DILTIAZEM HYDROCHLORIDE 120 MG: 120 CAPSULE, COATED, EXTENDED RELEASE ORAL at 09:34

## 2023-02-21 RX ADMIN — SODIUM CHLORIDE, PRESERVATIVE FREE 10 ML: 5 INJECTION INTRAVENOUS at 09:34

## 2023-02-21 RX ADMIN — GUAIFENESIN 600 MG: 600 TABLET, EXTENDED RELEASE ORAL at 20:45

## 2023-02-21 NOTE — PROGRESS NOTES
Per [address] [facility] pt may have a cardiac diet and be NPO after midnight for cath at [address] tomorrow 2/22/2023.  Cont to hold [facility]

## 2023-02-21 NOTE — PROGRESS NOTES
[address]            My findings are as follows. Subjective:   patient seen examined at bedside this morning. Stated that he still has on and off chest pressure, worsened with getting up and ambulating. Associated with shortness of breath. No dyspnea at rest.  No orthopnea leg edema. Telemetry showing patient atrial fibrillation, rate controlled. No significance overnight. Objective:  /78   Pulse 74   Temp 97.4 °F (36.3 °C) (Oral)   Resp 16   Ht 5' 8\" (1.727 m)   Wt 146 lb (66.2 kg)   SpO2 99%   BMI 22.20 kg/m²     General: no acute distress  Cardiac:   Irregularly irregular rhythm, normal rate, normal S1/S2  Lungs:  Clear to ausculation  Abdomen:  No tenderness or swelling  Ext: no edema    Assessment:   Atypical chest pain /pressure with exertion   Small inferior apical wall fixed defect with minimal teodoro-infarct reversible defect, low risk study   Permanent atrial fibrillation with RVR on Eliquis -  rate now controlled   Normal EF TTE 01/2020   COPD   Tobacco abuse    Plan:  - patient asymptomatic at the time I visit. Still having recurrent pressure in his chest with ambulating.  -  Stress test results reviewed discussed with the patient. -  Will plan for diagnostic cardiac catheterization  at [facility] tomorrow. Keep NPO midnight.  -  Follow-up echo results. -  Hold Eliquis for planned cardiac catheterization.  -  Will follow-up. Rocky James MD    This note was completed using a voice transcription system. Every effort was made to ensure accuracy. However, inadvertent computerized transcription errors may be present.

## 2023-02-21 NOTE — CARE COORDINATION
ONGOING DISCHARGE PLAN:    Patient is alert and oriented x4. Spoke with patient regarding discharge plan and patient confirms that plan is still home with no needs. + stress test, echo pending  Plan for heart cath on 2/22 @  [facility]    Will continue to follow for additional discharge needs.     Electronically signed by Veronica Moore RN on 2/21/2023 at 9:53 AM

## 2023-02-21 NOTE — PROGRESS NOTES
IBETH Mayes 53    HISTORY AND PHYSICAL EXAMINATION            Date:   2/21/2023  Patient name:  Katie Mcgee  Date of admission:  2/17/2023  1:05 PM  MRN:   [id number]  Account:  [de-identified]  YOB: 1965  PCP:    Haider Oro MD  Room:   2123/2123-01  Code Status:    Full Code    Chief Complaint:     Chief Complaint   Patient presents with    Palpitations    Numbness    Dizziness       History Obtained From:     patient, electronic medical record    History of Present Illness: The patient is a 62 y.o.   Non- / non  male who presents with Palpitations, Numbness, and Dizziness   and he is admitted to the hospital for the management of dizziness, palpitation  Patient has past medical history of multiple medical problem, which include COPD, current smoker, history of atrial fibrillation, bioprosthetic aortic valve, aortic regurgitation, patient has past medical history of substance abuse  Patient is a poor historian  Patient, presented with complaints of dizziness, palpitations, symptoms are going on for last few days, no aggravating or relieving factors  Patient also experiencing chest pressure along with the symptoms    He told me that he is compliant with his diet and medication, he did not use any drugs  Patient, also noticing tingling numbness, affecting his left arm, neck area, left leg, has chronic neck pain, the symptoms are intermittently going for on for last 3 months    In the emergency room, patient underwent EKG suggestive of A-fib with RVR, patient underwent 2 sets of troponins, which was flat    2/18   Patient, clinically doing better  Heart rate better controlled  No chest pain, shortness of breath    2/21  Patient, still have intermittent episode of chest pain  Stress test, suggestive of small inferior apical area fixed defect with minimal teodoro-infarct reversible defect,  Past Medical History:     Past Medical History:   Diagnosis Date    Abnormal echocardiogram 02/2018    Arrhythmia     AS (aortic stenosis)     Atrial fibrillation (HCC)     Benign prostatic hyperplasia     Biatrial enlargement     Central perforation of tympanic membrane, right     Chest pain     Chews tobacco     CHF (congestive heart failure), NYHA class I, acute on chronic, combined (HCC)     Chronic back pain     Conductive hearing loss of right ear     COPD (chronic obstructive pulmonary disease) ([facility]     Diverticulitis     Fatigue     History of cocaine abuse ([facility] 01/2018    Household contact with history of methicillin resistant Staphylococcus aureus (MRSA) infection     Lung nodule     Mass of upper lobe of left lung     Mild concentric left ventricular hypertrophy     Murmur, cardiac     Near syncope     Nonrheumatic aortic (valve) stenosis     Palpitations     Panlobular emphysema ([address]     Pneumonia of both upper lobes due to infectious organism     Right-sided tinnitus     Severe aortic stenosis     Shortness of breath     Tobacco abuse         Past Surgical History:     Past Surgical History:   Procedure Laterality Date    AORTIC VALVE REPLACEMENT  12/2018    CYST REMOVAL Left     INGUINAL HERNIA REPAIR Right     SC INCISION & DRAINAGE ABSCESS COMPLICATED/MULTIPLE N/A 2/12/2018    GLUTEAL INCISION AND DRAINAGE performed by Madeline Gilliam DO at [address]        Medications Prior to Admission:     Prior to Admission medications    Medication Sig Start Date End Date Taking?  Authorizing Provider   carvedilol (COREG) 3.125 MG tablet Take 1 tablet by mouth 2 times daily (with meals) 2/20/23  Yes Felicita Ni MD   ELIQUIS 5 MG TABS tablet take 1 tablet by mouth twice a day 1/4/23   Felicita Ni MD   PROAIR  (90 Base) MCG/ACT inhaler inhale 2 puffs by mouth and INTO THE LUNGS every 6 hours if needed for shortness of breath  Patient not taking: Reported on 2/17/2023 7/8/22   Felicita Ni MD   dilTIAZem (CARDIZEM CD) 120 MG extended release capsule Take 1 capsule by mouth daily 11/14/21   Evy Nugent MD   guaiFENesin (MUCINEX) 600 MG extended release tablet Take 1 tablet by mouth 2 times daily 11/13/21   Evy Nugent MD   atorvastatin (LIPITOR) 10 MG tablet Take 1 tablet by mouth nightly 11/13/21   Evy Nugent MD   tiotropium (Juan Riis) 18 MCG inhalation capsule Inhale 1 capsule into the lungs daily 10/7/21   Rashad Santiago MD   fluticasone (FLONASE) 50 MCG/ACT nasal spray 1 spray by Each Nostril route daily 4/12/21   Rashad Santiago MD        Allergies:     Patient has no known allergies. Social History:     Tobacco:    reports that he has been smoking cigarettes. He has a 10.00 pack-year smoking history. His smokeless tobacco use includes chew. Alcohol:      reports that he does not currently use alcohol. Drug Use:  reports that he does not currently use drugs after having used the following drugs: Cocaine. Family History:     Family History   Problem Relation Age of Onset    Depression Mother     COPD Father     COPD Paternal Grandfather        Review of Systems:     Positive and Negative as described in HPI. CONSTITUTIONAL: Positive for dizziness  HEENT:  negative for vision, hearing changes, runny nose, throat pain  RESPIRATORY:  negative for shortness of breath, cough, congestion, wheezing.   CARDIOVASCULAR: Palpitation, chest pressure  GASTROINTESTINAL:  negative for nausea, vomiting, diarrhea, constipation, change in bowel habits, abdominal pain   GENITOURINARY:  negative for difficulty of urination, burning with urination, frequency   INTEGUMENT:  negative for rash, skin lesions, easy bruising   HEMATOLOGIC/LYMPHATIC:  negative for swelling/edema   ALLERGIC/IMMUNOLOGIC:  negative for urticaria , itching  ENDOCRINE:  negative increase in drinking, increase in urination, hot or cold intolerance  MUSCULOSKELETAL:  negative joint pains, muscle aches, swelling of joints  NEUROLOGICAL: Positive for dizziness, tingling numbness, affecting left arm, left leg  BEHAVIOR/PSYCH:  negative for depression, anxiety    Physical Exam:   /66   Pulse 72   Temp 97.9 °F (36.6 °C) (Oral)   Resp 17   Ht 5' 8\" (1.727 m)   Wt 146 lb (66.2 kg)   SpO2 96%   BMI 22.20 kg/m²   Temp (24hrs), Av.7 °F (36.5 °C), Min:97.4 °F (36.3 °C), Max:97.9 °F (36.6 °C)    No results for input(s): POCGLU in the last 72 hours. No intake or output data in the 24 hours ending 23 1515    General Appearance:  alert, well appearing, and in no acute distress, thin but person   Mental status: oriented to person, place, and time with normal affect  Head:  normocephalic, atraumatic. Eye: no icterus, redness, pupils equal and reactive, extraocular eye movements intact, conjunctiva clear  Ear: normal external ear, no discharge, hearing intact  Nose:  no drainage noted  Mouth: mucous membranes moist  Neck: supple, no carotid bruits, thyroid not palpable  Lungs: Bilateral equal air entry, clear to ausculation, no wheezing, rales or rhonchi, normal effort  Cardiovascular: Irregular irregular heart rate.     Abdomen: Soft, nontender, nondistended, normal bowel sounds, no hepatomegaly or splenomegaly  Neurologic: There are no new focal motor or sensory deficits, normal muscle tone and bulk, no abnormal sensation, normal speech, cranial nerves II through XII grossly intact  Skin: No gross lesions, rashes, bruising or bleeding on exposed skin area  Extremities:  peripheral pulses palpable, no pedal edema or calf pain with palpation  Psych: normal affect     Investigations:      Laboratory Testing:  Recent Results (from the past 24 hour(s))   NM Cardiac Stress Test Nuclear Imaging    Collection Time: 23  3:28 PM   Result Value Ref Range    Left Ventricular Ejection Fraction 53     LVEF MODALITY Nuclear        Imaging/Diagnostics:      Assessment :      Primary Problem  Atrial fibrillation [facility])    Active Hospital Problems    Diagnosis Date Noted Atrial fibrillation ([facility] [I48.91] 02/17/2023     Priority: Medium    COPD (chronic obstructive pulmonary disease) ([facility] [J44.9] 07/30/2019    Atrial fibrillation with rapid ventricular response (HCC) [I48.91]     S/p TAVR (transcatheter aortic valve replacement), bioprosthetic [Z95.3] 12/27/2018    NICM (nonischemic cardiomyopathy) ([facility] [I42.8] 10/02/2018    CHF (congestive heart failure), NYHA class I, acute on chronic, combined ([facility] [I50.43] 08/25/2018    History of cocaine abuse ([facility] [F14.11] 02/27/2018    Aortic valve stenosis [I35.0] 01/05/2017   Principal Problem:    Atrial fibrillation ([facility]  Active Problems: Aortic valve stenosis    CHF (congestive heart failure), NYHA class I, acute on chronic, combined (HCC)    Atrial fibrillation with rapid ventricular response (HCC)    COPD (chronic obstructive pulmonary disease) (HCC)    History of cocaine abuse (HCC)    NICM (nonischemic cardiomyopathy) ([facility] [address]    S/p TAVR (transcatheter aortic valve replacement), bioprosthetic  Resolved Problems:    * No resolved hospital problems.  *       Plan:     Patient status Admit as inpatient in the  Progressive Unit/Step down    Patient admitted with nonspecific complaints, found to have A-fib with RVR on EKG, will continue with home dose of Cardizem, Eliquis, Lopressor as needed, with heart rate more than 110, will order TSH, urine drug screen, cardiology consult, patient follows with Dr. Yolanda Yu  History of bioprosthetic arctic valve, aortic regurgitation, patient follows with cardiologist, will repeat echocardiogram  Heart failure with reduced ejection fraction, compensated  COPD, compensated, cutting down his smoking  On Eliquis for DVT prophylaxis  2.18   UDS positive for cocaine  Lopressor added per cardiologist, given positive cocaine in urine, changing Lopressor to Coreg  May need stress test  Echo pending  2/19   Patient, had episode of nonsustained V. tach, 9 beats  Plan for echo and stress test tomorrow  Patient remained asymptomatic during that episode  Blood pressure, heart rate controlled    2/20   Patient, clinically doing better  Underwent echo results awaited  Getting stress test  If negative, will work on discharge planning  2/21  Plan, noted for cardiac cath tomorrow    Consultations:   IP CONSULT TO INTERNAL MEDICINE  IP CONSULT TO CARDIOLOGY     Patient is admitted as inpatient status because of co-morbidities listed above, severity of signs and symptoms as outlined, requirement for current medical therapies and most importantly because of direct risk to patient if care not provided in a hospital setting. Valentine Rendon MD  2/21/2023  3:15 PM    Copy sent to Dr. Valentine Rendon MD    Please note that this chart was generated using voice recognition Dragon dictation software. Although every effort was made to ensure the accuracy of this automated transcription, some errors in transcription may have occurred.

## 2023-02-22 ENCOUNTER — HOSPITAL ENCOUNTER (INPATIENT)
Age: 58
LOS: 1 days | Discharge: ANOTHER HOSPITAL | End: 2023-02-22
Attending: INTERNAL MEDICINE | Admitting: INTERNAL MEDICINE
Payer: MEDICAID

## 2023-02-22 ENCOUNTER — HOSPITAL ENCOUNTER (OUTPATIENT)
Dept: CARDIAC CATH/INVASIVE PROCEDURES | Age: 58
Discharge: HOME | End: 2023-02-22
Attending: INTERNAL MEDICINE
Payer: MEDICAID

## 2023-02-22 VITALS
HEART RATE: 77 BPM | OXYGEN SATURATION: 97 % | TEMPERATURE: 97.2 F | DIASTOLIC BLOOD PRESSURE: 51 MMHG | SYSTOLIC BLOOD PRESSURE: 118 MMHG | RESPIRATION RATE: 15 BRPM

## 2023-02-22 PROCEDURE — 94760 N-INVAS EAR/PLS OXIMETRY 1: CPT

## 2023-02-22 PROCEDURE — 6360000004 HC RX CONTRAST MEDICATION

## 2023-02-22 PROCEDURE — 6360000002 HC RX W HCPCS

## 2023-02-22 PROCEDURE — 6370000000 HC RX 637 (ALT 250 FOR IP): Performed by: INTERNAL MEDICINE

## 2023-02-22 PROCEDURE — 93454 CORONARY ARTERY ANGIO S&I: CPT

## 2023-02-22 PROCEDURE — 2500000003 HC RX 250 WO HCPCS

## 2023-02-22 PROCEDURE — 94640 AIRWAY INHALATION TREATMENT: CPT

## 2023-02-22 PROCEDURE — 2000000000 HC ICU R&B

## 2023-02-22 PROCEDURE — 2060000000 HC ICU INTERMEDIATE R&B

## 2023-02-22 RX ORDER — CETIRIZINE HYDROCHLORIDE 10 MG/1
10 TABLET ORAL DAILY
Status: DISCONTINUED | OUTPATIENT
Start: 2023-02-22 | End: 2023-02-23 | Stop reason: HOSPADM

## 2023-02-22 RX ORDER — FLUTICASONE PROPIONATE 50 MCG
1 SPRAY, SUSPENSION (ML) NASAL DAILY
Status: DISCONTINUED | OUTPATIENT
Start: 2023-02-22 | End: 2023-02-23 | Stop reason: HOSPADM

## 2023-02-22 RX ORDER — ACETAMINOPHEN 500 MG
1000 TABLET ORAL EVERY 6 HOURS PRN
Status: DISCONTINUED | OUTPATIENT
Start: 2023-02-22 | End: 2023-02-23 | Stop reason: HOSPADM

## 2023-02-22 RX ORDER — SODIUM CHLORIDE 9 MG/ML
INJECTION, SOLUTION INTRAVENOUS PRN
Status: DISCONTINUED | OUTPATIENT
Start: 2023-02-22 | End: 2023-02-22 | Stop reason: HOSPADM

## 2023-02-22 RX ORDER — ASPIRIN 81 MG/1
81 TABLET, CHEWABLE ORAL DAILY
Status: DISCONTINUED | OUTPATIENT
Start: 2023-02-22 | End: 2023-02-23 | Stop reason: HOSPADM

## 2023-02-22 RX ORDER — SODIUM CHLORIDE 0.9 % (FLUSH) 0.9 %
5-40 SYRINGE (ML) INJECTION EVERY 12 HOURS SCHEDULED
Status: DISCONTINUED | OUTPATIENT
Start: 2023-02-22 | End: 2023-02-22 | Stop reason: HOSPADM

## 2023-02-22 RX ORDER — DILTIAZEM HYDROCHLORIDE 120 MG/1
120 CAPSULE, COATED, EXTENDED RELEASE ORAL DAILY
Status: DISCONTINUED | OUTPATIENT
Start: 2023-02-22 | End: 2023-02-23 | Stop reason: HOSPADM

## 2023-02-22 RX ORDER — SODIUM CHLORIDE 0.9 % (FLUSH) 0.9 %
5-40 SYRINGE (ML) INJECTION PRN
Status: DISCONTINUED | OUTPATIENT
Start: 2023-02-22 | End: 2023-02-22 | Stop reason: HOSPADM

## 2023-02-22 RX ORDER — GUAIFENESIN 600 MG/1
600 TABLET, EXTENDED RELEASE ORAL 2 TIMES DAILY
Status: DISCONTINUED | OUTPATIENT
Start: 2023-02-22 | End: 2023-02-23 | Stop reason: HOSPADM

## 2023-02-22 RX ORDER — ACETAMINOPHEN 325 MG/1
650 TABLET ORAL EVERY 4 HOURS PRN
Status: DISCONTINUED | OUTPATIENT
Start: 2023-02-22 | End: 2023-02-22 | Stop reason: HOSPADM

## 2023-02-22 RX ORDER — ALBUTEROL SULFATE 90 UG/1
2 AEROSOL, METERED RESPIRATORY (INHALATION) EVERY 4 HOURS PRN
Status: DISCONTINUED | OUTPATIENT
Start: 2023-02-22 | End: 2023-02-23 | Stop reason: HOSPADM

## 2023-02-22 RX ORDER — TAMSULOSIN HYDROCHLORIDE 0.4 MG/1
0.4 CAPSULE ORAL DAILY
Status: DISCONTINUED | OUTPATIENT
Start: 2023-02-22 | End: 2023-02-23 | Stop reason: HOSPADM

## 2023-02-22 RX ORDER — ATORVASTATIN CALCIUM 10 MG/1
10 TABLET, FILM COATED ORAL NIGHTLY
Status: DISCONTINUED | OUTPATIENT
Start: 2023-02-22 | End: 2023-02-23 | Stop reason: HOSPADM

## 2023-02-22 RX ADMIN — ASPIRIN 81 MG: 81 TABLET, CHEWABLE ORAL at 21:41

## 2023-02-22 RX ADMIN — DILTIAZEM HYDROCHLORIDE 120 MG: 120 CAPSULE, COATED, EXTENDED RELEASE ORAL at 09:22

## 2023-02-22 RX ADMIN — TAMSULOSIN HYDROCHLORIDE 0.4 MG: 0.4 CAPSULE ORAL at 21:40

## 2023-02-22 RX ADMIN — GUAIFENESIN 600 MG: 600 TABLET, EXTENDED RELEASE ORAL at 21:41

## 2023-02-22 RX ADMIN — CARVEDILOL 3.12 MG: 3.12 TABLET, FILM COATED ORAL at 09:22

## 2023-02-22 RX ADMIN — TIOTROPIUM BROMIDE INHALATION SPRAY 2 PUFF: 3.12 SPRAY, METERED RESPIRATORY (INHALATION) at 08:37

## 2023-02-22 RX ADMIN — GUAIFENESIN 600 MG: 600 TABLET, EXTENDED RELEASE ORAL at 09:22

## 2023-02-22 RX ADMIN — DILTIAZEM HYDROCHLORIDE 120 MG: 120 CAPSULE, COATED, EXTENDED RELEASE ORAL at 21:46

## 2023-02-22 RX ADMIN — CETIRIZINE HYDROCHLORIDE 10 MG: 10 TABLET, FILM COATED ORAL at 21:40

## 2023-02-22 RX ADMIN — APIXABAN 5 MG: 5 TABLET, FILM COATED ORAL at 21:39

## 2023-02-22 RX ADMIN — ASPIRIN 81 MG: 81 TABLET, CHEWABLE ORAL at 09:22

## 2023-02-22 RX ADMIN — CETIRIZINE HYDROCHLORIDE 10 MG: 10 TABLET, FILM COATED ORAL at 09:22

## 2023-02-22 RX ADMIN — TAMSULOSIN HYDROCHLORIDE 0.4 MG: 0.4 CAPSULE ORAL at 09:22

## 2023-02-22 RX ADMIN — ATORVASTATIN CALCIUM 10 MG: 10 TABLET, FILM COATED ORAL at 21:39

## 2023-02-22 ASSESSMENT — PAIN DESCRIPTION - LOCATION: LOCATION: CHEST

## 2023-02-22 ASSESSMENT — PAIN SCALES - GENERAL: PAINLEVEL_OUTOF10: 4

## 2023-02-22 ASSESSMENT — PAIN DESCRIPTION - ORIENTATION: ORIENTATION: MID

## 2023-02-22 NOTE — CARE COORDINATION
ONGOING DISCHARGE PLAN:    Patient is not in the room. Went for cardiac cath @ [facility]. Will continue to follow for additional discharge needs.     Electronically signed by Mary Gavrin RN on 2/22/2023 at 10:44 AM

## 2023-02-22 NOTE — PROGRESS NOTES
Transport arrived to return pt to [facility]. Handoff given. Belongings sent with pt. Reported called to [facility] PCU RN [place]. All questions answered.

## 2023-02-22 NOTE — PROGRESS NOTES
Patient returned to room 2039 via bed post cardiac catheterization. Vitals charted. BP soft - 90s/60s. Pt A&Ox4. Report received per cath team over the phone prior to arrival. 12 ml of air in the TR band. Site is clean & dry. Pulses palpable and equal bilaterally. RN educated the patient on position orders and activity restrictions for cath recovery. Patient verbalizes understanding. Will continue to monitor closely.

## 2023-02-22 NOTE — PROGRESS NOTES
Pt's L AC IV occluded on admission. Removed at this time. New 20g placed in the L forearm in preparation for cath procedure.

## 2023-02-22 NOTE — PROGRESS NOTES
Pt arrived to room 2039 via stretcher and EMS from [facility]. Report and paperwork given at bedside per EMS. Pt A&Ox4, and c/o intermittent chest pain and SOB that is worse with exertion. Pt does not appear to be in distress. Vitals WNL with pt in rate controlled afib. Reviewed plan for the day and what to expect. Pt ambulated to bathroom independently without difficulty. Oriented to room and provided call light. Bed rails up x2. All questions answered.

## 2023-02-22 NOTE — SIGNIFICANT EVENT
Patient seen this morning. Still having on and off chest pressure. Stable at the time I visit. Plan for diagnostic cardiac catheterization at [facility] on 1:00 p.m. Eli Trejo Keep NPO. Continue to hold Eliquis. Will follow-up.

## 2023-02-23 VITALS
HEART RATE: 97 BPM | TEMPERATURE: 98.6 F | SYSTOLIC BLOOD PRESSURE: 104 MMHG | HEIGHT: 68 IN | BODY MASS INDEX: 22.13 KG/M2 | WEIGHT: 146 LBS | DIASTOLIC BLOOD PRESSURE: 70 MMHG | OXYGEN SATURATION: 96 % | RESPIRATION RATE: 16 BRPM

## 2023-02-23 PROCEDURE — 6370000000 HC RX 637 (ALT 250 FOR IP): Performed by: INTERNAL MEDICINE

## 2023-02-23 PROCEDURE — 94640 AIRWAY INHALATION TREATMENT: CPT

## 2023-02-23 PROCEDURE — 99239 HOSP IP/OBS DSCHRG MGMT >30: CPT | Performed by: INTERNAL MEDICINE

## 2023-02-23 PROCEDURE — 94760 N-INVAS EAR/PLS OXIMETRY 1: CPT

## 2023-02-23 RX ADMIN — CETIRIZINE HYDROCHLORIDE 10 MG: 10 TABLET, FILM COATED ORAL at 08:09

## 2023-02-23 RX ADMIN — ASPIRIN 81 MG: 81 TABLET, CHEWABLE ORAL at 08:09

## 2023-02-23 RX ADMIN — TAMSULOSIN HYDROCHLORIDE 0.4 MG: 0.4 CAPSULE ORAL at 08:09

## 2023-02-23 RX ADMIN — FLUTICASONE PROPIONATE 1 SPRAY: 50 SPRAY, METERED NASAL at 08:08

## 2023-02-23 RX ADMIN — GUAIFENESIN 600 MG: 600 TABLET, EXTENDED RELEASE ORAL at 08:09

## 2023-02-23 RX ADMIN — APIXABAN 5 MG: 5 TABLET, FILM COATED ORAL at 08:09

## 2023-02-23 RX ADMIN — TIOTROPIUM BROMIDE INHALATION SPRAY 2 PUFF: 3.12 SPRAY, METERED RESPIRATORY (INHALATION) at 09:11

## 2023-02-23 RX ADMIN — DILTIAZEM HYDROCHLORIDE 120 MG: 120 CAPSULE, COATED, EXTENDED RELEASE ORAL at 08:09

## 2023-02-23 NOTE — PROCEDURES
[address] [facility]                    [address] [facility]. [address]                              CARDIAC STRESS TEST    PATIENT NAME: Elan Morales                    :        1965  MED REC NO:   [id number]                              ROOM:       2123  ACCOUNT NO:   [de-identified]                           ADMIT DATE: 2023  PROVIDER:     Rashaun Baltazar    DATE OF STUDY:  2023    TEST TYPE: LEXISCAN CARDIOLYTE STRESS TEST  INDICATION: CHEST PAIN, EKG ABNORMAL  REFERRING PHYSICIAN: FRANDY BURGOS    RESTING HEART RATE: 78 BEATS PER MINUTE  RESTING BLOOD PRESSURE: 108/71    MEDICATION(S) GIVEN: 0.4MG IV LEXISCAN  REASON FOR TERMINATION: MEDICATION INFUSION COMPLETE    RESTING EKG: ABNORMAL, ATRIAL FIBRILLATION, HEART RATE 78 BEATS PER  MINUTE  STRESS HEART RESPONSE: NORMAL RESPONSE  BLOOD PRESSURE RESPONSE: NORMAL  STRESS EKGs: NO CHANGES SEEN  CHEST DISCOMFORT: MILD CHEST DISCOMFORT BEFORE TEST, NO CHANGE  ISCHEMIC EKG CHANGES: NONE    EKG IMPRESSION: ELECTROCARDIOGRAPHICALLY NEGATIVE LEXISCAN STRESS TEST. RADIOISOTOPE RESULTS TO FOLLOW FROM THE DEPARTMENT OF NUCLEAR MEDICINE.       Jordin Gordon    D: 2023 8:30:00       T: 2023 8:41:31     AS/STAN  Job#: [id number]     Doc#: Unknown    CC:    (Retain this field even if not dictated or not decipherable)

## 2023-02-23 NOTE — PROGRESS NOTES
Rn called Dr. aJzmyn Lanier to read echo results, states no further work-up, f/u in 2-3 weeks outpt, pt ok to d/c.

## 2023-02-23 NOTE — PROGRESS NOTES
Rn called Echo to f/u on echo results which was completed on the 17th, Echo stated it will be finalized sometime today.

## 2023-02-23 NOTE — PROGRESS NOTES
RN received handoff from day shift with the understanding that pt's chart was not fully transferred over from [address] earlier today. A voicemail was left for Dr. Paco Poon by telephone that there are no orders in the pts chart. Dr. Paco Poon put in orders.

## 2023-02-23 NOTE — PLAN OF CARE
Problem: Discharge Planning  Goal: Discharge to home or other facility with appropriate resources  Outcome: Progressing     Problem: Safety - Adult  Goal: Free from fall injury  Outcome: Progressing     Problem: Cardiovascular - Adult  Goal: Maintains optimal cardiac output and hemodynamic stability  Outcome: Progressing     Problem: Chronic Conditions and Co-morbidities  Goal: Patient's chronic conditions and co-morbidity symptoms are monitored and maintained or improved  Outcome: Progressing

## 2023-02-23 NOTE — DISCHARGE SUMMARY
[place] [address] Internal Medicine    Discharge Summary     Patient ID: Claudio Solis  :  1965   MRN: [id number]     ACCOUNT:  [de-identified]   Patient's PCP: Adriana Grajeda MD  Admit Date: 2023   Discharge Date: 2023    Length of Stay: 6  Code Status:  Full Code  Admitting Physician: Adriana Grajeda MD  Discharge Physician: Adriana Grajeda MD     Active Discharge Diagnoses:     Primary Problem  Atrial fibrillation [facility])      [place] Problems    Diagnosis Date Noted    Atrial fibrillation [facility]) [I48.91] 2023     Priority: Medium    COPD (chronic obstructive pulmonary disease) ([facility] [J44.9] 2019    Atrial fibrillation with rapid ventricular response (HCC) [I48.91]     S/p TAVR (transcatheter aortic valve replacement), bioprosthetic [Z95.3] 2018    NICM (nonischemic cardiomyopathy) ([facility] [I42.8] 10/02/2018    CHF (congestive heart failure), NYHA class I, acute on chronic, combined ([facility] [I50.43] 2018    History of cocaine abuse ([facility] [F14.11] 2018    Aortic valve stenosis [I35.0] 2017       Admission Condition:  poor     Discharged Condition: fair    Hospital Stay:     Hospital Course:  Claudio Solis is a 62 y.o. male who was admitted for the management of Atrial fibrillation ([facility] , presented to ER with Palpitations, Numbness, and Dizziness  Patient with multiple medical problems include COPD, current smoker history of atrial fibrillation on anticoagulation history of arctic valve regurgitation, patient found positive for cocaine in his urine drug screen  Evaluated by cardiologist, underwent cardiac cath which was negative  Patient getting discharged home  He will have MICHELE of March as outpatient at 10 AM [facility]      Significant therapeutic interventions:     Significant Diagnostic Studies:   Labs / Micro:        ,     Radiology:    XR CERVICAL SPINE (2-3 VIEWS)    Result Date: 2/17/2023  EXAMINATION: 3 XRAY VIEWS OF THE CERVICAL SPINE 2/17/2023 6:37 pm COMPARISON: January 25, 2010. HISTORY: ORDERING SYSTEM PROVIDED HISTORY: tingling in left arm TECHNOLOGIST PROVIDED HISTORY: tingling in left arm Reason for Exam: Tingling in left arm. Pt states he has had neck pain forever. FINDINGS: All 7 cervical vertebrae are visualized and appear normal in height and alignment. There is no evidence of prevertebral soft tissue edema or fracture. The base of the odontoid appears intact. No acute abnormality of the cervical spine. CT HEAD WO CONTRAST    Result Date: 2/18/2023  EXAMINATION: CT OF THE HEAD WITHOUT CONTRAST  2/17/2023 3:31 pm TECHNIQUE: CT of the head was performed without the administration of intravenous contrast. Automated exposure control, iterative reconstruction, and/or weight based adjustment of the mA/kV was utilized to reduce the radiation dose to as low as reasonably achievable. COMPARISON: 05/01/2013 HISTORY: ORDERING SYSTEM PROVIDED HISTORY: dizziness TECHNOLOGIST PROVIDED HISTORY: dizziness Decision Support Exception - unselect if not a suspected or confirmed emergency medical condition->Emergency Medical Condition (MA) Reason for Exam: dizziness for a few days FINDINGS: BRAIN/VENTRICLES: There is no acute intracranial hemorrhage, mass effect or midline shift. No abnormal extra-axial fluid collection. The gray-white differentiation is maintained without evidence of an acute infarct. There is no evidence of hydrocephalus. Mild generalized atrophy. ORBITS: The visualized portion of the orbits demonstrate no acute abnormality. SINUSES: The visualized paranasal sinuses and mastoid air cells demonstrate no acute abnormality. SOFT TISSUES/SKULL:  No acute abnormality of the visualized skull or soft tissues. No acute intracranial abnormality.      XR CHEST PORTABLE    Result Date: 2/17/2023  EXAMINATION: ONE XRAY VIEW OF THE CHEST 2/17/2023 1:47 pm COMPARISON: Chest x-ray dated 11/14/2022. HISTORY: ORDERING SYSTEM PROVIDED HISTORY: sob and LUE pain TECHNOLOGIST PROVIDED HISTORY: sob and LUE pain Reason for Exam: Chest pain, dizzy, visual disturbances x 4 hrs FINDINGS: HEART/MEDIASTINUM: The cardiomediastinal silhouette is within normal limits. PLEURA/LUNGS: There are no focal consolidations or pleural effusions. There is no appreciable pneumothorax. Emphysematous changes of the lungs noted. Calcified granuloma again noted in the left upper lung. BONES/SOFT TISSUE: No acute abnormality. No radiographic evidence of acute pulmonary disease. NM Cardiac Stress Test Nuclear Imaging    Result Date: 2/20/2023  EXAMINATION: MYOCARDIAL PERFUSION IMAGING 2/20/2023 11:23 am TECHNIQUE: For the rest study, 36.6 mCi of Tc-99m labeled sestamibi were injected. SPECT images were acquired. Under cardiology supervision, 0.4 mg Lexiscan was infused. After pharmacologic stress, 15.2 mCi of Tc-99m labeled sestamibi were injected. SPECT images with ECG gating were acquired. COMPARISON: 07/30/2019 HISTORY: ORDERING SYSTEM PROVIDED HISTORY: Chest pain TECHNOLOGIST PROVIDED HISTORY: Reason for Exam: Chest pain Reason for Exam: EKG abnormalities Procedure Type->Rx Reason for Exam: chest pain, ekg abnormalities FINDINGS: Small moderate severity fixed perfusion defect in the apical inferior wall persist on prone imaging. This has developed since the prior study. There is small amount of reversibility in the apical inferior wall on 1 slice. Consistent with small apical inferior wall infarct with probable small amount of teodoro-infarct ischemia. Medium-size fixed apical perfusion defect unchanged which may be due to infarct and or apical thinning. The gated images show no wall motion abnormalities. Normal myocardial thickening. Function: End diastolic volume:  207MB Left ventricular ejection fraction:  53% unchanged from prior.  TID score:  1.01 (scores greater than 1.39 are considered elevated for Lexiscan stress with Tc99m) Notes concerning risk stratification: Risk stratification incorporates both clinical history and some testing results. Final risk determination is the responsibility of the ordering provider as other patient information and test results may increase or decrease the risk assessment reported for this examination. Risk stratification criteria are adapted from \"Noninvasive Risk Stratification\" criteria from Jordyn Bradford. Al, ACC/AATS/AHA/ASE/ASNC/SCAI/SCCT/STS 2017 Appropriate Use Criteria For Coronary Revascularization in Patients With Stable Ischemic Heart Disease [facility] Volume 69, Issue 17, May 2017 High risk (>3% annual death or MI) 1. Severe resting LV dysfunction (LVEF <35%) not readily explained by non coronary causes 2. Resting perfusion abnormalities greater than 10% of the myocardium in patients without prior history or evidence of MI 3. Stress-induced perfusion abnormalities encumbering greater than or equal to 10% myocardium or stress segmental scores indicating multiple vascular territories with abnormalities 4. Stress-induced LV dilatation (TID ratio greater than 1.19 for exercise and greater than 1.39 for regadenoson) Intermediate risk (1% to 3% annual death or MI) 1. Mild/moderate resting LV dysfunction (LVEF 35% to 49%) not readily explained by non coronary causes. 2. Resting perfusion abnormalities in 5%-9.9% of the myocardium in patients without a history or prior evidence of MI 3. Stress-induced perfusion abnormality encumbering 5%-9.9% of the myocardium or stress segmental scores indicating 1 vascular territory with abnormalities but without LV dilation 4. Small wall motion abnormality involving 1-2 segments and only 1 coronary bed. Low Risk (Less than 1% annual death or MI) 1. Normal or small myocardial perfusion defect at rest or with stress encumbering less than 5% of the myocardium.      Small size apical inferior wall infarct with probable minimal teodoro-infarct ischemia which is new from prior. Redemonstration of fixed apical perfusion defect representing infarct and or apical thinning. Normal LVEF. Risk stratification: Low RECOMMENDATIONS: Unavailable         Consultations:    Consults:     Final Specialist Recommendations/Findings:   IP CONSULT TO INTERNAL MEDICINE  IP CONSULT TO CARDIOLOGY      The patient was seen and examined on day of discharge and this discharge summary is in conjunction with any daily progress note from day of discharge. Discharge plan:     Disposition: Home    Physician Follow Up:     No follow-up provider specified.      Requiring Further Evaluation/Follow Up POST HOSPITALIZATION/Incidental Findings:    Diet: cardiac diet    Activity: As tolerated    Instructions to Patient:     Discharge Medications:      Medication List        START taking these medications      atorvastatin 10 MG tablet  Commonly known as: LIPITOR  Take 1 tablet by mouth nightly     carvedilol 3.125 MG tablet  Commonly known as: COREG  Take 1 tablet by mouth 2 times daily (with meals)     dilTIAZem 120 MG extended release capsule  Commonly known as: CARDIZEM CD  Take 1 capsule by mouth daily     fluticasone 50 MCG/ACT nasal spray  Commonly known as: FLONASE  1 spray by Each Nostril route daily     guaiFENesin 600 MG extended release tablet  Commonly known as: MUCINEX  Take 1 tablet by mouth 2 times daily     ProAir  (90 Base) MCG/ACT inhaler  Generic drug: albuterol sulfate HFA  inhale 2 puffs by mouth and INTO THE LUNGS every 6 hours if needed for shortness of breath     Spiriva HandiHaler 18 MCG inhalation capsule  Generic drug: tiotropium  Inhale 1 capsule into the lungs daily            CONTINUE taking these medications      Eliquis 5 MG Tabs tablet  Generic drug: apixaban  take 1 tablet by mouth twice a day            STOP taking these medications      acetaminophen 500 MG tablet  Commonly known as: TYLENOL     aspirin 81 MG chewable tablet     loratadine 10 MG tablet  Commonly known as: Claritin     tamsulosin 0.4 MG capsule  Commonly known as: FLOMAX               Where to Get Your Medications        These medications were sent to [facility], [address] [address]      Phone: [phone]   carvedilol 3.125 MG tablet         Time Spent on discharge is  35 mins in patient examination, evaluation, counseling as well as medication reconciliation, prescriptions for required medications, discharge plan and follow up. Electronically signed by   Leah Clark MD  2/23/2023  9:50 AM      Thank you Dr. Leah Clark MD for the opportunity to be involved in this patient's care.

## 2023-02-23 NOTE — CARE COORDINATION
ONGOING DISCHARGE PLAN:    Patient is alert and oriented x4. Spoke with patient regarding discharge plan and patient confirms that plan is still home with no needs. 2/22 heart cath-normal  Echo results pending    Will continue to follow for additional discharge needs.     Electronically signed by Isabel Conway RN on 2/23/2023 at 9:37 AM

## 2023-02-23 NOTE — PROGRESS NOTES
Patient educated on discharge instructions which include: medication schedule, reasons for new medication and some side effects and need to follow-up. Patient verbalizes understanding of discharge and states readiness for discharge. All belongings were gathered by patient and in patient's possession. No distress noted at this time.      Current vital signs:  /70   Pulse 97   Temp 98.6 °F (37 °C) (Oral)   Resp 16   Ht 5' 8\" (1.727 m)   Wt 146 lb (66.2 kg)   SpO2 96%   BMI 22.20 kg/m²                MEWS Score: 1

## 2023-02-23 NOTE — PROGRESS NOTES
[facility]              Subjective:   patient seen examined at bedside this morning. Stated that he still has on and off chest pressure, sometimes at rest and worsens with exertion. Feels on and off shortness of breath. Review of telemetry showing patient in atrial fibrillation, rate controlled. Objective:  BP (!) 98/59   Pulse 65   Temp 97.5 °F (36.4 °C) (Oral)   Resp 18   Ht 5' 8\" (1.727 m)   Wt 146 lb (66.2 kg)   SpO2 99%   BMI 22.20 kg/m²     General: no acute distress  Cardiac:   Irregular irregular rhythm, normal rate, distant heart sounds, no obvious murmurs   Lungs:  Clear to ausculation  Abdomen:  No tenderness or swelling  Ext: no edema    Assessment:   Atypical chest pain /pressure with exertion   Normal coronaries [facility] 02/22/2023   Permanent atrial fibrillation with RVR on Eliquis -  rate now controlled   Normal EF TTE 01/2020   COPD   Tobacco abuse     Plan:  -  patient stable at time of visit. -  Echocardiogram completed although results are pending. Patient RN will reach out to Echo Department for official read. Need to rule out aortic stenosis or any other significant valvular disease. -  If echocardiogram unremarkable, no further cardiac workup will be planned at this time and can be discharge with plan to follow-up in 2 weeks. Otherwise will follow-up with further recommendations as needed. Mariana Figueredo MD    This note was completed using a voice transcription system. Every effort was made to ensure accuracy. However, inadvertent computerized transcription errors may be present.

## 2023-03-03 ENCOUNTER — HOSPITAL ENCOUNTER (EMERGENCY)
Age: 58
Discharge: HOME | End: 2023-03-03
Attending: EMERGENCY MEDICINE
Payer: MEDICAID

## 2023-03-03 ENCOUNTER — APPOINTMENT (OUTPATIENT)
Dept: GENERAL RADIOLOGY | Age: 58
End: 2023-03-03
Payer: MEDICAID

## 2023-03-03 VITALS
RESPIRATION RATE: 17 BRPM | TEMPERATURE: 98 F | HEART RATE: 108 BPM | WEIGHT: 147 LBS | DIASTOLIC BLOOD PRESSURE: 69 MMHG | HEIGHT: 68 IN | BODY MASS INDEX: 22.28 KG/M2 | SYSTOLIC BLOOD PRESSURE: 109 MMHG | OXYGEN SATURATION: 96 %

## 2023-03-03 DIAGNOSIS — S83.91XA SPRAIN OF RIGHT KNEE, UNSPECIFIED LIGAMENT, INITIAL ENCOUNTER: Primary | ICD-10-CM

## 2023-03-03 PROCEDURE — 99283 EMERGENCY DEPT VISIT LOW MDM: CPT

## 2023-03-03 PROCEDURE — 6370000000 HC RX 637 (ALT 250 FOR IP): Performed by: EMERGENCY MEDICINE

## 2023-03-03 PROCEDURE — 73562 X-RAY EXAM OF KNEE 3: CPT

## 2023-03-03 RX ORDER — ACETAMINOPHEN 500 MG
1000 TABLET ORAL EVERY 6 HOURS PRN
Qty: 60 TABLET | Refills: 0 | Status: SHIPPED | OUTPATIENT
Start: 2023-03-03

## 2023-03-03 RX ORDER — ACETAMINOPHEN 500 MG
1000 TABLET ORAL ONCE
Status: COMPLETED | OUTPATIENT
Start: 2023-03-03 | End: 2023-03-03

## 2023-03-03 RX ADMIN — ACETAMINOPHEN 1000 MG: 500 TABLET ORAL at 02:33

## 2023-03-03 ASSESSMENT — LIFESTYLE VARIABLES
HOW MANY STANDARD DRINKS CONTAINING ALCOHOL DO YOU HAVE ON A TYPICAL DAY: PATIENT DOES NOT DRINK
HOW OFTEN DO YOU HAVE A DRINK CONTAINING ALCOHOL: NEVER

## 2023-03-03 ASSESSMENT — PAIN - FUNCTIONAL ASSESSMENT: PAIN_FUNCTIONAL_ASSESSMENT: 0-10

## 2023-03-03 ASSESSMENT — PAIN DESCRIPTION - LOCATION: LOCATION: KNEE

## 2023-03-03 ASSESSMENT — PAIN SCALES - GENERAL: PAINLEVEL_OUTOF10: 8

## 2023-03-03 NOTE — ED TRIAGE NOTES
Mode of arrival (squad #, walk in, police, etc) : walk in        Chief complaint(s): Right knee pain        Arrival Note (brief scenario, treatment PTA, etc). : Patient accidentally stepped into sump pump hole in basement around 9pm. Pain continued to worsen since then. Patient reports difficulty walking on the right knee and says he heard \"cracking\" when walking on it. C= \"Have you ever felt that you should Cut down on your drinking? \"  No  A= \"Have people Annoyed you by criticizing your drinking? \"  No  G= \"Have you ever felt bad or Guilty about your drinking? \"  No  E= \"Have you ever had a drink as an Eye-opener first thing in the morning to steady your nerves or to help a hangover? \"  No      Deferred []      Reason for deferring: N/A    *If yes to two or more: probable alcohol abuse. *

## 2023-03-03 NOTE — ED PROVIDER NOTES
EMERGENCY DEPARTMENT ENCOUNTER    Pt Name: Thony Oh  MRN: [id number]  [place] 1965  Date of evaluation: 3/3/23  CHIEF COMPLAINT       Chief Complaint   Patient presents with    Knee Pain     Patient accidentally stepped into sump pump hole in basement around 9pm. Pain continued to worsen since then. Patient reports difficulty walking on the right knee and says he heard \"cracking\"     HISTORY OF PRESENT ILLNESS     Knee Problem  Location:  Knee  Time since incident:  1 hour  Injury: yes    Mechanism of injury: fall    Mechanism of injury comment:  Fell backward into sump pump pit, twisted right knee, pain right knee  Knee location:  R knee  Pain details:     Quality:  Aching    Radiates to:  Does not radiate    Onset quality:  Sudden    Timing:  Constant    Progression:  Unchanged  Chronicity:  New  Relieved by:  Rest  Worsened by:  Flexion  Ineffective treatments:  None tried  Associated symptoms: decreased ROM and stiffness    Associated symptoms: no numbness and no swelling    No other injury did not hit his head no LOC no syncope symptoms. Only having right knee pain. Compliant with medications. Took them today. REVIEW OF SYSTEMS     Review of Systems   Musculoskeletal:  Positive for stiffness. All other systems reviewed and are negative.   PASTMEDICAL HISTORY     Past Medical History:   Diagnosis Date    Abnormal echocardiogram 02/2018    Arrhythmia     AS (aortic stenosis)     Atrial fibrillation (HCC)     Benign prostatic hyperplasia     Biatrial enlargement     Central perforation of tympanic membrane, right     Chest pain     Chews tobacco     CHF (congestive heart failure), NYHA class I, acute on chronic, combined (HCC)     Chronic back pain     Conductive hearing loss of right ear     COPD (chronic obstructive pulmonary disease) ([facility]     Diverticulitis     Fatigue     History of cocaine abuse ([facility] 01/2018    Household contact with history of methicillin resistant Staphylococcus aureus (MRSA) infection     Lung nodule     Mass of upper lobe of left lung     Mild concentric left ventricular hypertrophy     Murmur, cardiac     Near syncope     Nonrheumatic aortic (valve) stenosis     Palpitations     Panlobular emphysema (HCC)     Pneumonia of both upper lobes due to infectious organism     Right-sided tinnitus     Severe aortic stenosis     Shortness of breath     Tobacco abuse      Past Problem List  Patient Active Problem List   Diagnosis Code    Back pain M54.9    Tobacco abuse Z72.0    Thrush, oral B37.0    Fatigue R53.83    Left foot burn T25.022A    Cellulitis L03.90    Cellulitis of buttock L03.317    Gluteal abscess L02.31    Aortic valve stenosis I35.0    Shortness of breath R06.02    CHF (congestive heart failure), NYHA class I, acute on chronic, combined ([facility]) I50.43    Pneumonia of both upper lobes due to infectious organism J18.9    Crack cocaine use F14.90    Atrial fibrillation with rapid ventricular response ([facility]) I48.91    Chest pain R07.9    COPD (chronic obstructive pulmonary disease) ([facility]) J44.9    Cocaine abuse ([facility] F14.10    Abnormal echocardiogram R93.1    Biatrial enlargement I51.7    Central perforation of tympanic membrane, right H72.01    Chews tobacco Z72.0    Conductive hearing loss of right ear H90.11    History of cocaine abuse ([address] F14.11    Mass of upper lobe of left lung R91.8    Mild concentric left ventricular hypertrophy (LVH) I51.7    MRSA (methicillin resistant Staphylococcus aureus) infection A49.02    Near syncope R55    NICM (nonischemic cardiomyopathy) ([facility]) I42.8    Normal coronary arteries [id number]    Panlobular emphysema ([facility]) J43.1    Right-sided tinnitus H93.11    S/p TAVR (transcatheter aortic valve replacement), bioprosthetic Z95.3    Numbness and tingling of left upper extremity R20.0, R20.2    Unstable angina ([facility]) I20.0    Acute coronary syndrome ([facility]) I24.9    Bronchitis, acute J20.9    Urinary retention R33.9    Atrial fibrillation ([facility] I48.91     SURGICAL HISTORY       Past Surgical History:   Procedure Laterality Date    AORTIC VALVE REPLACEMENT  2018    CYST REMOVAL Left     INGUINAL HERNIA REPAIR Right     [place] INCISION & DRAINAGE ABSCESS COMPLICATED/MULTIPLE N/A 2018    GLUTEAL INCISION AND DRAINAGE performed by Cherise Jain DO at [address] [place] [address]       Previous Medications    ATORVASTATIN (LIPITOR) 10 MG TABLET    Take 1 tablet by mouth nightly    CARVEDILOL (COREG) 3.125 MG TABLET    Take 1 tablet by mouth 2 times daily (with meals)    DILTIAZEM (CARDIZEM CD) 120 MG EXTENDED RELEASE CAPSULE    Take 1 capsule by mouth daily    ELIQUIS 5 MG TABS TABLET    take 1 tablet by mouth twice a day    FLUTICASONE (FLONASE) 50 MCG/ACT NASAL SPRAY    1 spray by Each Nostril route daily    GUAIFENESIN (MUCINEX) 600 MG EXTENDED RELEASE TABLET    Take 1 tablet by mouth 2 times daily    PROAIR  (90 BASE) MCG/ACT INHALER    inhale 2 puffs by mouth and INTO THE LUNGS every 6 hours if needed for shortness of breath    TIOTROPIUM (SPIRIVA HANDIHALER) 18 MCG INHALATION CAPSULE    Inhale 1 capsule into the lungs daily     ALLERGIES     has No Known Allergies. FAMILY HISTORY     He indicated that the status of his mother is unknown. He indicated that the status of his father is unknown. He indicated that the status of his paternal grandfather is unknown.      SOCIAL HISTORY       Social History     Tobacco Use    Smoking status: Every Day     Packs/day: 0.25     Years: 40.00     Pack years: 10.00     Types: Cigarettes     Last attempt to quit: 2022     Years since quittin.1    Smokeless tobacco: Former     Types: Chew    Tobacco comments:     pt  previously smoked 1.5 ppd;   Vaping Use    Vaping Use: Never used   Substance Use Topics    Alcohol use: Not Currently    Drug use: Not Currently     Types: Cocaine     Comment: drug abuse includes crack cocaine; last used 5 months ago      PHYSICAL EXAM     INITIAL VITALS: BP 109/69   Pulse (!) 108   Temp 98 °F (36.7 °C) (Oral)   Resp 17   Ht 5' 8\" (1.727 m)   Wt 147 lb (66.7 kg)   SpO2 96%   BMI 22.35 kg/m²    Physical Exam  Constitutional:       General: He is not in acute distress. Appearance: Normal appearance. He is well-developed. He is not diaphoretic. HENT:      Head: Normocephalic and atraumatic. Right Ear: External ear normal.      Left Ear: External ear normal.      Nose: Nose normal. No congestion. Mouth/Throat:      Mouth: Mucous membranes are moist.      Pharynx: Oropharynx is clear. Eyes:      General:         Right eye: No discharge. Left eye: No discharge. Conjunctiva/sclera: Conjunctivae normal.      Pupils: Pupils are equal, round, and reactive to light. Neck:      Trachea: No tracheal deviation. Cardiovascular:      Rate and Rhythm: Regular rhythm. Tachycardia present. Pulses: Normal pulses. Heart sounds: Normal heart sounds. Comments: Dp/pt 2+ right foot  Pulmonary:      Effort: Pulmonary effort is normal. No respiratory distress. Breath sounds: Normal breath sounds. No stridor. No wheezing or rales. Abdominal:      Palpations: Abdomen is soft. Tenderness: There is no abdominal tenderness. There is no guarding or rebound. Musculoskeletal:         General: Tenderness present. No swelling, deformity or signs of injury. Cervical back: Normal range of motion and neck supple. Right lower leg: No edema. Left lower leg: No edema. Comments: Tenderness right ant knee, pain with rom right knee, no deformity, no abrasion   Skin:     General: Skin is warm and dry. Capillary Refill: Capillary refill takes less than 2 seconds. Findings: No erythema or rash. Neurological:      General: No focal deficit present. Mental Status: He is alert and oriented to person, place, and time.       Coordination: Coordination normal.      Comments: RLE neuro intact motor and sensory Psychiatric:         Mood and Affect: Mood normal.         Behavior: Behavior normal.         Thought Content: Thought content normal.         Judgment: Judgment normal.       MEDICAL DECISION MAKING / ED COURSE:         1)  Number and Complexity of Problems Addressed at this Encounter  Problem List This Visit:  right knee pain, injury, trauma    Differential Diagnosis: sprain, cartilage tear, fx    Diagnoses Considered but Do Not Suspect:  infection, dvt, compartment syndrome, arterial injury    Pertinent Comorbid Conditions:  on eliquis for afib and has CM    2)  Data Reviewed and Analyzed  (Lab and radiology tests/orders below in next section)    Xray reviewed, I look at images    3)  Treatment and Disposition  Put in right knee immobilizer, crutches, Tylenol, ice provided. Patient repeat assessment:  improved comfortable    Disposition discussion with patient/family, Shared Decision Making:  Discussed with patient anticipatory guidance, discharge instructions, follow up ortho 24 hours        DATA FOR LAB AND RADIOLOGY TESTS ORDERED BELOW ARE REVIEWED BY THE ED CLINICIAN:    RADIOLOGY: All x-rays, CT, MRI, and formal ultrasound images (except ED bedside ultrasound) are read by the radiologist, see reports below, unless otherwise noted in MDM or here. Reports below are reviewed by myself. XR KNEE RIGHT (3 VIEWS)   Final Result   No acute osseous abnormality.              Vitals Reviewed:    Vitals:    03/03/23 0234 03/03/23 0300 03/03/23 0315 03/03/23 0330   BP:  98/66 105/72 109/69   Pulse: (!) 110 (!) 112 (!) 104 (!) 108   Resp:  17 14 17   Temp:       TempSrc:       SpO2:       Weight:       Height:         MEDICATIONS GIVEN TO PATIENT THIS ENCOUNTER:  Orders Placed This Encounter   Medications    acetaminophen (TYLENOL) tablet 1,000 mg    acetaminophen (TYLENOL) 500 MG tablet     Sig: Take 2 tablets by mouth every 6 hours as needed for Pain     Dispense:  60 tablet     Refill:  0     DISCHARGE PRESCRIPTIONS:  New Prescriptions    ACETAMINOPHEN (TYLENOL) 500 MG TABLET    Take 2 tablets by mouth every 6 hours as needed for Pain     PHYSICIAN CONSULTS ORDERED THIS ENCOUNTER:  None  FINAL IMPRESSION      1.  Sprain of right knee, unspecified ligament, initial encounter          DISPOSITION/PLAN   DISPOSITION Decision To Discharge 03/03/2023 03:32:50 AM      OUTPATIENT FOLLOW UP THE PATIENT:  Rafa Jiménez MD  [address] [place] [address] [address] [phone]    Schedule an appointment as soon as possible for a visit in 1 day      Kimberley Red, [address] [place] [address]  [phone]    Schedule an appointment as soon as possible for a visit in 1 day      MD Ana María Langston MD  03/03/23 6522

## 2023-03-03 NOTE — ED NOTES
Author educated pt on how to apply brace to knee. Pt was able to verbalized understanding on how to put brace on to the knee. Author demonstrated how to use crutches as an aid to walk. Pt was able to verbalize understanding. Author educated pt on using ice pack applied to injured knee and to rest the leg when able to.       Jessica Peralta RN  03/03/23 8049

## 2023-03-05 ENCOUNTER — APPOINTMENT (OUTPATIENT)
Dept: GENERAL RADIOLOGY | Age: 58
End: 2023-03-05
Payer: MEDICAID

## 2023-03-05 ENCOUNTER — HOSPITAL ENCOUNTER (OUTPATIENT)
Age: 58
Setting detail: OBSERVATION
Discharge: ANOTHER HOSPITAL | End: 2023-03-05
Attending: EMERGENCY MEDICINE | Admitting: EMERGENCY MEDICINE
Payer: MEDICAID

## 2023-03-05 VITALS
DIASTOLIC BLOOD PRESSURE: 74 MMHG | HEIGHT: 68 IN | OXYGEN SATURATION: 95 % | SYSTOLIC BLOOD PRESSURE: 117 MMHG | BODY MASS INDEX: 22.73 KG/M2 | WEIGHT: 150 LBS | RESPIRATION RATE: 16 BRPM | HEART RATE: 96 BPM | TEMPERATURE: 96.8 F

## 2023-03-05 DIAGNOSIS — R07.9 CHEST PAIN, UNSPECIFIED TYPE: Primary | ICD-10-CM

## 2023-03-05 LAB
ABSOLUTE EOS #: 0.04 K/UL (ref 0–0.44)
ABSOLUTE IMMATURE GRANULOCYTE: <0.03 K/UL (ref 0–0.3)
ABSOLUTE LYMPH #: 2.08 K/UL (ref 1.1–3.7)
ABSOLUTE MONO #: 0.72 K/UL (ref 0.1–1.2)
ANION GAP SERPL CALCULATED.3IONS-SCNC: 12 MMOL/L (ref 9–17)
BASOPHILS # BLD: 0 % (ref 0–2)
BASOPHILS ABSOLUTE: 0.03 K/UL (ref 0–0.2)
BNP SERPL-MCNC: 2167 PG/ML
BUN SERPL-MCNC: 21 MG/DL (ref 6–20)
CALCIUM SERPL-MCNC: 8.7 MG/DL (ref 8.6–10.4)
CHLORIDE SERPL-SCNC: 105 MMOL/L (ref 98–107)
CO2 SERPL-SCNC: 22 MMOL/L (ref 20–31)
CREAT SERPL-MCNC: 0.94 MG/DL (ref 0.7–1.2)
EOSINOPHILS RELATIVE PERCENT: 1 % (ref 1–4)
GFR SERPL CREATININE-BSD FRML MDRD: >60 ML/MIN/1.73M2
GLUCOSE SERPL-MCNC: 106 MG/DL (ref 70–99)
HCT VFR BLD AUTO: 42.5 % (ref 40.7–50.3)
HGB BLD-MCNC: 14.1 G/DL (ref 13–17)
IMMATURE GRANULOCYTES: 0 %
LYMPHOCYTES # BLD: 25 % (ref 24–43)
MCH RBC QN AUTO: 30.6 PG (ref 25.2–33.5)
MCHC RBC AUTO-ENTMCNC: 33.2 G/DL (ref 28.4–34.8)
MCV RBC AUTO: 92.2 FL (ref 82.6–102.9)
MONOCYTES # BLD: 9 % (ref 3–12)
NRBC AUTOMATED: 0 PER 100 WBC
PDW BLD-RTO: 14 % (ref 11.8–14.4)
PLATELET # BLD AUTO: 212 K/UL (ref 138–453)
PMV BLD AUTO: 11.5 FL (ref 8.1–13.5)
POTASSIUM SERPL-SCNC: 3.8 MMOL/L (ref 3.7–5.3)
RBC # BLD: 4.61 M/UL (ref 4.21–5.77)
SARS-COV-2 RDRP RESP QL NAA+PROBE: NOT DETECTED
SEG NEUTROPHILS: 65 % (ref 36–65)
SEGMENTED NEUTROPHILS ABSOLUTE COUNT: 5.59 K/UL (ref 1.5–8.1)
SODIUM SERPL-SCNC: 139 MMOL/L (ref 135–144)
SPECIMEN DESCRIPTION: NORMAL
TROPONIN I SERPL DL<=0.01 NG/ML-MCNC: 15 NG/L (ref 0–22)
TROPONIN I SERPL DL<=0.01 NG/ML-MCNC: 15 NG/L (ref 0–22)
WBC # BLD AUTO: 8.5 K/UL (ref 3.5–11.3)

## 2023-03-05 PROCEDURE — 99285 EMERGENCY DEPT VISIT HI MDM: CPT

## 2023-03-05 PROCEDURE — 6370000000 HC RX 637 (ALT 250 FOR IP)

## 2023-03-05 PROCEDURE — 85025 COMPLETE CBC W/AUTO DIFF WBC: CPT

## 2023-03-05 PROCEDURE — 2580000003 HC RX 258

## 2023-03-05 PROCEDURE — 6360000002 HC RX W HCPCS

## 2023-03-05 PROCEDURE — 93005 ELECTROCARDIOGRAM TRACING: CPT

## 2023-03-05 PROCEDURE — G0378 HOSPITAL OBSERVATION PER HR: HCPCS

## 2023-03-05 PROCEDURE — 83880 ASSAY OF NATRIURETIC PEPTIDE: CPT

## 2023-03-05 PROCEDURE — 84484 ASSAY OF TROPONIN QUANT: CPT

## 2023-03-05 PROCEDURE — 87635 SARS-COV-2 COVID-19 AMP PRB: CPT

## 2023-03-05 PROCEDURE — 96374 THER/PROPH/DIAG INJ IV PUSH: CPT

## 2023-03-05 PROCEDURE — 80048 BASIC METABOLIC PNL TOTAL CA: CPT

## 2023-03-05 PROCEDURE — 71046 X-RAY EXAM CHEST 2 VIEWS: CPT

## 2023-03-05 RX ORDER — DILTIAZEM HYDROCHLORIDE 120 MG/1
120 CAPSULE, COATED, EXTENDED RELEASE ORAL DAILY
Status: DISCONTINUED | OUTPATIENT
Start: 2023-03-05 | End: 2023-03-05 | Stop reason: HOSPADM

## 2023-03-05 RX ORDER — ALBUTEROL SULFATE 90 UG/1
2 AEROSOL, METERED RESPIRATORY (INHALATION) EVERY 6 HOURS PRN
Status: DISCONTINUED | OUTPATIENT
Start: 2023-03-05 | End: 2023-03-05 | Stop reason: HOSPADM

## 2023-03-05 RX ORDER — ASPIRIN 81 MG/1
81 TABLET ORAL DAILY
COMMUNITY

## 2023-03-05 RX ORDER — SODIUM CHLORIDE 0.9 % (FLUSH) 0.9 %
5-40 SYRINGE (ML) INJECTION EVERY 12 HOURS SCHEDULED
Status: DISCONTINUED | OUTPATIENT
Start: 2023-03-05 | End: 2023-03-05 | Stop reason: HOSPADM

## 2023-03-05 RX ORDER — ACETAMINOPHEN 650 MG/1
650 SUPPOSITORY RECTAL EVERY 6 HOURS PRN
Status: DISCONTINUED | OUTPATIENT
Start: 2023-03-05 | End: 2023-03-05 | Stop reason: HOSPADM

## 2023-03-05 RX ORDER — SODIUM CHLORIDE 9 MG/ML
INJECTION, SOLUTION INTRAVENOUS PRN
Status: DISCONTINUED | OUTPATIENT
Start: 2023-03-05 | End: 2023-03-05 | Stop reason: HOSPADM

## 2023-03-05 RX ORDER — MECLIZINE HCL 12.5 MG/1
25 TABLET ORAL ONCE
Status: COMPLETED | OUTPATIENT
Start: 2023-03-05 | End: 2023-03-05

## 2023-03-05 RX ORDER — ACETAMINOPHEN 500 MG
1000 TABLET ORAL EVERY 6 HOURS PRN
Status: DISCONTINUED | OUTPATIENT
Start: 2023-03-05 | End: 2023-03-05

## 2023-03-05 RX ORDER — POLYETHYLENE GLYCOL 3350 17 G/17G
17 POWDER, FOR SOLUTION ORAL DAILY PRN
Status: DISCONTINUED | OUTPATIENT
Start: 2023-03-05 | End: 2023-03-05 | Stop reason: HOSPADM

## 2023-03-05 RX ORDER — ACETAMINOPHEN 325 MG/1
650 TABLET ORAL EVERY 6 HOURS PRN
Status: DISCONTINUED | OUTPATIENT
Start: 2023-03-05 | End: 2023-03-05 | Stop reason: HOSPADM

## 2023-03-05 RX ORDER — ONDANSETRON 2 MG/ML
4 INJECTION INTRAMUSCULAR; INTRAVENOUS EVERY 6 HOURS PRN
Status: DISCONTINUED | OUTPATIENT
Start: 2023-03-05 | End: 2023-03-05 | Stop reason: HOSPADM

## 2023-03-05 RX ORDER — SODIUM CHLORIDE 0.9 % (FLUSH) 0.9 %
5-40 SYRINGE (ML) INJECTION PRN
Status: DISCONTINUED | OUTPATIENT
Start: 2023-03-05 | End: 2023-03-05 | Stop reason: HOSPADM

## 2023-03-05 RX ORDER — ONDANSETRON 4 MG/1
4 TABLET, ORALLY DISINTEGRATING ORAL EVERY 8 HOURS PRN
Status: DISCONTINUED | OUTPATIENT
Start: 2023-03-05 | End: 2023-03-05 | Stop reason: HOSPADM

## 2023-03-05 RX ADMIN — MECLIZINE HCL 12.5 MG 25 MG: 12.5 TABLET ORAL at 11:10

## 2023-03-05 RX ADMIN — APIXABAN 5 MG: 5 TABLET, FILM COATED ORAL at 09:12

## 2023-03-05 RX ADMIN — SODIUM CHLORIDE, PRESERVATIVE FREE 10 ML: 5 INJECTION INTRAVENOUS at 09:12

## 2023-03-05 RX ADMIN — DILTIAZEM HYDROCHLORIDE 120 MG: 120 CAPSULE, COATED, EXTENDED RELEASE ORAL at 09:12

## 2023-03-05 RX ADMIN — HYDROMORPHONE HYDROCHLORIDE 0.5 MG: 1 INJECTION, SOLUTION INTRAMUSCULAR; INTRAVENOUS; SUBCUTANEOUS at 15:22

## 2023-03-05 RX ADMIN — ACETAMINOPHEN 650 MG: 325 TABLET ORAL at 12:25

## 2023-03-05 ASSESSMENT — HEART SCORE: ECG: 1

## 2023-03-05 ASSESSMENT — ENCOUNTER SYMPTOMS
SHORTNESS OF BREATH: 1
SORE THROAT: 0
NAUSEA: 0
TROUBLE SWALLOWING: 0
BACK PAIN: 0
VOMITING: 0
ABDOMINAL PAIN: 0
WHEEZING: 0
CHEST TIGHTNESS: 0

## 2023-03-05 ASSESSMENT — PAIN SCALES - GENERAL
PAINLEVEL_OUTOF10: 2
PAINLEVEL_OUTOF10: 6
PAINLEVEL_OUTOF10: 5

## 2023-03-05 ASSESSMENT — PAIN - FUNCTIONAL ASSESSMENT
PAIN_FUNCTIONAL_ASSESSMENT: ACTIVITIES ARE NOT PREVENTED
PAIN_FUNCTIONAL_ASSESSMENT: PREVENTS OR INTERFERES SOME ACTIVE ACTIVITIES AND ADLS
PAIN_FUNCTIONAL_ASSESSMENT: 0-10
PAIN_FUNCTIONAL_ASSESSMENT: PREVENTS OR INTERFERES SOME ACTIVE ACTIVITIES AND ADLS

## 2023-03-05 ASSESSMENT — PAIN DESCRIPTION - LOCATION
LOCATION: CHEST

## 2023-03-05 ASSESSMENT — PAIN DESCRIPTION - ONSET: ONSET: GRADUAL

## 2023-03-05 ASSESSMENT — PAIN DESCRIPTION - PAIN TYPE
TYPE: ACUTE PAIN
TYPE: ACUTE PAIN

## 2023-03-05 ASSESSMENT — PAIN DESCRIPTION - ORIENTATION
ORIENTATION: LEFT

## 2023-03-05 ASSESSMENT — PAIN DESCRIPTION - DESCRIPTORS
DESCRIPTORS: PRESSURE

## 2023-03-05 ASSESSMENT — PAIN DESCRIPTION - FREQUENCY
FREQUENCY: INTERMITTENT
FREQUENCY: CONTINUOUS

## 2023-03-05 NOTE — ED PROVIDER NOTES
[address]  Emergency Department Encounter  Emergency Medicine Resident     Pt Name:Alexandro Gibson  MRN: [id number]  [place] 1965  Date of evaluation: 3/5/23  PCP:  Ze Christensen MD  Note Started: 5:06 AM EST      CHIEF COMPLAINT       Chief Complaint   Patient presents with    Chest Pain     Hx of a-fib       HISTORY OF PRESENT ILLNESS  (Location/Symptom, Timing/Onset, Context/Setting, Quality, Duration, Modifying Factors, Severity.)      Hunter Randhawa is a 62 y.o. male who presents with chest pain, shortness of breath and dizziness. Patient states that the chest pain has been present for 1 week, constant in nature but is gotten progressively worse. Patient states the chest pain is 7/10 with radiation down his left arm. Patient states that nothing makes the pain better. Patient is also complaining of dizziness. Patient was recently seen in the hospital 2 weeks ago. At that time patient had cardiac evaluation with cardiac stress test and cardiac cath. Cardiac cath was negative. Had negative stress test.  Patient also has a history of aortic valve replacement, states his aortic valve was replaced about 2 years ago.     PAST MEDICAL / SURGICAL / SOCIAL / FAMILY HISTORY      has a past medical history of Abnormal echocardiogram, Arrhythmia, AS (aortic stenosis), Atrial fibrillation (HCC), Benign prostatic hyperplasia, Biatrial enlargement, Central perforation of tympanic membrane, right, Chest pain, Chews tobacco, CHF (congestive heart failure), NYHA class I, acute on chronic, combined ([address] Chronic back pain, Conductive hearing loss of right ear, COPD (chronic obstructive pulmonary disease) ([address] Diverticulitis, Fatigue, History of cocaine abuse ([address] Household contact with history of methicillin resistant Staphylococcus aureus (MRSA) infection, Lung nodule, Mass of upper lobe of left lung, Mild concentric left ventricular hypertrophy, Murmur, cardiac, Near syncope, Nonrheumatic aortic (valve) stenosis, Palpitations, Panlobular emphysema ([address] Pneumonia of both upper lobes due to infectious organism, Right-sided tinnitus, Severe aortic stenosis, Shortness of breath, and Tobacco abuse.       has a past surgical history that includes Inguinal hernia repair (Right); cyst removal (Left); pr incision & drainage abscess complicated/multiple (N/A, 2018); and Aortic valve replacement (2018). Social History     Socioeconomic History    Marital status:      Spouse name: Not on file    Number of children: Not on file    Years of education: Not on file    Highest education level: Not on file   Occupational History    Not on file   Tobacco Use    Smoking status: Every Day     Packs/day: 0.25     Years: 40.00     Pack years: 10.00     Types: Cigarettes     Last attempt to quit: 2022     Years since quittin.1    Smokeless tobacco: Former     Types: Chew    Tobacco comments:     pt  previously smoked 1.5 ppd;   Vaping Use    Vaping Use: Never used   Substance and Sexual Activity    Alcohol use: Not Currently    Drug use: Not Currently     Types: Cocaine     Comment: drug abuse includes crack cocaine; last used 5 months ago     Sexual activity: Yes     Partners: Female   Other Topics Concern    Not on file   Social History Narrative    Not on file     Social Determinants of Health     Financial Resource Strain: Not on file   Food Insecurity: Not on file   Transportation Needs: Not on file   Physical Activity: Not on file   Stress: Not on file   Social Connections: Not on file   Intimate Partner Violence: Not on file   Housing Stability: Not on file       Family History   Problem Relation Age of Onset    Depression Mother     COPD Father     COPD Paternal Grandfather        Allergies:  Patient has no known allergies. Home Medications:  Prior to Admission medications    Medication Sig Start Date End Date Taking?  Authorizing Provider   acetaminophen (TYLENOL) 500 MG tablet Take 2 tablets by mouth every 6 hours as needed for Pain 3/3/23   Jessica Rcoha MD   carvedilol (COREG) 3.125 MG tablet Take 1 tablet by mouth 2 times daily (with meals)  Patient not taking: Reported on 3/5/2023 2/20/23   Yovani Manley MD   ELIQUIS 5 MG TABS tablet take 1 tablet by mouth twice a day 1/4/23   Yovani Manley MD   PROAIR  (16 Base) MCG/ACT inhaler inhale 2 puffs by mouth and INTO THE LUNGS every 6 hours if needed for shortness of breath 7/8/22   Yovani Manley MD   dilTIAZem (CARDIZEM CD) 120 MG extended release capsule Take 1 capsule by mouth daily 11/14/21   Hemalatha Syed MD   guaiFENesin (MUCINEX) 600 MG extended release tablet Take 1 tablet by mouth 2 times daily 11/13/21   Hemalatha Syed MD   atorvastatin (LIPITOR) 10 MG tablet Take 1 tablet by mouth nightly  Patient not taking: Reported on 3/5/2023 11/13/21   Hemalatha Syed MD   tiotropium (Jose Crisp) 18 MCG inhalation capsule Inhale 1 capsule into the lungs daily 10/7/21   Yovani Manley MD   fluticasone (FLONASE) 50 MCG/ACT nasal spray 1 spray by Each Nostril route daily 4/12/21   Yovani Manley MD         REVIEW OF SYSTEMS       Review of Systems   Constitutional:  Negative for activity change, appetite change, chills, fatigue and fever. HENT:  Negative for congestion, sore throat and trouble swallowing. Respiratory:  Positive for shortness of breath. Negative for chest tightness and wheezing. Cardiovascular:  Positive for chest pain. Gastrointestinal:  Negative for abdominal pain, nausea and vomiting. Genitourinary:  Negative for dysuria. Musculoskeletal:  Negative for back pain and joint swelling. Neurological:  Positive for dizziness. Negative for weakness, numbness and headaches.      PHYSICAL EXAM      INITIAL VITALS:   /82   Pulse (!) 104   Temp 97.4 °F (36.3 °C) (Oral)   Resp 14   Ht 5' 8\" (1.727 m)   Wt 150 lb (68 kg)   SpO2 90%   BMI 22.81 kg/m²     Physical Exam  Constitutional: General: He is not in acute distress.     Appearance: Normal appearance. He is not ill-appearing or toxic-appearing.   HENT:      Head: Normocephalic and atraumatic.   Eyes:      Extraocular Movements: Extraocular movements intact.      Pupils: Pupils are equal, round, and reactive to light.   Cardiovascular:      Rate and Rhythm: Normal rate and regular rhythm.   Pulmonary:      Effort: Pulmonary effort is normal.      Breath sounds: Normal breath sounds.   Abdominal:      General: Abdomen is flat. There is no distension.      Palpations: Abdomen is soft.      Tenderness: There is no abdominal tenderness.   Musculoskeletal:      Right lower leg: No edema.      Left lower leg: No edema.   Neurological:      General: No focal deficit present.      Mental Status: He is alert and oriented to person, place, and time. Mental status is at baseline.         DDX/DIAGNOSTIC RESULTS / EMERGENCY DEPARTMENT COURSE / MDM     Medical Decision Making  58-year-old male presents to the emergency department with chest pain and dizziness.  Patient states for the chest pain has been present for the past week, was gotten progressively worse over the past few days.  Patient states she was recently admitted for chest pain, had negative cardiac work-up including cardiac stress test and cardiac cath.  In the emergency department the patient is afebrile, nontoxic in appearance.  Patient's lungs were clear to auscultation bilaterally.  Chest Pain Diferentials: ACS/NSTEMI/STEMI/angina, arrhythmia, trauma, aortic dissection, PE, Pneumonia, pneumothorax, tamponade, GERD, MSK, anxiety  In order to evaluate for this patient's chest pain will obtain CBC, BMP, BNP, troponin x2, chest x-ray.  Patient's BNP was elevated, rest of the cardiac work-up was unremarkable.  ACS/NSTEMI/STEMI are on the differential but are less likely as the patient had negative cardiac work-up.  Arrhythmia is on the differential but is less likely as the patient had normal  EKG.  Aortic dissection, pneumonia, pneumothorax on the differential but are significantly less likely as the patient's chest x-ray was unremarkable. Patient is heart score was 5, had significant cardiac history. Due to this we will admit the patient to the observation unit for cardiac evaluation. Amount and/or Complexity of Data Reviewed  Labs: ordered. Radiology: ordered. ECG/medicine tests: ordered. Risk  Decision regarding hospitalization. EKG      All EKG's are interpreted by the Emergency Department Physician who either signs or Co-signs this chart in the absence of a cardiologist.    EMERGENCY DEPARTMENT COURSE:  Heart Score    Heart Score for chest pain patients  History: Moderately Suspicious  ECG: Non-Specifc repolarization disturbance/LBTB/PM  Patient Age: > 39 and < 65 years  Risk Factors: > 3 Risk factors or history of atherosclerotic disease*  Troponin: < 1X normal limit  Heart Score Total: 5    Score 0 - 3 = 2.5%  MACE over next 6 wks = Discharge home  Score 4 - 6 = 20.3%  MACE over next 6 wks = Obs admit  Score 7 - 10 = 72.7%  MACE over next 6 wks = Early invasive Rx           PROCEDURES:      CONSULTS:  IP CONSULT TO CARDIOLOGY    CRITICAL CARE:  There was significant risk of life threatening deterioration of patient's condition requiring my direct management. Critical care time 0 minutes, excluding any documented procedures. FINAL IMPRESSION      1. Chest pain, unspecified type          DISPOSITION / [address] Admitted 03/05/2023 05:35:56 AM      PATIENT REFERRED TO:  No follow-up provider specified.     DISCHARGE MEDICATIONS:  Current Discharge Medication List          Hilary Ruff MD  Emergency Medicine Resident    (Please note that portions of thisnote were completed with a voice recognition program.  Efforts were made to edit the dictations but occasionally words are mis-transcribed.)        Hilary Ruff MD  Resident  03/05/23 1361

## 2023-03-05 NOTE — PROGRESS NOTES
[address]  CDU / OBSERVATION ENCOUNTER  ATTENDING NOTE       I performed a history and physical examination of the patient and discussed management with the resident or midlevel provider. I reviewed the resident or midlevel provider's note and agree with the documented findings and plan of care. Any areas of disagreement are noted on the chart. I was personally present for the key portions of any procedures. I have documented in the chart those procedures where I was not present during the key portions. I have reviewed the nurses notes. I agree with the chief complaint, past medical history, past surgical history, allergies, medications, social and family history as documented unless otherwise noted below. The Family history, social history, and ROS are effectively unchanged since admission unless noted elsewhere in the chart. This patient was placed in the observation unit for reevaluation for possible admission to the hospital    Patient has difficult time giving a good history as he is uncertain of his medical condition. It would appear the patient had a procedure to \"repair leaky valve\" and has a history of COPD aortic valve regurg and cocaine use. Patient had a heart catheterization last month which was negative for coronary artery disease. Patient has been referred for ongoing treatment at [facility] where he is his primary cardiologist.  He has a \"procedure on his heart\" due tomorrow. Suspect that it is a transesophageal echocardiogram as a discharge summary from last month states that the patient has been set for that. Patient has been seen by her cardiologist.  Patient has been cleared for discharge to [facility] in the pursue further care. Patient has some ongoing symptoms and is anxious about discharge. We are calling [facility] to see if transfer is an option to get the procedure facilitated.     Marleen Magallon MD  Attending Emergency  Physician

## 2023-03-05 NOTE — PROGRESS NOTES
Orthostatic BP    Lying 113/68 pulse 83  Sitting 121/74 pulse 95  Standing 132/86 pulse 107    Cardiologist in room, VIRGINIA Gonzalez notified.

## 2023-03-05 NOTE — CONSULTS
[facility] Cardiology    Consult / H&P               Today's Date: 3/5/2023  Patient Name: Shelbie Prieto  Date of admission: 3/5/2023  3:24 AM  Patient's age: 62 y.o., 1965  Admission Dx: Chest pain [R07.9]    Reason for Consult:  Cardiac evaluation    Requesting Physician: Gin Stiles MD    CHIEF COMPLAINT:  Chest pain    History Obtained From:  patient, electronic medical record    HISTORY OF PRESENT ILLNESS:      The patient is a 62 y.o. male who was admitted for left sided chest pain and dizziness that has been lasting for one week. Patient was recently seen by [facility] cardiology for similar complaints and was discharged after a cardiac cath was negative. He does have a history of valve replacement, likely a TAVR. Past Medical History:   has a past medical history of Abnormal echocardiogram, Arrhythmia, AS (aortic stenosis), Atrial fibrillation ([address] Benign prostatic hyperplasia, Biatrial enlargement, Central perforation of tympanic membrane, right, Chest pain, Chews tobacco, CHF (congestive heart failure), NYHA class I, acute on chronic, combined ([address] Chronic back pain, Conductive hearing loss of right ear, COPD (chronic obstructive pulmonary disease) ([address] Diverticulitis, Fatigue, History of cocaine abuse ([address] Household contact with history of methicillin resistant Staphylococcus aureus (MRSA) infection, Lung nodule, Mass of upper lobe of left lung, Mild concentric left ventricular hypertrophy, Murmur, cardiac, Near syncope, Nonrheumatic aortic (valve) stenosis, Palpitations, Panlobular emphysema (HCC), Pneumonia of both upper lobes due to infectious organism, Right-sided tinnitus, Severe aortic stenosis, Shortness of breath, and Tobacco abuse. Past Surgical History:   has a past surgical history that includes Inguinal hernia repair (Right); cyst removal (Left); pr incision & drainage abscess complicated/multiple (N/A, 2/12/2018); and Aortic valve replacement (12/2018). Home Medications:    Prior to Admission medications    Medication Sig Start Date End Date Taking?  Authorizing Provider   acetaminophen (TYLENOL) 500 MG tablet Take 2 tablets by mouth every 6 hours as needed for Pain 3/3/23   George Zarate MD   carvedilol (COREG) 3.125 MG tablet Take 1 tablet by mouth 2 times daily (with meals)  Patient not taking: Reported on 3/5/2023 2/20/23   Renae Vicente MD   ELIQUIS 5 MG TABS tablet take 1 tablet by mouth twice a day 1/4/23   Renae Vicente MD   PROAIR  (36 Base) MCG/ACT inhaler inhale 2 puffs by mouth and INTO THE LUNGS every 6 hours if needed for shortness of breath 7/8/22   Renae Vicente MD   dilTIAZem (CARDIZEM CD) 120 MG extended release capsule Take 1 capsule by mouth daily 11/14/21   Reji Norris MD   guaiFENesin (MUCINEX) 600 MG extended release tablet Take 1 tablet by mouth 2 times daily 11/13/21   Reji Norris MD   atorvastatin (LIPITOR) 10 MG tablet Take 1 tablet by mouth nightly  Patient not taking: Reported on 3/5/2023 11/13/21   Reji Norris MD   tiotropium (Lurlean Founds) 18 MCG inhalation capsule Inhale 1 capsule into the lungs daily 10/7/21   Renae Vicente MD   fluticasone (FLONASE) 50 MCG/ACT nasal spray 1 spray by Each Nostril route daily 4/12/21   Renae Vicente MD      Current Facility-Administered Medications: dilTIAZem (CARDIZEM CD) extended release capsule 120 mg, 120 mg, Oral, Daily  apixaban (ELIQUIS) tablet 5 mg, 5 mg, Oral, BID  albuterol sulfate HFA (PROVENTIL;VENTOLIN;PROAIR) 108 (90 Base) MCG/ACT inhaler 2 puff, 2 puff, Inhalation, Q6H PRN  sodium chloride flush 0.9 % injection 5-40 mL, 5-40 mL, IntraVENous, 2 times per day  sodium chloride flush 0.9 % injection 5-40 mL, 5-40 mL, IntraVENous, PRN  0.9 % sodium chloride infusion, , IntraVENous, PRN  ondansetron (ZOFRAN-ODT) disintegrating tablet 4 mg, 4 mg, Oral, Q8H PRN **OR** ondansetron (ZOFRAN) injection 4 mg, 4 mg, IntraVENous, Q6H PRN  polyethylene glycol (GLYCOLAX) packet 17 g, 17 g, Oral, Daily PRN  acetaminophen (TYLENOL) tablet 650 mg, 650 mg, Oral, Q6H PRN **OR** acetaminophen (TYLENOL) suppository 650 mg, 650 mg, Rectal, Q6H PRN  meclizine (ANTIVERT) tablet 25 mg, 25 mg, Oral, Once    Allergies:  Patient has no known allergies. Social History:   reports that he has been smoking cigarettes. He has a 10.00 pack-year smoking history. He has quit using smokeless tobacco.  His smokeless tobacco use included chew. He reports that he does not currently use alcohol. He reports that he does not currently use drugs after having used the following drugs: Cocaine. Family History: family history includes COPD in his father and paternal grandfather; Depression in his mother. REVIEW OF SYSTEMS:    Constitutional: there has been no unanticipated weight loss. There's been No change in energy level, No change in activity level. Eyes: No visual changes or diplopia. No scleral icterus. ENT: No Headaches  Cardiovascular: As above. Respiratory: No previous pulmonary problems, No cough  Gastrointestinal: No abdominal pain. No change in bowel or bladder habits. Genitourinary: No dysuria, trouble voiding, or hematuria. Musculoskeletal:  No gait disturbance, No weakness or joint complaints. Integumentary: No rash or pruritis. Neurological: No headache, diplopia, change in muscle strength, numbness or tingling. No change in gait, balance, coordination, mood, affect, memory, mentation, behavior. Psychiatric: No anxiety, or depression. Endocrine: No temperature intolerance. No excessive thirst, fluid intake, or urination. No tremor. Hematologic/Lymphatic: No abnormal bruising or bleeding, blood clots or swollen lymph nodes. Allergic/Immunologic: No nasal congestion or hives.       PHYSICAL EXAM:      /70   Pulse 92   Temp 96.8 °F (36 °C)   Resp 16   Ht 5' 8\" (1.727 m)   Wt 150 lb (68 kg)   SpO2 95%   BMI 22.81 kg/m²    Constitutional and General Appearance: alert, cooperative, no distress and appears stated age  [de-identified]: PERRL, no cervical lymphadenopathy. No masses palpable. Normal oral mucosa  Respiratory:  Normal excursion and expansion without use of accessory muscles  Resp Auscultation: Good respiratory effort. No for increased work of breathing. On auscultation: clear to auscultation bilaterally  Cardiovascular: The apical impulse is not displaced  Heart tones are crisp and normal. regular S1 and S2.  Jugular venous pulsation Normal  The carotid upstroke is normal in amplitude and contour without delay or bruit  Peripheral pulses are symmetrical and full   Abdomen:   No masses or tenderness  Bowel sounds present  Extremities:   No Cyanosis or Clubbing   Lower extremity edema: No   Skin: Warm and dry  Neurological:  Alert and oriented. Moves all extremities well  No abnormalities of mood, affect, memory, mentation, or behavior are noted      Labs:     CBC:   Recent Labs     03/05/23  0358   WBC 8.5   HGB 14.1   HCT 42.5        BMP:   Recent Labs     03/05/23  0358      K 3.8   CO2 22   BUN 21*   CREATININE 0.94   LABGLOM >60   GLUCOSE 106*     BNP: No results for input(s): BNP in the last 72 hours. PT/INR: No results for input(s): PROTIME, INR in the last 72 hours. APTT:No results for input(s): APTT in the last 72 hours. CARDIAC ENZYMES:No results for input(s): CKTOTAL, CKMB, CKMBINDEX, TROPONINI in the last 72 hours. FASTING LIPID PANEL:  Lab Results   Component Value Date/Time    HDL 48 11/12/2021 07:08 AM    TRIG 62 11/12/2021 07:08 AM     LIVER PROFILE:No results for input(s): AST, ALT, LABALBU in the last 72 hours. LMCA: Normal 0% stenosis. LAD: Normal 0% stenosis. LCx: Normal 0% stenosis. RCA: Normal 0% stenosis. Coronary Tree      Dominance: Right      Echo 02/2023  Left ventricle is normal in size and wall thickness.   Left ventricular systolic function is low normal. Estimated LV EF 50-55%  Normal RV size and function  LA appears mildly dilated  No obvious significant valvular abnormality seen. Mild mitral regurgitation. Mild tricuspid regurgitation. No pulmonary hypertension. Estimated right ventricular systolic pressure is  37HHQD. No significant pericardial effusion is seen. Normal aortic root dimension. Normal IVC size and inspiratory collapse       IMPRESSION:     Chest pain likely secondary to cocaine abuse, negative troponin and no acute EKG changes. Permanent atrial fibrillation with RVR on Eliquis and cardizem. Previous reduced EF with severe AS, post TAVR with recent improved EF 50-55% TTE 03/2023, no significant WMA or valvular abnormality. COPD   Hx of TAVR. Recent echo with normal valvular function. Tobacco abuse. Cocaine and amphetamine abuse. RECOMMENDATIONS:  Will resume cardizem and eliquis. No acute intervention as per cardiology. Patient advised to stop using recreational drugs. He agrees to follow up with his primary cardiologist at [facility].   Cardiology to sign off. Discussed with patient and Nurse.     Bonnie Murphy MD       Cardiovascular Fellow

## 2023-03-05 NOTE — ED NOTES
Pt to ED via triage with complaints of chest pain and neck/arm numbness. Pt states this has been going on for a week and that he was seen at [facility] earlier for it. Pt states hx off a-fib which he is taking medication for. Pt states that he has no other major medical hx. Pt is on stretcher, monitor being placed, EKG and labs being obtained.           Rico Antoine RN  03/05/23 [id number]

## 2023-03-05 NOTE — CARE COORDINATION
Case Management Assessment  Initial Evaluation    Date/Time of Evaluation: 3/5/2023 12:54 PM  Assessment Completed by: Lan Alvarez RN    If patient is discharged prior to next notation, then this note serves as note for discharge by case management. Patient Name: Hunter Quiroga                   YOB: 1965  Diagnosis: Chest pain [R07.9]                   Date / Time: 3/5/2023  3:24 AM    Patient Admission Status: Observation   Readmission Risk (Low < 19, Mod (19-27), High > 27): Readmission Risk Score: 6.4    Current PCP: Lila Logan MD  PCP verified by CM? (P) Yes    Chart Reviewed: Yes      History Provided by: (P) Patient  Patient Orientation: (P) Alert and Oriented    Patient Cognition: (P) Alert    Hospitalization in the last 30 days (Readmission):  No    If yes, Readmission Assessment in  Navigator will be completed.     Advance Directives:      Code Status: Full Code   Patient's Primary Decision Maker is:        Discharge Planning:    Patient lives with: (P) Spouse/Significant Other Type of Home: (P) House  Primary Care Giver: (P) Self  Patient Support Systems include: (P) Spouse/Significant Other   Current Financial resources: (P) Medicaid  Current community resources: (P) None  Current services prior to admission: (P) None            Current DME:              Type of Home Care services:  (P) None    ADLS  Prior functional level: (P) Independent in ADLs/IADLs  Current functional level: (P) Independent in ADLs/IADLs    PT AM-PAC:   /24  OT AM-PAC:   /24    Family can provide assistance at DC: (P) Yes  Would you like Case Management to discuss the discharge plan with any other family members/significant others, and if so, who? (P) Yes  Plans to Return to Present Housing: (P) Yes  Other Identified Issues/Barriers to RETURNING to current housing: pain  Potential Assistance needed at discharge: (P) N/A            Potential DME:    Patient expects to discharge to: (P) [address] [place] [address] transportation at discharge: (P) Self    Financial    Payor: Robert Sotomayor / Plan: Robert Sotomayor [place] / Product Type: *No Product type* /     Does insurance require precert for SNF: Yes    Potential assistance Purchasing Medications: (P) No  Meds-to-Beds request: Yes      Milo Lennox #[id number] Svetlana Book, [address] [facility]  [place]  [address]  Phone: [phone] Fax: [phone] [facility], [address] [phone] - [phone]  [facility] [place] [address]  Phone: [phone] Fax: [address], [address] [place] [address]  Phone: [phone] Fax: [address] [facility] [place], [address] [place] [address] [phone] Jasmine Li [phone]  [address] [place] [address]  Phone: [phone] Fax: [phone]      Notes:    Factors facilitating achievement of predicted outcomes: Cooperative    Barriers to discharge: Pain    Additional Case Management Notes: The Plan for Transition of Care is related to the following treatment goals of Chest pain [I62.9]    IF APPLICABLE: The Patient and/or patient representative Jarrett Harada and his family were provided with a choice of provider and agrees with the discharge plan. Freedom of choice list with basic dialogue that supports the patient's individualized plan of care/goals and shares the quality data associated with the providers was provided to:     Patient Representative Name:       The Patient and/or Patient Representative Agree with the Discharge Plan?       David Nogueira RN  Case Management Department  Ph:  Fax:

## 2023-03-05 NOTE — DISCHARGE INSTRUCTIONS
You are hospitalized for short time for evaluation of your chest pain and dizziness. You are found to have no need for acute intervention and it was recommended you go to your procedure tomorrow. Your condition was discussed with [facility] cardiology who requested transfer in preparation for procedure tomorrow.   You are transferred to [facility] observation unit and your care will be assumed by them on arrival.

## 2023-03-05 NOTE — H&P
[facility] [employer]  CDU / OBSERVATION ENCOUNTER  RESIDENT NOTE     Pt Name: Jhonny Shaver  MRN: [id number]  Inezgfmari 1965  Date of evaluation: 3/5/23  Patient's PCP is :  Roland De Luna MD    [address]       Chief Complaint   Patient presents with    Chest Pain     Hx of a-fib         HISTORY OF PRESENT ILLNESS    Jhonny Shaver is a 62 y.o. male who presents with chest pain, shortness of breath and dizziness. Patient states that the chest pain has been present for 1 week, constant in nature but is gotten progressively worse. Patient states the chest pain is 7/10 with radiation down his left arm. Patient states that nothing makes the pain better. Patient is also complaining of dizziness. Patient was recently seen in the hospital 2 weeks ago. At that time patient had cardiac evaluation with cardiac stress test and cardiac cath. Cardiac cath was negative. Had negative stress test.  Patient also has a history of aortic valve replacement, states his aortic valve was replaced about 2 years ago. Also having some dizziness this mornign. Says room is spinning when he's not moving. Has been getting worse over past week. Associated with pain in his neck. No hearing changes. Location/Symptom: Left chest pain  Timing/Onset: Progressive onset  Provocation: None  Quality: Sharp  Radiation: Down left arm  Severity: 7/10  Timing/Duration: 1 week ago  Modifying Factors: None    History was obtained in part through review of the ED chart.  When possible, a direct discussion was had with ED nurses, residents, and attendings  REVIEW OF SYSTEMS       General ROS - No fevers, No malaise   Ophthalmic ROS - No discharge, No changes in vision  ENT ROS -  No sore throat, No rhinorrhea,   Respiratory ROS - + shortness of breath, no cough, no  wheezing  Cardiovascular ROS - + chest pain, + dyspnea on exertion  Gastrointestinal ROS - No abdominal pain, no nausea or vomiting, no change in bowel habits, no black or bloody stools  Genito-Urinary ROS - No dysuria, trouble voiding, or hematuria  Musculoskeletal ROS - No myalgias, No arthalgias  Neurological ROS - No headache, no dizziness/lightheadedness, No focal weakness, no loss of sensation  Dermatological ROS - No lesions, No rash     (PQRS) Advance directives on face sheet per hospital policy. No change unless specifically mentioned in chart    PAST MEDICAL HISTORY    has a past medical history of Abnormal echocardiogram, Arrhythmia, AS (aortic stenosis), Atrial fibrillation ([address] Benign prostatic hyperplasia, Biatrial enlargement, Central perforation of tympanic membrane, right, Chest pain, Chews tobacco, CHF (congestive heart failure), NYHA class I, acute on chronic, combined ([address] Chronic back pain, Conductive hearing loss of right ear, COPD (chronic obstructive pulmonary disease) ([address] Diverticulitis, Fatigue, History of cocaine abuse ([facility] [address] Household contact with history of methicillin resistant Staphylococcus aureus (MRSA) infection, Lung nodule, Mass of upper lobe of left lung, Mild concentric left ventricular hypertrophy, Murmur, cardiac, Near syncope, Nonrheumatic aortic (valve) stenosis, Palpitations, Panlobular emphysema (HCC), Pneumonia of both upper lobes due to infectious organism, Right-sided tinnitus, Severe aortic stenosis, Shortness of breath, and Tobacco abuse. I have reviewed the past medical history with the patient and it is pertinent to this complaint. SURGICAL HISTORY      has a past surgical history that includes Inguinal hernia repair (Right); cyst removal (Left); pr incision & drainage abscess complicated/multiple (N/A, 2/12/2018); and Aortic valve replacement (12/2018). I have reviewed and agree with Surgical History entered and it is pertinent to this complaint.      CURRENT MEDICATIONS     dilTIAZem (CARDIZEM CD) extended release capsule 120 mg, Daily  apixaban (ELIQUIS) tablet 5 mg, BID  albuterol sulfate HFA (PROVENTIL;VENTOLIN;PROAIR) 108 (90 Base) MCG/ACT inhaler 2 puff, Q6H PRN  sodium chloride flush 0.9 % injection 5-40 mL, 2 times per day  sodium chloride flush 0.9 % injection 5-40 mL, PRN  0.9 % sodium chloride infusion, PRN  ondansetron (ZOFRAN-ODT) disintegrating tablet 4 mg, Q8H PRN   Or  ondansetron (ZOFRAN) injection 4 mg, Q6H PRN  polyethylene glycol (GLYCOLAX) packet 17 g, Daily PRN  acetaminophen (TYLENOL) tablet 650 mg, Q6H PRN   Or  acetaminophen (TYLENOL) suppository 650 mg, Q6H PRN        All medication charted and reviewed. ALLERGIES     has No Known Allergies. FAMILY HISTORY     He indicated that the status of his mother is unknown. He indicated that the status of his father is unknown. He indicated that the status of his paternal grandfather is unknown.     family history includes COPD in his father and paternal grandfather; Depression in his mother. The patient denies any pertinent family history. I have reviewed and agree with the family history entered. I have reviewed the Family History and it is not significant to the case    SOCIAL HISTORY      reports that he has been smoking cigarettes. He has a 10.00 pack-year smoking history. He has quit using smokeless tobacco.  His smokeless tobacco use included chew. He reports that he does not currently use alcohol. He reports that he does not currently use drugs after having used the following drugs: Cocaine. I have reviewed and agree with all Social.  There are no concerns for substance abuse/use. PHYSICAL EXAM     INITIAL VITALS:  height is 5' 8\" (1.727 m) and weight is 150 lb (68 kg). His temperature is 96.8 °F (36 °C). His blood pressure is 106/70 and his pulse is 92. His respiration is 16 and oxygen saturation is 95%.       CONSTITUTIONAL: AOx4, no apparent distress, appears stated age   HEAD: normocephalic, atraumatic   EYES: PERRLA, EOMI, no nystagmus   ENT: moist mucous membranes, uvula midline   NECK: supple, symmetric   BACK: symmetric   LUNGS: clear to auscultation bilaterally   CARDIOVASCULAR: regular rate and rhythm, no murmurs, rubs or gallops   ABDOMEN: soft, non-tender, non-distended with normal active bowel sounds   NEUROLOGIC:  MAEx4, no focal sensory or motor deficits   MUSCULOSKELETAL: no clubbing, cyanosis or edema   SKIN: no rash or wounds       DIFFERENTIAL DIAGNOSIS/MDM:     DDx: ACS, PE, pneumothorax, pneumonia, valve malfunction, GERD, MSK    80-year-old male with history of aortic valve replacement, recent negative stress test and cardiac cath, now with progressive onset chest pain and shortness of breath, stable vitals, otherwise well-appearing, BNP elevated to 2000 167, troponin stable, labs otherwise unremarkable and no new findings on chest x-ray. Due to patient's significant cardiac history and new symptoms, will cardiology evaluate for further imaging. DIAGNOSTIC RESULTS     EKG: All EKG's are interpreted by the Observation Physician who either signs or Co-signs this chart in the absence of a cardiologist.    EKG Interpretation    As interpreted by ED physician  nml    RADIOLOGY:   I directly visualized the following  images and reviewed the radiologist interpretations:    XR CHEST (2 VW)    Result Date: 3/5/2023  EXAMINATION: TWO XRAY VIEWS OF THE CHEST 3/5/2023 4:39 am COMPARISON: 02/17/2023 radiograph HISTORY: ORDERING SYSTEM PROVIDED HISTORY: SOLOMON VELAZQUEZ TECHNOLOGIST PROVIDED HISTORY: SOLOMON VELAZQUEZ Reason for Exam: Chest pain, SOB FINDINGS: The heart, mediastinum and pulmonary vascularity are normal.  The lungs are lucent and hyperinflated. Mild reticular airspace changes are chronic. No acute airspace abnormality. Stable nodule in the left upper lobe. There are no significant skeletal findings. Chronic pattern of interstitial change with no acute pulmonary finding. LABS:  I have reviewed and interpreted all available lab results.   Labs Reviewed   BASIC METABOLIC PANEL - Abnormal; Notable for the following components:       Result Value    Glucose 106 (*)     BUN 21 (*)     All other components within normal limits   BRAIN NATRIURETIC PEPTIDE - Abnormal; Notable for the following components:    Pro-BNP 2,167 (*)     All other components within normal limits   COVID-19, RAPID   TROPONIN   TROPONIN   CBC WITH AUTO DIFFERENTIAL         CDU IMPRESSION / PLAN      Alexandro Soriano is a 58 y.o. male who presents with chest pain    Chest pain  S/p aortic valve replacement 2 years ago  Stress test and cardiac catheterization -2 weeks ago  Troponin stable  ECG unchanged  BNP elevated to 2167  Cardiology to evaluate  Dizziness  No concerning features  Likely BPPV vs associated with aortic insufficiency  Meclizine once and re-evaluate  Continue home medications and pain control  Monitor vitals, labs, and imaging  DISPO: pending consults and clinical improvement    CONSULTS:    IP CONSULT TO CARDIOLOGY    PROCEDURES:  Not indicated       PATIENT REFERRED TO:    No follow-up provider specified.    --  Esau Santiago MD   Emergency Medicine Resident    This dictation was generated by voice recognition computer software.  Although all attempts are made to edit the dictation for accuracy, there may be errors in the transcription that are not intended.

## 2023-03-05 NOTE — DISCHARGE SUMMARY
CDU Discharge Summary        Patient:  Oriana Fernandes  YOB: 1965    MRN: [id number]   Acct: [de-identified]    Primary Care Physician: Winnie Closs, MD    Admit date:  3/5/2023  3:24 AM  Discharge date: 3/5/2023  9:25 PM     Discharge Diagnoses:     1.)  Chest pain, acute on chronic, improved with meds, evaluated by cardiology with no emergent procedure indicated, [facility], will follow-up with primary care provider    2.)  Vertigo, acute on chronic, improved with meclizine, will follow-up with primary care provider    Follow-up:  Call today/tomorrow for a follow up appointment with Winnie Closs, MD , or return to the Emergency Room with worsening symptoms    Stressed to patient the importance of following up with primary care doctor for further workup/management of symptoms. Pt verbalizes understanding and agrees with plan.     Discharge Medication Changes:       Medication List        CONTINUE taking these medications      acetaminophen 500 MG tablet  Commonly known as: TYLENOL  Take 2 tablets by mouth every 6 hours as needed for Pain     aspirin 81 MG EC tablet     dilTIAZem 120 MG extended release capsule  Commonly known as: CARDIZEM CD  Take 1 capsule by mouth daily     Eliquis 5 MG Tabs tablet  Generic drug: apixaban  take 1 tablet by mouth twice a day     fluticasone 50 MCG/ACT nasal spray  Commonly known as: FLONASE  1 spray by Each Nostril route daily     guaiFENesin 600 MG extended release tablet  Commonly known as: MUCINEX  Take 1 tablet by mouth 2 times daily     ProAir  (90 Base) MCG/ACT inhaler  Generic drug: albuterol sulfate HFA  inhale 2 puffs by mouth and INTO THE LUNGS every 6 hours if needed for shortness of breath     Spiriva HandiHaler 18 MCG inhalation capsule  Generic drug: tiotropium  Inhale 1 capsule into the lungs daily            ASK your doctor about these medications      atorvastatin 10 MG tablet  Commonly known as: LIPITOR  Take 1 tablet by mouth nightly     carvedilol 3.125 MG tablet  Commonly known as: COREG  Take 1 tablet by mouth 2 times daily (with meals)              Diet:  No diet orders on file, advance as tolerated     Activity:  As tolerated    Consultants: IP CONSULT TO CARDIOLOGY    Procedures:  Not indicated    Diagnostic Test:   Results for orders placed or performed during the hospital encounter of 03/05/23   COVID-19, Rapid    Specimen: Nasopharyngeal Swab   Result Value Ref Range    Specimen Description . NASOPHARYNGEAL SWAB     SARS-CoV-2, Rapid Not Detected Not Detected   Troponin   Result Value Ref Range    Troponin, High Sensitivity 15 0 - 22 ng/L   Troponin   Result Value Ref Range    Troponin, High Sensitivity 15 0 - 22 ng/L   CBC with Auto Differential   Result Value Ref Range    WBC 8.5 3.5 - 11.3 k/uL    RBC 4.61 4.21 - 5.77 m/uL    Hemoglobin 14.1 13.0 - 17.0 g/dL    Hematocrit 42.5 40.7 - 50.3 %    MCV 92.2 82.6 - 102.9 fL    MCH 30.6 25.2 - 33.5 pg    MCHC 33.2 28.4 - 34.8 g/dL    RDW 14.0 11.8 - 14.4 %    Platelets 413 672 - 817 k/uL    MPV 11.5 8.1 - 13.5 fL    NRBC Automated 0.0 0.0 per 100 WBC    Seg Neutrophils 65 36 - 65 %    Lymphocytes 25 24 - 43 %    Monocytes 9 3 - 12 %    Eosinophils % 1 1 - 4 %    Basophils 0 0 - 2 %    Immature Granulocytes 0 0 %    Segs Absolute 5.59 1.50 - 8.10 k/uL    Absolute Lymph # 2.08 1.10 - 3.70 k/uL    Absolute Mono # 0.72 0.10 - 1.20 k/uL    Absolute Eos # 0.04 0.00 - 0.44 k/uL    Basophils Absolute 0.03 0.00 - 0.20 k/uL    Absolute Immature Granulocyte <0.03 0.00 - 0.30 k/uL   Basic Metabolic Panel   Result Value Ref Range    Glucose 106 (H) 70 - 99 mg/dL    BUN 21 (H) 6 - 20 mg/dL    Creatinine 0.94 0.70 - 1.20 mg/dL    Est, Glom Filt Rate >60 >60 mL/min/1.73m2    Calcium 8.7 8.6 - 10.4 mg/dL    Sodium 139 135 - 144 mmol/L    Potassium 3.8 3.7 - 5.3 mmol/L    Chloride 105 98 - 107 mmol/L    CO2 22 20 - 31 mmol/L    Anion Gap 12 9 - 17 mmol/L   Brain Natriuretic Peptide   Result Value Ref Range    Pro-BNP 2,167 (H) <300 pg/mL   EKG 12 Lead   Result Value Ref Range    Ventricular Rate 106 BPM    Atrial Rate 101 BPM    QRS Duration 96 ms    Q-T Interval 342 ms    QTc Calculation (Bazett) 454 ms    R Axis 67 degrees    T Axis 72 degrees     XR CHEST (2 VW)    Result Date: 3/5/2023  EXAMINATION: TWO XRAY VIEWS OF THE CHEST 3/5/2023 4:39 am COMPARISON: 02/17/2023 radiograph HISTORY: ORDERING SYSTEM PROVIDED HISTORY: CAROLINE, SOLOMON TECHNOLOGIST PROVIDED HISTORY: SOLOMON VELAZQUEZ Reason for Exam: Chest pain, SOB FINDINGS: The heart, mediastinum and pulmonary vascularity are normal.  The lungs are lucent and hyperinflated. Mild reticular airspace changes are chronic. No acute airspace abnormality. Stable nodule in the left upper lobe. There are no significant skeletal findings. Chronic pattern of interstitial change with no acute pulmonary finding. Physical Exam:    General appearance - NAD, AOx 3  Lungs -CTAB, no R/R/R  Heart - RRR, no M/R/G  Abdomen - Soft, NT/ND  Neurological:  MAEx4, No focal motor deficit, sensory loss  Extremities - Cap refil <2 sec in all ext., no edema  Skin -warm, dry      Hospital Course:  Clinical course has improved, labs and imaging reviewed. Lilian Farah originally presented to the hospital on 3/5/2023  3:24 AM with chest pain and dizziness. At that time it was determined that He required further observation and cardiology evaluation. Patient to be transferred to [facility] for procedure tomorrow. Labs and imaging were followed daily. Imaging results as above. He is medically stable to be discharged. Disposition: To [facility]    Patient stated that they will not drive themselves home from the hospital if they have gotten pain killers/ narcotics earlier that day and that they will arrange for transportation on their own or work with the  for a ride.  Patient counseled NOT to drive while under the influence of narcotics/ pain killers. Condition: Good    Patient stable and ready for discharge home. I have discussed plan of care with patient and they are in understanding. They were instructed to read discharge paperwork. All of their questions and concerns were addressed. Time Spent: 0 day      --  Clemente Horowitz MD  Emergency Medicine resident physician    This dictation was generated by voice recognition computer software. Although all attempts are made to edit the dictation for accuracy, there may be errors in the transcription that are not intended.

## 2023-03-06 LAB
EKG ATRIAL RATE: 101 BPM
EKG Q-T INTERVAL: 342 MS
EKG QRS DURATION: 96 MS
EKG QTC CALCULATION (BAZETT): 454 MS
EKG R AXIS: 67 DEGREES
EKG T AXIS: 72 DEGREES
EKG VENTRICULAR RATE: 106 BPM

## 2023-03-06 NOTE — ED PROVIDER NOTES
[facility]  Emergency Department  Faculty Attestation       I performed a history and physical examination of the patient and discussed management with the resident. I reviewed the residents note and agree with the documented findings including all diagnostic interpretations and plan of care. Any areas of disagreement are noted on the chart. I was personally present for the key portions of any procedures. I have documented in the chart those procedures where I was not present during the key portions. I have reviewed the emergency nurses triage note. I agree with the chief complaint, past medical history, past surgical history, allergies, medications, social and family history as documented unless otherwise noted below. Documentation of the HPI, Physical Exam and Medical Decision Making performed by [email or web address] is based on my personal performance of the HPI, PE and MDM. For Physician Assistant/ Nurse Practitioner cases/documentation I have personally evaluated this patient and have completed at least one if not all key elements of the E/M (history, physical exam, and MDM). Additional findings are as noted. Pertinent Comments     Primary Care Physician: Tiffanie Posey MD    History: This is a 62 y.o. male who presents to the Emergency Department with complaint of chest pain, shortness of breath nad dizziness for ~ 1week that has progressively worsening. No alleviating symptoms. H/o aortic valve replacement. Physical:    ED Triage Vitals [03/05/23 0329]   BP Temp Temp Source Heart Rate Resp SpO2 Height Weight   119/82 97.4 °F (36.3 °C) Oral (!) 108 20 95 % 5' 8\" (1.727 m) 150 lb (68 kg)        General: alert, appears to feel ill.   HENT: normocephalic, moist mucus membranes  Eyes: pupils equal and reactive, extraocular eye movements intact   Neck: normal ROM, trachea midline   Heart:  irregularly irregular rhythm with rate controlled  Chest:clear to auscultation bilaterally- no wheezes, rales or rhonchi, normal air movement, no respiratory distress  Abdomen: soft, nontender, nondistended, no masses or organomegaly  no pulsatile masses   Extremities: no obvious deformities, normal ROM of all 4 extremities, no edema of bilateral lower extremities  Neurological: alert, oriented, normal speech, no focal findings or movement disorder. No pronator drift. Normal coordination.  Skin: normal coloration and turgor, no rashes on exposed skin       MDM/Plan:   Medical Decision Making  58 y.o. male w/ chest pain.    Based on history and exam concern for unstable angina vs MSK vs anxiety vs pneumonitis c  Will obtain cardiac w/u including troponins and EKG .  Plan for ETU observation for cardiac evaluation due to heart score of 5      Problems Addressed:  Chest pain, unspecified type: acute illness or injury    Amount and/or Complexity of Data Reviewed  External Data Reviewed: labs and radiology.  Labs: ordered.  Radiology: ordered.  ECG/medicine tests: ordered and independent interpretation performed.    Risk  Prescription drug management.  Decision regarding hospitalization.          HEART SCORE:  Heart Score for chest pain patients  History: Moderately Suspicious  ECG: Non-Specifc repolarization disturbance/LBTB/PM  Patient Age: > 45 and < 65 years  Risk Factors: > 3 Risk factors or history of atherosclerotic disease*  Troponin: < 1X normal limit  Heart Score Total: 5    Score 0-3: 2.5% MACE over next 6 weeks = Discharge Home  Score 4-6: 20.3% MACE over next 6 weeks = Admit for Clinical Observation  Score 7-10: 72.7% MACE over next 6 weeks = Early Invasive Strategies   “Chest Pain in the Emergency Room: A Multicenter Validation of the HEART Score”. Augusta BE, Galen AJ, Sammie ARRON, Reilly TP, van esequiel Deleon F, Reilly EG, Francois SH, van Ludmila RM, Matilde PA. Crit Pathw Cardiol. 2010 Sep; 9(3): 164-169.  \"A prospective validation of the HEART score for chest pain patients at the emergency department.\" Int J Cardiol. 2013  Oct 3;168(3):2153-8. doi: 10.1016/j.ijcard. 2013.01.255. Epub 2013 Mar 7.       EKG Interpretation    Interpreted by emergency department physician    Clinical Impression: afib at a rate of 106 with no signs of acute ischemia    Sarah Lauren MD    Critical Care Time: None     Sarah Lauren MD  Attending Emergency Physician        Sarah Lauren MD  03/06/23 124

## 2023-03-06 NOTE — FLOWSHEET NOTE
Dr Maria Fernanda Vazquez instructed writer that it was ok to transport patient without monitor. Patient transported via [facility] [address] at 2030.   Report called to RN at [facility].  Patient transported to Obs Unit Room 11 at [facility].

## 2023-03-16 PROBLEM — I48.92 ATRIAL FLUTTER (HCC): Status: ACTIVE | Noted: 2023-03-16

## 2023-03-18 ENCOUNTER — HOSPITAL ENCOUNTER (EMERGENCY)
Age: 58
Discharge: HOME | End: 2023-03-19
Attending: EMERGENCY MEDICINE
Payer: MEDICAID

## 2023-03-18 ENCOUNTER — APPOINTMENT (OUTPATIENT)
Dept: CT IMAGING | Age: 58
End: 2023-03-18
Payer: MEDICAID

## 2023-03-18 ENCOUNTER — APPOINTMENT (OUTPATIENT)
Dept: GENERAL RADIOLOGY | Age: 58
End: 2023-03-18
Payer: MEDICAID

## 2023-03-18 DIAGNOSIS — R07.9 CHEST PAIN, UNSPECIFIED TYPE: Primary | ICD-10-CM

## 2023-03-18 LAB
ABSOLUTE EOS #: 0 K/UL (ref 0–0.4)
ABSOLUTE LYMPH #: 1.9 K/UL (ref 1–4.8)
ABSOLUTE MONO #: 0.9 K/UL (ref 0.1–1.3)
ALBUMIN SERPL-MCNC: 4 G/DL (ref 3.5–5.2)
ALP SERPL-CCNC: 99 U/L (ref 40–129)
ALT SERPL-CCNC: 21 U/L (ref 5–41)
ANION GAP SERPL CALCULATED.3IONS-SCNC: 10 MMOL/L (ref 9–17)
AST SERPL-CCNC: 27 U/L
BASOPHILS # BLD: 1 % (ref 0–2)
BASOPHILS ABSOLUTE: 0 K/UL (ref 0–0.2)
BILIRUB SERPL-MCNC: 0.7 MG/DL (ref 0.3–1.2)
BUN SERPL-MCNC: 22 MG/DL (ref 6–20)
CALCIUM SERPL-MCNC: 9 MG/DL (ref 8.6–10.4)
CHLORIDE SERPL-SCNC: 101 MMOL/L (ref 98–107)
CO2 SERPL-SCNC: 24 MMOL/L (ref 20–31)
CREAT SERPL-MCNC: 0.9 MG/DL (ref 0.7–1.2)
EOSINOPHILS RELATIVE PERCENT: 0 % (ref 0–4)
FLUAV RNA RESP QL NAA+PROBE: NOT DETECTED
FLUBV RNA RESP QL NAA+PROBE: NOT DETECTED
GFR SERPL CREATININE-BSD FRML MDRD: >60 ML/MIN/1.73M2
GLUCOSE SERPL-MCNC: 97 MG/DL (ref 70–99)
HAPTOGLOB SERPL-MCNC: 111 MG/DL (ref 30–200)
HCT VFR BLD AUTO: 39.4 % (ref 41–53)
HGB BLD-MCNC: 12.8 G/DL (ref 13.5–17.5)
LDLC SERPL-MCNC: 232 U/L (ref 135–225)
LYMPHOCYTES # BLD: 20 % (ref 24–44)
MCH RBC QN AUTO: 30.2 PG (ref 26–34)
MCHC RBC AUTO-ENTMCNC: 32.5 G/DL (ref 31–37)
MCV RBC AUTO: 92.8 FL (ref 80–100)
MONOCYTES # BLD: 10 % (ref 1–7)
PDW BLD-RTO: 15.1 % (ref 11.5–14.9)
PLATELET # BLD AUTO: 198 K/UL (ref 150–450)
PMV BLD AUTO: 8.5 FL (ref 6–12)
POTASSIUM SERPL-SCNC: 4.1 MMOL/L (ref 3.7–5.3)
PROT SERPL-MCNC: 6.5 G/DL (ref 6.4–8.3)
RBC # BLD: 4.24 M/UL (ref 4.5–5.9)
SARS-COV-2 RNA RESP QL NAA+PROBE: NOT DETECTED
SEG NEUTROPHILS: 69 % (ref 36–66)
SEGMENTED NEUTROPHILS ABSOLUTE COUNT: 6.5 K/UL (ref 1.3–9.1)
SODIUM SERPL-SCNC: 135 MMOL/L (ref 135–144)
SOURCE: NORMAL
SPECIMEN DESCRIPTION: NORMAL
TROPONIN I SERPL DL<=0.01 NG/ML-MCNC: 25 NG/L (ref 0–22)
TROPONIN I SERPL DL<=0.01 NG/ML-MCNC: 28 NG/L (ref 0–22)
WBC # BLD AUTO: 9.3 K/UL (ref 3.5–11)

## 2023-03-18 PROCEDURE — 71045 X-RAY EXAM CHEST 1 VIEW: CPT

## 2023-03-18 PROCEDURE — 85025 COMPLETE CBC W/AUTO DIFF WBC: CPT

## 2023-03-18 PROCEDURE — 84484 ASSAY OF TROPONIN QUANT: CPT

## 2023-03-18 PROCEDURE — 36415 COLL VENOUS BLD VENIPUNCTURE: CPT

## 2023-03-18 PROCEDURE — 83615 LACTATE (LD) (LDH) ENZYME: CPT

## 2023-03-18 PROCEDURE — 99285 EMERGENCY DEPT VISIT HI MDM: CPT

## 2023-03-18 PROCEDURE — 93005 ELECTROCARDIOGRAM TRACING: CPT | Performed by: EMERGENCY MEDICINE

## 2023-03-18 PROCEDURE — 87636 SARSCOV2 & INF A&B AMP PRB: CPT

## 2023-03-18 PROCEDURE — 83010 ASSAY OF HAPTOGLOBIN QUANT: CPT

## 2023-03-18 PROCEDURE — 80053 COMPREHEN METABOLIC PANEL: CPT

## 2023-03-18 PROCEDURE — 70450 CT HEAD/BRAIN W/O DYE: CPT

## 2023-03-18 ASSESSMENT — ENCOUNTER SYMPTOMS
DIARRHEA: 0
SHORTNESS OF BREATH: 1
RHINORRHEA: 1
COUGH: 1
VOMITING: 0
ABDOMINAL PAIN: 0

## 2023-03-18 ASSESSMENT — PAIN SCALES - GENERAL: PAINLEVEL_OUTOF10: 8

## 2023-03-18 ASSESSMENT — PAIN - FUNCTIONAL ASSESSMENT: PAIN_FUNCTIONAL_ASSESSMENT: 0-10

## 2023-03-19 ENCOUNTER — APPOINTMENT (OUTPATIENT)
Dept: CT IMAGING | Age: 58
End: 2023-03-19
Payer: MEDICAID

## 2023-03-19 VITALS
TEMPERATURE: 98.3 F | WEIGHT: 148 LBS | HEIGHT: 67 IN | SYSTOLIC BLOOD PRESSURE: 123 MMHG | DIASTOLIC BLOOD PRESSURE: 77 MMHG | HEART RATE: 99 BPM | RESPIRATION RATE: 17 BRPM | BODY MASS INDEX: 23.23 KG/M2 | OXYGEN SATURATION: 100 %

## 2023-03-19 PROCEDURE — 6360000004 HC RX CONTRAST MEDICATION: Performed by: EMERGENCY MEDICINE

## 2023-03-19 PROCEDURE — 71275 CT ANGIOGRAPHY CHEST: CPT

## 2023-03-19 PROCEDURE — 2580000003 HC RX 258: Performed by: EMERGENCY MEDICINE

## 2023-03-19 RX ORDER — SODIUM CHLORIDE 0.9 % (FLUSH) 0.9 %
10 SYRINGE (ML) INJECTION PRN
Status: COMPLETED | OUTPATIENT
Start: 2023-03-19 | End: 2023-03-19

## 2023-03-19 RX ORDER — 0.9 % SODIUM CHLORIDE 0.9 %
100 INTRAVENOUS SOLUTION INTRAVENOUS ONCE
Status: COMPLETED | OUTPATIENT
Start: 2023-03-19 | End: 2023-03-19

## 2023-03-19 RX ADMIN — SODIUM CHLORIDE, PRESERVATIVE FREE 10 ML: 5 INJECTION INTRAVENOUS at 01:50

## 2023-03-19 RX ADMIN — SODIUM CHLORIDE 100 ML: 9 INJECTION, SOLUTION INTRAVENOUS at 01:49

## 2023-03-19 RX ADMIN — IOPAMIDOL 75 ML: 755 INJECTION, SOLUTION INTRAVENOUS at 01:50

## 2023-03-19 ASSESSMENT — PAIN - FUNCTIONAL ASSESSMENT: PAIN_FUNCTIONAL_ASSESSMENT: NONE - DENIES PAIN

## 2023-03-19 ASSESSMENT — PAIN SCALES - GENERAL: PAINLEVEL_OUTOF10: 0

## 2023-03-19 NOTE — ED NOTES
Updated patient's contact information Ophelia Bloom about the patient's disposition.      Monique Whitlock RN  03/89/40 4743

## 2023-03-19 NOTE — ED PROVIDER NOTES
enlargement     Central perforation of tympanic membrane, right     Chest pain     Chews tobacco     CHF (congestive heart failure), NYHA class I, acute on chronic, combined (HCC)     Chronic back pain     Conductive hearing loss of right ear     COPD (chronic obstructive pulmonary disease) ([facility]     Diverticulitis     Fatigue     History of cocaine abuse ([facility] 01/2018    Household contact with history of methicillin resistant Staphylococcus aureus (MRSA) infection     Lung nodule     Mass of upper lobe of left lung     Mild concentric left ventricular hypertrophy     Murmur, cardiac     Near syncope     Nonrheumatic aortic (valve) stenosis     Palpitations     Panlobular emphysema (HCC)     Pneumonia of both upper lobes due to infectious organism     Right-sided tinnitus     Severe aortic stenosis     Shortness of breath     Tobacco abuse        SURGICAL HISTORY       Past Surgical History:   Procedure Laterality Date    AORTIC VALVE REPLACEMENT  12/2018    CYST REMOVAL Left     INGUINAL HERNIA REPAIR Right     [place] INCISION & DRAINAGE ABSCESS COMPLICATED/MULTIPLE N/A 2/12/2018    GLUTEAL INCISION AND DRAINAGE performed by Rainer Abdalla DO at [facility]       Discharge Medication List as of 3/19/2023  2:58 AM        CONTINUE these medications which have NOT CHANGED    Details   aspirin 81 MG EC tablet Take 81 mg by mouth dailyHistorical Med      acetaminophen (TYLENOL) 500 MG tablet Take 2 tablets by mouth every 6 hours as needed for Pain, Disp-60 tablet, R-0Normal      carvedilol (COREG) 3.125 MG tablet Take 1 tablet by mouth 2 times daily (with meals), Disp-60 tablet, R-3Normal      ELIQUIS 5 MG TABS tablet take 1 tablet by mouth twice a day, Disp-60 tablet, R-3Normal      PROAIR  (90 Base) MCG/ACT inhaler inhale 2 puffs by mouth and INTO THE LUNGS every 6 hours if needed for shortness of breath, Disp-8.5 g, R-3Normal      dilTIAZem (CARDIZEM CD) 120 MG extended release capsule

## 2023-03-19 NOTE — ED NOTES
Discharge instructions reviewed with patient, noting all directions and education by provider. Patient verbalizes understanding of all information reviewed, gathered personal items, and transferred under own power off unit to lobby without incident.  Emphasis on follow with PCP and cardiologist.     Morgan Anton RN  07/94/67 0932

## 2023-03-19 NOTE — ED NOTES
Assisted patient to the bathroom. Patient was able to ambulate with himself without assistance. Denies lightheadedness.      Yu Mendoza RN  83/22/88 0137

## 2023-03-20 LAB
EKG ATRIAL RATE: 108 BPM
EKG Q-T INTERVAL: 348 MS
EKG QRS DURATION: 90 MS
EKG QTC CALCULATION (BAZETT): 487 MS
EKG R AXIS: 69 DEGREES
EKG T AXIS: 78 DEGREES
EKG VENTRICULAR RATE: 118 BPM

## 2023-05-21 ENCOUNTER — APPOINTMENT (OUTPATIENT)
Dept: GENERAL RADIOLOGY | Age: 58
DRG: 201 | End: 2023-05-21
Payer: MEDICAID

## 2023-05-21 ENCOUNTER — HOSPITAL ENCOUNTER (INPATIENT)
Age: 58
LOS: 3 days | Discharge: HOME | DRG: 201 | End: 2023-05-24
Attending: EMERGENCY MEDICINE | Admitting: INTERNAL MEDICINE
Payer: MEDICAID

## 2023-05-21 DIAGNOSIS — R55 SYNCOPE, UNSPECIFIED SYNCOPE TYPE: Primary | ICD-10-CM

## 2023-05-21 DIAGNOSIS — I48.91 ATRIAL FIBRILLATION WITH RVR (HCC): ICD-10-CM

## 2023-05-21 LAB
ALBUMIN SERPL-MCNC: 3.8 G/DL (ref 3.5–5.2)
ALP SERPL-CCNC: 137 U/L (ref 40–129)
ALT SERPL-CCNC: 45 U/L (ref 5–41)
ANION GAP SERPL CALCULATED.3IONS-SCNC: 13 MMOL/L (ref 9–17)
AST SERPL-CCNC: 42 U/L
BASOPHILS # BLD: 0 K/UL (ref 0–0.2)
BASOPHILS NFR BLD: 1 % (ref 0–2)
BILIRUB SERPL-MCNC: 0.3 MG/DL (ref 0.3–1.2)
BUN SERPL-MCNC: 33 MG/DL (ref 6–20)
CALCIUM SERPL-MCNC: 9.1 MG/DL (ref 8.6–10.4)
CHLORIDE SERPL-SCNC: 108 MMOL/L (ref 98–107)
CO2 SERPL-SCNC: 25 MMOL/L (ref 20–31)
CREAT SERPL-MCNC: 0.92 MG/DL (ref 0.7–1.2)
D DIMER PPP FEU-MCNC: 0.46 UG/ML FEU (ref 0–0.59)
EOSINOPHIL # BLD: 0.1 K/UL (ref 0–0.4)
EOSINOPHILS RELATIVE PERCENT: 1 % (ref 0–4)
ERYTHROCYTE [DISTWIDTH] IN BLOOD BY AUTOMATED COUNT: 14.9 % (ref 11.5–14.9)
GFR SERPL CREATININE-BSD FRML MDRD: >60 ML/MIN/1.73M2
GLUCOSE SERPL-MCNC: 199 MG/DL (ref 70–99)
HCT VFR BLD AUTO: 48.2 % (ref 41–53)
HGB BLD-MCNC: 16.3 G/DL (ref 13.5–17.5)
INR PPP: 1
LIPASE SERPL-CCNC: 28 U/L (ref 13–60)
LYMPHOCYTES # BLD: 17 % (ref 24–44)
LYMPHOCYTES NFR BLD: 1.3 K/UL (ref 1–4.8)
MAGNESIUM SERPL-MCNC: 2.2 MG/DL (ref 1.6–2.6)
MCH RBC QN AUTO: 30.9 PG (ref 26–34)
MCHC RBC AUTO-ENTMCNC: 33.7 G/DL (ref 31–37)
MCV RBC AUTO: 91.6 FL (ref 80–100)
MONOCYTES NFR BLD: 0.5 K/UL (ref 0.1–1.3)
MONOCYTES NFR BLD: 7 % (ref 1–7)
NEUTROPHILS NFR BLD: 74 % (ref 36–66)
NEUTS SEG NFR BLD: 6.1 K/UL (ref 1.3–9.1)
PLATELET # BLD AUTO: 178 K/UL (ref 150–450)
PMV BLD AUTO: 9.2 FL (ref 6–12)
POTASSIUM SERPL-SCNC: 3.8 MMOL/L (ref 3.7–5.3)
PROT SERPL-MCNC: 6 G/DL (ref 6.4–8.3)
PROTHROMBIN TIME: 13 SEC (ref 11.8–14.6)
RBC # BLD AUTO: 5.26 M/UL (ref 4.5–5.9)
REASON FOR REJECTION: NORMAL
SODIUM SERPL-SCNC: 146 MMOL/L (ref 135–144)
SPECIMEN SOURCE: NORMAL
T4 FREE SERPL-MCNC: 1.1 NG/DL (ref 0.9–1.7)
TROPONIN I SERPL HS-MCNC: 13 NG/L (ref 0–22)
TROPONIN I SERPL HS-MCNC: 14 NG/L (ref 0–22)
TSH SERPL-MCNC: 1.33 UIU/ML (ref 0.3–5)
WBC OTHER # BLD: 8.1 K/UL (ref 3.5–11)
ZZ NTE CLEAN UP: ORDERED TEST: NORMAL

## 2023-05-21 PROCEDURE — 83690 ASSAY OF LIPASE: CPT

## 2023-05-21 PROCEDURE — 2580000003 HC RX 258

## 2023-05-21 PROCEDURE — 71045 X-RAY EXAM CHEST 1 VIEW: CPT

## 2023-05-21 PROCEDURE — 6370000000 HC RX 637 (ALT 250 FOR IP)

## 2023-05-21 PROCEDURE — 2500000003 HC RX 250 WO HCPCS: Performed by: EMERGENCY MEDICINE

## 2023-05-21 PROCEDURE — 96374 THER/PROPH/DIAG INJ IV PUSH: CPT

## 2023-05-21 PROCEDURE — 93005 ELECTROCARDIOGRAM TRACING: CPT | Performed by: EMERGENCY MEDICINE

## 2023-05-21 PROCEDURE — 36415 COLL VENOUS BLD VENIPUNCTURE: CPT

## 2023-05-21 PROCEDURE — 85610 PROTHROMBIN TIME: CPT

## 2023-05-21 PROCEDURE — 84439 ASSAY OF FREE THYROXINE: CPT

## 2023-05-21 PROCEDURE — 85379 FIBRIN DEGRADATION QUANT: CPT

## 2023-05-21 PROCEDURE — 85025 COMPLETE CBC W/AUTO DIFF WBC: CPT

## 2023-05-21 PROCEDURE — 99285 EMERGENCY DEPT VISIT HI MDM: CPT

## 2023-05-21 PROCEDURE — 99223 1ST HOSP IP/OBS HIGH 75: CPT | Performed by: INTERNAL MEDICINE

## 2023-05-21 PROCEDURE — 80053 COMPREHEN METABOLIC PANEL: CPT

## 2023-05-21 PROCEDURE — 84443 ASSAY THYROID STIM HORMONE: CPT

## 2023-05-21 PROCEDURE — 84484 ASSAY OF TROPONIN QUANT: CPT

## 2023-05-21 PROCEDURE — 83735 ASSAY OF MAGNESIUM: CPT

## 2023-05-21 PROCEDURE — 2060000000 HC ICU INTERMEDIATE R&B

## 2023-05-21 RX ORDER — POTASSIUM CHLORIDE 20 MEQ/1
40 TABLET, EXTENDED RELEASE ORAL PRN
Status: DISCONTINUED | OUTPATIENT
Start: 2023-05-21 | End: 2023-05-24 | Stop reason: HOSPADM

## 2023-05-21 RX ORDER — ACETAMINOPHEN 650 MG/1
650 SUPPOSITORY RECTAL EVERY 6 HOURS PRN
Status: DISCONTINUED | OUTPATIENT
Start: 2023-05-21 | End: 2023-05-24 | Stop reason: HOSPADM

## 2023-05-21 RX ORDER — ACETAMINOPHEN 325 MG/1
650 TABLET ORAL EVERY 6 HOURS PRN
Status: DISCONTINUED | OUTPATIENT
Start: 2023-05-21 | End: 2023-05-24 | Stop reason: HOSPADM

## 2023-05-21 RX ORDER — DILTIAZEM HYDROCHLORIDE 5 MG/ML
10 INJECTION INTRAVENOUS ONCE
Status: COMPLETED | OUTPATIENT
Start: 2023-05-21 | End: 2023-05-21

## 2023-05-21 RX ORDER — POTASSIUM CHLORIDE 7.45 MG/ML
10 INJECTION INTRAVENOUS PRN
Status: DISCONTINUED | OUTPATIENT
Start: 2023-05-21 | End: 2023-05-24 | Stop reason: HOSPADM

## 2023-05-21 RX ORDER — ASPIRIN 81 MG/1
81 TABLET ORAL DAILY
Status: DISCONTINUED | OUTPATIENT
Start: 2023-05-21 | End: 2023-05-24 | Stop reason: HOSPADM

## 2023-05-21 RX ORDER — GUAIFENESIN 600 MG/1
600 TABLET, EXTENDED RELEASE ORAL 2 TIMES DAILY
Status: DISCONTINUED | OUTPATIENT
Start: 2023-05-21 | End: 2023-05-24 | Stop reason: HOSPADM

## 2023-05-21 RX ORDER — SODIUM CHLORIDE 9 MG/ML
INJECTION, SOLUTION INTRAVENOUS PRN
Status: DISCONTINUED | OUTPATIENT
Start: 2023-05-21 | End: 2023-05-24 | Stop reason: HOSPADM

## 2023-05-21 RX ORDER — SODIUM CHLORIDE 0.9 % (FLUSH) 0.9 %
5-40 SYRINGE (ML) INJECTION EVERY 12 HOURS SCHEDULED
Status: DISCONTINUED | OUTPATIENT
Start: 2023-05-21 | End: 2023-05-24 | Stop reason: HOSPADM

## 2023-05-21 RX ORDER — ALBUTEROL SULFATE 90 UG/1
2 AEROSOL, METERED RESPIRATORY (INHALATION) EVERY 6 HOURS PRN
Status: DISCONTINUED | OUTPATIENT
Start: 2023-05-21 | End: 2023-05-24 | Stop reason: HOSPADM

## 2023-05-21 RX ORDER — ONDANSETRON 4 MG/1
4 TABLET, ORALLY DISINTEGRATING ORAL EVERY 8 HOURS PRN
Status: DISCONTINUED | OUTPATIENT
Start: 2023-05-21 | End: 2023-05-24 | Stop reason: HOSPADM

## 2023-05-21 RX ORDER — DILTIAZEM HYDROCHLORIDE 120 MG/1
120 CAPSULE, COATED, EXTENDED RELEASE ORAL ONCE
Status: DISCONTINUED | OUTPATIENT
Start: 2023-05-21 | End: 2023-05-21

## 2023-05-21 RX ORDER — MAGNESIUM SULFATE HEPTAHYDRATE 40 MG/ML
2000 INJECTION, SOLUTION INTRAVENOUS PRN
Status: DISCONTINUED | OUTPATIENT
Start: 2023-05-21 | End: 2023-05-24 | Stop reason: HOSPADM

## 2023-05-21 RX ORDER — POLYETHYLENE GLYCOL 3350 17 G/17G
17 POWDER, FOR SOLUTION ORAL DAILY PRN
Status: DISCONTINUED | OUTPATIENT
Start: 2023-05-21 | End: 2023-05-24 | Stop reason: HOSPADM

## 2023-05-21 RX ORDER — ONDANSETRON 2 MG/ML
4 INJECTION INTRAMUSCULAR; INTRAVENOUS EVERY 6 HOURS PRN
Status: DISCONTINUED | OUTPATIENT
Start: 2023-05-21 | End: 2023-05-24 | Stop reason: HOSPADM

## 2023-05-21 RX ORDER — DILTIAZEM HYDROCHLORIDE 5 MG/ML
10 INJECTION INTRAVENOUS ONCE
Status: DISCONTINUED | OUTPATIENT
Start: 2023-05-21 | End: 2023-05-21

## 2023-05-21 RX ORDER — DILTIAZEM HYDROCHLORIDE 240 MG/1
240 CAPSULE, COATED, EXTENDED RELEASE ORAL DAILY
Status: DISCONTINUED | OUTPATIENT
Start: 2023-05-22 | End: 2023-05-24 | Stop reason: HOSPADM

## 2023-05-21 RX ORDER — DILTIAZEM HYDROCHLORIDE 120 MG/1
120 CAPSULE, COATED, EXTENDED RELEASE ORAL DAILY
Status: DISCONTINUED | OUTPATIENT
Start: 2023-05-21 | End: 2023-05-22

## 2023-05-21 RX ORDER — FLUTICASONE PROPIONATE 50 MCG
1 SPRAY, SUSPENSION (ML) NASAL DAILY
Status: DISCONTINUED | OUTPATIENT
Start: 2023-05-21 | End: 2023-05-24 | Stop reason: HOSPADM

## 2023-05-21 RX ORDER — SODIUM CHLORIDE 0.9 % (FLUSH) 0.9 %
5-40 SYRINGE (ML) INJECTION PRN
Status: DISCONTINUED | OUTPATIENT
Start: 2023-05-21 | End: 2023-05-24 | Stop reason: HOSPADM

## 2023-05-21 RX ADMIN — DILTIAZEM HYDROCHLORIDE 10 MG: 5 INJECTION INTRAVENOUS at 15:18

## 2023-05-21 RX ADMIN — GUAIFENESIN 600 MG: 600 TABLET, EXTENDED RELEASE ORAL at 21:44

## 2023-05-21 RX ADMIN — SODIUM CHLORIDE, PRESERVATIVE FREE 10 ML: 5 INJECTION INTRAVENOUS at 21:45

## 2023-05-21 RX ADMIN — DILTIAZEM HYDROCHLORIDE 120 MG: 120 CAPSULE, COATED, EXTENDED RELEASE ORAL at 19:10

## 2023-05-21 RX ADMIN — APIXABAN 5 MG: 5 TABLET, FILM COATED ORAL at 21:44

## 2023-05-21 ASSESSMENT — PAIN DESCRIPTION - LOCATION: LOCATION: SHOULDER

## 2023-05-21 ASSESSMENT — ENCOUNTER SYMPTOMS
SHORTNESS OF BREATH: 1
VOMITING: 0
BACK PAIN: 0
SHORTNESS OF BREATH: 0
EYE PAIN: 0
COUGH: 0
NAUSEA: 0
CONSTIPATION: 0
ABDOMINAL PAIN: 0
TROUBLE SWALLOWING: 0
COLOR CHANGE: 0
DIARRHEA: 0

## 2023-05-21 ASSESSMENT — PAIN SCALES - GENERAL: PAINLEVEL_OUTOF10: 3

## 2023-05-21 ASSESSMENT — PAIN DESCRIPTION - ORIENTATION: ORIENTATION: LEFT

## 2023-05-21 ASSESSMENT — PAIN - FUNCTIONAL ASSESSMENT: PAIN_FUNCTIONAL_ASSESSMENT: 0-10

## 2023-05-22 LAB
ALBUMIN SERPL-MCNC: 3.5 G/DL (ref 3.5–5.2)
ALP SERPL-CCNC: 107 U/L (ref 40–129)
ALT SERPL-CCNC: 38 U/L (ref 5–41)
ANION GAP SERPL CALCULATED.3IONS-SCNC: 10 MMOL/L (ref 9–17)
AST SERPL-CCNC: 28 U/L
BASOPHILS # BLD: 0 K/UL (ref 0–0.2)
BASOPHILS NFR BLD: 1 % (ref 0–2)
BILIRUB SERPL-MCNC: 0.4 MG/DL (ref 0.3–1.2)
BUN SERPL-MCNC: 20 MG/DL (ref 6–20)
CALCIUM SERPL-MCNC: 8.7 MG/DL (ref 8.6–10.4)
CHLORIDE SERPL-SCNC: 104 MMOL/L (ref 98–107)
CO2 SERPL-SCNC: 24 MMOL/L (ref 20–31)
CREAT SERPL-MCNC: 0.84 MG/DL (ref 0.7–1.2)
EKG ATRIAL RATE: 117 BPM
EKG Q-T INTERVAL: 340 MS
EKG QRS DURATION: 80 MS
EKG QTC CALCULATION (BAZETT): 468 MS
EKG R AXIS: 61 DEGREES
EKG T AXIS: 90 DEGREES
EKG VENTRICULAR RATE: 114 BPM
EOSINOPHIL # BLD: 0.1 K/UL (ref 0–0.4)
EOSINOPHILS RELATIVE PERCENT: 2 % (ref 0–4)
ERYTHROCYTE [DISTWIDTH] IN BLOOD BY AUTOMATED COUNT: 14.7 % (ref 11.5–14.9)
GFR SERPL CREATININE-BSD FRML MDRD: >60 ML/MIN/1.73M2
GLUCOSE SERPL-MCNC: 119 MG/DL (ref 70–99)
HCT VFR BLD AUTO: 47.5 % (ref 41–53)
HGB BLD-MCNC: 16.2 G/DL (ref 13.5–17.5)
LYMPHOCYTES # BLD: 30 % (ref 24–44)
LYMPHOCYTES NFR BLD: 2 K/UL (ref 1–4.8)
MCH RBC QN AUTO: 31 PG (ref 26–34)
MCHC RBC AUTO-ENTMCNC: 34.1 G/DL (ref 31–37)
MCV RBC AUTO: 91 FL (ref 80–100)
MONOCYTES NFR BLD: 0.6 K/UL (ref 0.1–1.3)
MONOCYTES NFR BLD: 9 % (ref 1–7)
NEUTROPHILS NFR BLD: 58 % (ref 36–66)
NEUTS SEG NFR BLD: 4 K/UL (ref 1.3–9.1)
PLATELET # BLD AUTO: 143 K/UL (ref 150–450)
PMV BLD AUTO: 9 FL (ref 6–12)
POTASSIUM SERPL-SCNC: 4.1 MMOL/L (ref 3.7–5.3)
PROT SERPL-MCNC: 5.8 G/DL (ref 6.4–8.3)
RBC # BLD AUTO: 5.23 M/UL (ref 4.5–5.9)
SODIUM SERPL-SCNC: 138 MMOL/L (ref 135–144)
WBC OTHER # BLD: 6.7 K/UL (ref 3.5–11)

## 2023-05-22 PROCEDURE — 2060000000 HC ICU INTERMEDIATE R&B

## 2023-05-22 PROCEDURE — 94640 AIRWAY INHALATION TREATMENT: CPT

## 2023-05-22 PROCEDURE — 97162 PT EVAL MOD COMPLEX 30 MIN: CPT

## 2023-05-22 PROCEDURE — 2580000003 HC RX 258

## 2023-05-22 PROCEDURE — 36415 COLL VENOUS BLD VENIPUNCTURE: CPT

## 2023-05-22 PROCEDURE — 97166 OT EVAL MOD COMPLEX 45 MIN: CPT

## 2023-05-22 PROCEDURE — 6370000000 HC RX 637 (ALT 250 FOR IP)

## 2023-05-22 PROCEDURE — 99232 SBSQ HOSP IP/OBS MODERATE 35: CPT | Performed by: INTERNAL MEDICINE

## 2023-05-22 PROCEDURE — 85025 COMPLETE CBC W/AUTO DIFF WBC: CPT

## 2023-05-22 PROCEDURE — 6370000000 HC RX 637 (ALT 250 FOR IP): Performed by: INTERNAL MEDICINE

## 2023-05-22 PROCEDURE — 94760 N-INVAS EAR/PLS OXIMETRY 1: CPT

## 2023-05-22 PROCEDURE — 80053 COMPREHEN METABOLIC PANEL: CPT

## 2023-05-22 PROCEDURE — 2580000003 HC RX 258: Performed by: INTERNAL MEDICINE

## 2023-05-22 RX ORDER — SODIUM CHLORIDE 9 MG/ML
INJECTION, SOLUTION INTRAVENOUS CONTINUOUS
Status: ACTIVE | OUTPATIENT
Start: 2023-05-22 | End: 2023-05-23

## 2023-05-22 RX ADMIN — DILTIAZEM HYDROCHLORIDE 240 MG: 240 CAPSULE, COATED, EXTENDED RELEASE ORAL at 09:56

## 2023-05-22 RX ADMIN — GUAIFENESIN 600 MG: 600 TABLET, EXTENDED RELEASE ORAL at 21:52

## 2023-05-22 RX ADMIN — SODIUM CHLORIDE: 9 INJECTION, SOLUTION INTRAVENOUS at 14:51

## 2023-05-22 RX ADMIN — APIXABAN 5 MG: 5 TABLET, FILM COATED ORAL at 21:52

## 2023-05-22 RX ADMIN — ACETAMINOPHEN 650 MG: 325 TABLET ORAL at 10:16

## 2023-05-22 RX ADMIN — ACETAMINOPHEN 650 MG: 325 TABLET ORAL at 21:52

## 2023-05-22 RX ADMIN — TIOTROPIUM BROMIDE INHALATION SPRAY 2 PUFF: 3.12 SPRAY, METERED RESPIRATORY (INHALATION) at 07:59

## 2023-05-22 RX ADMIN — ASPIRIN 81 MG: 81 TABLET, COATED ORAL at 09:56

## 2023-05-22 RX ADMIN — APIXABAN 5 MG: 5 TABLET, FILM COATED ORAL at 09:57

## 2023-05-22 RX ADMIN — GUAIFENESIN 600 MG: 600 TABLET, EXTENDED RELEASE ORAL at 09:56

## 2023-05-22 RX ADMIN — SODIUM CHLORIDE, PRESERVATIVE FREE 10 ML: 5 INJECTION INTRAVENOUS at 09:56

## 2023-05-22 RX ADMIN — FLUTICASONE PROPIONATE 1 SPRAY: 50 SPRAY, METERED NASAL at 10:32

## 2023-05-22 ASSESSMENT — PAIN SCALES - GENERAL
PAINLEVEL_OUTOF10: 4
PAINLEVEL_OUTOF10: 6

## 2023-05-22 ASSESSMENT — ENCOUNTER SYMPTOMS
TROUBLE SWALLOWING: 0
NAUSEA: 0
CONSTIPATION: 0
BACK PAIN: 0
VOMITING: 0
ABDOMINAL PAIN: 0
DIARRHEA: 0
SHORTNESS OF BREATH: 0
COUGH: 0
COLOR CHANGE: 0

## 2023-05-22 ASSESSMENT — PAIN DESCRIPTION - LOCATION: LOCATION: HEAD

## 2023-05-22 ASSESSMENT — PAIN DESCRIPTION - DESCRIPTORS: DESCRIPTORS: ACHING

## 2023-05-22 NOTE — CONSULTS
bilaterally, no wheezing, no rales. Cardiovascular: The apical impulse is not displaced  Heart tones are irregularly irregular. Jugular venous pulsation Normal  Thre is no carotid bruit   Peripheral pulses are symmetrical and full   Abdomen:  No masses or tenderness  Bowel sounds present  Extremities:   No Cyanosis or Clubbing   Lower extremity edema: No edema    Skin: Warm and dry  Neurological:  Not done     DATA:    Diagnostics:      EKG:  atrial fib with rvr, nonspecific ST-T wave abnormality         Previous cardiac testing:     [facility] 2/22/23  LMCA: Normal 0% stenosis. LAD: Normal 0% stenosis. LCx: Normal 0% stenosis. RCA: Normal 0% stenosis. Echo 02/2023  Left ventricle is normal in size and wall thickness. Left ventricular systolic function is low normal. Estimated LV EF 50-55%  Normal RV size and function  LA appears mildly dilated  No obvious significant valvular abnormality seen. Mild mitral regurgitation. Mild tricuspid regurgitation. No pulmonary hypertension. Estimated right ventricular systolic pressure is  97NLFV. No significant pericardial effusion is seen. Normal aortic root dimension. Normal IVC size and inspiratory collapse    Labs:     CBC:   Recent Labs     05/21/23  1440 05/22/23  0527   WBC 8.1 6.7   HGB 16.3 16.2   HCT 48.2 47.5    143*     BMP:   Recent Labs     05/21/23  1518 05/22/23  0527   * 138   K 3.8 4.1   CO2 25 24   BUN 33* 20   CREATININE 0.92 0.84   LABGLOM >60 >60   GLUCOSE 199* 119*     BNP: No results for input(s): BNP in the last 72 hours. PT/INR:   Recent Labs     05/21/23  1440   PROTIME 13.0   INR 1.0     APTT:No results for input(s): APTT in the last 72 hours. CARDIAC ENZYMES:No results for input(s): CKTOTAL, CKMB, CKMBINDEX, TROPONINI in the last 72 hours.   FASTING LIPID PANEL:  Lab Results   Component Value Date/Time    HDL 48 11/12/2021 07:08 AM    TRIG 62 11/12/2021 07:08 AM     LIVER PROFILE:  Recent Labs     05/21/23  1518

## 2023-05-22 NOTE — PLAN OF CARE
Problem: Discharge Planning  Goal: Discharge to home or other facility with appropriate resources  Outcome: Progressing  Flowsheets (Taken 5/21/2023 9131 by Mick Stanley RN)  Discharge to home or other facility with appropriate resources:   Identify barriers to discharge with patient and caregiver   Arrange for needed discharge resources and transportation as appropriate   Identify discharge learning needs (meds, wound care, etc)   Arrange for interpreters to assist at discharge as needed   Refer to discharge planning if patient needs post-hospital services based on physician order or complex needs related to functional status, cognitive ability or social support system     Problem: Pain  Goal: Verbalizes/displays adequate comfort level or baseline comfort level  Outcome: Progressing     Problem: Safety - Adult  Goal: Free from fall injury  Outcome: Progressing     Problem: ABCDS Injury Assessment  Goal: Absence of physical injury  Outcome: Progressing

## 2023-05-22 NOTE — CARE COORDINATION
Case Management Assessment  Initial Evaluation    Date/Time of Evaluation: 5/22/2023 10:32 AM  Assessment Completed by: Oriana Joe RN    If patient is discharged prior to next notation, then this note serves as note for discharge by case management. Patient Name: Luis Del Real                   YOB: 1965  Diagnosis: Atrial fibrillation with RVR ([address] [I48.91]                   Date / Time: 5/21/2023  2:28 PM    Patient Admission Status: Inpatient   Readmission Risk (Low < 19, Mod (19-27), High > 27): Readmission Risk Score: 15.3    Current PCP: Gene Ambrocio MD  PCP verified by CM? Yes    Chart Reviewed: Yes      History Provided by: Patient  Patient Orientation: Alert and Oriented    Patient Cognition: Alert    Hospitalization in the last 30 days (Readmission):  No    If yes, Readmission Assessment in CM Navigator will be completed. Advance Directives:      Code Status: Full Code   Patient's Primary Decision Maker is:      Primary Decision Maker: Nigel Grover - [phone]    Discharge Planning:    Patient lives with: Spouse/Significant Other Type of Home: Apartment  Primary Care Giver: Self  Patient Support Systems include: Spouse/Significant Other   Current Financial resources: Medicaid  Current community resources: None  Current services prior to admission: None            Current DME:              Type of Home Care services:  None    ADLS  Prior functional level: Independent in ADLs/IADLs  Current functional level: Independent in ADLs/IADLs    PT AM-PAC: 18 /24  OT AM-PAC:   /24    Family can provide assistance at DC: Yes  Would you like Case Management to discuss the discharge plan with any other family members/significant others, and if so, who?  No  Plans to Return to Present Housing: Yes  Other Identified Issues/Barriers to RETURNING to current housing: None  Potential Assistance needed at discharge: N/A            Potential DME:    Patient expects to discharge to:

## 2023-05-23 ENCOUNTER — APPOINTMENT (OUTPATIENT)
Dept: NON INVASIVE DIAGNOSTICS | Age: 58
DRG: 201 | End: 2023-05-23
Payer: MEDICAID

## 2023-05-23 LAB
ALBUMIN SERPL-MCNC: 3.6 G/DL (ref 3.5–5.2)
ALP SERPL-CCNC: 103 U/L (ref 40–129)
ALT SERPL-CCNC: 29 U/L (ref 5–41)
ANION GAP SERPL CALCULATED.3IONS-SCNC: 10 MMOL/L (ref 9–17)
AST SERPL-CCNC: 18 U/L
BASOPHILS # BLD: 0 K/UL (ref 0–0.2)
BASOPHILS NFR BLD: 1 % (ref 0–2)
BILIRUB SERPL-MCNC: 0.2 MG/DL (ref 0.3–1.2)
BUN SERPL-MCNC: 24 MG/DL (ref 6–20)
CALCIUM SERPL-MCNC: 8.3 MG/DL (ref 8.6–10.4)
CHLORIDE SERPL-SCNC: 105 MMOL/L (ref 98–107)
CO2 SERPL-SCNC: 22 MMOL/L (ref 20–31)
CREAT SERPL-MCNC: 0.88 MG/DL (ref 0.7–1.2)
EOSINOPHIL # BLD: 0.1 K/UL (ref 0–0.4)
EOSINOPHILS RELATIVE PERCENT: 1 % (ref 0–4)
ERYTHROCYTE [DISTWIDTH] IN BLOOD BY AUTOMATED COUNT: 14.6 % (ref 11.5–14.9)
GFR SERPL CREATININE-BSD FRML MDRD: >60 ML/MIN/1.73M2
GLUCOSE SERPL-MCNC: 117 MG/DL (ref 70–99)
HCT VFR BLD AUTO: 45.4 % (ref 41–53)
HGB BLD-MCNC: 15.4 G/DL (ref 13.5–17.5)
LV EF: 50 %
LVEF MODALITY: NORMAL
LYMPHOCYTES # BLD: 30 % (ref 24–44)
LYMPHOCYTES NFR BLD: 1.9 K/UL (ref 1–4.8)
MAGNESIUM SERPL-MCNC: 1.9 MG/DL (ref 1.6–2.6)
MCH RBC QN AUTO: 31.1 PG (ref 26–34)
MCHC RBC AUTO-ENTMCNC: 33.8 G/DL (ref 31–37)
MCV RBC AUTO: 92 FL (ref 80–100)
MONOCYTES NFR BLD: 0.6 K/UL (ref 0.1–1.3)
MONOCYTES NFR BLD: 10 % (ref 1–7)
NEUTROPHILS NFR BLD: 58 % (ref 36–66)
NEUTS SEG NFR BLD: 3.7 K/UL (ref 1.3–9.1)
PHOSPHATE SERPL-MCNC: 3.7 MG/DL (ref 2.5–4.5)
PLATELET # BLD AUTO: 131 K/UL (ref 150–450)
PMV BLD AUTO: 8.7 FL (ref 6–12)
POTASSIUM SERPL-SCNC: 4.3 MMOL/L (ref 3.7–5.3)
PROT SERPL-MCNC: 5.8 G/DL (ref 6.4–8.3)
RBC # BLD AUTO: 4.94 M/UL (ref 4.5–5.9)
SODIUM SERPL-SCNC: 137 MMOL/L (ref 135–144)
WBC OTHER # BLD: 6.3 K/UL (ref 3.5–11)

## 2023-05-23 PROCEDURE — 6370000000 HC RX 637 (ALT 250 FOR IP)

## 2023-05-23 PROCEDURE — 93308 TTE F-UP OR LMTD: CPT

## 2023-05-23 PROCEDURE — 99233 SBSQ HOSP IP/OBS HIGH 50: CPT | Performed by: INTERNAL MEDICINE

## 2023-05-23 PROCEDURE — 85025 COMPLETE CBC W/AUTO DIFF WBC: CPT

## 2023-05-23 PROCEDURE — 94640 AIRWAY INHALATION TREATMENT: CPT

## 2023-05-23 PROCEDURE — 84100 ASSAY OF PHOSPHORUS: CPT

## 2023-05-23 PROCEDURE — 2580000003 HC RX 258

## 2023-05-23 PROCEDURE — 83735 ASSAY OF MAGNESIUM: CPT

## 2023-05-23 PROCEDURE — 80053 COMPREHEN METABOLIC PANEL: CPT

## 2023-05-23 PROCEDURE — 36415 COLL VENOUS BLD VENIPUNCTURE: CPT

## 2023-05-23 PROCEDURE — 99232 SBSQ HOSP IP/OBS MODERATE 35: CPT | Performed by: INTERNAL MEDICINE

## 2023-05-23 PROCEDURE — 6370000000 HC RX 637 (ALT 250 FOR IP): Performed by: INTERNAL MEDICINE

## 2023-05-23 PROCEDURE — 2060000000 HC ICU INTERMEDIATE R&B

## 2023-05-23 PROCEDURE — 94760 N-INVAS EAR/PLS OXIMETRY 1: CPT

## 2023-05-23 RX ORDER — METOPROLOL SUCCINATE 25 MG/1
25 TABLET, EXTENDED RELEASE ORAL DAILY
Status: DISCONTINUED | OUTPATIENT
Start: 2023-05-23 | End: 2023-05-24 | Stop reason: HOSPADM

## 2023-05-23 RX ORDER — DILTIAZEM HYDROCHLORIDE 240 MG/1
240 CAPSULE, COATED, EXTENDED RELEASE ORAL DAILY
Qty: 30 CAPSULE | Refills: 3 | Status: CANCELLED | OUTPATIENT
Start: 2023-05-23

## 2023-05-23 RX ADMIN — GUAIFENESIN 600 MG: 600 TABLET, EXTENDED RELEASE ORAL at 21:19

## 2023-05-23 RX ADMIN — APIXABAN 5 MG: 5 TABLET, FILM COATED ORAL at 09:49

## 2023-05-23 RX ADMIN — APIXABAN 5 MG: 5 TABLET, FILM COATED ORAL at 21:19

## 2023-05-23 RX ADMIN — DILTIAZEM HYDROCHLORIDE 240 MG: 240 CAPSULE, COATED, EXTENDED RELEASE ORAL at 09:49

## 2023-05-23 RX ADMIN — TIOTROPIUM BROMIDE INHALATION SPRAY 2 PUFF: 3.12 SPRAY, METERED RESPIRATORY (INHALATION) at 07:23

## 2023-05-23 RX ADMIN — SODIUM CHLORIDE, PRESERVATIVE FREE 10 ML: 5 INJECTION INTRAVENOUS at 21:19

## 2023-05-23 RX ADMIN — FLUTICASONE PROPIONATE 1 SPRAY: 50 SPRAY, METERED NASAL at 09:59

## 2023-05-23 RX ADMIN — GUAIFENESIN 600 MG: 600 TABLET, EXTENDED RELEASE ORAL at 09:49

## 2023-05-23 RX ADMIN — METOPROLOL SUCCINATE 25 MG: 25 TABLET, EXTENDED RELEASE ORAL at 09:58

## 2023-05-23 RX ADMIN — ASPIRIN 81 MG: 81 TABLET, COATED ORAL at 09:49

## 2023-05-23 RX ADMIN — ACETAMINOPHEN 650 MG: 325 TABLET ORAL at 09:49

## 2023-05-23 ASSESSMENT — ENCOUNTER SYMPTOMS
TROUBLE SWALLOWING: 0
CONSTIPATION: 0
BACK PAIN: 0
ABDOMINAL PAIN: 0
VOMITING: 0
SHORTNESS OF BREATH: 0
COLOR CHANGE: 0
COUGH: 0
DIARRHEA: 0
NAUSEA: 0

## 2023-05-23 ASSESSMENT — PAIN SCALES - GENERAL
PAINLEVEL_OUTOF10: 0
PAINLEVEL_OUTOF10: 6

## 2023-05-23 NOTE — CARE COORDINATION
ONGOING DISCHARGE PLAN:    Patient is alert and oriented x4. Spoke with patient regarding discharge plan and patient confirms that plan is still to return to home. Pt. States \"His SO, is on Vacation, & he is working on getting his Daughter, from [employer], to stay with him when he goes home\". Denies VNS. Eliquis PTA, Remains on PO Cardizem. Per Cardio notes, IF Echo stable, F/U w/ Primary Cardiologist in 2 weeks. Pt denies needs. Will continue to follow for additional discharge needs. If patient is discharged prior to next notation, then this note serves as note for discharge by case management.     Electronically signed by Rony Gabriel RN on 5/23/2023 at 11:11 AM

## 2023-05-24 VITALS
HEIGHT: 68 IN | SYSTOLIC BLOOD PRESSURE: 126 MMHG | BODY MASS INDEX: 22.59 KG/M2 | HEART RATE: 98 BPM | WEIGHT: 149.03 LBS | OXYGEN SATURATION: 98 % | DIASTOLIC BLOOD PRESSURE: 78 MMHG | TEMPERATURE: 97.7 F | RESPIRATION RATE: 17 BRPM

## 2023-05-24 LAB
ALBUMIN SERPL-MCNC: 4 G/DL (ref 3.5–5.2)
ALP SERPL-CCNC: 112 U/L (ref 40–129)
ALT SERPL-CCNC: 32 U/L (ref 5–41)
ANION GAP SERPL CALCULATED.3IONS-SCNC: 6 MMOL/L (ref 9–17)
AST SERPL-CCNC: 20 U/L
BASOPHILS # BLD: 0 K/UL (ref 0–0.2)
BASOPHILS NFR BLD: 1 % (ref 0–2)
BILIRUB SERPL-MCNC: 0.2 MG/DL (ref 0.3–1.2)
BUN SERPL-MCNC: 26 MG/DL (ref 6–20)
CALCIUM SERPL-MCNC: 9.6 MG/DL (ref 8.6–10.4)
CHLORIDE SERPL-SCNC: 104 MMOL/L (ref 98–107)
CO2 SERPL-SCNC: 28 MMOL/L (ref 20–31)
CREAT SERPL-MCNC: 0.83 MG/DL (ref 0.7–1.2)
EOSINOPHIL # BLD: 0.1 K/UL (ref 0–0.4)
EOSINOPHILS RELATIVE PERCENT: 1 % (ref 0–4)
ERYTHROCYTE [DISTWIDTH] IN BLOOD BY AUTOMATED COUNT: 14.3 % (ref 11.5–14.9)
GFR SERPL CREATININE-BSD FRML MDRD: >60 ML/MIN/1.73M2
GLUCOSE SERPL-MCNC: 103 MG/DL (ref 70–99)
HCT VFR BLD AUTO: 49.1 % (ref 41–53)
HGB BLD-MCNC: 16.7 G/DL (ref 13.5–17.5)
LYMPHOCYTES # BLD: 26 % (ref 24–44)
LYMPHOCYTES NFR BLD: 2 K/UL (ref 1–4.8)
MCH RBC QN AUTO: 31 PG (ref 26–34)
MCHC RBC AUTO-ENTMCNC: 34 G/DL (ref 31–37)
MCV RBC AUTO: 91.1 FL (ref 80–100)
MONOCYTES NFR BLD: 0.7 K/UL (ref 0.1–1.3)
MONOCYTES NFR BLD: 10 % (ref 1–7)
NEUTROPHILS NFR BLD: 62 % (ref 36–66)
NEUTS SEG NFR BLD: 4.8 K/UL (ref 1.3–9.1)
PLATELET # BLD AUTO: 144 K/UL (ref 150–450)
PMV BLD AUTO: 8.9 FL (ref 6–12)
POTASSIUM SERPL-SCNC: 4.5 MMOL/L (ref 3.7–5.3)
PROT SERPL-MCNC: 6.5 G/DL (ref 6.4–8.3)
RBC # BLD AUTO: 5.39 M/UL (ref 4.5–5.9)
SODIUM SERPL-SCNC: 138 MMOL/L (ref 135–144)
WBC OTHER # BLD: 7.7 K/UL (ref 3.5–11)

## 2023-05-24 PROCEDURE — 2580000003 HC RX 258

## 2023-05-24 PROCEDURE — 85025 COMPLETE CBC W/AUTO DIFF WBC: CPT

## 2023-05-24 PROCEDURE — 36415 COLL VENOUS BLD VENIPUNCTURE: CPT

## 2023-05-24 PROCEDURE — 99233 SBSQ HOSP IP/OBS HIGH 50: CPT | Performed by: INTERNAL MEDICINE

## 2023-05-24 PROCEDURE — 94761 N-INVAS EAR/PLS OXIMETRY MLT: CPT

## 2023-05-24 PROCEDURE — 80053 COMPREHEN METABOLIC PANEL: CPT

## 2023-05-24 PROCEDURE — 6370000000 HC RX 637 (ALT 250 FOR IP)

## 2023-05-24 PROCEDURE — 6370000000 HC RX 637 (ALT 250 FOR IP): Performed by: INTERNAL MEDICINE

## 2023-05-24 PROCEDURE — 94640 AIRWAY INHALATION TREATMENT: CPT

## 2023-05-24 RX ORDER — DILTIAZEM HYDROCHLORIDE 240 MG/1
240 CAPSULE, COATED, EXTENDED RELEASE ORAL DAILY
Qty: 30 CAPSULE | Refills: 3 | Status: SHIPPED | OUTPATIENT
Start: 2023-05-24

## 2023-05-24 RX ORDER — METOPROLOL SUCCINATE 25 MG/1
25 TABLET, EXTENDED RELEASE ORAL DAILY
Qty: 30 TABLET | Refills: 3 | Status: SHIPPED | OUTPATIENT
Start: 2023-05-24

## 2023-05-24 RX ADMIN — GUAIFENESIN 600 MG: 600 TABLET, EXTENDED RELEASE ORAL at 09:03

## 2023-05-24 RX ADMIN — METOPROLOL SUCCINATE 25 MG: 25 TABLET, EXTENDED RELEASE ORAL at 09:02

## 2023-05-24 RX ADMIN — APIXABAN 5 MG: 5 TABLET, FILM COATED ORAL at 09:02

## 2023-05-24 RX ADMIN — FLUTICASONE PROPIONATE 1 SPRAY: 50 SPRAY, METERED NASAL at 09:04

## 2023-05-24 RX ADMIN — ASPIRIN 81 MG: 81 TABLET, COATED ORAL at 09:03

## 2023-05-24 RX ADMIN — DILTIAZEM HYDROCHLORIDE 240 MG: 240 CAPSULE, COATED, EXTENDED RELEASE ORAL at 09:03

## 2023-05-24 RX ADMIN — SODIUM CHLORIDE, PRESERVATIVE FREE 10 ML: 5 INJECTION INTRAVENOUS at 09:03

## 2023-05-24 RX ADMIN — TIOTROPIUM BROMIDE INHALATION SPRAY 2 PUFF: 3.12 SPRAY, METERED RESPIRATORY (INHALATION) at 07:34

## 2023-05-24 NOTE — DISCHARGE INSTR - COC
COVID-19 & Influenza Combo (Ordered)   12/02/21 Rule-Out Test Resulted    COVID-19 (Rule Out) 11/11/21 11/11/21 11/11/21 COVID-19, Rapid (Ordered)   11/11/21 Rule-Out Test Resulted    COVID-19 (Rule Out) 04/13/21 04/13/21 04/13/21 COVID-19 (Ordered)   04/14/21 Rule-Out Test Resulted            Nurse Assessment:  Last Vital Signs: /78   Pulse 98   Temp 97.7 °F (36.5 °C) (Oral)   Resp 17   Ht 5' 8\" (1.727 m)   Wt 149 lb 0.5 oz (67.6 kg)   SpO2 98%   BMI 22.66 kg/m²     Last documented pain score (0-10 scale): Pain Level: 0  Last Weight:   Wt Readings from Last 1 Encounters:   05/23/23 149 lb 0.5 oz (67.6 kg)     Mental Status:  {IP PT MENTAL STATUS:20030}    IV Access:  { ALICIA IV ACCESS:386288724}    Nursing Mobility/ADLs:  Walking   {CHP DME FNKY:662782091}  Transfer  {CHP DME UWNM:177457360}  Bathing  {CHP DME JSZY:271795242}  Dressing  {CHP DME QVLX:807797009}  Toileting  {CHP DME SLCK:219880876}  Feeding  {CHP DME KGIQ:562298285}  Med Admin  {CHP DME IOKT:417994986}  Med Delivery   { ALICIA MED Delivery:364649820}    Wound Care Documentation and Therapy:        Elimination:  Continence:    Bowel: {YES / DX:29625}  Bladder: {YES / HP:84329}  Urinary Catheter: {Urinary Catheter:280830088}   Colostomy/Ileostomy/Ileal Conduit: {YES / VB:22154}       Date of Last BM: ***    Intake/Output Summary (Last 24 hours) at 5/24/2023 1436  Last data filed at 5/24/2023 1232  Gross per 24 hour   Intake 600 ml   Output --   Net 600 ml     I/O last 3 completed shifts:  In: -   Out: 1750 [OHKGZ:6280]    Safety Concerns:     [address] ALICIA Safety Concerns:907349137}    Impairments/Disabilities:      [address] VARGAS Impairments/Disabilities:593752154}    Nutrition Therapy:  Current Nutrition Therapy:   [address] ALICIA Diet List:208556305}    Routes of Feeding: {CHP DME Other Feedings:671843020}  Liquids: {Slp liquid thickness:76550}  Daily Fluid Restriction: {CHP DME Yes amt example:335562243}  Last Modified Barium Swallow with Video (Video

## 2023-05-24 NOTE — PROGRESS NOTES
05/23/23 1152   Encounter Summary   Encounter Overview/Reason  Spiritual/Emotional Needs   Service Provided For: Patient   Referral/Consult From: Rounding   Last Encounter  05/23/23   Spiritual/Emotional needs   Type Spiritual Support   Assessment/Intervention/Outcome   Assessment Coping   Intervention Active listening;Sustaining Presence/Ministry of presence   Outcome Engaged in conversation
[id number]    PROGRESS NOTE             5/23/2023    8:48 AM    Name:   Shreya Guido  MRN:     [id number]     Acct:      [de-identified]   Room:   2087/2087-01  IP Day:  2  Admit Date:  5/21/2023  2:28 PM    PCP:  Nathaly Garcia MD  Code Status:  Full Code    Subjective:     C/C:   Chief Complaint   Patient presents with    Loss of Consciousness    Shortness of Breath     Interval History Status: improved. Patient seen and examined at bedside. No acute events overnight. Heart rate is currently rate controlled, still in A-fib. States that he still feels like his heart skips a beat occasionally. Reports that his shortness of breath and lightheadedness has improved. No syncopal events overnight. Currently receiving IV fluids    Denies any chest pain, nausea, vomiting, abdominal pain, burning with urination, weakness or fatigue. Cardiology consulted. Increased Cardizem to 240 mg daily    Brief History:     The patient is a 62 y.o. 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 Reports that he felt lightheaded, dizzy and his vision went white. The next thing  he knew he woke up on the ground. States that he was disoriented but was able to go back into his house by himself. He felt his heart racing and skipping a beat before he passed out. Denied any loss of bowel or bladder function or tongue biting. Has been admitted many times for A-fib with RVR in the past.  Reports compliance with his medications. Has never passed out before.   Denies any
[facility]   Progress Note                   Date:   5/24/2023  Patient name: Harika Gomes  Date of admission:  5/21/2023  2:28 PM  MRN:   [id number]  YOB: 1965  PCP: Eva Negrete MD    Reason for Admission:     Subjective:       Clinical Changes / Abnormalities:  Feeling better  Tele- AF rate controlled  No cp or sob  No dizziness        Medications:   Scheduled Meds:   Current Facility-Administered Medications:     metoprolol succinate (TOPROL XL) extended release tablet 25 mg, 25 mg, Oral, Daily, Luke Baldwino, DO, 25 mg at 05/24/23 2672    perflutren lipid microspheres (DEFINITY) injection 1.5 mL, 1.5 mL, IntraVENous, ONCE PRN, Chaz Vazquez MD    aspirin EC tablet 81 mg, 81 mg, Oral, Daily, Tracey Henson MD, 81 mg at 05/24/23 0586    apixaban (ELIQUIS) tablet 5 mg, 5 mg, Oral, BID, Tracey Henson MD, 5 mg at 05/24/23 0902    fluticasone (FLONASE) 50 MCG/ACT nasal spray 1 spray, 1 spray, Each Nostril, Daily, Tracey Henson MD, 1 spray at 05/24/23 0904    guaiFENesin (MUCINEX) extended release tablet 600 mg, 600 mg, Oral, BID, Tracey Henson MD, 600 mg at 05/24/23 0903    albuterol sulfate HFA (PROVENTIL;VENTOLIN;PROAIR) 108 (90 Base) MCG/ACT inhaler 2 puff, 2 puff, Inhalation, Q6H PRN, Tracey Henson MD    tiotropium (SPIRIVA RESPIMAT) 2.5 MCG/ACT inhaler 2 puff, 2 puff, Inhalation, Daily, Tracey Henson MD, 2 puff at 05/24/23 0734    sodium chloride flush 0.9 % injection 5-40 mL, 5-40 mL, IntraVENous, 2 times per day, Tracey Hneson MD, 10 mL at 05/24/23 0903    sodium chloride flush 0.9 % injection 5-40 mL, 5-40 mL, IntraVENous, PRN, Tracey Henson MD    0.9 % sodium chloride infusion, , IntraVENous, PRN, Tracey Henson MD    ondansetron (ZOFRAN-ODT) disintegrating tablet 4 mg, 4 mg, Oral, Q8H PRN **OR** ondansetron (ZOFRAN) injection 4 mg, 4 mg, IntraVENous, Q6H PRN, Tracey Henson MD    polyethylene glycol (GLYCOLAX) packet 17 g, 17 g, Oral, Daily PRN, Asael Mathews
Physical Therapy  Facility/Department: [facility] PROGRESSIVE CARE  Physical Therapy Initial Assessment    Name: Shreya Guido  : 1965  MRN: [id number]  Date of Service: 2023    Discharge Recommendations:  Continue to assess pending progress   PT Equipment Recommendations  Equipment Needed: No      Patient Diagnosis(es): The primary encounter diagnosis was Syncope, unspecified syncope type. A diagnosis of Atrial fibrillation with RVR (HCC) was also pertinent to this visit. Past Medical History:  has a past medical history of Abnormal echocardiogram, Arrhythmia, AS (aortic stenosis), Atrial fibrillation ([address] Benign prostatic hyperplasia, Biatrial enlargement, Central perforation of tympanic membrane, right, Chest pain, Chews tobacco, CHF (congestive heart failure), NYHA class I, acute on chronic, combined ([address] Chronic back pain, Conductive hearing loss of right ear, COPD (chronic obstructive pulmonary disease) ([address] Diverticulitis, Fatigue, History of cocaine abuse ([address] Household contact with history of methicillin resistant Staphylococcus aureus (MRSA) infection, Lung nodule, Mass of upper lobe of left lung, Mild concentric left ventricular hypertrophy, Murmur, cardiac, Near syncope, Nonrheumatic aortic (valve) stenosis, Palpitations, Panlobular emphysema (HCC), Pneumonia of both upper lobes due to infectious organism, Right-sided tinnitus, Severe aortic stenosis, Shortness of breath, and Tobacco abuse. Past Surgical History:  has a past surgical history that includes Inguinal hernia repair (Right); cyst removal (Left); pr incision & drainage abscess complicated/multiple (N/A, 2018); and Aortic valve replacement (2018). Assessment   Body Structures, Functions, Activity Limitations Requiring Skilled Therapeutic Intervention: Decreased tolerance to work activity; Decreased balance  Assessment: pt to continue per plan to maximize potential  Treatment Diagnosis: impaired mobility due to decrease
Physician Progress Note      Luzma Scales  [facility] #:                  [id number]  :                       1965  ADMIT DATE:       2023 2:28 PM  [address] [place] DATE:  RESPONDING  PROVIDER #:        Destiny Perez          QUERY TEXT:    Patient admitted with syncope. Noted documentation of syncope likely secondary   to afib in H/P and syncope secondary to dehydration in cardiology consult   from . 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 Reports that he felt lightheaded, dizzy and his   vision went white. The next thing he knew he woke up on the ground. States   that he was disoriented but was able to go back into his house by himself. He   felt his heart racing and skipping a beat before he passed out. Denied any   loss of bowel or bladder function or tongue  Treatment: maintenance IVF @ 100ml/hr, I/O Q8H, CMP daily, IV Cardizem then   changed to PO, cardio consult, monitoring,  Options provided:  -- Syncope due to afib with RVR confirmed  -- Syncope due to dehydration confirmed  -- Other - I will add my own diagnosis  -- Disagree - Not applicable / Not valid  -- Disagree - Clinically unable to determine / Unknown  -- Refer to Clinical Documentation Reviewer    PROVIDER RESPONSE TEXT:    After study, syncope due to atrial fib with RVR is confirmed.     Query created by: Regina Rodgers on 2023 2:06 PM      Electronically signed by:  Destiny Perez 2023 1:06 PM
no lesions,  no deformities,  no traumatic injuries,  no significant scars are present,  chest wall non-tender,  no masses present, breathing is unlabored without accessory muscle use, normal breath sounds
with injury in the past year?: No  ADL Assistance: Independent  Homemaking Assistance: Independent  Homemaking Responsibilities: Yes  Meal Prep Responsibility: Primary  Laundry Responsibility: Primary  Cleaning Responsibility: Primary  Bill Paying/Finance Responsibility: Primary  Ambulation Assistance: Independent  Transfer Assistance: Independent  Active : Yes  Mode of Transportation: Car  Occupation: Unemployed  IADL Comments: Patient reports sleeping in regular flat bad  Additional Comments: Has assistance from sister    Objective  Orientation  Orientation Level: Oriented to place, Oriented to time, Oriented to situation, Oriented to person  Cognition  Overall Cognitive Status: WFL   Sensation  Overall Sensation Status: WFL    ADL  Feeding: Independent  Grooming: Setup  UE Bathing: Stand by assistance  LE Bathing: Contact guard assistance  UE Dressing: Stand by assistance  LE Dressing: Contact guard assistance  Toileting: Contact guard assistance  Additional Comments: ADL scores are based on skilled observation and clinical reasoning unless otherwise noted.  Patient is currently limited by decreased strength, endurance, activity tolerance, impaired balance, and lightheadedness, which impact the patient's ability to safely and independently complete self-care    UE Function  LUE AROM (degrees)  LUE AROM : WFL  Left Hand AROM (degrees)  Left Hand AROM: WFL  Tone LUE  LUE Tone: Normotonic  LUE Strength  Gross LUE Strength: WFL  L Hand General: 4/5  LUE Strength Comment: Grossly 4/5    RUE AROM (degrees)  RUE AROM : WFL  Right Hand AROM (degrees)  Right Hand AROM: WFL  Tone RUE  RUE Tone: Normotonic  RUE Strength  Gross RUE Strength: WFL  R Hand General: 4/5  RUE Strength Comment: Grossly 4/5    Fine Motor Skills/Coordination  Coordination  Movements Are Fluid And Coordinated: Yes     Bed Mobility  Bed mobility  Rolling to Right: Supervision  Supine to Sit: Stand by assistance  Sit to Supine: Stand by
General: No focal deficit present. Mental Status: He is alert and oriented to person, place, and time.    Psychiatric:         Mood and Affect: Mood normal.         Behavior: Behavior normal.     Assessment:        Primary Problem  Atrial fibrillation with RVR [facility])    Active Hospital Problems    Diagnosis Date Noted    Atrial fibrillation with RVR ([facility] [I48.91] 05/21/2023    COPD (chronic obstructive pulmonary disease) ([facility] [J44.9] 07/30/2019    S/p TAVR (transcatheter aortic valve replacement), bioprosthetic [Z95.3] 12/27/2018    CHF (congestive heart failure), NYHA class I, acute on chronic, combined ([facility] [I50.43] 08/25/2018    Tobacco abuse [Z72.0] 03/27/2015       Plan:        Syncopal episode likely secondary to A-fib with RVR, currently rate controlled  - Patient reports passing out while mowing the lawn, was able to under his own power and go inside  - In the emergency department found to be in A-fib RVR  - 1 dose of IV diltiazem given, became rate controlled in the 80s-90s  - History of aortic valve replacement 3 years ago with subsequent revision within the last several months secondary to aortic regurg  - TSH, T4 within normal limits  - Troponin, d-dimer, PT/INR unremarkable  - Lipase, magnesium, potassium within normal limits  - Chest x-ray demonstrated no acute process  - Urine drug screen ordered  - Denies drinking any recent alcohol use  - Reports compliance with his daily medications  - Cardiology consulted, appreciate recommendations  - Continue aspirin 81 mg daily  - Continue Eliquis 5 mg twice daily  - Cardizem increased from 120 mg to 240 mg daily per cardiology recommendations  - Continue Lipitor 10 mg daily  - Started on IV hydration, cardio okay for discharge  -Echo: Ejection fraction of 50%, left atrial dilation  - Cardio recommends adequate hydration throughout the day and compression stockings     COPD  - Current smoker  - Continue home inhalers     Elevated liver enzymes,
seconds. Neurological:      General: No focal deficit present. Mental Status: He is alert and oriented to person, place, and time.    Psychiatric:         Mood and Affect: Mood normal.         Behavior: Behavior normal.         Assessment:        Primary Problem  Atrial fibrillation with RVR [facility])    Active Hospital Problems    Diagnosis Date Noted    Atrial fibrillation with RVR ([facility] [I48.91] 05/21/2023    COPD (chronic obstructive pulmonary disease) ([facility] [J44.9] 07/30/2019    S/p TAVR (transcatheter aortic valve replacement), bioprosthetic [Z95.3] 12/27/2018    CHF (congestive heart failure), NYHA class I, acute on chronic, combined ([facility] [I50.43] 08/25/2018    Tobacco abuse [Z72.0] 03/27/2015       Plan:        Syncopal episode likely secondary to A-fib with RVR, currently rate controlled  - Patient reports passing out while mowing the lawn, was able to under his own power and go inside  - In the emergency department found to be in A-fib RVR  - 1 dose of IV diltiazem given, became rate controlled in the 80s-90s  - History of aortic valve replacement 3 years ago with subsequent revision within the last several months secondary to aortic regurg  - TSH, T4 within normal limits  - Troponin, d-dimer, PT/INR unremarkable  - Lipase, magnesium, potassium within normal limits  - Chest x-ray demonstrated no acute process  - Urine drug screen ordered  - Denies drinking any recent alcohol use  - Reports compliance with his daily medications  - Cardiology consulted, appreciate recommendations  - Continue aspirin 81 mg daily  - Continue Eliquis 5 mg twice daily  - Cardizem increased from 120 mg to 240 mg daily per cardiology recommendations  - Continue Lipitor 10 mg daily     COPD  - Current smoker  - Continue home inhalers     Elevated liver enzymes, improved  - On admission Alk phos, ALT and AST mildly elevated  - On recheck, liver enzymes within normal limits     Current smoker  - Encourage smoking cessation     DVT
directed perivalvular   regurgitation, 4+ regurgitation. Successful perivalvular closure with   occluder device (6 mm VSD occluder device used). Post procedure, the   regurgitation was reduced to mild to moderate. Normal leaflet mobility   pre and postprocedure. Normal gradients pre and postprocedure. No pericardial effusion pre or postprocedure    Echo [facility] 3/23:    Left Ventricle: Systolic function is mildly decreased with an ejection   fraction of 45-50%. The quantitative EF by 2D Flores biplane is 44%. Aortic Valve: S/P 29mm S3 TAVR bioprosthetic valve intially placed in   2018. There is moderate paravalvular regurgitation. The gradient recorded   across the prosthetic aortic valve is within the expected range. Mitral Valve: There is mild regurgitation. There is no evidence of   mitral valve stenosis. Tricuspid Valve: There is mild regurgitation. There is no evidence of   tricuspid valve stenosis. Assessment / Acute Cardiac Problems:   Patient Active Problem List:      Syncope secondary to dehydration   Permanent atrial fibrillation with RVR on admission, now rate controlled  Hx of HFrEF with severe Aortic stenosis, status post TAVR with recent improved EF 50-55% on TTE in 02/2023, then had severe perivalvular leak- s/p percutaneous closure on 3/15/23- repeat echo- moderate perivalve AI. Normal Cors on Cath 2/23  COPD  Hx of TAVR. Recent echo with normal valvular function. Tobacco abuse. Cocaine and amphetamine abuse. NSVT on monitor overnight      Plan of Treatment:     Continue cardizem and eliquis   Keep off coreg  Add toprol 25  Compression stockings during daytime. Counseled to avoid any alcohol or drug use  Check limited echo today- re eval LVEF, TAVR function, and Perivalvular AI- given history as above in number 3. If echo- shows stable echo compared to echo 3/23- no further workup, follow up with primary cardiologist in 2 weeks. D/w nursing and patient.

## 2023-05-24 NOTE — DISCHARGE SUMMARY
[address]    Discharge Summary     Patient ID: Leslie Mei  :  1965   MRN: [id number]     ACCOUNT:  [de-identified]   Patient's PCP: Papo Snell MD  Admit Date: 2023   Discharge Date: 2023   Length of Stay: 3  Code Status:  Full Code  Admitting Physician: Prasad Horner MD  Discharge Physician: Jacob Valdovinos MD     Active Discharge Diagnoses:       Primary Problem  Atrial fibrillation with RVR [facility])      [facility] [place] [facility]    Diagnosis Date Noted    Atrial fibrillation with RVR ([facility] [I48.91] 2023    COPD (chronic obstructive pulmonary disease) ([facility] [J44.9] 2019    S/p TAVR (transcatheter aortic valve replacement), bioprosthetic [Z95.3] 2018    CHF (congestive heart failure), NYHA class I, acute on chronic, combined ([facility] [I50.43] 2018    Tobacco abuse [Z72.0] 2015       Admission Condition:  poor     Discharged Condition: fair    Hospital Stay:       Hospital Course:  Leslie Mei is a 62 y.o. male who was admitted for the management of   Atrial fibrillation with RVR ([facility] , presented to ER with Loss of Consciousness and Shortness of Breath    PMH significant for atrial fibrillation on Eliquis, aortic valve replacement, COPD, tobacco use, combined systolic and diastolic CHF      He had a syncopal episode and reports that he felt his heart racing and skipping a beat before he passed out. Has been admitted many times for A-fib with RVR in the past.  Reports compliance with his medications. Has never passed out before. Denies any caffeine or any other illicit substance use. He reports that he was well hydrated and had good oral intake, denies any sick contacts or any recent illness. In the emergency department patient was found to be in A-fib with RVR, his heart rate was sustained in the 110s and 120s.   Patient was given 1 dose of IV diltiazem and became

## 2023-05-24 NOTE — CARE COORDINATION
ONGOING DISCHARGE PLAN:    Patient is alert and oriented x4. Spoke with patient regarding discharge plan and patient confirms that plan is still to return to home. Pt. States \"His Daughter, will be staying with him on DC\". Denies VNS. Eliquis PTA, Remains on PO Cardizem. Echo yesterday, showed, EF-50%. Cardio on board. Anticipate DC today w/ No needs. Will continue to follow for additional discharge needs. If patient is discharged prior to next notation, then this note serves as note for discharge by case management.     Electronically signed by Jessica Curtis RN on 5/24/2023 at 10:01 AM

## 2023-05-24 NOTE — PLAN OF CARE
Problem: Discharge Planning  Goal: Discharge to home or other facility with appropriate resources  5/24/2023 1432 by Rosmery Walden RN  Outcome: Adequate for Discharge  Flowsheets (Taken 5/24/2023 0930)  Discharge to home or other facility with appropriate resources:   Identify barriers to discharge with patient and caregiver   Arrange for needed discharge resources and transportation as appropriate   Identify discharge learning needs (meds, wound care, etc)   Refer to discharge planning if patient needs post-hospital services based on physician order or complex needs related to functional status, cognitive ability or social support system     Problem: Pain  Goal: Verbalizes/displays adequate comfort level or baseline comfort level  5/24/2023 1432 by Rosmery Walden RN  Outcome: Adequate for Discharge     Problem: Safety - Adult  Goal: Free from fall injury  5/24/2023 1432 by Rosmery Walden RN  Outcome: Adequate for Discharge  Flowsheets (Taken 5/24/2023 0930)  Free From Fall Injury:   Instruct family/caregiver on patient safety   Based on caregiver fall risk screen, instruct family/caregiver to ask for assistance with transferring infant if caregiver noted to have fall risk factors     Problem: ABCDS Injury Assessment  Goal: Absence of physical injury  5/24/2023 1432 by Rosmery Walden RN  Outcome: Adequate for Discharge     Problem: Chronic Conditions and Co-morbidities  Goal: Patient's chronic conditions and co-morbidity symptoms are monitored and maintained or improved  5/24/2023 1432 by Rosmery Walden RN  Outcome: Adequate for Discharge  Flowsheets (Taken 5/24/2023 0930)  Care Plan - Patient's Chronic Conditions and Co-Morbidity Symptoms are Monitored and Maintained or Improved:   Monitor and assess patient's chronic conditions and comorbid symptoms for stability, deterioration, or improvement   Collaborate with multidisciplinary team to address chronic and comorbid conditions and prevent

## 2023-05-24 NOTE — PLAN OF CARE
Problem: Discharge Planning  Goal: Discharge to home or other facility with appropriate resources  Outcome: Progressing  Flowsheets (Taken 5/24/2023 0118)  Discharge to home or other facility with appropriate resources:   Identify barriers to discharge with patient and caregiver   Identify discharge learning needs (meds, wound care, etc)     Problem: Pain  Goal: Verbalizes/displays adequate comfort level or baseline comfort level  Outcome: Progressing  Flowsheets (Taken 5/24/2023 0118)  Verbalizes/displays adequate comfort level or baseline comfort level:   Encourage patient to monitor pain and request assistance   Assess pain using appropriate pain scale     Problem: Safety - Adult  Goal: Free from fall injury  Outcome: Progressing     Problem: ABCDS Injury Assessment  Goal: Absence of physical injury  Outcome: Progressing  Flowsheets (Taken 5/24/2023 0118)  Absence of Physical Injury: Implement safety measures based on patient assessment     Problem: Chronic Conditions and Co-morbidities  Goal: Patient's chronic conditions and co-morbidity symptoms are monitored and maintained or improved  Outcome: Progressing  Flowsheets (Taken 5/24/2023 0118)  Care Plan - Patient's Chronic Conditions and Co-Morbidity Symptoms are Monitored and Maintained or Improved:   Monitor and assess patient's chronic conditions and comorbid symptoms for stability, deterioration, or improvement   Collaborate with multidisciplinary team to address chronic and comorbid conditions and prevent exacerbation or deterioration   Update acute care plan with appropriate goals if chronic or comorbid symptoms are exacerbated and prevent overall improvement and discharge

## 2023-05-24 NOTE — DISCHARGE INSTR - DIET

## 2023-05-25 ENCOUNTER — TELEPHONE (OUTPATIENT)
Dept: INTERNAL MEDICINE CLINIC | Age: 58
End: 2023-05-25

## 2023-05-25 NOTE — TELEPHONE ENCOUNTER
Care Transitions Initial Follow Up Call    Outreach made within 2 business days of discharge: Yes    Patient: Magnolia Hayes Patient : 1965   MRN: [id number]  Reason for Admission: There are no discharge diagnoses documented for the most recent discharge. Discharge Date: 23       Spoke with: Harish Prince    Discharge department/facility: [facility]    TCM Interactive Patient Contact:  Was patient able to fill all prescriptions:   Was patient instructed to bring all medications to the follow-up visit:   Is patient taking all medications as directed in the discharge summary? Does patient understand their discharge instructions:   Does patient have questions or concerns that need addressed prior to 7-14 day follow up office visit:     [facility]    Follow Up  No future appointments.     Payal Lagos MA

## 2023-05-30 ENCOUNTER — HOSPITAL ENCOUNTER (EMERGENCY)
Age: 58
Discharge: HOME | End: 2023-05-30
Attending: EMERGENCY MEDICINE
Payer: MEDICAID

## 2023-05-30 ENCOUNTER — APPOINTMENT (OUTPATIENT)
Dept: GENERAL RADIOLOGY | Age: 58
End: 2023-05-30
Payer: MEDICAID

## 2023-05-30 VITALS
WEIGHT: 147 LBS | DIASTOLIC BLOOD PRESSURE: 80 MMHG | BODY MASS INDEX: 22.35 KG/M2 | HEART RATE: 94 BPM | RESPIRATION RATE: 20 BRPM | SYSTOLIC BLOOD PRESSURE: 113 MMHG | OXYGEN SATURATION: 96 %

## 2023-05-30 DIAGNOSIS — R07.9 CHEST PAIN, UNSPECIFIED TYPE: Primary | ICD-10-CM

## 2023-05-30 LAB
ANION GAP SERPL CALCULATED.3IONS-SCNC: 9 MMOL/L (ref 9–17)
BASOPHILS # BLD: 0 K/UL (ref 0–0.2)
BASOPHILS NFR BLD: 0 % (ref 0–2)
BNP SERPL-MCNC: 598 PG/ML
BUN SERPL-MCNC: 22 MG/DL (ref 6–20)
CALCIUM SERPL-MCNC: 9 MG/DL (ref 8.6–10.4)
CHLORIDE SERPL-SCNC: 105 MMOL/L (ref 98–107)
CO2 SERPL-SCNC: 28 MMOL/L (ref 20–31)
CREAT SERPL-MCNC: 0.9 MG/DL (ref 0.7–1.2)
D DIMER PPP FEU-MCNC: 0.38 UG/ML FEU (ref 0–0.59)
EOSINOPHIL # BLD: 0.1 K/UL (ref 0–0.4)
EOSINOPHILS RELATIVE PERCENT: 1 % (ref 0–4)
ERYTHROCYTE [DISTWIDTH] IN BLOOD BY AUTOMATED COUNT: 14.5 % (ref 11.5–14.9)
GFR SERPL CREATININE-BSD FRML MDRD: >60 ML/MIN/1.73M2
GLUCOSE BLD-MCNC: 169 MG/DL (ref 75–110)
GLUCOSE SERPL-MCNC: 160 MG/DL (ref 70–99)
HCT VFR BLD AUTO: 46.6 % (ref 41–53)
HGB BLD-MCNC: 15.3 G/DL (ref 13.5–17.5)
LYMPHOCYTES # BLD: 25 % (ref 24–44)
LYMPHOCYTES NFR BLD: 1.7 K/UL (ref 1–4.8)
MCH RBC QN AUTO: 30 PG (ref 26–34)
MCHC RBC AUTO-ENTMCNC: 32.9 G/DL (ref 31–37)
MCV RBC AUTO: 91.3 FL (ref 80–100)
MONOCYTES NFR BLD: 0.6 K/UL (ref 0.1–1.3)
MONOCYTES NFR BLD: 9 % (ref 1–7)
NEUTROPHILS NFR BLD: 65 % (ref 36–66)
NEUTS SEG NFR BLD: 4.5 K/UL (ref 1.3–9.1)
PLATELET # BLD AUTO: 173 K/UL (ref 150–450)
PMV BLD AUTO: 8.6 FL (ref 6–12)
POTASSIUM SERPL-SCNC: 4.5 MMOL/L (ref 3.7–5.3)
RBC # BLD AUTO: 5.1 M/UL (ref 4.5–5.9)
SODIUM SERPL-SCNC: 142 MMOL/L (ref 135–144)
TROPONIN I SERPL HS-MCNC: 11 NG/L (ref 0–22)
TROPONIN I SERPL HS-MCNC: 11 NG/L (ref 0–22)
WBC OTHER # BLD: 7 K/UL (ref 3.5–11)

## 2023-05-30 PROCEDURE — 82947 ASSAY GLUCOSE BLOOD QUANT: CPT

## 2023-05-30 PROCEDURE — 96374 THER/PROPH/DIAG INJ IV PUSH: CPT

## 2023-05-30 PROCEDURE — 85379 FIBRIN DEGRADATION QUANT: CPT

## 2023-05-30 PROCEDURE — 84484 ASSAY OF TROPONIN QUANT: CPT

## 2023-05-30 PROCEDURE — 93005 ELECTROCARDIOGRAM TRACING: CPT | Performed by: STUDENT IN AN ORGANIZED HEALTH CARE EDUCATION/TRAINING PROGRAM

## 2023-05-30 PROCEDURE — 36415 COLL VENOUS BLD VENIPUNCTURE: CPT

## 2023-05-30 PROCEDURE — 80048 BASIC METABOLIC PNL TOTAL CA: CPT

## 2023-05-30 PROCEDURE — 85025 COMPLETE CBC W/AUTO DIFF WBC: CPT

## 2023-05-30 PROCEDURE — 2500000003 HC RX 250 WO HCPCS: Performed by: STUDENT IN AN ORGANIZED HEALTH CARE EDUCATION/TRAINING PROGRAM

## 2023-05-30 PROCEDURE — 71045 X-RAY EXAM CHEST 1 VIEW: CPT

## 2023-05-30 PROCEDURE — 99285 EMERGENCY DEPT VISIT HI MDM: CPT

## 2023-05-30 PROCEDURE — 83880 ASSAY OF NATRIURETIC PEPTIDE: CPT

## 2023-05-30 RX ORDER — METOPROLOL TARTRATE 5 MG/5ML
5 INJECTION INTRAVENOUS ONCE
Status: COMPLETED | OUTPATIENT
Start: 2023-05-30 | End: 2023-05-30

## 2023-05-30 RX ADMIN — METOPROLOL TARTRATE 5 MG: 1 INJECTION, SOLUTION INTRAVENOUS at 17:05

## 2023-05-30 ASSESSMENT — ENCOUNTER SYMPTOMS
DIARRHEA: 0
NAUSEA: 0
BACK PAIN: 0
ABDOMINAL PAIN: 0
SHORTNESS OF BREATH: 1
VOMITING: 0
CONSTIPATION: 0

## 2023-05-30 ASSESSMENT — LIFESTYLE VARIABLES
HOW OFTEN DO YOU HAVE A DRINK CONTAINING ALCOHOL: NEVER
HOW MANY STANDARD DRINKS CONTAINING ALCOHOL DO YOU HAVE ON A TYPICAL DAY: PATIENT DOES NOT DRINK

## 2023-05-30 NOTE — DISCHARGE INSTRUCTIONS
You were evaluated in the emergency department for chest pain. You were found to be in atrial fibrillation with rapid ventricular response. You received a medication to help bring your heart rate back down. Your lab work did not reveal any acute abnormalities. Your chest x-ray and EKG did not show any acute abnormalities. Please follow-up with your primary care physician. Please return to the emergency department for any worsening symptoms including but not limited chest pain, shortness of breath, or palpitations, nausea vomiting or difficulty eating or drinking, abdominal pain, difficult standing or walking, dizziness, passing out, or any other questions or concerns.

## 2023-05-31 ENCOUNTER — TELEPHONE (OUTPATIENT)
Dept: INTERNAL MEDICINE CLINIC | Age: 58
End: 2023-05-31

## 2023-05-31 LAB
EKG ATRIAL RATE: 136 BPM
EKG Q-T INTERVAL: 328 MS
EKG QRS DURATION: 88 MS
EKG QTC CALCULATION (BAZETT): 459 MS
EKG R AXIS: 67 DEGREES
EKG T AXIS: 86 DEGREES
EKG VENTRICULAR RATE: 118 BPM

## 2023-05-31 PROCEDURE — 93010 ELECTROCARDIOGRAM REPORT: CPT | Performed by: INTERNAL MEDICINE

## 2023-05-31 NOTE — TELEPHONE ENCOUNTER
Patient had his 3rd no show today and according to policy he should be dismissed. Would you like to proceed?

## 2023-06-28 ENCOUNTER — APPOINTMENT (OUTPATIENT)
Dept: CT IMAGING | Age: 58
End: 2023-06-28
Payer: MEDICAID

## 2023-06-28 ENCOUNTER — HOSPITAL ENCOUNTER (EMERGENCY)
Age: 58
Discharge: HOME | End: 2023-06-28
Attending: EMERGENCY MEDICINE
Payer: MEDICAID

## 2023-06-28 VITALS
OXYGEN SATURATION: 96 % | HEIGHT: 68 IN | TEMPERATURE: 98.6 F | DIASTOLIC BLOOD PRESSURE: 86 MMHG | BODY MASS INDEX: 22.28 KG/M2 | RESPIRATION RATE: 18 BRPM | SYSTOLIC BLOOD PRESSURE: 131 MMHG | HEART RATE: 58 BPM | WEIGHT: 147 LBS

## 2023-06-28 DIAGNOSIS — S16.1XXA STRAIN OF NECK MUSCLE, INITIAL ENCOUNTER: Primary | ICD-10-CM

## 2023-06-28 LAB
ANION GAP SERPL CALCULATED.3IONS-SCNC: 13 MMOL/L (ref 9–17)
BASOPHILS # BLD: 0 K/UL (ref 0–0.2)
BASOPHILS NFR BLD: 1 % (ref 0–2)
BUN SERPL-MCNC: 30 MG/DL (ref 6–20)
CALCIUM SERPL-MCNC: 9.2 MG/DL (ref 8.6–10.4)
CHLORIDE SERPL-SCNC: 105 MMOL/L (ref 98–107)
CO2 SERPL-SCNC: 25 MMOL/L (ref 20–31)
CREAT SERPL-MCNC: 0.81 MG/DL (ref 0.7–1.2)
EOSINOPHIL # BLD: 0.1 K/UL (ref 0–0.4)
EOSINOPHILS RELATIVE PERCENT: 1 % (ref 0–4)
ERYTHROCYTE [DISTWIDTH] IN BLOOD BY AUTOMATED COUNT: 14.5 % (ref 11.5–14.9)
GFR SERPL CREATININE-BSD FRML MDRD: >60 ML/MIN/1.73M2
GLUCOSE SERPL-MCNC: 186 MG/DL (ref 70–99)
HCT VFR BLD AUTO: 45 % (ref 41–53)
HGB BLD-MCNC: 14.5 G/DL (ref 13.5–17.5)
LYMPHOCYTES # BLD: 20 % (ref 24–44)
LYMPHOCYTES NFR BLD: 1.3 K/UL (ref 1–4.8)
MCH RBC QN AUTO: 29.2 PG (ref 26–34)
MCHC RBC AUTO-ENTMCNC: 32.3 G/DL (ref 31–37)
MCV RBC AUTO: 90.6 FL (ref 80–100)
MONOCYTES NFR BLD: 0.5 K/UL (ref 0.1–1.3)
MONOCYTES NFR BLD: 8 % (ref 1–7)
NEUTROPHILS NFR BLD: 70 % (ref 36–66)
NEUTS SEG NFR BLD: 4.4 K/UL (ref 1.3–9.1)
PLATELET # BLD AUTO: 168 K/UL (ref 150–450)
PMV BLD AUTO: 8.8 FL (ref 6–12)
POTASSIUM SERPL-SCNC: 4 MMOL/L (ref 3.7–5.3)
RBC # BLD AUTO: 4.97 M/UL (ref 4.5–5.9)
SODIUM SERPL-SCNC: 143 MMOL/L (ref 135–144)
WBC OTHER # BLD: 6.3 K/UL (ref 3.5–11)

## 2023-06-28 PROCEDURE — 80048 BASIC METABOLIC PNL TOTAL CA: CPT

## 2023-06-28 PROCEDURE — 6360000004 HC RX CONTRAST MEDICATION: Performed by: PHYSICIAN ASSISTANT

## 2023-06-28 PROCEDURE — 6370000000 HC RX 637 (ALT 250 FOR IP): Performed by: PHYSICIAN ASSISTANT

## 2023-06-28 PROCEDURE — 2580000003 HC RX 258: Performed by: PHYSICIAN ASSISTANT

## 2023-06-28 PROCEDURE — 36415 COLL VENOUS BLD VENIPUNCTURE: CPT

## 2023-06-28 PROCEDURE — 70498 CT ANGIOGRAPHY NECK: CPT

## 2023-06-28 PROCEDURE — 99285 EMERGENCY DEPT VISIT HI MDM: CPT

## 2023-06-28 PROCEDURE — 85027 COMPLETE CBC AUTOMATED: CPT

## 2023-06-28 PROCEDURE — 70450 CT HEAD/BRAIN W/O DYE: CPT

## 2023-06-28 RX ORDER — SODIUM CHLORIDE 0.9 % (FLUSH) 0.9 %
10 SYRINGE (ML) INJECTION AS NEEDED
Status: DISCONTINUED | OUTPATIENT
Start: 2023-06-28 | End: 2023-06-28 | Stop reason: HOSPADM

## 2023-06-28 RX ORDER — 0.9 % SODIUM CHLORIDE 0.9 %
100 INTRAVENOUS SOLUTION INTRAVENOUS ONCE
Status: COMPLETED | OUTPATIENT
Start: 2023-06-28 | End: 2023-06-28

## 2023-06-28 RX ORDER — LIDOCAINE 50 MG/G
1 PATCH TOPICAL DAILY
Qty: 10 PATCH | Refills: 0 | Status: SHIPPED | OUTPATIENT
Start: 2023-06-28 | End: 2023-07-08

## 2023-06-28 RX ORDER — CYCLOBENZAPRINE HCL 10 MG
10 TABLET ORAL 2 TIMES DAILY PRN
Qty: 10 TABLET | Refills: 0 | Status: SHIPPED | OUTPATIENT
Start: 2023-06-28

## 2023-06-28 RX ORDER — ACETAMINOPHEN 325 MG/1
650 TABLET ORAL ONCE
Status: COMPLETED | OUTPATIENT
Start: 2023-06-28 | End: 2023-06-28

## 2023-06-28 RX ADMIN — SODIUM CHLORIDE 100 ML: 9 INJECTION, SOLUTION INTRAVENOUS at 14:15

## 2023-06-28 RX ADMIN — ACETAMINOPHEN 650 MG: 325 TABLET ORAL at 14:19

## 2023-06-28 RX ADMIN — IOPAMIDOL 75 ML: 755 INJECTION, SOLUTION INTRAVENOUS at 14:19

## 2023-06-28 RX ADMIN — SODIUM CHLORIDE, PRESERVATIVE FREE 10 ML: 5 INJECTION INTRAVENOUS at 14:19

## 2023-06-28 ASSESSMENT — PAIN DESCRIPTION - ORIENTATION: ORIENTATION: LEFT

## 2023-06-28 ASSESSMENT — ENCOUNTER SYMPTOMS
SHORTNESS OF BREATH: 0
VOMITING: 0
NAUSEA: 0
BACK PAIN: 0
ABDOMINAL PAIN: 0

## 2023-06-28 ASSESSMENT — PAIN DESCRIPTION - LOCATION: LOCATION: NECK

## 2023-06-28 ASSESSMENT — VISUAL ACUITY: OU: 1

## 2023-06-28 ASSESSMENT — PAIN - FUNCTIONAL ASSESSMENT: PAIN_FUNCTIONAL_ASSESSMENT: 0-10

## 2023-06-28 ASSESSMENT — PAIN SCALES - GENERAL: PAINLEVEL_OUTOF10: 7

## 2023-07-03 ENCOUNTER — HOSPITAL ENCOUNTER (EMERGENCY)
Age: 58
Discharge: HOME | End: 2023-07-03
Attending: STUDENT IN AN ORGANIZED HEALTH CARE EDUCATION/TRAINING PROGRAM
Payer: MEDICAID

## 2023-07-03 ENCOUNTER — APPOINTMENT (OUTPATIENT)
Dept: CT IMAGING | Age: 58
End: 2023-07-03
Payer: MEDICAID

## 2023-07-03 VITALS
HEART RATE: 79 BPM | BODY MASS INDEX: 21.22 KG/M2 | TEMPERATURE: 97.5 F | DIASTOLIC BLOOD PRESSURE: 87 MMHG | OXYGEN SATURATION: 98 % | HEIGHT: 68 IN | RESPIRATION RATE: 17 BRPM | WEIGHT: 140 LBS | SYSTOLIC BLOOD PRESSURE: 122 MMHG

## 2023-07-03 DIAGNOSIS — M54.2 NECK PAIN: Primary | ICD-10-CM

## 2023-07-03 PROCEDURE — 6370000000 HC RX 637 (ALT 250 FOR IP): Performed by: STUDENT IN AN ORGANIZED HEALTH CARE EDUCATION/TRAINING PROGRAM

## 2023-07-03 PROCEDURE — 99284 EMERGENCY DEPT VISIT MOD MDM: CPT

## 2023-07-03 PROCEDURE — 72125 CT NECK SPINE W/O DYE: CPT

## 2023-07-03 PROCEDURE — 70450 CT HEAD/BRAIN W/O DYE: CPT

## 2023-07-03 RX ORDER — NAPROXEN 250 MG/1
500 TABLET ORAL ONCE
Status: COMPLETED | OUTPATIENT
Start: 2023-07-03 | End: 2023-07-03

## 2023-07-03 RX ORDER — NAPROXEN 500 MG/1
500 TABLET ORAL 2 TIMES DAILY WITH MEALS
Qty: 28 TABLET | Refills: 0 | Status: SHIPPED | OUTPATIENT
Start: 2023-07-03 | End: 2023-07-17

## 2023-07-03 RX ORDER — PANTOPRAZOLE SODIUM 20 MG/1
40 TABLET, DELAYED RELEASE ORAL DAILY
Qty: 30 TABLET | Refills: 0 | Status: SHIPPED | OUTPATIENT
Start: 2023-07-03

## 2023-07-03 RX ADMIN — NAPROXEN 500 MG: 250 TABLET ORAL at 09:27

## 2023-07-03 ASSESSMENT — ENCOUNTER SYMPTOMS
RHINORRHEA: 0
BACK PAIN: 0
ABDOMINAL PAIN: 0
VOMITING: 0
NAUSEA: 0
SHORTNESS OF BREATH: 0
COUGH: 0

## 2023-07-03 ASSESSMENT — PAIN - FUNCTIONAL ASSESSMENT: PAIN_FUNCTIONAL_ASSESSMENT: 0-10

## 2023-07-03 ASSESSMENT — PAIN SCALES - GENERAL: PAINLEVEL_OUTOF10: 7

## 2023-07-03 NOTE — DISCHARGE INSTRUCTIONS
Please follow with a primary care provider  Please take naproxen twice a day every day for next 14 days. Please take the Protonix once a day every day in the morning  Should he have any severe worsening of symptoms, please return to the emergency room emergently  Continue take all home medication as prescribed  You may take Tylenol up to 1 g every 8 hours as needed for pain in addition to the naproxen.

## 2023-07-03 NOTE — ED PROVIDER NOTES
[facility]  Emergency Department Encounter  Emergency Medicine Physician     Pt Name: Burak Sibley  MRN: [id number]  [address] [facility] 1965  Date of evaluation: 7/3/23  PCP:  No primary care provider on file. CHIEF COMPLAINT       Chief Complaint   Patient presents with    Neck Pain    Numbness       HISTORY OF PRESENT ILLNESS  (Location/Symptom, Timing/Onset, Context/Setting, Quality, Duration, Modifying Factors, Severity.)    Burak Sibley is a 62 y.o. male who presents with intermittent left arm numbness, paresthesias, headache. Patient states that on 6/27/2023 he was struck with a tree branch he was trying to cut down that struck him on the left side of his head and neck. He was seen and evaluated here at that time. He states since then however he has had a ongoing and occasionally worsening headache. Denies any vision changes. Denies any neurological deficit but states that since the past 3 days he has been having some paresthesias and numbness in the left arm. He states that he has not been exerting himself since that time. He states that the numbness now, this morning, appears to be nearly constant and is not associated with any specific movement. He denies any back pain. States he has had some trouble sleeping as well. States he is on Eliquis for history of atrial fibrillation.         PAST MEDICAL / SURGICAL / SOCIAL / FAMILY HISTORY    has a past medical history of Abnormal echocardiogram, Arrhythmia, AS (aortic stenosis), Atrial fibrillation (HCC), Benign prostatic hyperplasia, Biatrial enlargement, Central perforation of tympanic membrane, right, Chest pain, Chews tobacco, CHF (congestive heart failure), NYHA class I, acute on chronic, combined ([address]), Chronic back pain, Conductive hearing loss of right ear, COPD (chronic obstructive pulmonary disease) ([address]), Diverticulitis, Fatigue, History of cocaine abuse ([address]), Household contact with history of methicillin resistant

## 2023-07-19 ENCOUNTER — APPOINTMENT (OUTPATIENT)
Dept: GENERAL RADIOLOGY | Age: 58
End: 2023-07-19
Payer: MEDICAID

## 2023-07-19 ENCOUNTER — HOSPITAL ENCOUNTER (EMERGENCY)
Age: 58
Discharge: HOME | End: 2023-07-19
Attending: EMERGENCY MEDICINE
Payer: MEDICAID

## 2023-07-19 VITALS
BODY MASS INDEX: 22.28 KG/M2 | OXYGEN SATURATION: 95 % | RESPIRATION RATE: 22 BRPM | DIASTOLIC BLOOD PRESSURE: 86 MMHG | SYSTOLIC BLOOD PRESSURE: 126 MMHG | TEMPERATURE: 97.5 F | WEIGHT: 147 LBS | HEIGHT: 68 IN | HEART RATE: 85 BPM

## 2023-07-19 DIAGNOSIS — I48.91 ATRIAL FIBRILLATION WITH RVR (HCC): Primary | ICD-10-CM

## 2023-07-19 LAB
ALBUMIN SERPL-MCNC: 3.9 G/DL (ref 3.5–5.2)
ALP SERPL-CCNC: 115 U/L (ref 40–129)
ALT SERPL-CCNC: 19 U/L (ref 5–41)
ANION GAP SERPL CALCULATED.3IONS-SCNC: 8 MMOL/L (ref 9–17)
AST SERPL-CCNC: 20 U/L
BASOPHILS # BLD: 0 K/UL (ref 0–0.2)
BASOPHILS NFR BLD: 0 % (ref 0–2)
BILIRUB SERPL-MCNC: 0.6 MG/DL (ref 0.3–1.2)
BUN SERPL-MCNC: 22 MG/DL (ref 6–20)
CALCIUM SERPL-MCNC: 9.1 MG/DL (ref 8.6–10.4)
CHLORIDE SERPL-SCNC: 106 MMOL/L (ref 98–107)
CO2 SERPL-SCNC: 27 MMOL/L (ref 20–31)
CREAT SERPL-MCNC: 0.9 MG/DL (ref 0.7–1.2)
EOSINOPHIL # BLD: 0.1 K/UL (ref 0–0.4)
EOSINOPHILS RELATIVE PERCENT: 1 % (ref 0–4)
ERYTHROCYTE [DISTWIDTH] IN BLOOD BY AUTOMATED COUNT: 14.9 % (ref 11.5–14.9)
GFR SERPL CREATININE-BSD FRML MDRD: >60 ML/MIN/1.73M2
GLUCOSE SERPL-MCNC: 167 MG/DL (ref 70–99)
HCT VFR BLD AUTO: 45.8 % (ref 41–53)
HGB BLD-MCNC: 15.4 G/DL (ref 13.5–17.5)
LYMPHOCYTES NFR BLD: 1.6 K/UL (ref 1–4.8)
LYMPHOCYTES RELATIVE PERCENT: 24 % (ref 24–44)
MCH RBC QN AUTO: 30.3 PG (ref 26–34)
MCHC RBC AUTO-ENTMCNC: 33.6 G/DL (ref 31–37)
MCV RBC AUTO: 90.2 FL (ref 80–100)
MONOCYTES NFR BLD: 0.4 K/UL (ref 0.1–1.3)
MONOCYTES NFR BLD: 7 % (ref 1–7)
NEUTROPHILS NFR BLD: 68 % (ref 36–66)
NEUTS SEG NFR BLD: 4.5 K/UL (ref 1.3–9.1)
PLATELET # BLD AUTO: 163 K/UL (ref 150–450)
PMV BLD AUTO: 8.7 FL (ref 6–12)
POTASSIUM SERPL-SCNC: 3.5 MMOL/L (ref 3.7–5.3)
PROT SERPL-MCNC: 6.1 G/DL (ref 6.4–8.3)
RBC # BLD AUTO: 5.08 M/UL (ref 4.5–5.9)
SODIUM SERPL-SCNC: 141 MMOL/L (ref 135–144)
TROPONIN I SERPL HS-MCNC: 12 NG/L (ref 0–22)
TROPONIN I SERPL HS-MCNC: 14 NG/L (ref 0–22)
WBC OTHER # BLD: 6.7 K/UL (ref 3.5–11)

## 2023-07-19 PROCEDURE — 84484 ASSAY OF TROPONIN QUANT: CPT

## 2023-07-19 PROCEDURE — 80053 COMPREHEN METABOLIC PANEL: CPT

## 2023-07-19 PROCEDURE — 96374 THER/PROPH/DIAG INJ IV PUSH: CPT

## 2023-07-19 PROCEDURE — 36415 COLL VENOUS BLD VENIPUNCTURE: CPT

## 2023-07-19 PROCEDURE — 99285 EMERGENCY DEPT VISIT HI MDM: CPT

## 2023-07-19 PROCEDURE — 85027 COMPLETE CBC AUTOMATED: CPT

## 2023-07-19 PROCEDURE — 2500000003 HC RX 250 WO HCPCS: Performed by: EMERGENCY MEDICINE

## 2023-07-19 PROCEDURE — 71046 X-RAY EXAM CHEST 2 VIEWS: CPT

## 2023-07-19 RX ORDER — DILTIAZEM HYDROCHLORIDE 5 MG/ML
0.25 INJECTION INTRAVENOUS ONCE
Status: COMPLETED | OUTPATIENT
Start: 2023-07-19 | End: 2023-07-19

## 2023-07-19 RX ADMIN — DILTIAZEM HYDROCHLORIDE 16.5 MG: 5 INJECTION INTRAVENOUS at 18:08

## 2023-07-19 ASSESSMENT — ENCOUNTER SYMPTOMS
NAUSEA: 0
VOMITING: 0
ABDOMINAL PAIN: 0
DIARRHEA: 0
SHORTNESS OF BREATH: 1

## 2023-07-19 NOTE — ED NOTES
Mode of arrival (squad #, walk in, police, etc) : walk in    Chief complaint(s): Chest pain, Left arm numbness    Arrival Note (brief scenario, treatment PTA, etc). : pt here with chest pain that started last night and left arm numbness. Pt states he has a hx of AFIB and states he is taking his medication. Pt states he feels like his heart is pounding and racing. C= \"Have you ever felt that you should Cut down on your drinking? \"  No  A= \"Have people Annoyed you by criticizing your drinking? \"  No  G= \"Have you ever felt bad or Guilty about your drinking? \"  No  E= \"Have you ever had a drink as an Eye-opener first thing in the morning to steady your nerves or to help a hangover? \"  No      Deferred []      Reason for deferring: N/A    *If yes to two or more: probable alcohol abuse. *       Yarelis Cifuentes RN  07/19/23 8803

## 2023-07-19 NOTE — ED NOTES
Patient resting comfortably in bed with no complaints at this time.      Justin Garcia RN  07/19/23 5202

## 2023-07-19 NOTE — ED PROVIDER NOTES
EMERGENCY DEPARTMENT ENCOUNTER   ATTENDING ATTESTATION     Pt Name: Felisa Blakely  MRN: [id number]  [address] [place] 1965  Date of evaluation: 7/19/23       Felisa Blakely is a 62 y.o. male who presents with Chest Pain and Numbness      MDM:   some chest tightness today. States he was not feeling well. Arrived in atrial fibrillation with RVR. History of chronic A-fib on calcium channel blockers and Eliquis. He had a normal heart catheterization done earlier this year, no coronary artery disease. Do not suspect PE or acute MI or aortic dissection. Do not suspect pneumonia. He is nontoxic, well-appearing. He was given Cardizem. He is feeling much better now. Do not suspect acute coronary syndrome. I think he can follow-up with his primary care doctor and cardiologist outpatient tomorrow. Vitals:   Vitals:    07/19/23 1808 07/19/23 1809 07/19/23 1825 07/19/23 1830   BP: 123/75 123/75  104/62   Pulse: (!) 108 95 81 78   Resp:  25 18 16   Temp:       SpO2:  96% 96% 97%   Weight:       Height:             I personally saw and examined the patient. I have reviewed and agree with the resident's findings, including all diagnostic interpretations and treatment plan as written. I was present for the key portions of any procedures performed and the inclusive time noted for any critical care statement.     Gisel Vasquez MD  Attending Emergency Physician          Gisel Vasquez MD  07/19/23 1911

## 2023-07-19 NOTE — ED PROVIDER NOTES
[address] [facility],6Th Floor ED  Emergency Department Encounter  Emergency Medicine Resident     Pt Name:Alexandro Alcaraz  MRN: [id number]  [address] [facility] 1965  Date of evaluation: 7/19/23  PCP:  No primary care provider on file. Note Started: 5:40 PM EDT      CHIEF COMPLAINT       Chief Complaint   Patient presents with    Chest Pain    Numbness       HISTORY OF PRESENT ILLNESS  (Location/Symptom, Timing/Onset, Context/Setting, Quality, Duration, Modifying Factors, Severity.)      April Sic is a 62 y.o. male who presents with chest pain that radiates into his left neck and down his left arm with associated \"pins-and-needles\" feeling in his left hand. Patient states this started yesterday. He has a history of A-fib and takes diltiazem at home. He states he has had some associated shortness of breath, no nausea or vomiting. He states yesterday he was having cold sweats but not today. He states he has been taking medications as prescribed. On arrival patient is in A-fib with RVR heart rate in the 140s.     PAST MEDICAL / SURGICAL / SOCIAL / FAMILY HISTORY      has a past medical history of Abnormal echocardiogram, Arrhythmia, AS (aortic stenosis), Atrial fibrillation (HCC), Benign prostatic hyperplasia, Biatrial enlargement, Central perforation of tympanic membrane, right, Chest pain, Chews tobacco, CHF (congestive heart failure), NYHA class I, acute on chronic, combined ([address]), Chronic back pain, Conductive hearing loss of right ear, COPD (chronic obstructive pulmonary disease) ([address]), Diverticulitis, Fatigue, History of cocaine abuse ([address]), Household contact with history of methicillin resistant Staphylococcus aureus (MRSA) infection, Lung nodule, Mass of upper lobe of left lung, Mild concentric left ventricular hypertrophy, Murmur, cardiac, Near syncope, Nonrheumatic aortic (valve) stenosis, Palpitations, Panlobular emphysema (HCC), Pneumonia of both upper lobes due to infectious organism, Right-sided tinnitus,

## 2023-07-19 NOTE — ED NOTES
Report received from Adelaida Berg RN. Report method in person. Any medication or safety alerts were reviewed. Any pending diagnostics and notifications were also reviewed, as well as any safety concerns or issues, abnormal labs, abnormal imaging, and abnormal assessment findings. Questions were answered.        Ambrose Garcia RN  07/19/23 9541

## 2023-07-19 NOTE — ED NOTES
Patient presents to the ED from home with chest pain since last night. Patient states that the pain shot up his neck and he got sweaty and nauseous but did not pass out.      Shelby Kaiser RN  07/19/23 7823

## 2023-07-20 NOTE — DISCHARGE INSTRUCTIONS
You were seen today for chest pain. A-fib with RVR on arrival.  He received diltiazem with improvement in your heart rate. Your lab work today was normal.  Your chest x-ray was normal.  Your EKG was unchanged from previous. Please follow-up with your cardiologist as soon as possible. If you notice any concerning symptoms please return to the ER immediately. These can include but are not limited to: fevers, chills, shortness of breath, vomiting, weakness of the extremities, changes in your mental status, numbness, pale extremities, or chest pain. Medications: Continue taking your home medications as previously directed. Follow up: Please follow up with your primary care doctor within one week.   Please follow-up with your cardiologist.

## 2023-07-23 ENCOUNTER — HOSPITAL ENCOUNTER (EMERGENCY)
Age: 58
Discharge: HOME | End: 2023-07-23
Attending: STUDENT IN AN ORGANIZED HEALTH CARE EDUCATION/TRAINING PROGRAM
Payer: MEDICAID

## 2023-07-23 VITALS
BODY MASS INDEX: 22.28 KG/M2 | HEART RATE: 78 BPM | RESPIRATION RATE: 14 BRPM | TEMPERATURE: 97.8 F | WEIGHT: 147 LBS | HEIGHT: 68 IN | SYSTOLIC BLOOD PRESSURE: 108 MMHG | OXYGEN SATURATION: 97 % | DIASTOLIC BLOOD PRESSURE: 86 MMHG

## 2023-07-23 DIAGNOSIS — L30.9 DERMATITIS: Primary | ICD-10-CM

## 2023-07-23 PROCEDURE — 6360000002 HC RX W HCPCS: Performed by: STUDENT IN AN ORGANIZED HEALTH CARE EDUCATION/TRAINING PROGRAM

## 2023-07-23 PROCEDURE — 99283 EMERGENCY DEPT VISIT LOW MDM: CPT

## 2023-07-23 PROCEDURE — 6370000000 HC RX 637 (ALT 250 FOR IP): Performed by: STUDENT IN AN ORGANIZED HEALTH CARE EDUCATION/TRAINING PROGRAM

## 2023-07-23 RX ORDER — METHYLPREDNISOLONE 4 MG/1
TABLET ORAL
Qty: 21 TABLET | Refills: 0 | Status: SHIPPED | OUTPATIENT
Start: 2023-07-23 | End: 2023-07-29

## 2023-07-23 RX ORDER — CEPHALEXIN 250 MG/1
500 CAPSULE ORAL ONCE
Status: COMPLETED | OUTPATIENT
Start: 2023-07-23 | End: 2023-07-23

## 2023-07-23 RX ORDER — CEPHALEXIN 500 MG/1
500 CAPSULE ORAL 3 TIMES DAILY
Qty: 21 CAPSULE | Refills: 0 | Status: SHIPPED | OUTPATIENT
Start: 2023-07-23 | End: 2023-07-30

## 2023-07-23 RX ADMIN — DEXAMETHASONE 10 MG: 6 TABLET ORAL at 22:24

## 2023-07-23 RX ADMIN — CEPHALEXIN 500 MG: 250 CAPSULE ORAL at 22:25

## 2023-07-23 ASSESSMENT — ENCOUNTER SYMPTOMS
EYE DISCHARGE: 0
ABDOMINAL PAIN: 0
DIARRHEA: 0
SHORTNESS OF BREATH: 0
EYE REDNESS: 0
VOMITING: 0
NAUSEA: 0
SORE THROAT: 0
CHEST TIGHTNESS: 0
RHINORRHEA: 0

## 2023-07-23 ASSESSMENT — PAIN - FUNCTIONAL ASSESSMENT: PAIN_FUNCTIONAL_ASSESSMENT: 0-10

## 2023-07-23 ASSESSMENT — PAIN SCALES - GENERAL: PAINLEVEL_OUTOF10: 4

## 2023-07-23 ASSESSMENT — PAIN DESCRIPTION - LOCATION: LOCATION: ARM

## 2023-07-23 ASSESSMENT — PAIN DESCRIPTION - ORIENTATION: ORIENTATION: RIGHT;LEFT

## 2023-07-23 ASSESSMENT — PAIN DESCRIPTION - DESCRIPTORS: DESCRIPTORS: ITCHING

## 2023-07-24 ENCOUNTER — APPOINTMENT (OUTPATIENT)
Dept: GENERAL RADIOLOGY | Age: 58
End: 2023-07-24
Payer: MEDICAID

## 2023-07-24 ENCOUNTER — HOSPITAL ENCOUNTER (EMERGENCY)
Age: 58
Discharge: HOME | End: 2023-07-25
Attending: STUDENT IN AN ORGANIZED HEALTH CARE EDUCATION/TRAINING PROGRAM
Payer: MEDICAID

## 2023-07-24 DIAGNOSIS — R07.9 CHEST PAIN, UNSPECIFIED TYPE: ICD-10-CM

## 2023-07-24 DIAGNOSIS — I48.91 ATRIAL FIBRILLATION WITH RVR (HCC): Primary | ICD-10-CM

## 2023-07-24 LAB
ABSOLUTE BANDS #: 0.39 K/UL (ref 0–1)
ANION GAP SERPL CALCULATED.3IONS-SCNC: 8 MMOL/L (ref 9–17)
ATYPICAL LYMPHOCYTE ABSOLUTE COUNT: 0.2 K/UL
ATYPICAL LYMPHOCYTES: 1 %
BANDS: 2 % (ref 0–10)
BASOPHILS # BLD: 0 K/UL (ref 0–0.2)
BASOPHILS NFR BLD: 0 % (ref 0–2)
BUN SERPL-MCNC: 24 MG/DL (ref 6–20)
CALCIUM SERPL-MCNC: 9.3 MG/DL (ref 8.6–10.4)
CHLORIDE SERPL-SCNC: 107 MMOL/L (ref 98–107)
CO2 SERPL-SCNC: 27 MMOL/L (ref 20–31)
CREAT SERPL-MCNC: 0.7 MG/DL (ref 0.7–1.2)
D DIMER PPP FEU-MCNC: 0.46 UG/ML FEU (ref 0–0.59)
EOSINOPHIL # BLD: 0 K/UL (ref 0–0.4)
EOSINOPHILS RELATIVE PERCENT: 0 % (ref 0–4)
ERYTHROCYTE [DISTWIDTH] IN BLOOD BY AUTOMATED COUNT: 15 % (ref 11.5–14.9)
GFR SERPL CREATININE-BSD FRML MDRD: >60 ML/MIN/1.73M2
GLUCOSE SERPL-MCNC: 183 MG/DL (ref 70–99)
HCT VFR BLD AUTO: 42.9 % (ref 41–53)
HGB BLD-MCNC: 14.4 G/DL (ref 13.5–17.5)
LYMPHOCYTES NFR BLD: 1.76 K/UL (ref 1–4.8)
LYMPHOCYTES RELATIVE PERCENT: 9 % (ref 24–44)
MCH RBC QN AUTO: 30.3 PG (ref 26–34)
MCHC RBC AUTO-ENTMCNC: 33.6 G/DL (ref 31–37)
MCV RBC AUTO: 90.2 FL (ref 80–100)
MONOCYTES NFR BLD: 0.78 K/UL (ref 0.1–1.3)
MONOCYTES NFR BLD: 4 % (ref 1–7)
MORPHOLOGY: ABNORMAL
NEUTROPHILS NFR BLD: 84 % (ref 36–66)
NEUTS SEG NFR BLD: 16.37 K/UL (ref 1.3–9.1)
PLATELET # BLD AUTO: 158 K/UL (ref 150–450)
PMV BLD AUTO: 9.3 FL (ref 6–12)
POTASSIUM SERPL-SCNC: 4.2 MMOL/L (ref 3.7–5.3)
RBC # BLD AUTO: 4.76 M/UL (ref 4.5–5.9)
SODIUM SERPL-SCNC: 142 MMOL/L (ref 135–144)
TROPONIN I SERPL HS-MCNC: 10 NG/L (ref 0–22)
TROPONIN I SERPL HS-MCNC: 11 NG/L (ref 0–22)
WBC OTHER # BLD: 19.5 K/UL (ref 3.5–11)

## 2023-07-24 PROCEDURE — 71045 X-RAY EXAM CHEST 1 VIEW: CPT

## 2023-07-24 PROCEDURE — 85027 COMPLETE CBC AUTOMATED: CPT

## 2023-07-24 PROCEDURE — 84484 ASSAY OF TROPONIN QUANT: CPT

## 2023-07-24 PROCEDURE — 99285 EMERGENCY DEPT VISIT HI MDM: CPT

## 2023-07-24 PROCEDURE — 93005 ELECTROCARDIOGRAM TRACING: CPT

## 2023-07-24 PROCEDURE — 85379 FIBRIN DEGRADATION QUANT: CPT

## 2023-07-24 PROCEDURE — 80048 BASIC METABOLIC PNL TOTAL CA: CPT

## 2023-07-24 PROCEDURE — 93005 ELECTROCARDIOGRAM TRACING: CPT | Performed by: EMERGENCY MEDICINE

## 2023-07-24 PROCEDURE — 2500000003 HC RX 250 WO HCPCS: Performed by: EMERGENCY MEDICINE

## 2023-07-24 PROCEDURE — 36415 COLL VENOUS BLD VENIPUNCTURE: CPT

## 2023-07-24 PROCEDURE — 96374 THER/PROPH/DIAG INJ IV PUSH: CPT

## 2023-07-24 RX ORDER — DILTIAZEM HYDROCHLORIDE 5 MG/ML
10 INJECTION INTRAVENOUS ONCE
Status: COMPLETED | OUTPATIENT
Start: 2023-07-24 | End: 2023-07-24

## 2023-07-24 RX ADMIN — DILTIAZEM HYDROCHLORIDE 10 MG: 5 INJECTION INTRAVENOUS at 21:38

## 2023-07-24 ASSESSMENT — ENCOUNTER SYMPTOMS
VOMITING: 0
NAUSEA: 0
SHORTNESS OF BREATH: 1
COUGH: 0
ABDOMINAL PAIN: 0

## 2023-07-24 ASSESSMENT — LIFESTYLE VARIABLES: HOW OFTEN DO YOU HAVE A DRINK CONTAINING ALCOHOL: NEVER

## 2023-07-24 ASSESSMENT — HEART SCORE: ECG: 1

## 2023-07-24 ASSESSMENT — PAIN - FUNCTIONAL ASSESSMENT: PAIN_FUNCTIONAL_ASSESSMENT: 0-10

## 2023-07-24 ASSESSMENT — PAIN SCALES - GENERAL: PAINLEVEL_OUTOF10: 6

## 2023-07-24 NOTE — ED TRIAGE NOTES
Mode of arrival (squad #, walk in, police, etc) : walk in        Chief complaint(s): rash         Arrival Note (brief scenario, treatment PTA, etc). : Rashes develop 2 weeks ago with urticaria and pain in the lower forearms. C= \"Have you ever felt that you should Cut down on your drinking? \"  No  A= \"Have people Annoyed you by criticizing your drinking? \"  No  G= \"Have you ever felt bad or Guilty about your drinking? \"  No  E= \"Have you ever had a drink as an Eye-opener first thing in the morning to steady your nerves or to help a hangover? \"  No      Deferred []      Reason for deferring: N/A    *If yes to two or more: probable alcohol abuse. *

## 2023-07-24 NOTE — ED PROVIDER NOTES
EMERGENCY DEPARTMENT ENCOUNTER    Pt Name: Eloy Nielsen  MRN: [id number]  [address] [facility] [place] 1965  Date of evaluation: 7/23/23  CHIEF COMPLAINT       Chief Complaint   Patient presents with    Rash     HISTORY OF PRESENT ILLNESS   70-year-old presenting with rash NuPrep states he got into some poison ivy    Rash on his bilateral forearms    Itching,    No fevers chills redness spreading or other systemic symptoms            REVIEW OF SYSTEMS     Review of Systems   Constitutional:  Negative for chills and fever. HENT:  Negative for rhinorrhea and sore throat. Eyes:  Negative for discharge and redness. Respiratory:  Negative for chest tightness and shortness of breath. Cardiovascular:  Negative for chest pain. Gastrointestinal:  Negative for abdominal pain, diarrhea, nausea and vomiting. Genitourinary:  Negative for dysuria and frequency. Musculoskeletal:  Negative for arthralgias and myalgias. Skin:  Positive for rash. Neurological:  Negative for weakness and numbness. Psychiatric/Behavioral:  Negative for suicidal ideas. All other systems reviewed and are negative.   PASTMEDICAL HISTORY     Past Medical History:   Diagnosis Date    Abnormal echocardiogram 02/2018    Arrhythmia     AS (aortic stenosis)     Atrial fibrillation (HCC)     Benign prostatic hyperplasia     Biatrial enlargement     Central perforation of tympanic membrane, right     Chest pain     Chews tobacco     CHF (congestive heart failure), NYHA class I, acute on chronic, combined (HCC)     Chronic back pain     Conductive hearing loss of right ear     COPD (chronic obstructive pulmonary disease) ([address])     Diverticulitis     Fatigue     History of cocaine abuse ([address]) 01/2018    Household contact with history of methicillin resistant Staphylococcus aureus (MRSA) infection     Lung nodule     Mass of upper lobe of left lung     Mild concentric left ventricular hypertrophy     Murmur, cardiac     Near syncope     Nonrheumatic aortic

## 2023-07-24 NOTE — ED NOTES
Discharge instructions reviewed with patient, noting all directions and education by provider. Patient verbalizes understanding of all information reviewed, gathered personal items, and transferred under own power off unit to lobby without incident.       Danielle Roy RN  58/21/44 0744

## 2023-07-25 VITALS
OXYGEN SATURATION: 96 % | WEIGHT: 147 LBS | TEMPERATURE: 98.7 F | BODY MASS INDEX: 22.28 KG/M2 | HEIGHT: 68 IN | SYSTOLIC BLOOD PRESSURE: 117 MMHG | RESPIRATION RATE: 16 BRPM | DIASTOLIC BLOOD PRESSURE: 85 MMHG | HEART RATE: 80 BPM

## 2023-07-25 NOTE — ED TRIAGE NOTES
Mode of arrival (squad #, walk in, police, etc) : Walk-in        Chief complaint(s): Chest pain        Arrival Note (brief scenario, treatment PTA, etc). : Pt arrived to ED with c/o Chest pain under the left rib/breast area. Pt describes pain as a 6/10 on inspiration. C= \"Have you ever felt that you should Cut down on your drinking? \"  No  A= \"Have people Annoyed you by criticizing your drinking? \"  No  G= \"Have you ever felt bad or Guilty about your drinking? \"  No  E= \"Have you ever had a drink as an Eye-opener first thing in the morning to steady your nerves or to help a hangover? \"  No      Deferred []      Reason for deferring: N/A    *If yes to two or more: probable alcohol abuse. *

## 2023-07-25 NOTE — DISCHARGE INSTRUCTIONS
You were seen in the ER today for your chest pain. We did an extensive work-up which showed normal heart enzymes, also did a test looking for blood clots, this was negative. We gave you a dose of IV Cardizem which did increased her heart rate appropriately. Your white blood cell count was elevated, however this is likely related to the steroid you started taking. Please return the ER if you develop worsening chest pain, shortness of breath, nausea, vomiting, palpitations, or any other concerning symptoms. Otherwise, please follow-up with a primary care provider from the clinic listed or your primary care provider. PLEASE RETURN TO THE EMERGENCY DEPARTMENT IMMEDIATELY if you develop any concerning symptoms such as: chest pain, shortness of breath, feeling like your heart is racing, high fever not relieved by acetaminophen (Tylenol) and/or ibuprofen (Motrin / Advil), chills, persistent nausea and/or vomiting, loss of consciousness, numbness, weakness or tingling in the arms or legs or change in color of the extremities, changes in mental status, persistent or severe headache, blurry vision, loss of bladder / bowel control, unable to follow up with your physician, or other any other care or concern.

## 2023-07-25 NOTE — ED PROVIDER NOTES
[address] [facility],6Th Floor ED  Emergency Department Encounter  Emergency Medicine Resident     Pt Name:Alexandro Davidson  MRN: [id number]  [address] [facility] 1965  Date of evaluation: 7/24/23  PCP:  No primary care provider on file. Note Started: 9:12 PM EDT      CHIEF COMPLAINT       Chief Complaint   Patient presents with    Chest Pain       HISTORY OF PRESENT ILLNESS  (Location/Symptom, Timing/Onset, Context/Setting, Quality, Duration, Modifying Factors, Severity.)      Maryanne Saldivar is a 62 y.o. male who presents with chest pain. Patient states it started when he woke up this morning. He describes it as a sharp left-sided, radiates superiorly. No neck or arm radiation. Denies nausea or vomiting. Patient states he has a history of atrial fibrillation, aortic valve replacement, anticoagulated on Eliquis denies any missed doses. Patient has a history of cocaine use. States he has not used in a week. Patient was recently seen with negative cardiac work-up.   Patient also had recent heart cath which was normal.     PAST MEDICAL / SURGICAL / SOCIAL / FAMILY HISTORY      has a past medical history of Abnormal echocardiogram, Arrhythmia, AS (aortic stenosis), Atrial fibrillation (HCC), Benign prostatic hyperplasia, Biatrial enlargement, Central perforation of tympanic membrane, right, Chest pain, Chews tobacco, CHF (congestive heart failure), NYHA class I, acute on chronic, combined ([address]), Chronic back pain, Conductive hearing loss of right ear, COPD (chronic obstructive pulmonary disease) ([address]), Diverticulitis, Fatigue, History of cocaine abuse ([address]), Household contact with history of methicillin resistant Staphylococcus aureus (MRSA) infection, Lung nodule, Mass of upper lobe of left lung, Mild concentric left ventricular hypertrophy, Murmur, cardiac, Near syncope, Nonrheumatic aortic (valve) stenosis, Palpitations, Panlobular emphysema ([address]), Pneumonia of both upper lobes due to infectious organism, Right-sided

## 2023-07-25 NOTE — ED PROVIDER NOTES
EMERGENCY DEPARTMENT ENCOUNTER   ATTENDING ATTESTATION     Pt Name: Nataly Sutherland  MRN: [id number]  [address] [place] 1965  Date of evaluation: 7/24/23       Nataly Sutherland is a [place] y.o. male who presents with Chest Pain        Hypertension, nonischemic cardiomyopathy, A-fib on Eliquis  Diltiazem present with chest pain  left-sided, sharp, going on all day    60 compliant with his medication    Upon evaluation patient is in A-fib with RVR    We will obtain cardiac work-up including a D-dimer provide him with a dose of diltiazem and continue to monitor on telemetry    MDM:     Work-up is unremarkable he has negative cardiac enzymes and blood work    Has a leukocytosis but just started on steroids    After 10 mg of diltiazem his rate is controlled    Just had a cardiac catheterization and his rate is controlled at this time he is pain-free    I feel we can discharge him home with close cardiology follow-up and return precautions    Vitals:   Vitals:    07/24/23 2213 07/24/23 2231 07/24/23 2246 07/25/23 0000   BP: 115/73 109/80 111/82 117/85   Pulse: 81 83 99 80   Resp: 17 18 26 16   Temp:       SpO2: 96% 97% 96% 96%   Weight:       Height:             I personally saw and examined the patient. I have reviewed and agree with the resident's findings, including all diagnostic interpretations and treatment plan as written. I was present for the key portions of any procedures performed and the inclusive time noted for any critical care statement.     Nuria Abel MD  Attending Emergency Physician           Nuria Abel MD  07/25/23 3610

## 2023-07-25 NOTE — ED NOTES
Discharge instructions reviewed with patient. Encouraged f/u with PCP. Ambulatory out of dept with steady gait and all personal belongings.         Alicja Munoz RN  07/25/23 6164

## 2023-07-26 LAB
EKG ATRIAL RATE: 119 BPM
EKG ATRIAL RATE: 136 BPM
EKG Q-T INTERVAL: 322 MS
EKG Q-T INTERVAL: 330 MS
EKG QRS DURATION: 88 MS
EKG QRS DURATION: 90 MS
EKG QTC CALCULATION (BAZETT): 441 MS
EKG QTC CALCULATION (BAZETT): 466 MS
EKG R AXIS: 73 DEGREES
EKG R AXIS: 80 DEGREES
EKG T AXIS: 105 DEGREES
EKG T AXIS: 93 DEGREES
EKG VENTRICULAR RATE: 113 BPM
EKG VENTRICULAR RATE: 120 BPM

## 2023-07-26 PROCEDURE — 93010 ELECTROCARDIOGRAM REPORT: CPT | Performed by: INTERNAL MEDICINE

## 2023-09-08 DIAGNOSIS — I48.91 ATRIAL FIBRILLATION WITH RAPID VENTRICULAR RESPONSE (HCC): ICD-10-CM

## 2023-09-08 RX ORDER — APIXABAN 5 MG/1
TABLET, FILM COATED ORAL
Qty: 60 TABLET | Refills: 3 | OUTPATIENT
Start: 2023-09-08

## 2023-10-06 RX ORDER — DILTIAZEM HYDROCHLORIDE 240 MG/1
240 CAPSULE, COATED, EXTENDED RELEASE ORAL DAILY
Qty: 30 CAPSULE | Refills: 3 | OUTPATIENT
Start: 2023-10-06

## 2023-10-06 RX ORDER — METOPROLOL SUCCINATE 25 MG/1
25 TABLET, EXTENDED RELEASE ORAL DAILY
Qty: 30 TABLET | Refills: 3 | OUTPATIENT
Start: 2023-10-06

## 2023-10-30 ENCOUNTER — HOSPITAL ENCOUNTER (EMERGENCY)
Age: 58
Discharge: HOME | End: 2023-10-30
Attending: STUDENT IN AN ORGANIZED HEALTH CARE EDUCATION/TRAINING PROGRAM
Payer: MEDICAID

## 2023-10-30 VITALS
HEIGHT: 68 IN | BODY MASS INDEX: 22.73 KG/M2 | WEIGHT: 150 LBS | SYSTOLIC BLOOD PRESSURE: 125 MMHG | DIASTOLIC BLOOD PRESSURE: 63 MMHG | TEMPERATURE: 97.7 F | HEART RATE: 80 BPM | OXYGEN SATURATION: 97 % | RESPIRATION RATE: 18 BRPM

## 2023-10-30 DIAGNOSIS — S39.012A STRAIN OF LUMBAR REGION, INITIAL ENCOUNTER: ICD-10-CM

## 2023-10-30 DIAGNOSIS — S61.210A LACERATION OF RIGHT INDEX FINGER WITHOUT FOREIGN BODY WITHOUT DAMAGE TO NAIL, INITIAL ENCOUNTER: Primary | ICD-10-CM

## 2023-10-30 PROCEDURE — 90471 IMMUNIZATION ADMIN: CPT

## 2023-10-30 PROCEDURE — 6360000002 HC RX W HCPCS

## 2023-10-30 PROCEDURE — 90715 TDAP VACCINE 7 YRS/> IM: CPT

## 2023-10-30 PROCEDURE — 99284 EMERGENCY DEPT VISIT MOD MDM: CPT

## 2023-10-30 RX ORDER — METOPROLOL SUCCINATE 25 MG/1
25 TABLET, EXTENDED RELEASE ORAL DAILY
Qty: 7 TABLET | Refills: 0 | Status: SHIPPED | OUTPATIENT
Start: 2023-10-30

## 2023-10-30 RX ORDER — DILTIAZEM HYDROCHLORIDE 240 MG/1
240 CAPSULE, COATED, EXTENDED RELEASE ORAL DAILY
Qty: 7 CAPSULE | Refills: 0 | Status: SHIPPED | OUTPATIENT
Start: 2023-10-30

## 2023-10-30 RX ORDER — IBUPROFEN 600 MG/1
600 TABLET ORAL 4 TIMES DAILY PRN
Qty: 30 TABLET | Refills: 0 | Status: SHIPPED | OUTPATIENT
Start: 2023-10-30

## 2023-10-30 RX ORDER — ACETAMINOPHEN 500 MG
500 TABLET ORAL 4 TIMES DAILY PRN
Qty: 30 TABLET | Refills: 0 | Status: SHIPPED | OUTPATIENT
Start: 2023-10-30

## 2023-10-30 RX ADMIN — TETANUS TOXOID, REDUCED DIPHTHERIA TOXOID AND ACELLULAR PERTUSSIS VACCINE, ADSORBED 0.5 ML: 5; 2.5; 8; 8; 2.5 SUSPENSION INTRAMUSCULAR at 22:15

## 2023-10-30 ASSESSMENT — PAIN SCALES - GENERAL: PAINLEVEL_OUTOF10: 8

## 2023-10-30 ASSESSMENT — PAIN DESCRIPTION - ONSET: ONSET: SUDDEN

## 2023-10-30 ASSESSMENT — PAIN - FUNCTIONAL ASSESSMENT: PAIN_FUNCTIONAL_ASSESSMENT: NONE - DENIES PAIN

## 2023-10-30 ASSESSMENT — PAIN DESCRIPTION - DESCRIPTORS: DESCRIPTORS: SHOOTING;SHARP

## 2023-10-30 ASSESSMENT — PAIN DESCRIPTION - PAIN TYPE: TYPE: ACUTE PAIN

## 2023-10-31 NOTE — ED TRIAGE NOTES
Mode of arrival (squad #, walk in, police, etc) : walk in        Chief complaint(s): back pain, finger laceration, tachycardia        Arrival Note (brief scenario, treatment PTA, etc). : Pt reports hurting his lower back and neck when he moved something heavy into his truck about 4 days ago. Pt reports that his neck is now hurting as well. Pt reports that he cut his right 5th finger around 3pm today and it has not stopped bleeding. Pt reports that he is out of his blood pressure medication and has not had it in the last two days. C= \"Have you ever felt that you should Cut down on your drinking? \"  No  A= \"Have people Annoyed you by criticizing your drinking? \"  No  G= \"Have you ever felt bad or Guilty about your drinking? \"  No  E= \"Have you ever had a drink as an Eye-opener first thing in the morning to steady your nerves or to help a hangover? \"  No      Deferred []      Reason for deferring: N/A    *If yes to two or more: probable alcohol abuse. *

## 2023-10-31 NOTE — ED PROVIDER NOTES
[address] [facility] ED  Emergency Department Encounter  Emergency Medicine Resident     Pt Name:Alexandro Rogers  MRN: [id number]  [address] [facility] 1965  Date of evaluation: 10/30/23  PCP:  No primary care provider on file. Note Started: 10:08 PM EDT      CHIEF COMPLAINT       Chief Complaint   Patient presents with    Back Pain     X4 days    Laceration     Right 5th finger    Medication Refill       HISTORY OF PRESENT ILLNESS  (Location/Symptom, Timing/Onset, Context/Setting, Quality, Duration, Modifying Factors, Severity.)      Forest Brunner is a 62 y.o. male who presents with back pain, superficial skin laceration to right hand and need for medication refill. He says he was lifting something very heavy a couple days ago and noticed some pain in his low back the next day. Describes the pain as intermittent 8/10, sharp, shooting pain to both sides of his low back. Denies any trauma to the back in the meantime. Says it hurts more when he stands up straight from being bent over. Denies any numbness tingling or weakness. No recent fever or chills. No loss of continence or numbness to his legs. Has had issues like this before with strained his back. Denies any pain or burning with peeing. Denies abdominal pain,, vomiting. Denies shortness of breath or chest pain. Patient also reports cutting back his finger on a cement travel earlier today. He did wash it out and put duct tape over it. Denies any difficulty moving his finger or with sensation distal to the wound. Unsure when his last tetanus shot was. Additionally, patient takes diltiazem and metoprolol, but left it in [employer] when he came here for work. He is going back to [employer] in a week but was wondering if he could get a week supply of his medication so that it does not cause any problems for his mechanical heart valve.      PAST MEDICAL / SURGICAL / SOCIAL / FAMILY HISTORY      has a past medical history of Abnormal echocardiogram,

## 2023-10-31 NOTE — DISCHARGE INSTRUCTIONS
She continue to keep your finger wound clean and covered while working. If you begin to notice redness, swelling, fever, chills, you should turn the emergency department for further evaluation. Your back and will improve with Tylenol, Motrin and time. You can do exercises and stretching to help event further injuries. You should follow-up primary care provider next week if your symptoms are not resolving.

## 2023-12-12 ENCOUNTER — APPOINTMENT (OUTPATIENT)
Dept: CT IMAGING | Age: 58
End: 2023-12-12
Payer: MEDICAID

## 2023-12-12 ENCOUNTER — HOSPITAL ENCOUNTER (EMERGENCY)
Age: 58
Discharge: HOME | End: 2023-12-12
Attending: EMERGENCY MEDICINE
Payer: MEDICAID

## 2023-12-12 VITALS
BODY MASS INDEX: 22.28 KG/M2 | WEIGHT: 147 LBS | DIASTOLIC BLOOD PRESSURE: 89 MMHG | SYSTOLIC BLOOD PRESSURE: 127 MMHG | RESPIRATION RATE: 22 BRPM | TEMPERATURE: 98.1 F | HEIGHT: 68 IN | HEART RATE: 96 BPM | OXYGEN SATURATION: 98 %

## 2023-12-12 DIAGNOSIS — R42 DIZZINESS: Primary | ICD-10-CM

## 2023-12-12 LAB
ALBUMIN SERPL-MCNC: 4.1 G/DL (ref 3.5–5.2)
ALP SERPL-CCNC: 114 U/L (ref 40–129)
ALT SERPL-CCNC: 19 U/L (ref 5–41)
ANION GAP SERPL CALCULATED.3IONS-SCNC: 11 MMOL/L (ref 9–17)
APAP SERPL-MCNC: <5 UG/ML (ref 10–30)
AST SERPL-CCNC: 22 U/L
BASOPHILS # BLD: 0.1 K/UL (ref 0–0.2)
BASOPHILS NFR BLD: 1 % (ref 0–2)
BILIRUB SERPL-MCNC: 0.5 MG/DL (ref 0.3–1.2)
BILIRUB UR QL STRIP: NEGATIVE
BUN SERPL-MCNC: 21 MG/DL (ref 6–20)
CALCIUM SERPL-MCNC: 8.9 MG/DL (ref 8.6–10.4)
CHLORIDE SERPL-SCNC: 104 MMOL/L (ref 98–107)
CLARITY UR: CLEAR
CO2 SERPL-SCNC: 23 MMOL/L (ref 20–31)
COLOR UR: YELLOW
COMMENT: ABNORMAL
CREAT SERPL-MCNC: 1 MG/DL (ref 0.7–1.2)
EKG ATRIAL RATE: 77 BPM
EKG Q-T INTERVAL: 374 MS
EKG QRS DURATION: 88 MS
EKG QTC CALCULATION (BAZETT): 462 MS
EKG R AXIS: 65 DEGREES
EKG T AXIS: 89 DEGREES
EKG VENTRICULAR RATE: 92 BPM
EOSINOPHIL # BLD: 0.1 K/UL (ref 0–0.4)
EOSINOPHILS RELATIVE PERCENT: 1 % (ref 0–4)
ERYTHROCYTE [DISTWIDTH] IN BLOOD BY AUTOMATED COUNT: 13.7 % (ref 11.5–14.9)
ETHANOL PERCENT: <0.01 %
ETHANOLAMINE SERPL-MCNC: <10 MG/DL
GFR SERPL CREATININE-BSD FRML MDRD: >60 ML/MIN/1.73M2
GLUCOSE BLD-MCNC: 194 MG/DL (ref 75–110)
GLUCOSE SERPL-MCNC: 191 MG/DL (ref 70–99)
GLUCOSE UR STRIP-MCNC: NEGATIVE MG/DL
HCT VFR BLD AUTO: 48 % (ref 41–53)
HGB BLD-MCNC: 16 G/DL (ref 13.5–17.5)
HGB UR QL STRIP.AUTO: NEGATIVE
KETONES UR STRIP-MCNC: NEGATIVE MG/DL
LEUKOCYTE ESTERASE UR QL STRIP: NEGATIVE
LYMPHOCYTES NFR BLD: 2.1 K/UL (ref 1–4.8)
LYMPHOCYTES RELATIVE PERCENT: 24 % (ref 24–44)
MCH RBC QN AUTO: 30.8 PG (ref 26–34)
MCHC RBC AUTO-ENTMCNC: 33.5 G/DL (ref 31–37)
MCV RBC AUTO: 92.1 FL (ref 80–100)
MONOCYTES NFR BLD: 0.7 K/UL (ref 0.1–1.3)
MONOCYTES NFR BLD: 9 % (ref 1–7)
MYOGLOBIN SERPL-MCNC: 26 NG/ML (ref 28–72)
MYOGLOBIN SERPL-MCNC: 30 NG/ML (ref 28–72)
NEUTROPHILS NFR BLD: 65 % (ref 36–66)
NEUTS SEG NFR BLD: 5.6 K/UL (ref 1.3–9.1)
NITRITE UR QL STRIP: NEGATIVE
PH UR STRIP: 5 [PH] (ref 5–8)
PLATELET # BLD AUTO: 171 K/UL (ref 150–450)
PMV BLD AUTO: 9 FL (ref 6–12)
POTASSIUM SERPL-SCNC: 3.9 MMOL/L (ref 3.7–5.3)
PROT SERPL-MCNC: 6.6 G/DL (ref 6.4–8.3)
PROT UR STRIP-MCNC: NEGATIVE MG/DL
RBC # BLD AUTO: 5.21 M/UL (ref 4.5–5.9)
SALICYLATES SERPL-MCNC: <1 MG/DL (ref 3–10)
SODIUM SERPL-SCNC: 138 MMOL/L (ref 135–144)
SP GR UR STRIP: 1.08 (ref 1–1.03)
TOXIC TRICYCLIC SC,BLOOD: ABNORMAL
TRICYCLIC ANTIDEP,URINE: NEGATIVE
TROPONIN I SERPL HS-MCNC: 10 NG/L (ref 0–22)
TROPONIN I SERPL HS-MCNC: 11 NG/L (ref 0–22)
UROBILINOGEN UR STRIP-ACNC: NORMAL EU/DL (ref 0–1)
WBC OTHER # BLD: 8.5 K/UL (ref 3.5–11)

## 2023-12-12 PROCEDURE — 6360000004 HC RX CONTRAST MEDICATION: Performed by: EMERGENCY MEDICINE

## 2023-12-12 PROCEDURE — 93010 ELECTROCARDIOGRAM REPORT: CPT | Performed by: INTERNAL MEDICINE

## 2023-12-12 PROCEDURE — 70450 CT HEAD/BRAIN W/O DYE: CPT

## 2023-12-12 PROCEDURE — 80179 DRUG ASSAY SALICYLATE: CPT

## 2023-12-12 PROCEDURE — 99285 EMERGENCY DEPT VISIT HI MDM: CPT

## 2023-12-12 PROCEDURE — 80143 DRUG ASSAY ACETAMINOPHEN: CPT

## 2023-12-12 PROCEDURE — G0480 DRUG TEST DEF 1-7 CLASSES: HCPCS

## 2023-12-12 PROCEDURE — 83874 ASSAY OF MYOGLOBIN: CPT

## 2023-12-12 PROCEDURE — 80307 DRUG TEST PRSMV CHEM ANLYZR: CPT

## 2023-12-12 PROCEDURE — 70498 CT ANGIOGRAPHY NECK: CPT

## 2023-12-12 PROCEDURE — 84484 ASSAY OF TROPONIN QUANT: CPT

## 2023-12-12 PROCEDURE — 2580000003 HC RX 258: Performed by: EMERGENCY MEDICINE

## 2023-12-12 PROCEDURE — 93005 ELECTROCARDIOGRAM TRACING: CPT | Performed by: EMERGENCY MEDICINE

## 2023-12-12 PROCEDURE — 81003 URINALYSIS AUTO W/O SCOPE: CPT

## 2023-12-12 PROCEDURE — 82947 ASSAY GLUCOSE BLOOD QUANT: CPT

## 2023-12-12 PROCEDURE — 80053 COMPREHEN METABOLIC PANEL: CPT

## 2023-12-12 PROCEDURE — 36415 COLL VENOUS BLD VENIPUNCTURE: CPT

## 2023-12-12 PROCEDURE — 85025 COMPLETE CBC W/AUTO DIFF WBC: CPT

## 2023-12-12 RX ORDER — SODIUM CHLORIDE 0.9 % (FLUSH) 0.9 %
10 SYRINGE (ML) INJECTION PRN
Status: DISCONTINUED | OUTPATIENT
Start: 2023-12-12 | End: 2023-12-12 | Stop reason: HOSPADM

## 2023-12-12 RX ORDER — 0.9 % SODIUM CHLORIDE 0.9 %
80 INTRAVENOUS SOLUTION INTRAVENOUS ONCE
Status: COMPLETED | OUTPATIENT
Start: 2023-12-12 | End: 2023-12-12

## 2023-12-12 RX ADMIN — SODIUM CHLORIDE, PRESERVATIVE FREE 10 ML: 5 INJECTION INTRAVENOUS at 08:09

## 2023-12-12 RX ADMIN — IOPAMIDOL 75 ML: 755 INJECTION, SOLUTION INTRAVENOUS at 08:05

## 2023-12-12 RX ADMIN — SODIUM CHLORIDE 80 ML: 9 INJECTION, SOLUTION INTRAVENOUS at 08:09

## 2023-12-12 ASSESSMENT — ENCOUNTER SYMPTOMS
SINUS PRESSURE: 0
VOMITING: 0
BLOOD IN STOOL: 0
CHEST TIGHTNESS: 0
FACIAL SWELLING: 0
TROUBLE SWALLOWING: 0
EYE DISCHARGE: 0
RHINORRHEA: 0
BACK PAIN: 0
DIARRHEA: 0
COUGH: 0
ABDOMINAL PAIN: 0
EYE REDNESS: 0
NAUSEA: 0
SORE THROAT: 0
EYE PAIN: 0
COLOR CHANGE: 0
CONSTIPATION: 0
SHORTNESS OF BREATH: 0
WHEEZING: 0

## 2023-12-12 ASSESSMENT — PAIN - FUNCTIONAL ASSESSMENT: PAIN_FUNCTIONAL_ASSESSMENT: NONE - DENIES PAIN

## 2023-12-12 NOTE — PROGRESS NOTES
PT Ct head wo repeated due to pt answering cell phone during ct scan. Best scans per pt cooperation.

## 2023-12-12 NOTE — ED TRIAGE NOTES
Mode of arrival (squad #, walk in, police, etc) : Walk In        Chief complaint(s): Dizziness, blurry vision        Arrival Note (brief scenario, treatment PTA, etc). : Pt arrives to ED c/o blurry vision and dizziness that started around 0500 this morning. Patient reports feeling fatigued as well.

## 2024-02-14 NOTE — TELEPHONE ENCOUNTER
I already ordered nystatin mouthwash
Pt informed medication sent to pharmacy. Pt voiced understanding.
Sick Call    Duke Kenny   1965   [id number]   Johnnie Sahni MD   No Known Allergies     Last Visit: 8/17/20  Reason for No Same Day Appt: called too late in the day    Complaint: thrush inside pts mouth, white maria de jesus, sore throat, just finished atbx       Pharmacy: [id number], [place]
Detail Level: Zone
Add High Risk Medication Management Associated Diagnosis?: No

## 2024-06-06 NOTE — CARE COORDINATION
CASE MANAGEMENT NOTE:    Admission Date:  8/25/2018 Marcus Wood is a 48 y.o.  male    Admitted for : CHF (congestive heart failure), NYHA class I, acute on chronic, combined ([facility]) [I50.43]  CHF (congestive heart failure), NYHA class I, acute on chronic, combined ([facility] [I50.43]    Met with:  Patient    PCP:  Dr Stringer Petties:  Law :  independently at home             Current Services PTA:  No    Is patient agreeable to VNS: No    Freedom of choice provided: Yes    List of [address] provided: NA    VNS chosen:  NA    DME:  none    Home Oxygen: No    Nebulizer: No    CPAP/BIPAP: No    Supplier: N/A    Potential Assistance Needed: Continue to follow    SNF needed: No    Pharmacy:  [address]       Does Patient want to use MEDS to BEDS? No    Family Members/Caregivers that pt would like involved in their care:    Yes    If yes, list name here:  Makenzie Franz    Transportation Provider:  Patient                      Discharge Plan:  8/26/2018 Tiana Bassett; From single story home with spouse- Independent and Drives; DME- None; Declines VNS; On eliquis prior to admission; Active orders for IV lasix and Cardiology consulted; Will follow//MEHRAN                 Electronically signed by:  Vanesa Gonzalez RN on 8/26/2018 at 9:30 AM Son

## 2024-09-30 ENCOUNTER — HOSPITAL ENCOUNTER (EMERGENCY)
Age: 59
Discharge: HOME | End: 2024-09-30
Attending: STUDENT IN AN ORGANIZED HEALTH CARE EDUCATION/TRAINING PROGRAM
Payer: MEDICAID

## 2024-09-30 VITALS
OXYGEN SATURATION: 98 % | HEIGHT: 68 IN | HEART RATE: 94 BPM | BODY MASS INDEX: 22.73 KG/M2 | WEIGHT: 150 LBS | RESPIRATION RATE: 18 BRPM | SYSTOLIC BLOOD PRESSURE: 108 MMHG | TEMPERATURE: 97.7 F | DIASTOLIC BLOOD PRESSURE: 77 MMHG

## 2024-09-30 DIAGNOSIS — L02.91 ABSCESS: Primary | ICD-10-CM

## 2024-09-30 PROCEDURE — 6370000000 HC RX 637 (ALT 250 FOR IP)

## 2024-09-30 PROCEDURE — 99283 EMERGENCY DEPT VISIT LOW MDM: CPT

## 2024-09-30 RX ORDER — SULFAMETHOXAZOLE/TRIMETHOPRIM 800-160 MG
1 TABLET ORAL 2 TIMES DAILY
Qty: 14 TABLET | Refills: 0 | Status: SHIPPED | OUTPATIENT
Start: 2024-09-30 | End: 2024-10-07

## 2024-09-30 RX ORDER — CEPHALEXIN 500 MG/1
500 CAPSULE ORAL 2 TIMES DAILY
Qty: 14 CAPSULE | Refills: 0 | Status: SHIPPED | OUTPATIENT
Start: 2024-09-30 | End: 2024-10-07

## 2024-09-30 RX ORDER — SULFAMETHOXAZOLE/TRIMETHOPRIM 800-160 MG
1 TABLET ORAL ONCE
Status: COMPLETED | OUTPATIENT
Start: 2024-09-30 | End: 2024-09-30

## 2024-09-30 RX ADMIN — CEPHALEXIN 500 MG: 250 CAPSULE ORAL at 21:08

## 2024-09-30 RX ADMIN — SULFAMETHOXAZOLE AND TRIMETHOPRIM 1 TABLET: 800; 160 TABLET ORAL at 21:08

## 2024-09-30 NOTE — ED PROVIDER NOTES
EMERGENCY DEPARTMENT ENCOUNTER   ATTENDING ATTESTATION     Pt Name: Alexandro Soriano  MRN: [id number]  Birthdate 1965  Date of evaluation: 9/30/24       Alexandro Soriano is a 59 y.o. male who presents with Abscess (Rt. buttock)    Patient has a history of previous buttock abscess    He does have a history of atrial fibrillation on Eliquis    Previously these have resolved with antibiotics    On exam it mostly appears like a small folliculitis.    MDM:     Through shared decision making patient elects to trial oral antibiotics close follow-up with outpatient surgery return for any worsening swelling pain fevers chills        Vitals:   Vitals:    09/30/24 1918   BP: 108/77   Pulse: 94   Resp: 18   Temp: 97.7 °F (36.5 °C)   TempSrc: Oral   SpO2: 98%   Weight: 68 kg (150 lb)   Height: 1.727 m (5' 8\")         I personally saw and examined the patient. I have reviewed and agree with the resident's findings, including all diagnostic interpretations and treatment plan as written. I was present for the key portions of any procedures performed and the inclusive time noted for any critical care statement.    Anthony Alcantara MD  Attending Emergency Physician            Anthony Alcantara MD  09/30/24 2047       Anthony Alcantara MD  09/30/24 2105

## 2024-09-30 NOTE — ED PROVIDER NOTES
[facility] ED  Emergency Department Encounter  Emergency Medicine Resident     Pt Name:Alexandro Soriano  MRN: [id number]  Birthdate 1965  Date of evaluation: 9/30/24  PCP:  No primary care provider on file.  Note Started: 7:47 PM EDT      CHIEF COMPLAINT       Chief Complaint   Patient presents with    Abscess     Rt. buttock       HISTORY OF PRESENT ILLNESS  (Location/Symptom, Timing/Onset, Context/Setting, Quality, Duration, Modifying Factors, Severity.)      Alexandro Soriano is a 59 y.o. male with significant pertinent history of multiple boils/abscesses on the skin, as well as cellulitis who presents with 1 abscess on the right buttock.  Patient states he had drainages of previous abscesses in the buttock.  States that there were multiple at 1 time at 1 point, and had them drained at [facility] emergency department.    Patient presents with 1 solitary lesion, however is erythematous, with borders that are concerning for cellulitis.    Patient had visit in 2022, last time he had an abscess in the right buttock, where he received antibiotics and had improvement, was given follow-up with surgeon in 3 days.    Will discuss the patient, with antibiotics and follow-up.    PAST MEDICAL / SURGICAL / SOCIAL / FAMILY HISTORY      has a past medical history of Abnormal echocardiogram, Arrhythmia, AS (aortic stenosis), Atrial fibrillation ([facility]), Benign prostatic hyperplasia, Biatrial enlargement, Central perforation of tympanic membrane, right, Chest pain, Chews tobacco, CHF (congestive heart failure), NYHA class I, acute on chronic, combined ([facility]), Chronic back pain, Conductive hearing loss of right ear, COPD (chronic obstructive pulmonary disease) ([facility]), Diverticulitis, Fatigue, History of cocaine abuse ([facility]), Household contact with history of methicillin resistant Staphylococcus aureus (MRSA) infection, Lung nodule, Mass of upper lobe of left lung, Mild concentric left ventricular hypertrophy, Murmur,

## 2024-10-01 NOTE — DISCHARGE INSTRUCTIONS
You were seen here for an abscess of the right buttock.    We evaluated you, and found that you most likely do have an abscess, however we, because of your Eliquis, are recommending that you take antibiotics for this.    Please take your antibiotics as prescribed.  Please take the entire course    We recommend that you follow-up with the general surgery clinic, in 3 days.  Please call the number below and make an appointment    Please follow-up with your primary care provider    May return the emergency department any new or worsening symptoms

## 2024-10-02 ENCOUNTER — HOSPITAL ENCOUNTER (EMERGENCY)
Age: 59
Discharge: HOME | End: 2024-10-02
Attending: EMERGENCY MEDICINE
Payer: MEDICAID

## 2024-10-02 ENCOUNTER — APPOINTMENT (OUTPATIENT)
Dept: GENERAL RADIOLOGY | Age: 59
End: 2024-10-02
Payer: MEDICAID

## 2024-10-02 VITALS
BODY MASS INDEX: 22.73 KG/M2 | SYSTOLIC BLOOD PRESSURE: 127 MMHG | OXYGEN SATURATION: 97 % | TEMPERATURE: 97.6 F | HEIGHT: 68 IN | DIASTOLIC BLOOD PRESSURE: 83 MMHG | WEIGHT: 150 LBS | RESPIRATION RATE: 18 BRPM | HEART RATE: 95 BPM

## 2024-10-02 DIAGNOSIS — Z51.89 WOUND CHECK, ABSCESS: Primary | ICD-10-CM

## 2024-10-02 LAB
ANION GAP SERPL CALCULATED.3IONS-SCNC: 11 MMOL/L (ref 9–17)
BASOPHILS # BLD: 0 K/UL (ref 0–0.2)
BASOPHILS NFR BLD: 0 % (ref 0–2)
BUN SERPL-MCNC: 19 MG/DL (ref 6–20)
CALCIUM SERPL-MCNC: 9.1 MG/DL (ref 8.6–10.4)
CHLORIDE SERPL-SCNC: 102 MMOL/L (ref 98–107)
CO2 SERPL-SCNC: 25 MMOL/L (ref 20–31)
CREAT SERPL-MCNC: 0.8 MG/DL (ref 0.7–1.2)
EOSINOPHIL # BLD: 0 K/UL (ref 0–0.4)
EOSINOPHILS RELATIVE PERCENT: 0 % (ref 0–4)
ERYTHROCYTE [DISTWIDTH] IN BLOOD BY AUTOMATED COUNT: 14.5 % (ref 11.5–14.9)
GFR, ESTIMATED: >90 ML/MIN/1.73M2
GLUCOSE SERPL-MCNC: 86 MG/DL (ref 70–99)
HCT VFR BLD AUTO: 46 % (ref 41–53)
HGB BLD-MCNC: 15.4 G/DL (ref 13.5–17.5)
LYMPHOCYTES NFR BLD: 1 K/UL (ref 1–4.8)
LYMPHOCYTES RELATIVE PERCENT: 12 % (ref 24–44)
MCH RBC QN AUTO: 30.9 PG (ref 26–34)
MCHC RBC AUTO-ENTMCNC: 33.4 G/DL (ref 31–37)
MCV RBC AUTO: 92.4 FL (ref 80–100)
MONOCYTES NFR BLD: 1.2 K/UL (ref 0.1–1.3)
MONOCYTES NFR BLD: 14 % (ref 1–7)
NEUTROPHILS NFR BLD: 74 % (ref 36–66)
NEUTS SEG NFR BLD: 6.5 K/UL (ref 1.3–9.1)
PLATELET # BLD AUTO: 145 K/UL (ref 150–450)
PMV BLD AUTO: 9.4 FL (ref 6–12)
POTASSIUM SERPL-SCNC: 5.4 MMOL/L (ref 3.7–5.3)
RBC # BLD AUTO: 4.98 M/UL (ref 4.5–5.9)
SODIUM SERPL-SCNC: 138 MMOL/L (ref 135–144)
TROPONIN I SERPL HS-MCNC: 9 NG/L (ref 0–22)
TROPONIN I SERPL HS-MCNC: 9 NG/L (ref 0–22)
WBC OTHER # BLD: 8.8 K/UL (ref 3.5–11)

## 2024-10-02 PROCEDURE — 85025 COMPLETE CBC W/AUTO DIFF WBC: CPT

## 2024-10-02 PROCEDURE — 6370000000 HC RX 637 (ALT 250 FOR IP)

## 2024-10-02 PROCEDURE — 99285 EMERGENCY DEPT VISIT HI MDM: CPT

## 2024-10-02 PROCEDURE — 93005 ELECTROCARDIOGRAM TRACING: CPT | Performed by: EMERGENCY MEDICINE

## 2024-10-02 PROCEDURE — 71045 X-RAY EXAM CHEST 1 VIEW: CPT

## 2024-10-02 PROCEDURE — 36415 COLL VENOUS BLD VENIPUNCTURE: CPT

## 2024-10-02 PROCEDURE — 84484 ASSAY OF TROPONIN QUANT: CPT

## 2024-10-02 PROCEDURE — 80048 BASIC METABOLIC PNL TOTAL CA: CPT

## 2024-10-02 PROCEDURE — 71120 X-RAY EXAM BREASTBONE 2/>VWS: CPT

## 2024-10-02 RX ORDER — ACETAMINOPHEN 500 MG
1000 TABLET ORAL ONCE
Status: COMPLETED | OUTPATIENT
Start: 2024-10-02 | End: 2024-10-02

## 2024-10-02 RX ADMIN — ACETAMINOPHEN 1000 MG: 500 TABLET ORAL at 19:10

## 2024-10-02 ASSESSMENT — PAIN SCALES - GENERAL
PAINLEVEL_OUTOF10: 9
PAINLEVEL_OUTOF10: 4

## 2024-10-02 ASSESSMENT — PAIN - FUNCTIONAL ASSESSMENT
PAIN_FUNCTIONAL_ASSESSMENT: 0-10
PAIN_FUNCTIONAL_ASSESSMENT: 0-10

## 2024-10-02 NOTE — ED PROVIDER NOTES
[facility] ED  eMERGENCY dEPARTMENT eNCOUnter   Attending Attestation     Pt Name: Alexandro Soriano  MRN: [id number]  Birthdate 1965  Date of evaluation: 10/2/24       Alexandro Soriano is a 59 y.o. male who presents with Wound Check (Pt had a fall last week and hit his buttock and his chest. Pt returns today for wound check of his buttock abscess (states he thinks it looks bigger) and states last night when he was stretching, he felt a pop in his chest, to the area that he hit when he fell. )      History:   59-year-old male presenting to the ER complaining of a left buttock wound that he is concerned about.  Patient has been on antibiotics for 2 days.  The patient is also complaining of midsternal chest pain.    Exam: Vitals:   Vitals:    10/02/24 1746   BP: 135/78   Pulse: 95   Resp: 18   Temp: 97.6 °F (36.4 °C)   TempSrc: Oral   SpO2: 100%   Weight: 68 kg (150 lb)   Height: 1.727 m (5' 8\")     Cardiac workup in the ER did not display positive signs of acute cardiac ischemia.  EKG was reviewed and interpreted by myself, the ED physician.  Patient was instructed to follow-up with the primary care and to return to the ER immediately if symptoms worsen or change.  Resident did state that he would demarcate the borders of the wound on the left buttock with a skin marker.  Patient was instructed to continue to utilize the antibiotic outpatient and to give it a chance to continue working.  If the cellulitis does increase or worsen, the patient is to report back to the ER.  Patient understands and agrees with the plan.      History: 0  EC  Patient Age: 1  Risk Factors: 2  Troponin: 0  Heart Score Total: 3        I performed a history and physical examination of the patient and discussed management with the resident. I reviewed the resident’s note and agree with the documented findings and plan of care. Any areas of disagreement are noted on the chart. I was personally present for the key portions of any

## 2024-10-02 NOTE — ED PROVIDER NOTES
[facility] ED  Emergency Department Encounter  Emergency Medicine Resident     Pt Name:Alexandro Soriano  MRN: [id number]  Birthdate 1965  Date of evaluation: 10/2/24  PCP:  No primary care provider on file.  Note Started: 6:54 PM EDT      CHIEF COMPLAINT       Chief Complaint   Patient presents with    Wound Check     Pt had a fall last week and hit his buttock and his chest. Pt returns today for wound check of his buttock abscess (states he thinks it looks bigger) and states last night when he was stretching, he felt a pop in his chest, to the area that he hit when he fell.        HISTORY OF PRESENT ILLNESS  (Location/Symptom, Timing/Onset, Context/Setting, Quality, Duration, Modifying Factors, Severity.)      Alexandro Soriano is a 59 y.o. male who presents for wound check to buttocks.  Patient was seen on 9/30/2024 for similar complaints.  Patient was placed on Keflex as well as Bactrim and discharged and decision was made to not perform an I&D due to him being on Eliquis.    Patient is concerned that this area looks bigger.  Patient also reports that he stretched yesterday and felt a pop in his chest.    Patient reports decreased appetite.  Reports feeling hot and cold.  Reports constipation.  Denying any abdominal pain, nausea, vomiting.  Denying any urinary complaints.    PAST MEDICAL / SURGICAL / SOCIAL / FAMILY HISTORY      has a past medical history of Abnormal echocardiogram, Arrhythmia, AS (aortic stenosis), Atrial fibrillation (HCC), Benign prostatic hyperplasia, Biatrial enlargement, Central perforation of tympanic membrane, right, Chest pain, Chews tobacco, CHF (congestive heart failure), NYHA class I, acute on chronic, combined ([facility]), Chronic back pain, Conductive hearing loss of right ear, COPD (chronic obstructive pulmonary disease) ([facility]), Diverticulitis, Fatigue, History of cocaine abuse ([facility]), Household contact with history of methicillin resistant Staphylococcus aureus (MRSA) infection,

## 2024-10-03 ASSESSMENT — HEART SCORE: ECG: NORMAL

## 2024-10-03 NOTE — DISCHARGE INSTRUCTIONS
Please continue to take all antibiotics as prescribed.  You can take Tylenol as needed for the pain.  Please take as prescribed.  Do not exceed more than 4000 mg of Tylenol in a 24-hour time.  Please follow-up with your primary care provider as well as general surgery office.  Attached is contact information become established.  Please return to the ED if this area of redness and swelling gets worse over the next 24 to 48 hours.

## 2024-10-05 LAB
EKG Q-T INTERVAL: 336 MS
EKG QRS DURATION: 84 MS
EKG QTC CALCULATION (BAZETT): 448 MS
EKG R AXIS: 64 DEGREES
EKG T AXIS: 89 DEGREES
EKG VENTRICULAR RATE: 107 BPM

## 2024-10-05 PROCEDURE — 93010 ELECTROCARDIOGRAM REPORT: CPT | Performed by: INTERNAL MEDICINE

## 2024-10-07 ENCOUNTER — TELEPHONE (OUTPATIENT)
Age: 59
End: 2024-10-07

## 2024-10-07 NOTE — TELEPHONE ENCOUNTER
Pt was scheduled to be seen by Annika on 10/07/24. The pt is currently at [facility]. Informed the lady on the phone to have the pt call our office back when he can. Will still send a n/s letter

## 2024-10-16 ENCOUNTER — APPOINTMENT (OUTPATIENT)
Dept: CT IMAGING | Age: 59
End: 2024-10-16
Payer: MEDICAID

## 2024-10-16 ENCOUNTER — HOSPITAL ENCOUNTER (INPATIENT)
Age: 59
LOS: 3 days | Discharge: HOME | End: 2024-10-19
Attending: STUDENT IN AN ORGANIZED HEALTH CARE EDUCATION/TRAINING PROGRAM | Admitting: INTERNAL MEDICINE
Payer: MEDICAID

## 2024-10-16 ENCOUNTER — APPOINTMENT (OUTPATIENT)
Dept: GENERAL RADIOLOGY | Age: 59
End: 2024-10-16
Payer: MEDICAID

## 2024-10-16 ENCOUNTER — APPOINTMENT (OUTPATIENT)
Age: 59
End: 2024-10-16
Attending: INTERNAL MEDICINE
Payer: MEDICAID

## 2024-10-16 DIAGNOSIS — J44.9 CHRONIC OBSTRUCTIVE PULMONARY DISEASE, UNSPECIFIED COPD TYPE (HCC): ICD-10-CM

## 2024-10-16 DIAGNOSIS — I48.91 ATRIAL FIBRILLATION, UNSPECIFIED TYPE (HCC): ICD-10-CM

## 2024-10-16 DIAGNOSIS — I48.91 ATRIAL FIBRILLATION WITH RAPID VENTRICULAR RESPONSE (HCC): ICD-10-CM

## 2024-10-16 DIAGNOSIS — R07.9 CHEST PAIN, UNSPECIFIED TYPE: Primary | ICD-10-CM

## 2024-10-16 LAB
AMPHET UR QL SCN: NEGATIVE
ANION GAP SERPL CALCULATED.3IONS-SCNC: 16 MMOL/L (ref 9–16)
BARBITURATES UR QL SCN: NEGATIVE
BASOPHILS # BLD: 0.1 K/UL (ref 0–0.2)
BASOPHILS NFR BLD: 0 % (ref 0–2)
BENZODIAZ UR QL: NEGATIVE
BILIRUB UR QL STRIP: NEGATIVE
BUN SERPL-MCNC: 36 MG/DL (ref 6–20)
CALCIUM SERPL-MCNC: 8.8 MG/DL (ref 8.6–10.4)
CANNABINOIDS UR QL SCN: NEGATIVE
CHLORIDE SERPL-SCNC: 103 MMOL/L (ref 98–107)
CLARITY UR: CLEAR
CO2 SERPL-SCNC: 21 MMOL/L (ref 20–31)
COCAINE UR QL SCN: POSITIVE
COLOR UR: YELLOW
COMMENT: ABNORMAL
CREAT SERPL-MCNC: 1.3 MG/DL (ref 0.7–1.2)
ECHO AO ROOT DIAM: 2.8 CM
ECHO AO ROOT INDEX: 1.55 CM/M2
ECHO AR MAX VEL PISA: 3 M/S
ECHO AV AREA PEAK VELOCITY: 1.3 CM2
ECHO AV AREA VTI: 1.5 CM2
ECHO AV AREA/BSA PEAK VELOCITY: 0.7 CM2/M2
ECHO AV AREA/BSA VTI: 0.8 CM2/M2
ECHO AV MEAN GRADIENT: 9 MMHG
ECHO AV MEAN VELOCITY: 1.3 M/S
ECHO AV PEAK GRADIENT: 18 MMHG
ECHO AV PEAK VELOCITY: 2.1 M/S
ECHO AV REGURGITANT PHT: 754 MS
ECHO AV VELOCITY RATIO: 0.38
ECHO AV VTI: 31.4 CM
ECHO BSA: 1.81 M2
ECHO EST RA PRESSURE: 3 MMHG
ECHO LA AREA 2C: 18.3 CM2
ECHO LA AREA 4C: 14.1 CM2
ECHO LA DIAMETER INDEX: 1.77 CM/M2
ECHO LA DIAMETER: 3.2 CM
ECHO LA MAJOR AXIS: 5.2 CM
ECHO LA MINOR AXIS: 5.1 CM
ECHO LA TO AORTIC ROOT RATIO: 1.14
ECHO LA VOL BP: 40 ML (ref 18–58)
ECHO LA VOL MOD A2C: 55 ML (ref 18–58)
ECHO LA VOL MOD A4C: 28 ML (ref 18–58)
ECHO LA VOL/BSA BIPLANE: 22 ML/M2 (ref 16–34)
ECHO LA VOLUME INDEX MOD A2C: 30 ML/M2 (ref 16–34)
ECHO LA VOLUME INDEX MOD A4C: 15 ML/M2 (ref 16–34)
ECHO LV E' LATERAL VELOCITY: 14 CM/S
ECHO LV E' SEPTAL VELOCITY: 12.2 CM/S
ECHO LV EDV A2C: 86 ML
ECHO LV EDV A4C: 90 ML
ECHO LV EDV INDEX A4C: 50 ML/M2
ECHO LV EDV NDEX A2C: 48 ML/M2
ECHO LV EF PHYSICIAN: 50 %
ECHO LV EJECTION FRACTION A2C: 54 %
ECHO LV EJECTION FRACTION A4C: 49 %
ECHO LV EJECTION FRACTION BIPLANE: 51 % (ref 55–100)
ECHO LV ESV A2C: 40 ML
ECHO LV ESV A4C: 46 ML
ECHO LV ESV INDEX A2C: 22 ML/M2
ECHO LV ESV INDEX A4C: 25 ML/M2
ECHO LV FRACTIONAL SHORTENING: 26 % (ref 28–44)
ECHO LV INTERNAL DIMENSION DIASTOLE INDEX: 2.38 CM/M2
ECHO LV INTERNAL DIMENSION DIASTOLIC: 4.3 CM (ref 4.2–5.9)
ECHO LV INTERNAL DIMENSION SYSTOLIC INDEX: 1.77 CM/M2
ECHO LV INTERNAL DIMENSION SYSTOLIC: 3.2 CM
ECHO LV IVSD: 1.2 CM (ref 0.6–1)
ECHO LV MASS 2D: 184.7 G (ref 88–224)
ECHO LV MASS INDEX 2D: 102 G/M2 (ref 49–115)
ECHO LV POSTERIOR WALL DIASTOLIC: 1.2 CM (ref 0.6–1)
ECHO LV RELATIVE WALL THICKNESS RATIO: 0.56
ECHO LVOT AREA: 3.1 CM2
ECHO LVOT AV VTI INDEX: 0.44
ECHO LVOT DIAM: 2 CM
ECHO LVOT MEAN GRADIENT: 1 MMHG
ECHO LVOT PEAK GRADIENT: 2 MMHG
ECHO LVOT PEAK VELOCITY: 0.8 M/S
ECHO LVOT STROKE VOLUME INDEX: 23.9 ML/M2
ECHO LVOT SV: 43.3 ML
ECHO LVOT VTI: 13.8 CM
ECHO MV A VELOCITY: 0.44 M/S
ECHO MV AREA VTI: 1.4 CM2
ECHO MV E DECELERATION TIME (DT): 189 MS
ECHO MV E VELOCITY: 1.03 M/S
ECHO MV E/A RATIO: 2.34
ECHO MV E/E' LATERAL: 7.36
ECHO MV E/E' RATIO (AVERAGED): 7.9
ECHO MV E/E' SEPTAL: 8.44
ECHO MV LVOT VTI INDEX: 2.3
ECHO MV MAX VELOCITY: 1.1 M/S
ECHO MV MEAN GRADIENT: 2 MMHG
ECHO MV MEAN VELOCITY: 0.6 M/S
ECHO MV PEAK GRADIENT: 5 MMHG
ECHO MV VTI: 31.7 CM
ECHO RA AREA 4C: 18 CM2
ECHO RA END SYSTOLIC VOLUME APICAL 4 CHAMBER INDEX BSA: 25 ML/M2
ECHO RA VOLUME: 46 ML
ECHO RIGHT VENTRICULAR SYSTOLIC PRESSURE (RVSP): 23 MMHG
ECHO RV TAPSE: 1.8 CM (ref 1.7–?)
ECHO TV REGURGITANT MAX VELOCITY: 2.26 M/S
ECHO TV REGURGITANT PEAK GRADIENT: 20 MMHG
EOSINOPHIL # BLD: 0 K/UL (ref 0–0.4)
EOSINOPHILS RELATIVE PERCENT: 0 % (ref 0–4)
ERYTHROCYTE [DISTWIDTH] IN BLOOD BY AUTOMATED COUNT: 14.8 % (ref 11.5–14.9)
FENTANYL UR QL: NEGATIVE
GFR, ESTIMATED: 63 ML/MIN/1.73M2
GLUCOSE SERPL-MCNC: 103 MG/DL (ref 74–99)
GLUCOSE UR STRIP-MCNC: NEGATIVE MG/DL
HCT VFR BLD AUTO: 39.5 % (ref 41–53)
HGB BLD-MCNC: 13.8 G/DL (ref 13.5–17.5)
HGB UR QL STRIP.AUTO: NEGATIVE
INR PPP: 1.2
KETONES UR STRIP-MCNC: ABNORMAL MG/DL
LEUKOCYTE ESTERASE UR QL STRIP: NEGATIVE
LYMPHOCYTES NFR BLD: 2.1 K/UL (ref 1–4.8)
LYMPHOCYTES RELATIVE PERCENT: 16 % (ref 24–44)
MAGNESIUM SERPL-MCNC: 2.3 MG/DL (ref 1.6–2.6)
MCH RBC QN AUTO: 31.6 PG (ref 26–34)
MCHC RBC AUTO-ENTMCNC: 34.9 G/DL (ref 31–37)
MCV RBC AUTO: 90.4 FL (ref 80–100)
METHADONE UR QL: NEGATIVE
MONOCYTES NFR BLD: 1.4 K/UL (ref 0.1–1.3)
MONOCYTES NFR BLD: 11 % (ref 1–7)
NEUTROPHILS NFR BLD: 73 % (ref 36–66)
NEUTS SEG NFR BLD: 9.8 K/UL (ref 1.3–9.1)
NITRITE UR QL STRIP: NEGATIVE
OPIATES UR QL SCN: NEGATIVE
OXYCODONE UR QL SCN: NEGATIVE
PCP UR QL SCN: NEGATIVE
PH UR STRIP: 5 [PH] (ref 5–8)
PLATELET # BLD AUTO: 364 K/UL (ref 150–450)
PMV BLD AUTO: 8.9 FL (ref 6–12)
POTASSIUM SERPL-SCNC: 3.4 MMOL/L (ref 3.7–5.3)
PROT UR STRIP-MCNC: NEGATIVE MG/DL
PROTHROMBIN TIME: 15.5 SEC (ref 11.8–14.6)
RBC # BLD AUTO: 4.37 M/UL (ref 4.5–5.9)
SODIUM SERPL-SCNC: 140 MMOL/L (ref 136–145)
SP GR UR STRIP: 1.06 (ref 1–1.03)
TEST INFORMATION: ABNORMAL
TROPONIN I SERPL HS-MCNC: 19 NG/L (ref 0–22)
TROPONIN I SERPL HS-MCNC: 27 NG/L (ref 0–22)
TSH SERPL DL<=0.05 MIU/L-ACNC: 2.05 UIU/ML (ref 0.27–4.2)
UROBILINOGEN UR STRIP-ACNC: NORMAL EU/DL (ref 0–1)
WBC OTHER # BLD: 13.5 K/UL (ref 3.5–11)

## 2024-10-16 PROCEDURE — 2500000003 HC RX 250 WO HCPCS: Performed by: STUDENT IN AN ORGANIZED HEALTH CARE EDUCATION/TRAINING PROGRAM

## 2024-10-16 PROCEDURE — 85610 PROTHROMBIN TIME: CPT

## 2024-10-16 PROCEDURE — 6360000002 HC RX W HCPCS: Performed by: STUDENT IN AN ORGANIZED HEALTH CARE EDUCATION/TRAINING PROGRAM

## 2024-10-16 PROCEDURE — 6370000000 HC RX 637 (ALT 250 FOR IP)

## 2024-10-16 PROCEDURE — 6370000000 HC RX 637 (ALT 250 FOR IP): Performed by: STUDENT IN AN ORGANIZED HEALTH CARE EDUCATION/TRAINING PROGRAM

## 2024-10-16 PROCEDURE — 96374 THER/PROPH/DIAG INJ IV PUSH: CPT

## 2024-10-16 PROCEDURE — 36415 COLL VENOUS BLD VENIPUNCTURE: CPT

## 2024-10-16 PROCEDURE — 2580000003 HC RX 258: Performed by: STUDENT IN AN ORGANIZED HEALTH CARE EDUCATION/TRAINING PROGRAM

## 2024-10-16 PROCEDURE — 85025 COMPLETE CBC W/AUTO DIFF WBC: CPT

## 2024-10-16 PROCEDURE — 71045 X-RAY EXAM CHEST 1 VIEW: CPT

## 2024-10-16 PROCEDURE — 96365 THER/PROPH/DIAG IV INF INIT: CPT

## 2024-10-16 PROCEDURE — 93306 TTE W/DOPPLER COMPLETE: CPT | Performed by: INTERNAL MEDICINE

## 2024-10-16 PROCEDURE — 6360000004 HC RX CONTRAST MEDICATION: Performed by: STUDENT IN AN ORGANIZED HEALTH CARE EDUCATION/TRAINING PROGRAM

## 2024-10-16 PROCEDURE — 99213 OFFICE O/P EST LOW 20 MIN: CPT

## 2024-10-16 PROCEDURE — 2060000000 HC ICU INTERMEDIATE R&B

## 2024-10-16 PROCEDURE — 80048 BASIC METABOLIC PNL TOTAL CA: CPT

## 2024-10-16 PROCEDURE — 84443 ASSAY THYROID STIM HORMONE: CPT

## 2024-10-16 PROCEDURE — 80307 DRUG TEST PRSMV CHEM ANLYZR: CPT

## 2024-10-16 PROCEDURE — 2580000003 HC RX 258: Performed by: INTERNAL MEDICINE

## 2024-10-16 PROCEDURE — 87205 SMEAR GRAM STAIN: CPT

## 2024-10-16 PROCEDURE — 71260 CT THORAX DX C+: CPT

## 2024-10-16 PROCEDURE — 81003 URINALYSIS AUTO W/O SCOPE: CPT

## 2024-10-16 PROCEDURE — 87154 CUL TYP ID BLD PTHGN 6+ TRGT: CPT

## 2024-10-16 PROCEDURE — 87040 BLOOD CULTURE FOR BACTERIA: CPT

## 2024-10-16 PROCEDURE — 6370000000 HC RX 637 (ALT 250 FOR IP): Performed by: NURSE PRACTITIONER

## 2024-10-16 PROCEDURE — 83735 ASSAY OF MAGNESIUM: CPT

## 2024-10-16 PROCEDURE — 96366 THER/PROPH/DIAG IV INF ADDON: CPT

## 2024-10-16 PROCEDURE — 93005 ELECTROCARDIOGRAM TRACING: CPT | Performed by: STUDENT IN AN ORGANIZED HEALTH CARE EDUCATION/TRAINING PROGRAM

## 2024-10-16 PROCEDURE — 93306 TTE W/DOPPLER COMPLETE: CPT

## 2024-10-16 PROCEDURE — 84484 ASSAY OF TROPONIN QUANT: CPT

## 2024-10-16 PROCEDURE — 99285 EMERGENCY DEPT VISIT HI MDM: CPT

## 2024-10-16 PROCEDURE — 99223 1ST HOSP IP/OBS HIGH 75: CPT | Performed by: INTERNAL MEDICINE

## 2024-10-16 RX ORDER — PSEUDOEPHEDRINE HCL 30 MG/1
60 TABLET, FILM COATED ORAL EVERY 6 HOURS PRN
Status: DISCONTINUED | OUTPATIENT
Start: 2024-10-17 | End: 2024-10-19 | Stop reason: HOSPADM

## 2024-10-16 RX ORDER — POTASSIUM CHLORIDE 1500 MG/1
40 TABLET, EXTENDED RELEASE ORAL PRN
Status: DISCONTINUED | OUTPATIENT
Start: 2024-10-16 | End: 2024-10-19 | Stop reason: HOSPADM

## 2024-10-16 RX ORDER — CETIRIZINE HYDROCHLORIDE 10 MG/1
10 TABLET ORAL DAILY
COMMUNITY
Start: 2024-10-11

## 2024-10-16 RX ORDER — IOPAMIDOL 755 MG/ML
75 INJECTION, SOLUTION INTRAVASCULAR
Status: COMPLETED | OUTPATIENT
Start: 2024-10-16 | End: 2024-10-16

## 2024-10-16 RX ORDER — ALBUTEROL SULFATE 90 UG/1
2 INHALANT RESPIRATORY (INHALATION) EVERY 4 HOURS PRN
Status: DISCONTINUED | OUTPATIENT
Start: 2024-10-16 | End: 2024-10-19 | Stop reason: HOSPADM

## 2024-10-16 RX ORDER — TAMSULOSIN HYDROCHLORIDE 0.4 MG/1
0.4 CAPSULE ORAL DAILY
Status: DISCONTINUED | OUTPATIENT
Start: 2024-10-16 | End: 2024-10-19 | Stop reason: HOSPADM

## 2024-10-16 RX ORDER — SODIUM CHLORIDE 0.9 % (FLUSH) 0.9 %
10 SYRINGE (ML) INJECTION PRN
Status: COMPLETED | OUTPATIENT
Start: 2024-10-16 | End: 2024-10-16

## 2024-10-16 RX ORDER — FLUTICASONE PROPIONATE 50 MCG
1 SPRAY, SUSPENSION (ML) NASAL DAILY PRN
Status: DISCONTINUED | OUTPATIENT
Start: 2024-10-16 | End: 2024-10-19 | Stop reason: HOSPADM

## 2024-10-16 RX ORDER — ASPIRIN 81 MG/1
81 TABLET ORAL DAILY
Status: DISCONTINUED | OUTPATIENT
Start: 2024-10-16 | End: 2024-10-19 | Stop reason: HOSPADM

## 2024-10-16 RX ORDER — FLUTICASONE PROPIONATE 50 MCG
1 SPRAY, SUSPENSION (ML) NASAL DAILY
Status: DISCONTINUED | OUTPATIENT
Start: 2024-10-16 | End: 2024-10-16

## 2024-10-16 RX ORDER — LORAZEPAM 2 MG/ML
0.5 INJECTION INTRAMUSCULAR ONCE
Status: COMPLETED | OUTPATIENT
Start: 2024-10-16 | End: 2024-10-16

## 2024-10-16 RX ORDER — SODIUM CHLORIDE 9 MG/ML
INJECTION, SOLUTION INTRAVENOUS CONTINUOUS
Status: DISCONTINUED | OUTPATIENT
Start: 2024-10-16 | End: 2024-10-19

## 2024-10-16 RX ORDER — DILTIAZEM HYDROCHLORIDE 180 MG/1
180 CAPSULE, EXTENDED RELEASE ORAL DAILY
Status: ON HOLD | COMMUNITY
End: 2024-10-19 | Stop reason: HOSPADM

## 2024-10-16 RX ORDER — TAMSULOSIN HYDROCHLORIDE 0.4 MG/1
0.4 CAPSULE ORAL DAILY
COMMUNITY
Start: 2024-10-11

## 2024-10-16 RX ORDER — POTASSIUM CHLORIDE 7.45 MG/ML
10 INJECTION INTRAVENOUS PRN
Status: DISCONTINUED | OUTPATIENT
Start: 2024-10-16 | End: 2024-10-19 | Stop reason: HOSPADM

## 2024-10-16 RX ORDER — 0.9 % SODIUM CHLORIDE 0.9 %
100 INTRAVENOUS SOLUTION INTRAVENOUS ONCE
Status: COMPLETED | OUTPATIENT
Start: 2024-10-16 | End: 2024-10-16

## 2024-10-16 RX ORDER — DILTIAZEM HYDROCHLORIDE 180 MG/1
180 CAPSULE, COATED, EXTENDED RELEASE ORAL DAILY
Status: DISCONTINUED | OUTPATIENT
Start: 2024-10-16 | End: 2024-10-17

## 2024-10-16 RX ORDER — METOPROLOL SUCCINATE 25 MG/1
25 TABLET, EXTENDED RELEASE ORAL DAILY
Status: DISCONTINUED | OUTPATIENT
Start: 2024-10-16 | End: 2024-10-16

## 2024-10-16 RX ORDER — DILTIAZEM HYDROCHLORIDE 5 MG/ML
10 INJECTION INTRAVENOUS ONCE
Status: COMPLETED | OUTPATIENT
Start: 2024-10-16 | End: 2024-10-16

## 2024-10-16 RX ORDER — 0.9 % SODIUM CHLORIDE 0.9 %
1000 INTRAVENOUS SOLUTION INTRAVENOUS ONCE
Status: COMPLETED | OUTPATIENT
Start: 2024-10-16 | End: 2024-10-16

## 2024-10-16 RX ORDER — ASPIRIN 81 MG/1
324 TABLET, CHEWABLE ORAL ONCE
Status: COMPLETED | OUTPATIENT
Start: 2024-10-16 | End: 2024-10-16

## 2024-10-16 RX ADMIN — SODIUM CHLORIDE 100 ML: 9 INJECTION, SOLUTION INTRAVENOUS at 04:16

## 2024-10-16 RX ADMIN — BENZOCAINE 6 MG-MENTHOL 10 MG LOZENGES 1 LOZENGE: at 21:45

## 2024-10-16 RX ADMIN — DILTIAZEM HYDROCHLORIDE 10 MG: 5 INJECTION, SOLUTION INTRAVENOUS at 03:21

## 2024-10-16 RX ADMIN — SODIUM CHLORIDE, PRESERVATIVE FREE 10 ML: 5 INJECTION INTRAVENOUS at 04:16

## 2024-10-16 RX ADMIN — APIXABAN 5 MG: 5 TABLET, FILM COATED ORAL at 20:00

## 2024-10-16 RX ADMIN — IOPAMIDOL 75 ML: 755 INJECTION, SOLUTION INTRAVENOUS at 04:16

## 2024-10-16 RX ADMIN — METOPROLOL SUCCINATE 25 MG: 25 TABLET, EXTENDED RELEASE ORAL at 12:29

## 2024-10-16 RX ADMIN — SODIUM CHLORIDE: 9 INJECTION, SOLUTION INTRAVENOUS at 09:14

## 2024-10-16 RX ADMIN — POTASSIUM BICARBONATE 40 MEQ: 782 TABLET, EFFERVESCENT ORAL at 04:29

## 2024-10-16 RX ADMIN — SODIUM CHLORIDE 1000 ML: 9 INJECTION, SOLUTION INTRAVENOUS at 03:15

## 2024-10-16 RX ADMIN — APIXABAN 5 MG: 5 TABLET, FILM COATED ORAL at 12:29

## 2024-10-16 RX ADMIN — TAMSULOSIN HYDROCHLORIDE 0.4 MG: 0.4 CAPSULE ORAL at 12:29

## 2024-10-16 RX ADMIN — LORAZEPAM 0.5 MG: 2 INJECTION INTRAMUSCULAR; INTRAVENOUS at 03:23

## 2024-10-16 RX ADMIN — DILTIAZEM HYDROCHLORIDE 5 MG/HR: 5 INJECTION, SOLUTION INTRAVENOUS at 03:29

## 2024-10-16 RX ADMIN — SODIUM CHLORIDE: 9 INJECTION, SOLUTION INTRAVENOUS at 20:00

## 2024-10-16 RX ADMIN — ASPIRIN 81 MG 324 MG: 81 TABLET ORAL at 04:27

## 2024-10-16 RX ADMIN — DILTIAZEM HYDROCHLORIDE 5 MG/HR: 5 INJECTION, SOLUTION INTRAVENOUS at 21:52

## 2024-10-16 ASSESSMENT — ENCOUNTER SYMPTOMS
DIARRHEA: 0
EYE DISCHARGE: 0
EYE REDNESS: 0
VOMITING: 0
ABDOMINAL PAIN: 0
SHORTNESS OF BREATH: 0
RHINORRHEA: 0
NAUSEA: 0
CHEST TIGHTNESS: 0
SORE THROAT: 0

## 2024-10-16 ASSESSMENT — PAIN - FUNCTIONAL ASSESSMENT: PAIN_FUNCTIONAL_ASSESSMENT: 0-10

## 2024-10-16 ASSESSMENT — PAIN SCALES - GENERAL
PAINLEVEL_OUTOF10: 2
PAINLEVEL_OUTOF10: 0

## 2024-10-16 ASSESSMENT — PAIN DESCRIPTION - LOCATION: LOCATION: CHEST

## 2024-10-16 ASSESSMENT — PAIN DESCRIPTION - FREQUENCY: FREQUENCY: CONTINUOUS

## 2024-10-16 NOTE — PROGRESS NOTES
Date:   10/16/2024  Patient name: Alexandro Soriano  Date of admission:  10/16/2024  3:02 AM  MRN:   [id number]  YOB: 1965  PCP: No primary care provider on file.    Reason for Admission: Atrial fibrillation with rapid ventricular response (HCC) [I48.91]  Atrial fibrillation with RVR (HCC) [I48.91]  Chest pain, unspecified type [R07.9]    Cardiology consult:       Referring physician Dr. 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  Also had diaphoresis and diarrhea and had a chills.  Recently patient completed a course of treatment for drug rehab for cocaine but relapsed on cocaine and using cocaine   In the emergency room patient found to have heart rate 140 A-fib with RVR and 2 sets of troponin were 19 and 27.  CTA chest was negative for pulmonary embolism.  He was started on Cardizem drip  Blood pressure on admission 130/105 heart rate 140 oxygen saturation 94% temperature 97.7  On admission sodium 140, potassium 3.4, BUN 36, creatinine 1.3, magnesium 2.3  Tox screen positive for cocaine  WBC 13.5, hemoglobin 13.8, platelets 364, neutrophils 73    Patient seen and examined  Very frail looking male he was resting with a 1 to  Did not appear in any pain  No tachypnea  ECG monitor

## 2024-10-16 NOTE — CARE COORDINATION
Case Management Assessment  Initial Evaluation    Date/Time of Evaluation: 10/16/2024 12:59 PM  Assessment Completed by: Bety Enriquez RN    If patient is discharged prior to next notation, then this note serves as note for discharge by case management.    Patient Name: Alexandro Soriano                   YOB: 1965  Diagnosis: Atrial fibrillation with rapid ventricular response (HCC) [I48.91]  Atrial fibrillation with RVR (HCC) [I48.91]  Chest pain, unspecified type [R07.9]                   Date / Time: 10/16/2024  3:02 AM    Patient Admission Status: Inpatient   Readmission Risk (Low < 19, Mod (19-27), High > 27): Readmission Risk Score: 12.7    Current PCP: No primary care provider on file.  PCP verified by CM? Yes    Chart Reviewed: Yes      History Provided by: Patient  Patient Orientation: Alert and Oriented    Patient Cognition: Alert    Hospitalization in the last 30 days (Readmission):  No    If yes, Readmission Assessment in CM Navigator will be completed.    Advance Directives:      Code Status: Full Code   Patient's Primary Decision Maker is:      Primary Decision Maker: Liberty Mejia - Other - [phone]    Discharge Planning:    Patient lives with: Spouse/Significant Other Type of Home: Apartment  Primary Care Giver: Self  Patient Support Systems include: Spouse/Significant Other   Current Financial resources: Medicaid  Current community resources: None  Current services prior to admission: Durable Medical Equipment            Current DME: Shower Chair ([employer])            Type of Home Care services:  None    ADLS  Prior functional level: Independent in ADLs/IADLs  Current functional level: Independent in ADLs/IADLs    PT AM-PAC:   /24  OT AM-PAC:   /24    Family can provide assistance at DC: Yes  Would you like Case Management to discuss the discharge plan with any other family members/significant others, and if so, who? No  Plans to Return to Present Housing: Yes  Other Identified  [I48.91]  Chest pain, unspecified type [R07.9]    IF APPLICABLE: The Patient and/or patient representative Alexandro and his family were provided with a choice of provider and agrees with the discharge plan. Freedom of choice list with basic dialogue that supports the patient's individualized plan of care/goals and shares the quality data associated with the providers was provided to: Patient   Patient Representative Name:       The Patient and/or Patient Representative Agree with the Discharge Plan? Yes    Bety Enriquez RN  Case Management Department  Ph:  Fax:

## 2024-10-16 NOTE — ED PROVIDER NOTES
EMERGENCY DEPARTMENT ENCOUNTER    Pt Name: Alexandro Soriano  MRN: [id number]  Birthdate 1965  Date of evaluation: 10/16/24  CHIEF COMPLAINT       Chief Complaint   Patient presents with    Tachycardia    Chest Pain     HISTORY OF PRESENT ILLNESS   This is a 59-year-old male history of hypertension, CHF, COPD, A-fib presenting with palpitations chest discomfort    Patient admits to smoking about $20-$40 worth of crack cocaine    Heart is racing some chest discomfort and tingling in his arms    Denies any leg swelling recent trauma or surgery no history of blood clots    No other numbness weakness slurred speech              REVIEW OF SYSTEMS     Review of Systems   Constitutional:  Negative for chills and fever.   HENT:  Negative for rhinorrhea and sore throat.    Eyes:  Negative for discharge and redness.   Respiratory:  Negative for chest tightness and shortness of breath.    Cardiovascular:  Positive for chest pain and palpitations.   Gastrointestinal:  Negative for abdominal pain, diarrhea, nausea and vomiting.   Genitourinary:  Negative for dysuria and frequency.   Musculoskeletal:  Negative for arthralgias and myalgias.   Skin:  Negative for rash.   Neurological:  Negative for weakness and numbness.   Psychiatric/Behavioral:  Negative for suicidal ideas.    All other systems reviewed and are negative.    PASTMEDICAL HISTORY     Past Medical History:   Diagnosis Date    Abnormal echocardiogram 02/2018    Arrhythmia     AS (aortic stenosis)     Atrial fibrillation (HCC)     Benign prostatic hyperplasia     Biatrial enlargement     Central perforation of tympanic membrane, right     Chest pain     Chews tobacco     CHF (congestive heart failure), NYHA class I, acute on chronic, combined (HCC)     Chronic back pain     Conductive hearing loss of right ear     COPD (chronic obstructive pulmonary disease) (HCC)     Diverticulitis     Fatigue     History of cocaine abuse (HCC) 01/2018    Household contact with history  Yes     Order Specific Question:   Initial Infusion Dose:     Answer:   5 mg/hr     Order Specific Question:   Goal of Therapy is:     Answer:   HR less than 100 bpm     Order Specific Question:   Contact Provider if:     Answer:   SBP less than 90 mmHg     Order Specific Question:   Contact Provider if:     Answer:   HR less than 60 bpm     Order Specific Question:   HOLD for     Answer:   SBP less than 90 mmHg     Order Specific Question:   HOLD for     Answer:   HR less than 60 bpm    LORazepam (ATIVAN) injection 0.5 mg    aspirin chewable tablet 324 mg    potassium bicarb-citric acid (EFFER-K) effervescent tablet 40 mEq    sodium chloride 0.9 % bolus 100 mL    iopamidol (ISOVUE-370) 76 % injection 75 mL    sodium chloride flush 0.9 % injection 10 mL     DISCHARGE PRESCRIPTIONS:  New Prescriptions    No medications on file     PHYSICIAN CONSULTS ORDERED THIS ENCOUNTER:  None  FINAL IMPRESSION      1. Chest pain, unspecified type    2. Atrial fibrillation with rapid ventricular response (HCC)          DISPOSITION/PLAN   DISPOSITION Decision To Admit 10/16/2024 04:04:00 AM  Condition at Disposition: Data Unavailable      OUTPATIENT FOLLOW UP THE PATIENT:  No follow-up provider specified.    MD Maricruz Dumont Jeffrey, MD  10/16/24 0500

## 2024-10-16 NOTE — PROGRESS NOTES
Patient arrived to unit and connected to telemetry. Patient lethargic but awakens to speech, knows the year and that he is in the hospital but drifts back off to sleep right after answering. Patient denies pain. Cardizem gtt infusing, rhythm is rate controlled afib currently. Wound found on right buttocks during skin assessment, covered with gauze.

## 2024-10-16 NOTE — PROGRESS NOTES
[facility] Internal Medicine  Rowdy Paredes MD; Geremias Monaco MD; Bhupinder Lenz MD; MD Malia Heard MD; Kobi Garcia MD  [facility] Internal Medicine   IN-PATIENT SERVICE  [facility]                 Date:   10/16/2024  Patientname:  Alexandro Soriano  Date of admission:  10/16/2024  3:02 AM  MRN:   [id number]  Account:  [id number]  YOB: 1965  PCP:    No primary care provider on file.  Room:   11/11  Code Status:    Prior      Chief Complaint:     Chief Complaint   Patient presents with    Tachycardia    Chest Pain       History of Present Illness:     Alexandro Soriano is a 59 y.o. Non- / non  male who presents with Tachycardia and Chest Pain   and is admitted to the hospital for the management of Atrial fibrillation with RVR ([facility]).    Patient's medical history significant for aortic valve stenosis-s/p TAVR-bioprosthetic, atrial fibrillation and flutter, crack cocaine abuse, nonischemic cardiomyopathy,    At time of exam, patient is lethargic and not answering any questions.  According to ED provider, patient recently completed a course of treatment for drug rehab and relapsed on Crack cocaine today.  He presented to the ED with chest pain and heart palpitations.      EKG obtained in ED shows atrial fibrillation with rate 128.  Troponin HS 27, then 19.  TSH 2.05.  Mildly hypokalemic with potassium 3.4.  Replacement protocol initiated.  CXR shows no evidence of acute chest disease.  Mild VELASQUEZ with creatinine 1.3, which is up from baseline of 0.8.  WBC 13.5.    CT chest shows no evidence of pulmonary embolism or acute cardiopulmonary process. COPD with bullous changes at the medial aspect of the left pulmonary upper lobe.  No pneumothorax. 3. Mild ectasia of the ascending thoracic aorta with a diameter of 3.68 cm. Evidence of previous, trans artery 0 aortic valve replacement.   Mild dependent atelectatic/fibrotic changes at the posterior

## 2024-10-16 NOTE — DISCHARGE INSTR - COC
Continuity of Care Form    Patient Name: Alexandro Soriano   :  1965  MRN:  [id number]    Admit date:  10/16/2024  Discharge date:  ***    Code Status Order: Full Code   Advance Directives:   Advance Care Flowsheet Documentation             Admitting Physician:  Brijesh Montana MD  PCP: No primary care provider on file.    Discharging Nurse: ***  Discharging Hospital Unit/Room#: 2092/2092-01  Discharging Unit Phone Number: ***    Emergency Contact:   Extended Emergency Contact Information  Primary Emergency Contact: Liberty Mejia  Address: [place] [address] [facility]  Home Phone: [phone]  Mobile Phone: [phone]  Relation: Other  Hearing or visual needs: None  Other needs: None  Preferred language: English   needed? No  Secondary Emergency Contact: NimaFlor abada  Home Phone: [phone]  Mobile Phone: [phone]  Relation: Parent   needed? No    Past Surgical History:  Past Surgical History:   Procedure Laterality Date    AORTIC VALVE REPLACEMENT  2018    CYST REMOVAL Left     INGUINAL HERNIA REPAIR Right     NM INCISION & DRAINAGE ABSCESS COMPLICATED/MULTIPLE N/A 2018    GLUTEAL INCISION AND DRAINAGE performed by Estrella Cooley DO at [facility] OR       Immunization History:   Immunization History   Administered Date(s) Administered    Influenza Virus Vaccine 2016, 2019    Influenza, FLUARIX, FLULAVAL, FLUZONE (age 6 mo+) and AFLURIA, (age 3 y+), Quadv PF, 0.5mL 2016    Influenza, Quadv, 6 mo and older, IM, PF (Flulaval, Fluarix) 2019    Pneumococcal Conjugate Vaccine 2018    Pneumococcal, PPSV23, PNEUMOVAX 23, (age 2y+), SC/IM, 0.5mL 2018    TDaP, ADACEL (age 10y-64y), BOOSTRIX (age 10y+), IM, 0.5mL 2011, 2015, 10/30/2023       Active Problems:  Patient Active Problem List   Diagnosis Code    Back pain M54.9    Tobacco abuse Z72.0    Thrush, oral B37.0    Fatigue R53.83    Left foot burn  that he requires {Admit to Appropriate Level of Care:11998} for {GREATER/LESS:492247023} 30 days.     Update Admission H&P: {CHP DME Changes in HandP:552509617}    PHYSICIAN SIGNATURE:  {Esignature:618999847}

## 2024-10-16 NOTE — H&P
[facility]   IN-PATIENT SERVICE   [facility]    HISTORY AND PHYSICAL EXAMINATION            Date:   10/16/2024  Patient name:  Alexandro Soraino  Date of admission:  10/16/2024  3:02 AM  MRN:   [id number]  Account:  [id number]  YOB: 1965  PCP:    No primary care provider on file.  Room:   [address] [place] [address]  Code Status:    Prior    Chief Complaint:     Chief Complaint   Patient presents with    Tachycardia    Chest Pain       History Obtained From:     patient, electronic medical record    History of Present Illness:     The patient is a 59 y.o.  Non- / non  male who presents with Tachycardia and Chest Pain   and he is admitted to the hospital for the management of chest pain, palpitation  Patient, has past medical history of multiple medical problem which include tobacco abuse, protein calorie malnutrition, COPD, atrial fibrillation anticoagulation, history of arctic stenosis status post TAVR, cocaine abuse  Patient, very confused, history is limited  Patient, presented to emergency room with complaints of chest pain, chest pain started last night, intermittent nature, no radiation of chest pain  No aggravating relieving factor  Symptom associated with palpitation  Patient, completed a course of treatment for drug rehab for cocaine, but relapsed on cocaine and using cocaine recently  In emergency room, patient, found to have heart rate of 140 he was in A-fib with RVR,  2 sets of troponin was 19 and 27  Underwent CT chest which was negative for PE, pulmonary pathology  EKG did not suggest any ST-T changes  Patient started on Cardizem drip, heart rate is fairly controlled  At the time my evaluation, patient denying any chest pain  Patient follows with Dr. Woodard, with history of TAVR  Last cardiac cath was done in March 2013          Past Medical History:     Past Medical History:   Diagnosis Date    Abnormal echocardiogram 02/2018    Arrhythmia     AS (aortic stenosis)      90.4 80 - 100 fL    MCH 31.6 26 - 34 pg    MCHC 34.9 31 - 37 g/dL    RDW 14.8 11.5 - 14.9 %    Platelets 364 150 - 450 k/uL    MPV 8.9 6.0 - 12.0 fL    Neutrophils % 73 (H) 36 - 66 %    Lymphocytes % 16 (L) 24 - 44 %    Monocytes % 11 (H) 1 - 7 %    Eosinophils % 0 0 - 4 %    Basophils % 0 0 - 2 %    Neutrophils Absolute 9.80 (H) 1.3 - 9.1 k/uL    Lymphocytes Absolute 2.10 1.0 - 4.8 k/uL    Monocytes Absolute 1.40 (H) 0.1 - 1.3 k/uL    Eosinophils Absolute 0.00 0.0 - 0.4 k/uL    Basophils Absolute 0.10 0.0 - 0.2 k/uL   BMP    Collection Time: 10/16/24  3:14 AM   Result Value Ref Range    Sodium 140 136 - 145 mmol/L    Potassium 3.4 (L) 3.7 - 5.3 mmol/L    Chloride 103 98 - 107 mmol/L    CO2 21 20 - 31 mmol/L    Anion Gap 16 9 - 16 mmol/L    Glucose 103 (H) 74 - 99 mg/dL    BUN 36 (H) 6 - 20 mg/dL    Creatinine 1.3 (H) 0.7 - 1.2 mg/dL    Est, Glom Filt Rate 63 >60 mL/min/1.73m2    Calcium 8.8 8.6 - 10.4 mg/dL   Magnesium    Collection Time: 10/16/24  3:14 AM   Result Value Ref Range    Magnesium 2.3 1.6 - 2.6 mg/dL   Protime-INR    Collection Time: 10/16/24  3:14 AM   Result Value Ref Range    Protime 15.5 (H) 11.8 - 14.6 sec    INR 1.2    Troponin Now and Q 1 Hour    Collection Time: 10/16/24  3:14 AM   Result Value Ref Range    Troponin, High Sensitivity 27 (H) 0 - 22 ng/L   TSH with Reflex    Collection Time: 10/16/24  3:14 AM   Result Value Ref Range    TSH 2.05 0.27 - 4.20 uIU/mL   Troponin Now and Q 1 Hour    Collection Time: 10/16/24  4:40 AM   Result Value Ref Range    Troponin, High Sensitivity 19 0 - 22 ng/L       Imaging/Diagnostics:      Assessment :      Primary Problem  Atrial fibrillation with RVR ([facility])    Active Hospital Problems    Diagnosis Date Noted    Atrial fibrillation with RVR ([facility]) [I48.91] 05/21/2023    Cocaine abuse ([facility]) [F14.10] 07/30/2019    Crack cocaine use [F14.90] 01/06/2019    S/p TAVR (transcatheter aortic valve replacement), bioprosthetic [Z95.3] 12/27/2018       Plan:     Patient

## 2024-10-16 NOTE — CONSULTS
[facility]  Consult Note       NAME:  Alexandro Soriano  MEDICAL RECORD NUMBER:  [id number]  AGE: 59 y.o.   GENDER: male  : 1965  TODAY'S DATE:  10/16/2024    Subjective:      Alexandro oSriano is a 59 y.o. male with inpatient referral to [facility] for:  Right buttock wound      Wound Identification:  Wound Type:  Surgical  Contributing Factors: smoking and substance abuse    Wound History: Patient states that he had an abscess drained about a week ago.  Per notes, patient had I&D of right buttock abscess on 10/6 at [facility] and was treated with Augmentin and doxycycline  Current Wound Care Treatment: Dry dressing    Patient Goal of Care:  [x] Wound Healing  [] Odor Control  [] Palliative Care  [] Pain Control   [] Other:         PAST MEDICAL HISTORY        Diagnosis Date    Abnormal echocardiogram 2018    Arrhythmia     AS (aortic stenosis)     Atrial fibrillation ([facility])     Benign prostatic hyperplasia     Biatrial enlargement     Central perforation of tympanic membrane, right     Chest pain     Chews tobacco     CHF (congestive heart failure), NYHA class I, acute on chronic, combined ([facility])     Chronic back pain     Conductive hearing loss of right ear     COPD (chronic obstructive pulmonary disease) ([facility])     Diverticulitis     Fatigue     History of cocaine abuse ([facility]) 2018    Household contact with history of methicillin resistant Staphylococcus aureus (MRSA) infection     Lung nodule     Mass of upper lobe of left lung     Mild concentric left ventricular hypertrophy     Murmur, cardiac     Near syncope     Nonrheumatic aortic (valve) stenosis     Palpitations     Panlobular emphysema (HCC)     Pneumonia of both upper lobes due to infectious organism     Right-sided tinnitus     Severe aortic stenosis     Shortness of breath     Tobacco abuse        PAST SURGICAL HISTORY    Past Surgical History:   Procedure Laterality Date    AORTIC VALVE REPLACEMENT   dressing.  Change daily and as needed if loose or soiled.       [x] Turn and reposition every 2 hours while in bed.    [x] Float heels off of bed with pillows under calves.    [] Heel protective boots (heel medix boots) at all times while in bed.    [] Sacral foam dressing to sacrococcygeal area. Use skin barrier film prior to placement. Peel back dressing, inspect skin beneath, and re-secure every shift. Change every 3 days and as needed if loose or soiled. Discontinue sacral foam if repeatedly soiled by incontinence.    [] Apply zinc oxide cream twice daily and as needed after incontinent episodes.    [] Perform routine incontinence care with use of foam cleanser.    [x] Use single layer moisture wicking underpad.    [] Use comfort glide system and wedges to reposition patient.    [x] Keep the head of the bed below 30 degrees unless contraindicated.    [] Pressure reducing chair cushion while up to chair. Reposition every hour while in chair and limit chair time to 2 hour intervals.    [x] Encourage good nutritional intake and fluids. Consult dietician if needed.    Specialty Bed Required : Yes   [] Low Air Loss   [x] Pressure Redistribution  [] Fluid Immersion  [] Bariatric  [] Total Pressure Relief  [] Other:     Current Diet: ADULT DIET; Regular; Low Sodium (2 gm)  Dietician consult:  No    Discharge Plan:  Placement for patient upon discharge: home with support   Patient appropriate for Outpatient Wound Care Center: Yes    Patient/Caregiver Teaching:  Level of patientunderstanding able to:     [x] Indicates understanding       [] Needs reinforcement  [] Unsuccessful      [x] Verbal Understanding  [] Demonstrated understanding       [] No evidence of learning  [] Refused teaching         [] N/A       Electronically signed by Jenniffer Warren RN on  10/16/2024 at 4:19 PM

## 2024-10-16 NOTE — ED NOTES
Mode of arrival (squad #, walk in, police, etc) : walk-in        Chief complaint(s): tachycardia, chest pain        Arrival Note (brief scenario, treatment PTA, etc).: Patient presents with fast heart beat along with chest pain. Patient admits to smoke \"Crack\" prior to ER visit.        C= \"Have you ever felt that you should Cut down on your drinking?\"  No  A= \"Have people Annoyed you by criticizing your drinking?\"  Refused  G= \"Have you ever felt bad or Guilty about your drinking?\"  No  E= \"Have you ever had a drink as an Eye-opener first thing in the morning to steady your nerves or to help a hangover?\"  No      Deferred []      Reason for deferring: N/A    *If yes to two or more: probable alcohol abuse.*

## 2024-10-17 LAB
ANION GAP SERPL CALCULATED.3IONS-SCNC: 7 MMOL/L (ref 9–16)
BASOPHILS # BLD: 0 K/UL (ref 0–0.2)
BASOPHILS NFR BLD: 0 % (ref 0–2)
BUN SERPL-MCNC: 13 MG/DL (ref 6–20)
C DIFF GDH + TOXINS A+B STL QL IA.RAPID: ABNORMAL
CALCIUM SERPL-MCNC: 8 MG/DL (ref 8.6–10.4)
CHLORIDE SERPL-SCNC: 107 MMOL/L (ref 98–107)
CO2 SERPL-SCNC: 26 MMOL/L (ref 20–31)
CREAT SERPL-MCNC: 0.8 MG/DL (ref 0.7–1.2)
EKG Q-T INTERVAL: 324 MS
EKG QRS DURATION: 88 MS
EKG QTC CALCULATION (BAZETT): 473 MS
EKG R AXIS: 60 DEGREES
EKG T AXIS: 83 DEGREES
EKG VENTRICULAR RATE: 128 BPM
EOSINOPHIL # BLD: 0 K/UL (ref 0–0.4)
EOSINOPHILS RELATIVE PERCENT: 0 % (ref 0–4)
ERYTHROCYTE [DISTWIDTH] IN BLOOD BY AUTOMATED COUNT: 14.9 % (ref 11.5–14.9)
GFR, ESTIMATED: >90 ML/MIN/1.73M2
GLUCOSE SERPL-MCNC: 164 MG/DL (ref 74–99)
HCT VFR BLD AUTO: 38.2 % (ref 41–53)
HGB BLD-MCNC: 12.9 G/DL (ref 13.5–17.5)
LYMPHOCYTES NFR BLD: 0.9 K/UL (ref 1–4.8)
LYMPHOCYTES RELATIVE PERCENT: 8 % (ref 24–44)
MAGNESIUM SERPL-MCNC: 1.7 MG/DL (ref 1.6–2.6)
MCH RBC QN AUTO: 31.1 PG (ref 26–34)
MCHC RBC AUTO-ENTMCNC: 33.9 G/DL (ref 31–37)
MCV RBC AUTO: 91.8 FL (ref 80–100)
MONOCYTES NFR BLD: 0.8 K/UL (ref 0.1–1.3)
MONOCYTES NFR BLD: 8 % (ref 1–7)
NEUTROPHILS NFR BLD: 84 % (ref 36–66)
NEUTS SEG NFR BLD: 8.5 K/UL (ref 1.3–9.1)
PLATELET # BLD AUTO: 278 K/UL (ref 150–450)
PMV BLD AUTO: 8.9 FL (ref 6–12)
POTASSIUM SERPL-SCNC: 3.5 MMOL/L (ref 3.7–5.3)
RBC # BLD AUTO: 4.16 M/UL (ref 4.5–5.9)
SODIUM SERPL-SCNC: 140 MMOL/L (ref 136–145)
SPECIMEN DESCRIPTION: ABNORMAL
TROPONIN I SERPL HS-MCNC: 13 NG/L (ref 0–22)
WBC OTHER # BLD: 10.2 K/UL (ref 3.5–11)

## 2024-10-17 PROCEDURE — 87324 CLOSTRIDIUM AG IA: CPT

## 2024-10-17 PROCEDURE — 93005 ELECTROCARDIOGRAM TRACING: CPT | Performed by: INTERNAL MEDICINE

## 2024-10-17 PROCEDURE — 87449 NOS EACH ORGANISM AG IA: CPT

## 2024-10-17 PROCEDURE — 84484 ASSAY OF TROPONIN QUANT: CPT

## 2024-10-17 PROCEDURE — 6370000000 HC RX 637 (ALT 250 FOR IP)

## 2024-10-17 PROCEDURE — 2060000000 HC ICU INTERMEDIATE R&B

## 2024-10-17 PROCEDURE — 93010 ELECTROCARDIOGRAM REPORT: CPT | Performed by: INTERNAL MEDICINE

## 2024-10-17 PROCEDURE — 85025 COMPLETE CBC W/AUTO DIFF WBC: CPT

## 2024-10-17 PROCEDURE — 6370000000 HC RX 637 (ALT 250 FOR IP): Performed by: NURSE PRACTITIONER

## 2024-10-17 PROCEDURE — 6360000002 HC RX W HCPCS: Performed by: INTERNAL MEDICINE

## 2024-10-17 PROCEDURE — 87493 C DIFF AMPLIFIED PROBE: CPT

## 2024-10-17 PROCEDURE — 80048 BASIC METABOLIC PNL TOTAL CA: CPT

## 2024-10-17 PROCEDURE — 2580000003 HC RX 258: Performed by: INTERNAL MEDICINE

## 2024-10-17 PROCEDURE — 94761 N-INVAS EAR/PLS OXIMETRY MLT: CPT

## 2024-10-17 PROCEDURE — 83735 ASSAY OF MAGNESIUM: CPT

## 2024-10-17 PROCEDURE — 6370000000 HC RX 637 (ALT 250 FOR IP): Performed by: INTERNAL MEDICINE

## 2024-10-17 PROCEDURE — 94640 AIRWAY INHALATION TREATMENT: CPT

## 2024-10-17 PROCEDURE — 99233 SBSQ HOSP IP/OBS HIGH 50: CPT | Performed by: INTERNAL MEDICINE

## 2024-10-17 PROCEDURE — 2580000003 HC RX 258: Performed by: NURSE PRACTITIONER

## 2024-10-17 PROCEDURE — 6360000002 HC RX W HCPCS: Performed by: NURSE PRACTITIONER

## 2024-10-17 PROCEDURE — 36415 COLL VENOUS BLD VENIPUNCTURE: CPT

## 2024-10-17 RX ORDER — NICOTINE 21 MG/24HR
1 PATCH, TRANSDERMAL 24 HOURS TRANSDERMAL DAILY
Status: DISCONTINUED | OUTPATIENT
Start: 2024-10-17 | End: 2024-10-19 | Stop reason: HOSPADM

## 2024-10-17 RX ORDER — LEVALBUTEROL INHALATION SOLUTION 1.25 MG/3ML
1.25 SOLUTION RESPIRATORY (INHALATION) EVERY 8 HOURS PRN
Status: DISCONTINUED | OUTPATIENT
Start: 2024-10-17 | End: 2024-10-19 | Stop reason: HOSPADM

## 2024-10-17 RX ORDER — DOXYCYCLINE 100 MG/1
100 CAPSULE ORAL EVERY 12 HOURS SCHEDULED
Status: DISCONTINUED | OUTPATIENT
Start: 2024-10-17 | End: 2024-10-17

## 2024-10-17 RX ORDER — VANCOMYCIN 1 G/200ML
1000 INJECTION, SOLUTION INTRAVENOUS EVERY 12 HOURS
Status: DISCONTINUED | OUTPATIENT
Start: 2024-10-18 | End: 2024-10-19

## 2024-10-17 RX ORDER — DILTIAZEM HYDROCHLORIDE 180 MG/1
180 CAPSULE, COATED, EXTENDED RELEASE ORAL DAILY
Status: DISCONTINUED | OUTPATIENT
Start: 2024-10-17 | End: 2024-10-19 | Stop reason: HOSPADM

## 2024-10-17 RX ORDER — ACETAMINOPHEN 325 MG/1
650 TABLET ORAL EVERY 4 HOURS PRN
Status: DISCONTINUED | OUTPATIENT
Start: 2024-10-17 | End: 2024-10-19 | Stop reason: HOSPADM

## 2024-10-17 RX ADMIN — APIXABAN 5 MG: 5 TABLET, FILM COATED ORAL at 08:38

## 2024-10-17 RX ADMIN — POTASSIUM CHLORIDE 40 MEQ: 1500 TABLET, EXTENDED RELEASE ORAL at 12:02

## 2024-10-17 RX ADMIN — BENZOCAINE 6 MG-MENTHOL 10 MG LOZENGES 1 LOZENGE: at 07:20

## 2024-10-17 RX ADMIN — BENZOCAINE 6 MG-MENTHOL 10 MG LOZENGES 1 LOZENGE: at 03:33

## 2024-10-17 RX ADMIN — PSEUDOEPHEDRINE HCL 60 MG: 30 TABLET, FILM COATED ORAL at 21:54

## 2024-10-17 RX ADMIN — SODIUM CHLORIDE: 9 INJECTION, SOLUTION INTRAVENOUS at 06:09

## 2024-10-17 RX ADMIN — DOXYCYCLINE 100 MG: 100 CAPSULE ORAL at 21:54

## 2024-10-17 RX ADMIN — LEVALBUTEROL HYDROCHLORIDE 1.25 MG: 1.25 SOLUTION RESPIRATORY (INHALATION) at 11:20

## 2024-10-17 RX ADMIN — ACETAMINOPHEN 650 MG: 325 TABLET ORAL at 22:04

## 2024-10-17 RX ADMIN — ASPIRIN 81 MG: 81 TABLET, COATED ORAL at 08:38

## 2024-10-17 RX ADMIN — VANCOMYCIN HYDROCHLORIDE 1750 MG: 1 INJECTION, POWDER, LYOPHILIZED, FOR SOLUTION INTRAVENOUS at 23:14

## 2024-10-17 RX ADMIN — DILTIAZEM HYDROCHLORIDE 180 MG: 180 CAPSULE, COATED, EXTENDED RELEASE ORAL at 13:18

## 2024-10-17 RX ADMIN — APIXABAN 5 MG: 5 TABLET, FILM COATED ORAL at 21:54

## 2024-10-17 RX ADMIN — TAMSULOSIN HYDROCHLORIDE 0.4 MG: 0.4 CAPSULE ORAL at 08:38

## 2024-10-17 RX ADMIN — PSEUDOEPHEDRINE HCL 60 MG: 30 TABLET, FILM COATED ORAL at 10:43

## 2024-10-17 ASSESSMENT — PAIN DESCRIPTION - LOCATION
LOCATION: CHEST
LOCATION: BUTTOCKS
LOCATION: CHEST

## 2024-10-17 ASSESSMENT — PAIN - FUNCTIONAL ASSESSMENT
PAIN_FUNCTIONAL_ASSESSMENT: PREVENTS OR INTERFERES SOME ACTIVE ACTIVITIES AND ADLS
PAIN_FUNCTIONAL_ASSESSMENT: ACTIVITIES ARE NOT PREVENTED
PAIN_FUNCTIONAL_ASSESSMENT: PREVENTS OR INTERFERES SOME ACTIVE ACTIVITIES AND ADLS

## 2024-10-17 ASSESSMENT — PAIN SCALES - GENERAL
PAINLEVEL_OUTOF10: 5

## 2024-10-17 ASSESSMENT — PAIN DESCRIPTION - FREQUENCY
FREQUENCY: CONTINUOUS
FREQUENCY: CONTINUOUS

## 2024-10-17 ASSESSMENT — PAIN DESCRIPTION - PAIN TYPE
TYPE: ACUTE PAIN
TYPE: ACUTE PAIN

## 2024-10-17 ASSESSMENT — PAIN DESCRIPTION - ORIENTATION
ORIENTATION: ANTERIOR
ORIENTATION: ANTERIOR
ORIENTATION: RIGHT

## 2024-10-17 ASSESSMENT — PAIN DESCRIPTION - DESCRIPTORS
DESCRIPTORS: PRESSURE
DESCRIPTORS: BURNING
DESCRIPTORS: PRESSURE

## 2024-10-17 NOTE — CARE COORDINATION
ONGOING DISCHARGE PLAN:    Patient is alert and oriented x4.    Spoke with patient regarding discharge plan and patient confirms that plan is still to return to home w/ X Wife, Liberty.     Denies VNS.     Pt. Remains on Cardizem GTT. Eliquis PTA. Cardio following.     Pt. Had Rt. Buttock Abscess drained about a week ago, at [place]. Liberty was doing dressing changes at home. Wound Care Following. WBC 10.2.     Will continue to follow for additional discharge needs.    If patient is discharged prior to next notation, then this note serves as note for discharge by case management.    Electronically signed by Bety Enriquez RN on 10/17/2024 at 1:01 PM

## 2024-10-17 NOTE — PROGRESS NOTES
Date:   10/17/2024  Patient name: Alexandro Soriano  Date of admission:  10/16/2024  3:02 AM  MRN:   [id number]  YOB: 1965  PCP: No primary care provider on file.    Reason for Admission: Atrial fibrillation with rapid ventricular response (HCC) [I48.91]  Atrial fibrillation with RVR (HCC) [I48.91]  Chest pain, unspecified type [R07.9]    Cardiology follow-up: Chest pain, chronic A-fib       Referring physician Dr. Brijesh Montana     Impression  Admission 10/6/2024 for pleuritic chest pain intermittent in nature aggravates with the cough no hemoptysis he also had process chills and diarrhea   Substance abuse cocaine, history of smoking, tox screen positive for cocaine  High-sensitivity troponin 19, ECG showed A-fib nonspecific T wave changes  History of bicuspid valve, severe aortic stenosis, 29 mm S3 TAVR December 2018  Normal coronary arteries cardiac cath 9/4/2018  Severe perivalvular aortic regurgitation requiring percutaneous closure with decrease in aortic regurgitation to mild to moderate severity March 15, 2023  Chronic atrial fibrillation  Emphysema           History of present illness     59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  Also had diaphoresis and diarrhea and had a chills.  Recently patient completed a course of treatment for drug rehab for cocaine but relapsed on cocaine and using cocaine   In the emergency room patient found to have heart rate 140 A-fib with RVR and 2 sets of troponin were 19 and 27.  CTA chest was negative for pulmonary embolism.  He was started on Cardizem drip.    Blood pressure on admission 130/105 heart rate 140 oxygen

## 2024-10-17 NOTE — PROGRESS NOTES
[facility]   IN-PATIENT SERVICE   [facility]    HISTORY AND PHYSICAL EXAMINATION            Date:   10/17/2024  Patient name:  Alexandro Soriano  Date of admission:  10/16/2024  3:02 AM  MRN:   [id number]  Account:  [id number]  YOB: 1965  PCP:    No primary care provider on file.  Room:   [address] [place] [address]  Code Status:    Full Code    Chief Complaint:     Chief Complaint   Patient presents with    Tachycardia    Chest Pain       History Obtained From:     patient, electronic medical record    History of Present Illness:     The patient is a 59 y.o.  Non- / non  male who presents with Tachycardia and Chest Pain   and he is admitted to the hospital for the management of chest pain, palpitation  Patient, has past medical history of multiple medical problem which include tobacco abuse, protein calorie malnutrition, COPD, atrial fibrillation anticoagulation, history of arctic stenosis status post TAVR, cocaine abuse  Patient, very confused, history is limited  Patient, presented to emergency room with complaints of chest pain, chest pain started last night, intermittent nature, no radiation of chest pain  No aggravating relieving factor  Symptom associated with palpitation  Patient, completed a course of treatment for drug rehab for cocaine, but relapsed on cocaine and using cocaine recently  In emergency room, patient, found to have heart rate of 140 he was in A-fib with RVR,  2 sets of troponin was 19 and 27  Underwent CT chest which was negative for PE, pulmonary pathology  EKG did not suggest any ST-T changes  Patient started on Cardizem drip, heart rate is fairly controlled  At the time my evaluation, patient denying any chest pain  Patient follows with Dr. Woodard, with history of TAVR  Last cardiac cath was done in March 2013          Past Medical History:     Past Medical History:   Diagnosis Date    Abnormal echocardiogram 02/2018    Arrhythmia     AS (aortic stenosis)

## 2024-10-17 NOTE — PLAN OF CARE
Problem: Chronic Conditions and Co-morbidities  Goal: Patient's chronic conditions and co-morbidity symptoms are monitored and maintained or improved  Outcome: Progressing     Problem: Discharge Planning  Goal: Discharge to home or other facility with appropriate resources  10/17/2024 1652 by Roslyn Mccabe RN  Outcome: Progressing     Problem: Safety - Adult  Goal: Free from fall injury  10/17/2024 1652 by Roslyn Mccabe RN  Outcome: Progressing     Problem: ABCDS Injury Assessment  Goal: Absence of physical injury  10/17/2024 1652 by Roslyn Mccabe RN  Outcome: Progressing     Problem: Cardiovascular - Adult  Goal: Maintains optimal cardiac output and hemodynamic stability  10/17/2024 1652 by Roslyn Mccabe RN  Outcome: Progressing     Problem: Pain  Goal: Verbalizes/displays adequate comfort level or baseline comfort level  Outcome: Progressing

## 2024-10-17 NOTE — FLOWSHEET NOTE
10/17/24 0830   Treatment Team Notification   Reason for Communication Evaluate   Name of Team Member Notified Dr. Muñoz   Treatment Team Role Consulting Provider   Method of Communication Face to face   Response See orders   Notification Time 0830     Dr. Muñoz notified of patient having sharp chest/left side pain. Troponin and EKG ordered.

## 2024-10-17 NOTE — PLAN OF CARE
Problem: Discharge Planning  Goal: Discharge to home or other facility with appropriate resources  10/17/2024 0331 by Glory Luz, RN  Outcome: Progressing  Flowsheets (Taken 10/17/2024 0331)  Discharge to home or other facility with appropriate resources: Identify barriers to discharge with patient and caregiver     Problem: Safety - Adult  Goal: Free from fall injury  10/17/2024 0331 by Glory Luz, RN  Outcome: Progressing  Flowsheets (Taken 10/17/2024 0331)  Free From Fall Injury:   Instruct family/caregiver on patient safety   Based on caregiver fall risk screen, instruct family/caregiver to ask for assistance with transferring infant if caregiver noted to have fall risk factors  Note: The patient remained free from falls this shift, call light within reach, bed in locked and lowest position.  Side rails up x2.      Problem: ABCDS Injury Assessment  Goal: Absence of physical injury  10/17/2024 0331 by Glory Luz, RN  Outcome: Progressing  Flowsheets (Taken 10/17/2024 0331)  Absence of Physical Injury: Implement safety measures based on patient assessment  Note: Patient remained free from injury this shift. Call light within reach. Bed locked and in lowest position. Side rails x2. Pathways clear.      Problem: Cardiovascular - Adult  Goal: Maintains optimal cardiac output and hemodynamic stability  Outcome: Progressing  Flowsheets (Taken 10/17/2024 0331)  Maintains optimal cardiac output and hemodynamic stability: Monitor blood pressure and heart rate

## 2024-10-18 LAB
ANION GAP SERPL CALCULATED.3IONS-SCNC: 6 MMOL/L (ref 9–16)
BUN SERPL-MCNC: 9 MG/DL (ref 6–20)
C DIFFICILE TOXINS, PCR: NORMAL
CALCIUM SERPL-MCNC: 8.4 MG/DL (ref 8.6–10.4)
CHLORIDE SERPL-SCNC: 105 MMOL/L (ref 98–107)
CO2 SERPL-SCNC: 29 MMOL/L (ref 20–31)
CREAT SERPL-MCNC: 0.8 MG/DL (ref 0.7–1.2)
EKG Q-T INTERVAL: 378 MS
EKG QRS DURATION: 88 MS
EKG QTC CALCULATION (BAZETT): 449 MS
EKG R AXIS: 11 DEGREES
EKG T AXIS: 42 DEGREES
EKG VENTRICULAR RATE: 85 BPM
GFR, ESTIMATED: >90 ML/MIN/1.73M2
GLUCOSE SERPL-MCNC: 101 MG/DL (ref 74–99)
MICROORGANISM SPEC CULT: ABNORMAL
POTASSIUM SERPL-SCNC: 3.9 MMOL/L (ref 3.7–5.3)
SERVICE CMNT-IMP: ABNORMAL
SODIUM SERPL-SCNC: 140 MMOL/L (ref 136–145)
SPECIMEN DESCRIPTION: ABNORMAL
SPECIMEN DESCRIPTION: NORMAL

## 2024-10-18 PROCEDURE — 6370000000 HC RX 637 (ALT 250 FOR IP): Performed by: INTERNAL MEDICINE

## 2024-10-18 PROCEDURE — 6370000000 HC RX 637 (ALT 250 FOR IP): Performed by: NURSE PRACTITIONER

## 2024-10-18 PROCEDURE — 2580000003 HC RX 258: Performed by: INTERNAL MEDICINE

## 2024-10-18 PROCEDURE — 87040 BLOOD CULTURE FOR BACTERIA: CPT

## 2024-10-18 PROCEDURE — 6360000002 HC RX W HCPCS: Performed by: NURSE PRACTITIONER

## 2024-10-18 PROCEDURE — 93010 ELECTROCARDIOGRAM REPORT: CPT | Performed by: INTERNAL MEDICINE

## 2024-10-18 PROCEDURE — 80048 BASIC METABOLIC PNL TOTAL CA: CPT

## 2024-10-18 PROCEDURE — 2060000000 HC ICU INTERMEDIATE R&B

## 2024-10-18 PROCEDURE — 99232 SBSQ HOSP IP/OBS MODERATE 35: CPT

## 2024-10-18 PROCEDURE — 36415 COLL VENOUS BLD VENIPUNCTURE: CPT

## 2024-10-18 RX ORDER — VANCOMYCIN HYDROCHLORIDE 125 MG/1
125 CAPSULE ORAL 4 TIMES DAILY
Status: DISCONTINUED | OUTPATIENT
Start: 2024-10-18 | End: 2024-10-18

## 2024-10-18 RX ORDER — LANOLIN ALCOHOL/MO/W.PET/CERES
3 CREAM (GRAM) TOPICAL NIGHTLY PRN
Status: DISCONTINUED | OUTPATIENT
Start: 2024-10-18 | End: 2024-10-19 | Stop reason: HOSPADM

## 2024-10-18 RX ORDER — MIDODRINE HYDROCHLORIDE 5 MG/1
5 TABLET ORAL 3 TIMES DAILY PRN
Status: DISCONTINUED | OUTPATIENT
Start: 2024-10-18 | End: 2024-10-19 | Stop reason: HOSPADM

## 2024-10-18 RX ADMIN — VANCOMYCIN 1000 MG: 1 INJECTION, SOLUTION INTRAVENOUS at 23:03

## 2024-10-18 RX ADMIN — ACETAMINOPHEN 650 MG: 325 TABLET ORAL at 09:12

## 2024-10-18 RX ADMIN — TAMSULOSIN HYDROCHLORIDE 0.4 MG: 0.4 CAPSULE ORAL at 09:04

## 2024-10-18 RX ADMIN — APIXABAN 5 MG: 5 TABLET, FILM COATED ORAL at 09:09

## 2024-10-18 RX ADMIN — VANCOMYCIN 1000 MG: 1 INJECTION, SOLUTION INTRAVENOUS at 12:05

## 2024-10-18 RX ADMIN — Medication 3 MG: at 22:57

## 2024-10-18 RX ADMIN — APIXABAN 5 MG: 5 TABLET, FILM COATED ORAL at 20:19

## 2024-10-18 RX ADMIN — DILTIAZEM HYDROCHLORIDE 180 MG: 180 CAPSULE, COATED, EXTENDED RELEASE ORAL at 09:09

## 2024-10-18 RX ADMIN — MIDODRINE HYDROCHLORIDE 5 MG: 5 TABLET ORAL at 14:02

## 2024-10-18 RX ADMIN — POTASSIUM CHLORIDE 40 MEQ: 1500 TABLET, EXTENDED RELEASE ORAL at 09:08

## 2024-10-18 RX ADMIN — SODIUM CHLORIDE: 9 INJECTION, SOLUTION INTRAVENOUS at 04:26

## 2024-10-18 RX ADMIN — ASPIRIN 81 MG: 81 TABLET, COATED ORAL at 09:09

## 2024-10-18 ASSESSMENT — PAIN DESCRIPTION - LOCATION
LOCATION: CHEST;BUTTOCKS
LOCATION: BUTTOCKS

## 2024-10-18 ASSESSMENT — PAIN DESCRIPTION - DESCRIPTORS
DESCRIPTORS: JABBING
DESCRIPTORS: SHARP

## 2024-10-18 ASSESSMENT — PAIN DESCRIPTION - ORIENTATION: ORIENTATION: LEFT

## 2024-10-18 ASSESSMENT — PAIN SCALES - GENERAL
PAINLEVEL_OUTOF10: 6
PAINLEVEL_OUTOF10: 4
PAINLEVEL_OUTOF10: 6

## 2024-10-18 NOTE — PROGRESS NOTES
Date:   10/18/2024  Patient name: Alexandro Soriano  Date of admission:  10/16/2024  3:02 AM  MRN:   [id number]  YOB: 1965  PCP: No primary care provider on file.    Reason for Admission: Atrial fibrillation with rapid ventricular response (HCC) [I48.91]  Atrial fibrillation with RVR (HCC) [I48.91]  Chest pain, unspecified type [R07.9]      Cardiology follow-up: Chest pain, chronic A-fib       Referring physician Dr. Brijesh Montana     Impression  Admission 10/6/2024 for pleuritic chest pain intermittent in nature aggravates with the cough no hemoptysis he also had process chills and diarrhea   Substance abuse cocaine, history of smoking, tox screen positive for cocaine  High-sensitivity troponin 19, ECG showed A-fib nonspecific T wave changes  History of bicuspid valve, severe aortic stenosis, 29 mm S3 TAVR December 2018  Normal coronary arteries cardiac cath 9/4/2018  Severe perivalvular aortic regurgitation requiring percutaneous closure with decrease in aortic regurgitation to mild to moderate severity March 15, 2023  Chronic atrial fibrillation  Emphysema  Status post I&D for boil in his buttock area    History of present illness     59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  Also had diaphoresis and diarrhea and had a chills.  Recently patient completed a course of treatment for drug rehab for cocaine but relapsed on cocaine and using cocaine   In the emergency room patient found to have heart rate 140 A-fib with RVR and 2 sets of troponin were 19 and 27.  CTA chest was negative for pulmonary embolism.  He was started on Cardizem drip.     Blood pressure  97.7, leukocytosis WBC 13.7  Blood culture no growth /  24-hour  CTA chest no pulmonary embolism, emphysema  A-fib with RVR heart rate control has improved with the Cardizem infusion  Ejection fraction 50 to 55%  Cocaine abuse high-sensitivity troponin 19 on admission, repeat high-sensitivity troponin 13 on 10/17/2024  ECG showed A-fib, nonspecific T wave changes  COPD, current smoker  History of aortic valve replacement TAVR patient had bicuspid aortic valve  Status post I&D for a boil in the right buttock area      Patient Active Problem List:     Back pain     Tobacco abuse     Thrush, oral     Fatigue     Left foot burn     Cellulitis     Cellulitis of buttock     Gluteal abscess     Aortic valve stenosis     Shortness of breath     CHF (congestive heart failure), NYHA class I, acute on chronic, combined ([facility])     Pneumonia of both upper lobes due to infectious organism     Crack cocaine use     Atrial fibrillation with rapid ventricular response ([facility])     Chest pain     COPD (chronic obstructive pulmonary disease) ([facility])     Cocaine abuse ([facility])     Abnormal echocardiogram     Biatrial enlargement     Central perforation of tympanic membrane, right     Chews tobacco     Conductive hearing loss of right ear     History of cocaine abuse ([facility])     Mass of upper lobe of left lung     Mild concentric left ventricular hypertrophy (LVH)     MRSA (methicillin resistant Staphylococcus aureus) infection     Near syncope     NICM (nonischemic cardiomyopathy) ([facility])     Normal coronary arteries     Panlobular emphysema ([facility])     Right-sided tinnitus     S/p TAVR (transcatheter aortic valve replacement), bioprosthetic     Numbness and tingling of left upper extremity     Unstable angina ([facility])     Acute coronary syndrome ([facility])     Bronchitis, acute     Urinary retention     Atrial fibrillation ([facility])     Atrial flutter ([facility])     Atrial fibrillation with RVR ([facility])      Plan of Treatment:   Medications reviewed    1: A-fib with

## 2024-10-18 NOTE — CARE COORDINATION
ONGOING DISCHARGE PLAN:    Patient is alert and oriented x4.    Spoke with patient regarding discharge plan and patient confirms that plan is still to return to home w/ X Wife, Liberty.    Denies VNS.     Pt. Remains on PO Cardizem, Eliquis PTA. Cardio following.     Pt. Had Rt. Buttock Abscess drained about a week ago, at [place]. Liberty was doing dressing changes at home. Wound Care Following. WBC 10.2.     ID on board. Remains on IV Vanco. Has 1/2 + BC for Staff Epidermidis. Repeat BC ordered for today.     Referral sent to [facility], from [employer], to run benefits & follow if needed.     On PO Vanco for possible C Diff. Testing Indeterminate.    Will continue to follow for additional discharge needs.    If patient is discharged prior to next notation, then this note serves as note for discharge by case management.    Electronically signed by Bety Enriquez RN on 10/18/2024 at 12:28 PM

## 2024-10-18 NOTE — PLAN OF CARE
Problem: Chronic Conditions and Co-morbidities  Goal: Patient's chronic conditions and co-morbidity symptoms are monitored and maintained or improved  Outcome: Progressing  Flowsheets (Taken 10/18/2024 0926)  Care Plan - Patient's Chronic Conditions and Co-Morbidity Symptoms are Monitored and Maintained or Improved: Monitor and assess patient's chronic conditions and comorbid symptoms for stability, deterioration, or improvement     Problem: Discharge Planning  Goal: Discharge to home or other facility with appropriate resources  10/18/2024 1551 by Maddie Guzman RN  Outcome: Progressing  Flowsheets (Taken 10/18/2024 0926)  Discharge to home or other facility with appropriate resources: Identify barriers to discharge with patient and caregiver     Problem: Safety - Adult  Goal: Free from fall injury  10/18/2024 1551 by Maddie Guzman RN  Outcome: Progressing     Problem: ABCDS Injury Assessment  Goal: Absence of physical injury  10/18/2024 1551 by Maddie Guzman RN  Outcome: Progressing     Problem: Cardiovascular - Adult  Goal: Maintains optimal cardiac output and hemodynamic stability  10/18/2024 1551 by Maddie Guzman, RN  Outcome: Progressing     Problem: Pain  Goal: Verbalizes/displays adequate comfort level or baseline comfort level  10/18/2024 1551 by Maddie Guzman, RN  Outcome: Progressing

## 2024-10-18 NOTE — PROGRESS NOTES
Writer messageLoraine Krutz via secure message to inform of patient c/o pain of 5 on 0-15 scale, patient requesting tylenol for pain medication.     2158- Order placed.

## 2024-10-18 NOTE — PROGRESS NOTES
[facility]   Pharmacy Pharmacokinetic Monitoring Service - Vancomycin     Alexandro Soriano is a 59 y.o. male starting on vancomycin therapy for Bloodstream infection. Pharmacy consulted by Saumya Lopez APRN - CNP for monitoring and adjustment.    Target Concentration: Goal AUC/ELIANA 400-600 mg*hr/L    Additional Antimicrobials: None.     Pertinent Laboratory Values:   Wt Readings from Last 1 Encounters:   10/17/24 67.2 kg (148 lb 2.4 oz)     Temp Readings from Last 1 Encounters:   10/17/24 98.6 °F (37 °C) (Oral)     Estimated Creatinine Clearance: 95 mL/min (based on SCr of 0.8 mg/dL).  Recent Labs     10/16/24  0314 10/17/24  1040   CREATININE 1.3* 0.8   BUN 36* 13   WBC 13.5* 10.2     Procalcitonin: N/A.    Pertinent Cultures:  Culture Date Source Results   10/16 Blood culture 1/2 Staph epi   MRSA Nasal Swab: N/A. Non-respiratory infection.    Plan:  Dosing recommendations based on Bayesian software  Start vancomycin Loading dose 1750 mg once followed by 1000 mg q12h.  Anticipated AUC of 459 mg/L.hr and trough concentration of 11.7 mg/L at steady state  Renal labs as indicated   Vancomycin concentration ordered for 10/19 @ 0600   Pharmacy will continue to monitor patient and adjust therapy as indicated    Thank you for the consult,  Baudilio Myers RPH  10/17/2024 11:49 PM

## 2024-10-18 NOTE — PROGRESS NOTES
[facility]   Pharmacy Pharmacokinetic Monitoring Service - Vancomycin    Consulting Provider: GILA Mccain   Indication: bloodstream infection  Target Concentration: Goal AUC/ELIANA 400-600 mg*hr/L  Day of Therapy: 2  Additional Antimicrobials: none    Pertinent Laboratory Values:   Wt Readings from Last 1 Encounters:   10/17/24 67.2 kg (148 lb 2.4 oz)     Temp Readings from Last 1 Encounters:   10/18/24 97.6 °F (36.4 °C) (Oral)     Estimated Creatinine Clearance: 95 mL/min (based on SCr of 0.8 mg/dL).  Recent Labs     10/16/24  0314 10/17/24  1040   CREATININE 1.3* 0.8   BUN 36* 13   WBC 13.5* 10.2     Procalcitonin: n/a    Pertinent Cultures:  Culture Date Source Results   10/16/ Blood x 1  GPC in clusters   MRSA Nasal Swab: N/A. Non-respiratory infection.    Recent vancomycin administrations                     vancomycin (VANCOCIN) 1,750 mg in sodium chloride 0.9 % 500 mL IVPB (mg) 1,750 mg New Bag 10/17/24 7319                    Assessment:  Date/Time Current Dose Concentration Timing of Concentration (h) AUC   10/17 1000 mg IVPB Q12H --- --- ---   Note: Serum concentrations collected for AUC dosing may appear elevated if collected in close proximity to the dose administered, this is not necessarily an indication of toxicity    Plan:  Current dosing regimen is therapeutic  Continue current dose  Repeat vancomycin concentration ordered for 10/19 @ 0600   Pharmacy will continue to monitor patient and adjust therapy as indicated    Loading dose: N/A  Regimen: 1250 mg IV every 12 hours.  Start time: 11:15 on 10/18/2024  Exposure target: AUC24 (range)400-600 mg/L.hr   NVL60-18: 524 mg/L.hr  AUC24,ss: 572 mg/L.hr  Probability of AUC24 > 400: 89 %  Ctrough,ss: 14.6 mg/L  Probability of Ctrough,ss > 20: 22 %      Thank you for the consult,  Stacey Miller RPH  10/18/2024 8:33 AM

## 2024-10-18 NOTE — PLAN OF CARE
Problem: Discharge Planning  Goal: Discharge to home or other facility with appropriate resources  10/18/2024 0325 by Glory Luz RN  Outcome: Progressing  Flowsheets (Taken 10/18/2024 0325)  Discharge to home or other facility with appropriate resources: Identify barriers to discharge with patient and caregiver     Problem: Safety - Adult  Goal: Free from fall injury  10/18/2024 0325 by Glory Luz RN  Outcome: Progressing  Flowsheets (Taken 10/18/2024 0325)  Free From Fall Injury:   Instruct family/caregiver on patient safety   Based on caregiver fall risk screen, instruct family/caregiver to ask for assistance with transferring infant if caregiver noted to have fall risk factors  Note: The patient remained free from falls this shift, call light within reach, bed in locked and lowest position.  Side rails up x2.      Problem: ABCDS Injury Assessment  Goal: Absence of physical injury  10/18/2024 0325 by Glory Luz RN  Outcome: Progressing  Flowsheets (Taken 10/18/2024 0325)  Absence of Physical Injury: Implement safety measures based on patient assessment  Note: Patient remained free from injury this shift. Call light within reach. Bed locked and in lowest position. Side rails x2. Pathways clear.      Problem: Cardiovascular - Adult  Goal: Maintains optimal cardiac output and hemodynamic stability  10/18/2024 0325 by Glory Luz RN  Outcome: Progressing  Flowsheets (Taken 10/18/2024 0325)  Maintains optimal cardiac output and hemodynamic stability: Monitor blood pressure and heart rate     Problem: Pain  Goal: Verbalizes/displays adequate comfort level or baseline comfort level  10/18/2024 0325 by Glory Luz RN  Outcome: Progressing  Flowsheets (Taken 10/18/2024 0325)  Verbalizes/displays adequate comfort level or baseline comfort level:   Encourage patient to monitor pain and request assistance   Assess pain using appropriate pain scale  Note: Pain assessment completed. Administered PRN pain  medication based on 0-10 pain scale and patients stated pain.

## 2024-10-18 NOTE — PROGRESS NOTES
[facility]   IN-PATIENT SERVICE   [facility]    HISTORY AND PHYSICAL EXAMINATION            Date:   10/18/2024  Patient name:  Alexandro Soriano  Date of admission:  10/16/2024  3:02 AM  MRN:   [id number]  Account:  [id number]  YOB: 1965  PCP:    No primary care provider on file.  Room:   [address] [place] [address]  Code Status:    Full Code    Chief Complaint:     Chief Complaint   Patient presents with    Tachycardia    Chest Pain       History Obtained From:     patient, electronic medical record    History of Present Illness:     The patient is a 59 y.o.  Non- / non  male who presents with Tachycardia and Chest Pain   and he is admitted to the hospital for the management of chest pain, palpitation  Patient, has past medical history of multiple medical problem which include tobacco abuse, protein calorie malnutrition, COPD, atrial fibrillation anticoagulation, history of arctic stenosis status post TAVR, cocaine abuse  Patient, very confused, history is limited  Patient, presented to emergency room with complaints of chest pain, chest pain started last night, intermittent nature, no radiation of chest pain  No aggravating relieving factor  Symptom associated with palpitation  Patient, completed a course of treatment for drug rehab for cocaine, but relapsed on cocaine and using cocaine recently  In emergency room, patient, found to have heart rate of 140 he was in A-fib with RVR,  2 sets of troponin was 19 and 27  Underwent CT chest which was negative for PE, pulmonary pathology  EKG did not suggest any ST-T changes  Patient started on Cardizem drip, heart rate is fairly controlled  At the time my evaluation, patient denying any chest pain  Patient follows with Dr. Woodard, with history of TAVR  Last cardiac cath was done in March 2013          Past Medical History:     Past Medical History:   Diagnosis Date    Abnormal echocardiogram 02/2018    Arrhythmia     AS (aortic stenosis)   Encounter on 10/16/24   Source Information    Jenniffer Warren, RN  [facility]   Document History      Plan:     Patient status Admit as inpatient in the  Progressive Unit/Step down    Admitted with A-fib with RVR, likely due to cocaine abuse, on Cardizem drip, resuming home dose of beta-blocker, already on Eliquis which is resumed, ordering ECHO   Will order UDS  Requesting cardiology consult  History of arctic stenosis status post TAVR  Toxic metabolic encephalopathy due to substance abuse  COPD, controlled, ordering albuterol as needed  Will keep potassium more than 4 and magnesium more than 2, will replace potassium  Slightly elevated creatinine 1.3, above from baseline, starting patient on gentle hydration    10/17  Patient, creatinine improved  Has hypokalemia will replace potassium  Patient still having intermittent episode of chest tightness, discussed with Dr. Muñoz may need stress test tomorrow  Cardizem drip is off, starting patient on p.o. Cardizem  Patient had recent surgery for boil in his buttock area, wound care following  Pictures in media  Starting patient on doxycycline    October 18.    Patient having diarrhea.  C. difficile indeterminant.  Start p.o. vancomycin  Appreciate cardiology input.    IV Cardizem DC'd yesterday switched to DC Cardizem.  Afebrile, heart rate controlled.  On Eliquis 5 twice daily.  Cocaine positive.  Advised cessation.  Blood culture 1 out of 2 growing Staph epidermidis.  Repeat blood cultures.  PT/OT.  Wound care following.    Consultations:   IP CONSULT TO CARDIOLOGY  PHARMACY TO DOSE VANCOMYCIN    Patient is admitted as inpatient status because of co-morbidities listed above, severity of signs and symptoms as outlined, requirement for current medical therapies and most importantly because of direct risk to patient if care not provided in a hospital setting.    Carlitos Bates MD  10/18/2024  9:13 AM    Please note that this chart was generated using  voice recognition Dragon dictation software.  Although every effort was made to ensure the accuracy of this automated transcription, some errors in transcription may have occurred.

## 2024-10-19 VITALS
RESPIRATION RATE: 18 BRPM | DIASTOLIC BLOOD PRESSURE: 78 MMHG | HEART RATE: 88 BPM | TEMPERATURE: 97.5 F | WEIGHT: 148.15 LBS | OXYGEN SATURATION: 98 % | BODY MASS INDEX: 22.45 KG/M2 | SYSTOLIC BLOOD PRESSURE: 117 MMHG | HEIGHT: 68 IN

## 2024-10-19 PROBLEM — R19.7 DIARRHEA: Status: ACTIVE | Noted: 2024-10-19

## 2024-10-19 LAB
ANION GAP SERPL CALCULATED.3IONS-SCNC: 8 MMOL/L (ref 9–16)
BASOPHILS # BLD: 0 K/UL (ref 0–0.2)
BASOPHILS NFR BLD: 1 % (ref 0–2)
BUN SERPL-MCNC: 11 MG/DL (ref 6–20)
CALCIUM SERPL-MCNC: 8.7 MG/DL (ref 8.6–10.4)
CHLORIDE SERPL-SCNC: 106 MMOL/L (ref 98–107)
CO2 SERPL-SCNC: 24 MMOL/L (ref 20–31)
CREAT SERPL-MCNC: 0.7 MG/DL (ref 0.7–1.2)
DATE LAST DOSE: NORMAL
EOSINOPHIL # BLD: 0.1 K/UL (ref 0–0.4)
EOSINOPHILS RELATIVE PERCENT: 2 % (ref 0–4)
ERYTHROCYTE [DISTWIDTH] IN BLOOD BY AUTOMATED COUNT: 15.7 % (ref 11.5–14.9)
GFR, ESTIMATED: >90 ML/MIN/1.73M2
GLUCOSE SERPL-MCNC: 106 MG/DL (ref 74–99)
HCT VFR BLD AUTO: 41.3 % (ref 41–53)
HGB BLD-MCNC: 14 G/DL (ref 13.5–17.5)
LYMPHOCYTES NFR BLD: 1.3 K/UL (ref 1–4.8)
LYMPHOCYTES RELATIVE PERCENT: 20 % (ref 24–44)
MCH RBC QN AUTO: 31.4 PG (ref 26–34)
MCHC RBC AUTO-ENTMCNC: 33.9 G/DL (ref 31–37)
MCV RBC AUTO: 92.6 FL (ref 80–100)
MONOCYTES NFR BLD: 0.8 K/UL (ref 0.1–1.3)
MONOCYTES NFR BLD: 13 % (ref 1–7)
NEUTROPHILS NFR BLD: 64 % (ref 36–66)
NEUTS SEG NFR BLD: 4.1 K/UL (ref 1.3–9.1)
PLATELET # BLD AUTO: 240 K/UL (ref 150–450)
PMV BLD AUTO: 9.8 FL (ref 6–12)
POTASSIUM SERPL-SCNC: 4.1 MMOL/L (ref 3.7–5.3)
RBC # BLD AUTO: 4.46 M/UL (ref 4.5–5.9)
SODIUM SERPL-SCNC: 138 MMOL/L (ref 136–145)
TME LAST DOSE: 2303 H
VANCOMYCIN DOSE: 1000 MG
VANCOMYCIN SERPL-MCNC: 8.7 UG/ML (ref 5–40)
WBC OTHER # BLD: 6.3 K/UL (ref 3.5–11)

## 2024-10-19 PROCEDURE — 80202 ASSAY OF VANCOMYCIN: CPT

## 2024-10-19 PROCEDURE — 6370000000 HC RX 637 (ALT 250 FOR IP): Performed by: INTERNAL MEDICINE

## 2024-10-19 PROCEDURE — 85025 COMPLETE CBC W/AUTO DIFF WBC: CPT

## 2024-10-19 PROCEDURE — 6370000000 HC RX 637 (ALT 250 FOR IP): Performed by: NURSE PRACTITIONER

## 2024-10-19 PROCEDURE — 36415 COLL VENOUS BLD VENIPUNCTURE: CPT

## 2024-10-19 PROCEDURE — 99239 HOSP IP/OBS DSCHRG MGMT >30: CPT

## 2024-10-19 PROCEDURE — 2580000003 HC RX 258: Performed by: INTERNAL MEDICINE

## 2024-10-19 PROCEDURE — 80048 BASIC METABOLIC PNL TOTAL CA: CPT

## 2024-10-19 RX ORDER — DOXYCYCLINE HYCLATE 100 MG
100 TABLET ORAL 2 TIMES DAILY
Qty: 14 TABLET | Refills: 0 | Status: CANCELLED | OUTPATIENT
Start: 2024-10-19 | End: 2024-10-26

## 2024-10-19 RX ORDER — DOXYCYCLINE 100 MG/1
100 CAPSULE ORAL EVERY 12 HOURS SCHEDULED
Status: DISCONTINUED | OUTPATIENT
Start: 2024-10-19 | End: 2024-10-19 | Stop reason: HOSPADM

## 2024-10-19 RX ORDER — VANCOMYCIN 1 G/200ML
1000 INJECTION, SOLUTION INTRAVENOUS EVERY 12 HOURS
Status: DISCONTINUED | OUTPATIENT
Start: 2024-10-19 | End: 2024-10-19

## 2024-10-19 RX ORDER — MIDODRINE HYDROCHLORIDE 5 MG/1
5 TABLET ORAL 3 TIMES DAILY PRN
Qty: 90 TABLET | Refills: 1 | Status: SHIPPED | OUTPATIENT
Start: 2024-10-19

## 2024-10-19 RX ORDER — DOXYCYCLINE 100 MG/1
100 CAPSULE ORAL EVERY 12 HOURS SCHEDULED
Qty: 14 CAPSULE | Refills: 0 | Status: SHIPPED | OUTPATIENT
Start: 2024-10-19 | End: 2024-10-26

## 2024-10-19 RX ORDER — ATORVASTATIN CALCIUM 10 MG/1
10 TABLET, FILM COATED ORAL NIGHTLY
Qty: 30 TABLET | Refills: 3 | Status: SHIPPED | OUTPATIENT
Start: 2024-10-19

## 2024-10-19 RX ORDER — LANOLIN ALCOHOL/MO/W.PET/CERES
3 CREAM (GRAM) TOPICAL NIGHTLY PRN
Qty: 30 TABLET | Refills: 1 | Status: SHIPPED | OUTPATIENT
Start: 2024-10-19

## 2024-10-19 RX ORDER — DILTIAZEM HYDROCHLORIDE 180 MG/1
180 CAPSULE, COATED, EXTENDED RELEASE ORAL DAILY
Qty: 30 CAPSULE | Refills: 3 | Status: SHIPPED | OUTPATIENT
Start: 2024-10-20

## 2024-10-19 RX ORDER — TIOTROPIUM BROMIDE 18 UG/1
18 CAPSULE ORAL; RESPIRATORY (INHALATION) DAILY
Qty: 90 CAPSULE | Refills: 1 | Status: SHIPPED | OUTPATIENT
Start: 2024-10-19

## 2024-10-19 RX ADMIN — SODIUM CHLORIDE: 9 INJECTION, SOLUTION INTRAVENOUS at 03:44

## 2024-10-19 RX ADMIN — ACETAMINOPHEN 650 MG: 325 TABLET ORAL at 09:54

## 2024-10-19 RX ADMIN — ASPIRIN 81 MG: 81 TABLET, COATED ORAL at 09:46

## 2024-10-19 RX ADMIN — APIXABAN 5 MG: 5 TABLET, FILM COATED ORAL at 09:47

## 2024-10-19 RX ADMIN — TAMSULOSIN HYDROCHLORIDE 0.4 MG: 0.4 CAPSULE ORAL at 09:47

## 2024-10-19 RX ADMIN — DILTIAZEM HYDROCHLORIDE 180 MG: 180 CAPSULE, COATED, EXTENDED RELEASE ORAL at 09:46

## 2024-10-19 ASSESSMENT — PAIN DESCRIPTION - DESCRIPTORS: DESCRIPTORS: ACHING

## 2024-10-19 ASSESSMENT — PAIN DESCRIPTION - LOCATION: LOCATION: COCCYX

## 2024-10-19 ASSESSMENT — PAIN SCALES - GENERAL: PAINLEVEL_OUTOF10: 3

## 2024-10-19 ASSESSMENT — PAIN DESCRIPTION - ORIENTATION: ORIENTATION: RIGHT

## 2024-10-19 NOTE — PLAN OF CARE
Problem: Chronic Conditions and Co-morbidities  Goal: Patient's chronic conditions and co-morbidity symptoms are monitored and maintained or improved  10/19/2024 1451 by Magdalena Chavez RN  Outcome: Progressing     Problem: Discharge Planning  Goal: Discharge to home or other facility with appropriate resources  Outcome: Progressing     Problem: Safety - Adult  Goal: Free from fall injury  10/19/2024 1451 by Magdalena Chavez RN  Outcome: Progressing     Problem: ABCDS Injury Assessment  Goal: Absence of physical injury  Outcome: Progressing

## 2024-10-19 NOTE — FLOWSHEET NOTE
10/18/24 2223   Treatment Team Notification   Reason for Communication Patient/Family request   Name of Team Member Notified Loraine Mcdaniel NP   Treatment Team Role Advanced Practice Nurse   Method of Communication Secure Message   Response See orders   Notification Time 2226      Pt has difficulty sleeping. No prn med on MAR. Loraine Mcdaniel NP notified. See new order on MAR.

## 2024-10-19 NOTE — DISCHARGE INSTRUCTIONS
Complete your antibiotic course.  Follow-up with your cardiologist.  Follow-up with your family doctor.

## 2024-10-19 NOTE — PROGRESS NOTES
Patient educated on discharge instructions which include: medication schedule, reasons for new medications and some side effects and need to follow-up.   Patient verbalizes understanding of discharge and states readiness for discharge.  All belongings were gathered by patient and in patient's possession.  No distress noted at this time.     Current vital signs:  /78   Pulse 88   Temp 97.5 °F (36.4 °C) (Oral)   Resp 18   Ht 1.727 m (5' 7.99\")   Wt 67.2 kg (148 lb 2.4 oz)   SpO2 98%   BMI 22.53 kg/m²                MEWS Score: 2

## 2024-10-19 NOTE — PLAN OF CARE
Problem: Chronic Conditions and Co-morbidities  Goal: Patient's chronic conditions and co-morbidity symptoms are monitored and maintained or improved  10/19/2024 0357 by Norberto Lazo RN  Outcome: Progressing  Note:   Monitored and assessed patient's chronic conditions and comorbid symptoms for stability, deterioration, or improvement.     Problem: Safety - Adult  Goal: Free from fall injury  10/19/2024 0357 by Norberto Lazo RN  Outcome: Progressing  Note: No falls noted this shift. Patient ambulates with x1 staff assistance without difficulty.  Bed kept in low position. Safe environment maintained. Bedside table & call light in reach. Uses call light appropriately when needing assistance.       Problem: Cardiovascular - Adult  Goal: Maintains optimal cardiac output and hemodynamic stability  10/19/2024 0357 by Norberto Lazo RN  Outcome: Progressing     Problem: Pain  Goal: Verbalizes/displays adequate comfort level or baseline comfort level  10/19/2024 0357 by Norberto Lazo RN  Outcome: Progressing  Note: No pain at this time.  0/10 pain scale.

## 2024-10-19 NOTE — CARE COORDINATION
DISCHARGE PLANNING NOTE:    [facility] can not take patient for home IV antibiotics due to drug use history.    Electronically signed by Jessica Shukla RN on 10/19/2024 at 6:10 PM

## 2024-10-19 NOTE — PROGRESS NOTES
Vancomycin Random Dose amount 1,000     Vancomycin Random Date last dose [id number]     Vancomycin Random Time last dose 2303    CBC with Auto Differential    Collection Time: 10/19/24  6:50 AM   Result Value Ref Range    WBC 6.3 3.5 - 11.0 k/uL    RBC 4.46 (L) 4.5 - 5.9 m/uL    Hemoglobin 14.0 13.5 - 17.5 g/dL    Hematocrit 41.3 41 - 53 %    MCV 92.6 80 - 100 fL    MCH 31.4 26 - 34 pg    MCHC 33.9 31 - 37 g/dL    RDW 15.7 (H) 11.5 - 14.9 %    Platelets 240 150 - 450 k/uL    MPV 9.8 6.0 - 12.0 fL    Neutrophils % 64 36 - 66 %    Lymphocytes % 20 (L) 24 - 44 %    Monocytes % 13 (H) 1 - 7 %    Eosinophils % 2 0 - 4 %    Basophils % 1 0 - 2 %    Neutrophils Absolute 4.10 1.3 - 9.1 k/uL    Lymphocytes Absolute 1.30 1.0 - 4.8 k/uL    Monocytes Absolute 0.80 0.1 - 1.3 k/uL    Eosinophils Absolute 0.10 0.0 - 0.4 k/uL    Basophils Absolute 0.00 0.0 - 0.2 k/uL   Basic Metabolic Panel w/ Reflex to MG    Collection Time: 10/19/24  6:50 AM   Result Value Ref Range    Sodium 138 136 - 145 mmol/L    Potassium 4.1 3.7 - 5.3 mmol/L    Chloride 106 98 - 107 mmol/L    CO2 24 20 - 31 mmol/L    Anion Gap 8 (L) 9 - 16 mmol/L    Glucose 106 (H) 74 - 99 mg/dL    BUN 11 6 - 20 mg/dL    Creatinine 0.7 0.7 - 1.2 mg/dL    Est, Glom Filt Rate >90 >60 mL/min/1.73m2    Calcium 8.7 8.6 - 10.4 mg/dL       Imaging/Diagnostics:      Assessment :      Primary Problem  Atrial fibrillation with RVR ([facility])    Active Hospital Problems    Diagnosis Date Noted    Atrial fibrillation with RVR ([facility]) [I48.91] 05/21/2023    Cocaine abuse ([facility]) [F14.10] 07/30/2019    Crack cocaine use [F14.90] 01/06/2019    S/p TAVR (transcatheter aortic valve replacement), bioprosthetic [Z95.3] 12/27/2018        Media Information    Document Information    Wound Care Image: Wound   Right buttock 2.2x3.1x0.3   10/16/2024 13:43   Attached To:   Hospital Encounter on 10/16/24   Source Information    Jenniffer Warren, RN  [facility]   Document History      Plan:  most importantly because of direct risk to patient if care not provided in a hospital setting.    Carlitos Bates MD  10/19/2024  11:14 AM    Please note that this chart was generated using voice recognition Dragon dictation software.  Although every effort was made to ensure the accuracy of this automated transcription, some errors in transcription may have occurred.

## 2024-10-19 NOTE — PROGRESS NOTES
Rn spoke w/ Dr. Muñoz cardio- from his standpoint pt may discharge. MD advise pt to see his cardiologist at [facility] for outpt f/u.

## 2024-10-19 NOTE — DISCHARGE SUMMARY
IN-PATIENT SERVICE   [facility]    Discharge Summary     Patient ID: Alexandro Soriano  :  1965   MRN: [id number]     ACCOUNT:  [id number]   Patient's PCP: No primary care provider on file.  Admit Date: 10/16/2024   Discharge Date: 10/19/24   Length of Stay: 3  Code Status:  Full Code  Admitting Physician: Brijesh Montana MD  Discharge Physician: Carlitos Bates MD     Active Discharge Diagnoses:     Primary Problem  Atrial fibrillation with RVR (HCC)      Hospital Problems  Active Hospital Problems    Diagnosis Date Noted    Atrial fibrillation with RVR (HCC) [I48.91] 2023    Cocaine abuse (HCC) [F14.10] 2019    Crack cocaine use [F14.90] 2019    S/p TAVR (transcatheter aortic valve replacement), bioprosthetic [Z95.3] 2018       Admission Condition:  fair     Discharged Condition: fair    Hospital Stay:     Hospital Course:  lAexandro Soriano is a 59 y.o. male who was admitted for the management of Atrial fibrillation with RVR (HCC) , presented to ER with Tachycardia and Chest Pain  Multiple past medical history of tobacco abuse, COPD, A-fib, history of aortic stenosis s/p TAVR and cocaine abuse.  Patient was found to be in A-fib with RVR in the ED.  Admitted for the management of A-fib with RVR.  Was initially started on Cardizem drip, later switched to p.o. Cardizem.  Patient was found to be hypokalemic, potassium was replaced.  Patient recently had a boil in the buttock area for which patient was treated with antibiotics and will be switched to p.o. doxycycline on discharge.  Wound care was following.  Blood culture 1 out of 2 initially grew staph epidermis, likely contaminant patient was started on IV vancomycin, repeat blood cultures were negative.  IV vancomycin was discontinued.  Patient was feeling much better on the day of discharge.  Patient seen and examined on the day of discharge    Significant therapeutic interventions: As  succinate 25 MG extended release tablet  Commonly known as: TOPROL XL  Take 1 tablet by mouth daily     ProAir  (90 Base) MCG/ACT inhaler  Generic drug: albuterol sulfate HFA  inhale 2 puffs by mouth and INTO THE LUNGS every 6 hours if needed for shortness of breath     tamsulosin 0.4 MG capsule  Commonly known as: FLOMAX     tiotropium 18 MCG inhalation capsule  Commonly known as: Spiriva HandiHaler  Inhale 1 capsule into the lungs daily            STOP taking these medications      dicyclomine 10 MG capsule  Commonly known as: BENTYL     dilTIAZem 180 MG extended release capsule  Commonly known as: DILACOR XR     guaiFENesin 600 MG extended release tablet  Commonly known as: MUCINEX     ibuprofen 600 MG tablet  Commonly known as: ADVIL;MOTRIN     pantoprazole 20 MG tablet  Commonly known as: Protonix               Where to Get Your Medications        These medications were sent to [id number] - [place], [place] - [address] [facility] - P [phone] - F [phone]  [address] [place] [id number]      Phone: [phone]   atorvastatin 10 MG tablet  Benzocaine-Menthol 6-10 MG Lozg lozenge  dilTIAZem 180 MG extended release capsule  doxycycline monohydrate 100 MG capsule  melatonin 3 MG Tabs tablet  midodrine 5 MG tablet  tiotropium 18 MCG inhalation capsule         Time Spent on discharge is 35 mins in patient examination, evaluation, counseling as well as medication reconciliation, prescriptions for required medications, discharge plan and follow up.    Electronically signed by   Carlitos Bates MD  10/19/2024  5:21 PM      Thank you Dr. Bose primary care provider on file. for the opportunity to be involved in this patient's care.

## 2024-10-19 NOTE — PROGRESS NOTES
[facility]   Pharmacy Pharmacokinetic Monitoring Service - Vancomycin    Consulting Provider: Saumya Lopez   Indication: Bloodstream infection  Target Concentration: Goal AUC/ELIANA 400-600 mg*hr/L  Day of Therapy: 3  Additional Antimicrobials: none    Pertinent Laboratory Values:   Wt Readings from Last 1 Encounters:   10/17/24 67.2 kg (148 lb 2.4 oz)     Temp Readings from Last 1 Encounters:   10/19/24 98.2 °F (36.8 °C) (Oral)     Estimated Creatinine Clearance: 108 mL/min (based on SCr of 0.7 mg/dL).  Recent Labs     10/17/24  1040 10/18/24  1101 10/19/24  0650   CREATININE 0.8 0.8 0.7   BUN 13 9 11   WBC 10.2  --  6.3     Procalcitonin: n/a    Pertinent Cultures: see micro  MRSA Nasal Swab: N/A. Non-respiratory infection.    Recent vancomycin administrations                     vancomycin (VANCOCIN) 1000 mg in 200 mL IVPB (mg) 1,000 mg New Bag 10/18/24 2303     1,000 mg New Bag  1205    vancomycin (VANCOCIN) 1,750 mg in sodium chloride 0.9 % 500 mL IVPB (mg) 1,750 mg New Bag 10/17/24 2314                    Assessment:  Date/Time Current Dose Concentration Timing of Concentration (h) AUC   10/18 1000 mg IVPB Q12H 8.7 11 413   Note: Serum concentrations collected for AUC dosing may appear elevated if collected in close proximity to the dose administered, this is not necessarily an indication of toxicity    Plan:  Current dosing regimen is therapeutic  Continue current dose  Repeat vancomycin concentration ordered for 10/21 @ 0600   Pharmacy will continue to monitor patient and adjust therapy as indicated    Loading dose: N/A  Regimen: 1000 mg IV every 12 hours.  Start time: 11:03 on 10/19/2024  Exposure target: AUC24 (range)400-600 mg/L.hr   [id number]: 413 mg/L.hr  AUC24,ss: 434 mg/L.hr  Probability of AUC24 > 400: 67 %  Ctrough,ss: 10.8 mg/L  Probability of Ctrough,ss > 20: 1 %      Thank you for the consult,  Stacey Miller [facility]  10/19/2024 10:52 AM

## 2024-10-20 LAB
MICROORGANISM SPEC CULT: NORMAL
SERVICE CMNT-IMP: NORMAL
SPECIMEN DESCRIPTION: NORMAL

## 2024-10-21 LAB
MICROORGANISM SPEC CULT: NORMAL
SERVICE CMNT-IMP: NORMAL
SPECIMEN DESCRIPTION: NORMAL

## 2024-10-23 LAB
MICROORGANISM SPEC CULT: NORMAL
MICROORGANISM SPEC CULT: NORMAL
SERVICE CMNT-IMP: NORMAL
SERVICE CMNT-IMP: NORMAL
SPECIMEN DESCRIPTION: NORMAL
SPECIMEN DESCRIPTION: NORMAL

## 2024-10-28 NOTE — PROGRESS NOTES
Physician Progress Note      PATIENT:               NATASHA PATE  CSN #:                  [id number]  :                       1965  ADMIT DATE:       10/16/2024 3:02 AM  DISCH DATE:        10/19/2024 6:14 PM  RESPONDING  PROVIDER #:        Carlitos Bates MD          QUERY TEXT:    Patient admitted with Afib. Documentation reflects VELASQUEZ IM progress notes 10/26   .  If possible, please document in the progress notes and discharge summary   if VELASQUEZ was:    The medical record reflects the following:  Risk Factors:  HTN, CHF.  Clinical Indicators:  IM progress notes 10/16-Mild VELASQUEZ with creatinine 1.3,   which is up from baseline of 0.8.  Lab result-10/16- cr -1.3, BUN 36, GFR-63.  10/17-cr-0.8, BUn-13, GFr-> 90.  10/18-cr-.08, BUN-29, GFR>90  10/19-cr-0.7, BUN -11, GFR>90.    Treatment: IVF, serial lab.    Thank you  Alyssa Abad [facility].  Options provided:  -- Velasquez confirmed after study  -- Velasquez ruled out after study  -- Other - I will add my own diagnosis  -- Disagree - Not applicable / Not valid  -- Disagree - Clinically unable to determine / Unknown  -- Refer to Clinical Documentation Reviewer    PROVIDER RESPONSE TEXT:    Velasquez confirmed after study.    Query created by: Alyssa Abad on 10/28/2024 4:00 AM      Electronically signed by:  Carlitos Bates MD 10/28/2024 8:19 AM

## (undated) DEVICE — STERILE LATEX POWDER-FREE SURGICAL GLOVESWITH NITRILE COATING: Brand: PROTEXIS

## (undated) DEVICE — 1200CC GUARDIAN II: Brand: GUARDIAN

## (undated) DEVICE — PAD,ABDOMINAL,8"X7.5",ST,LF,20/BX: Brand: MEDLINE INDUSTRIES, INC.

## (undated) DEVICE — Z DISCONTINUED BY MEDLINE USE 2711682 TRAY SKIN PREP DRY W/ PREM GLV

## (undated) DEVICE — SWAB CULT CLR BLU PLAS RAYON LIQ AMIES AERB ANAERB FRIC CAP

## (undated) DEVICE — SOLUTION IV IRRIG POUR BRL 0.9% SODIUM CHL 2F7124

## (undated) DEVICE — YANKAUER,BULB TIP,W/O VENT,RIGID,STERILE: Brand: MEDLINE

## (undated) DEVICE — BANDAGE,GAUZE,BULKEE II,4.5"X4.1YD,STRL: Brand: MEDLINE

## (undated) DEVICE — ST CHARLES MINOR ABDOMINAL PK: Brand: MEDLINE INDUSTRIES, INC.

## (undated) DEVICE — SHEET, T, LAPAROTOMY, STERILE: Brand: MEDLINE

## (undated) DEVICE — GAUZE,SPONGE,4"X4",16PLY,XRAY,STRL,LF: Brand: MEDLINE

## (undated) DEVICE — Device

## (undated) DEVICE — SURGICAL PROCEDURE PACK PERI-GYN 7177C] SURGICAL EXPRESS]

## (undated) DEVICE — GOWN,AURORA,NONREINFORCED,LARGE: Brand: MEDLINE

## (undated) DEVICE — GLOVE SURG SZ 65 STD WHT LTX SYN POLYMER BEAD REINF ANTI RL

## (undated) DEVICE — SOLUTION IV IRRIG WATER 1000ML POUR BRL 2F7114

## (undated) DEVICE — GAUZE,PACKING STRIP,IODOFORM,1/2"X5YD,ST: Brand: CURAD

## (undated) DEVICE — GAUZE,SPONGE,FLUFF,6"X6.75",STRL,5/TRAY: Brand: MEDLINE